# Patient Record
Sex: MALE | Race: WHITE | NOT HISPANIC OR LATINO | Employment: OTHER | URBAN - NONMETROPOLITAN AREA
[De-identification: names, ages, dates, MRNs, and addresses within clinical notes are randomized per-mention and may not be internally consistent; named-entity substitution may affect disease eponyms.]

---

## 2017-12-27 ENCOUNTER — OFFICE VISIT (OUTPATIENT)
Dept: OTOLARYNGOLOGY | Facility: CLINIC | Age: 57
End: 2017-12-27

## 2017-12-27 VITALS
BODY MASS INDEX: 19.99 KG/M2 | DIASTOLIC BLOOD PRESSURE: 88 MMHG | TEMPERATURE: 99.6 F | HEART RATE: 88 BPM | SYSTOLIC BLOOD PRESSURE: 125 MMHG | WEIGHT: 135 LBS | RESPIRATION RATE: 20 BRPM | HEIGHT: 69 IN

## 2017-12-27 DIAGNOSIS — J30.9 CHRONIC ALLERGIC RHINITIS, UNSPECIFIED SEASONALITY, UNSPECIFIED TRIGGER: ICD-10-CM

## 2017-12-27 DIAGNOSIS — R59.1 LYMPHADENOPATHY OF HEAD AND NECK: ICD-10-CM

## 2017-12-27 DIAGNOSIS — C43.31: ICD-10-CM

## 2017-12-27 DIAGNOSIS — C04.9 FLOOR OF MOUTH SQUAMOUS CELL CARCINOMA (HCC): Primary | ICD-10-CM

## 2017-12-27 DIAGNOSIS — F17.200 SMOKING: ICD-10-CM

## 2017-12-27 PROCEDURE — 4004F PT TOBACCO SCREEN RCVD TLK: CPT | Performed by: OTOLARYNGOLOGY

## 2017-12-27 PROCEDURE — 99214 OFFICE O/P EST MOD 30 MIN: CPT | Performed by: OTOLARYNGOLOGY

## 2017-12-27 RX ORDER — AMOXICILLIN AND CLAVULANATE POTASSIUM 875; 125 MG/1; MG/1
1 TABLET, FILM COATED ORAL EVERY 12 HOURS SCHEDULED
Qty: 20 TABLET | Refills: 0 | Status: SHIPPED | OUTPATIENT
Start: 2017-12-27 | End: 2018-01-06

## 2017-12-27 RX ORDER — FLUTICASONE PROPIONATE 50 MCG
2 SPRAY, SUSPENSION (ML) NASAL DAILY
Qty: 16 G | Refills: 11 | Status: SHIPPED | OUTPATIENT
Start: 2017-12-27 | End: 2021-04-20

## 2017-12-27 RX ORDER — MOMETASONE FUROATE 50 UG/1
2 SPRAY, METERED NASAL DAILY
Qty: 1 EACH | Refills: 6 | Status: SHIPPED | OUTPATIENT
Start: 2017-12-27 | End: 2017-12-27 | Stop reason: ALTCHOICE

## 2017-12-27 NOTE — PATIENT INSTRUCTIONS
Steps to Quit Smoking   Smoking tobacco can be harmful to your health and can affect almost every organ in your body. Smoking puts you, and those around you, at risk for developing many serious chronic diseases. Quitting smoking is difficult, but it is one of the best things that you can do for your health. It is never too late to quit.  WHAT ARE THE BENEFITS OF QUITTING SMOKING?  When you quit smoking, you lower your risk of developing serious diseases and conditions, such as:  · Lung cancer or lung disease, such as COPD.  · Heart disease.  · Stroke.  · Heart attack.  · Infertility.  · Osteoporosis and bone fractures.  Additionally, symptoms such as coughing, wheezing, and shortness of breath may get better when you quit. You may also find that you get sick less often because your body is stronger at fighting off colds and infections. If you are pregnant, quitting smoking can help to reduce your chances of having a baby of low birth weight.  HOW DO I GET READY TO QUIT?  When you decide to quit smoking, create a plan to make sure that you are successful. Before you quit:  · Pick a date to quit. Set a date within the next two weeks to give you time to prepare.  · Write down the reasons why you are quitting. Keep this list in places where you will see it often, such as on your bathroom mirror or in your car or wallet.  · Identify the people, places, things, and activities that make you want to smoke (triggers) and avoid them. Make sure to take these actions:    Throw away all cigarettes at home, at work, and in your car.    Throw away smoking accessories, such as ashtrays and lighters.    Clean your car and make sure to empty the ashtray.    Clean your home, including curtains and carpets.  · Tell your family, friends, and coworkers that you are quitting. Support from your loved ones can make quitting easier.  · Talk with your health care provider about your options for quitting smoking.  · Find out what treatment  "options are covered by your health insurance.  WHAT STRATEGIES CAN I USE TO QUIT SMOKING?   Talk with your healthcare provider about different strategies to quit smoking. Some strategies include:  · Quitting smoking altogether instead of gradually lessening how much you smoke over a period of time. Research shows that quitting \"cold turkey\" is more successful than gradually quitting.  · Attending in-person counseling to help you build problem-solving skills. You are more likely to have success in quitting if you attend several counseling sessions. Even short sessions of 10 minutes can be effective.  · Finding resources and support systems that can help you to quit smoking and remain smoke-free after you quit. These resources are most helpful when you use them often. They can include:    Online chats with a counselor.    Telephone quitlines.    Printed self-help materials.    Support groups or group counseling.    Text messaging programs.    Mobile phone applications.  · Taking medicines to help you quit smoking. (If you are pregnant or breastfeeding, talk with your health care provider first.) Some medicines contain nicotine and some do not. Both types of medicines help with cravings, but the medicines that include nicotine help to relieve withdrawal symptoms. Your health care provider may recommend:    Nicotine patches, gum, or lozenges.    Nicotine inhalers or sprays.    Non-nicotine medicine that is taken by mouth.  Talk with your health care provider about combining strategies, such as taking medicines while you are also receiving in-person counseling. Using these two strategies together makes you more likely to succeed in quitting than if you used either strategy on its own.  If you are pregnant or breastfeeding, talk with your health care provider about finding counseling or other support strategies to quit smoking. Do not take medicine to help you quit smoking unless told to do so by your health care " provider.  WHAT THINGS CAN I DO TO MAKE IT EASIER TO QUIT?  Quitting smoking might feel overwhelming at first, but there is a lot that you can do to make it easier. Take these important actions:  · Reach out to your family and friends and ask that they support and encourage you during this time. Call telephone quitlines, reach out to support groups, or work with a counselor for support.  · Ask people who smoke to avoid smoking around you.  · Avoid places that trigger you to smoke, such as bars, parties, or smoke-break areas at work.  · Spend time around people who do not smoke.  · Lessen stress in your life, because stress can be a smoking trigger for some people. To lessen stress, try:    Exercising regularly.    Deep-breathing exercises.    Yoga.    Meditating.    Performing a body scan. This involves closing your eyes, scanning your body from head to toe, and noticing which parts of your body are particularly tense. Purposefully relax the muscles in those areas.  · Download or purchase mobile phone or tablet apps (applications) that can help you stick to your quit plan by providing reminders, tips, and encouragement. There are many free apps, such as QuitGuide from the CDC (Centers for Disease Control and Prevention). You can find other support for quitting smoking (smoking cessation) through smokefree.gov and other websites.  HOW WILL I FEEL WHEN I QUIT SMOKING?  Within the first 24 hours of quitting smoking, you may start to feel some withdrawal symptoms. These symptoms are usually most noticeable 2-3 days after quitting, but they usually do not last beyond 2-3 weeks. Changes or symptoms that you might experience include:  · Mood swings.  · Restlessness, anxiety, or irritation.  · Difficulty concentrating.  · Dizziness.  · Strong cravings for sugary foods in addition to nicotine.  · Mild weight gain.  · Constipation.  · Nausea.  · Coughing or a sore throat.  · Changes in how your medicines work in your  body.  · A depressed mood.  · Difficulty sleeping (insomnia).  After the first 2-3 weeks of quitting, you may start to notice more positive results, such as:  · Improved sense of smell and taste.  · Decreased coughing and sore throat.  · Slower heart rate.  · Lower blood pressure.  · Clearer skin.  · The ability to breathe more easily.  · Fewer sick days.  Quitting smoking is very challenging for most people. Do not get discouraged if you are not successful the first time. Some people need to make many attempts to quit before they achieve long-term success. Do your best to stick to your quit plan, and talk with your health care provider if you have any questions or concerns.     This information is not intended to replace advice given to you by your health care provider. Make sure you discuss any questions you have with your health care provider.     Document Released: 12/12/2002 Document Revised: 05/03/2016 Document Reviewed: 05/03/2016  [employer] Interactive Patient Education ©2017 [employer]

## 2017-12-27 NOTE — PROGRESS NOTES
PRIMARY CARE PROVIDER: Yousif Marrero MD  REFERRING PROVIDER: No ref. provider found    No chief complaint on file.      Subjective   History of Present Illness:  Keron Gerber is a  57 y.o.  male who is here for follow up. He has had complaints of a right neck mass noted with shaving.  Prior to this, he has a few weeks of flu-like symptoms with malaise and nausea.  He is also had multiple dental extractions on November 7 for chronic gingivitis and dental problems.  He has not been treated with any antibiotics.  Denies any weight loss.  Denies any voice change.  Reports intermittent nausea and emesis.  Reports fatigue.  Symptoms are moderate.    He is still smoking.    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    11/7: Dental extractions.    Review of Systems:  Review of Systems   Constitutional: Positive for fatigue and fever. Negative for activity change, appetite change, chills and unexpected weight change.   HENT: Positive for postnasal drip and rhinorrhea. Negative for congestion, dental problem, ear discharge, ear pain, facial swelling, hearing loss, nosebleeds, sinus pressure, sore throat, tinnitus, trouble swallowing and voice change.    Eyes: Negative for pain, discharge, redness and itching.   Respiratory: Negative for apnea, cough, chest tightness, shortness of breath, wheezing and stridor.    Cardiovascular: Negative for chest pain, palpitations and leg swelling.   Gastrointestinal: Positive for nausea and vomiting. Negative for abdominal pain, constipation and diarrhea.   Endocrine: Negative for cold intolerance, heat intolerance and polydipsia.   Genitourinary: Negative for decreased urine volume, difficulty urinating and flank pain.   Musculoskeletal: Negative for arthralgias, back pain, neck pain  and neck stiffness.   Skin: Negative for color change, pallor and rash.   Allergic/Immunologic: Negative for immunocompromised state.   Neurological: Negative for dizziness, facial asymmetry, speech difficulty, weakness, light-headedness and headaches.   Hematological: Negative for adenopathy. Does not bruise/bleed easily.   Psychiatric/Behavioral: Negative for behavioral problems.       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 No pertinent family history.  Social History   Substance Use Topics   • Smoking status: Current Every Day Smoker     Types: Cigarettes   • Smokeless tobacco: Never Used   • Alcohol use Yes      Comment: SOCIAL       Current Outpatient Prescriptions:   •  ALPRAZolam (XANAX) 0.5 MG tablet, , Disp: , Rfl:   •  citalopram (CeleXA) 10 MG tablet, , Disp: , Rfl:   •  metoprolol succinate XL (TOPROL-XL) 25 MG 24 hr tablet, , Disp: , Rfl:   •  tiZANidine (ZANAFLEX) 4 MG tablet, , Disp: , Rfl:   •  amoxicillin-clavulanate (AUGMENTIN) 875-125 MG per tablet, Take 1 tablet by mouth Every 12 (Twelve) Hours for 10 days., Disp: 20 tablet, Rfl: 0  •  fluticasone (FLONASE) 50 MCG/ACT nasal spray, 2 sprays into each nostril Daily., Disp: 16 g, Rfl: 11  Allergies:  Sulfa antibiotics    Objective     Vital Signs:  Temp:  [99.6 °F (37.6 °C)] 99.6 °F (37.6 °C)  Heart Rate:  [88] 88  Resp:  [20] 20  BP: (125)/(88)  125/88    Physical Exam:  Physical Exam   Constitutional: He is oriented to person, place, and time. He appears well-developed. He is cooperative. No distress.   HENT:   Head: Normocephalic and atraumatic. Head is without Delgadillo's sign, without abrasion and without contusion.   Right Ear: Hearing, tympanic membrane, external ear and ear canal normal.   Left Ear: Hearing, tympanic membrane, external ear and ear canal normal.   Nose: Nasal deformity (status post forehead flap with left alar retraction.  No evidence of recurrence) present. No septal deviation.   Mouth/Throat: Uvula is midline, oropharynx is clear and moist and mucous membranes are normal. No oral lesions. Dental caries present. No uvula swelling.   Well-healed floor of mouth and no palpable abnormality.    Mirror laryngoscopy shows an omega-shaped epiglottis.  No tumors seen.   Eyes: Conjunctivae and EOM are normal. Pupils are equal, round, and reactive to light. Right eye exhibits no discharge. Left eye exhibits no discharge. No scleral icterus.   Neck: Normal range of motion. Neck supple. No JVD present. No tracheal deviation present. No thyromegaly present.       Pulmonary/Chest: Effort normal. No stridor.   Musculoskeletal: Normal range of motion. He exhibits no edema or deformity.   Lymphadenopathy:     He has no cervical adenopathy.   Neurological: He is alert and oriented to person, place, and time. He has normal strength. No cranial nerve deficit. Coordination normal.   Skin: Skin is warm and dry. No rash noted. He is not diaphoretic. No erythema. No pallor.   Psychiatric: He has a normal mood and affect. His speech is normal and behavior is normal. Judgment and thought content normal. Cognition and memory are normal.   Nursing note and vitals reviewed.      Assessment   Assessment:  1. Floor of mouth squamous cell carcinoma    2. Smoking    3. Malignant melanoma of skin of nose    4. Chronic allergic rhinitis, unspecified seasonality,  unspecified trigger    5. Lymphadenopathy of head and neck        Plan   Plan:    Mild lymph node findings on examination.  He does have dental exposure following his recent dental extractions.  This may be reactive lymphadenopathy.  He does have a history of melanoma the nose and left for mouth squamous cell carcinoma who continues to smoke.  We will treat with Augmentin for 10 days and follow-up with CT scan of the neck 6 weeks.    .  New Medications Ordered This Visit   Medications   • amoxicillin-clavulanate (AUGMENTIN) 875-125 MG per tablet     Sig: Take 1 tablet by mouth Every 12 (Twelve) Hours for 10 days.     Dispense:  20 tablet     Refill:  0   • fluticasone (FLONASE) 50 MCG/ACT nasal spray     Si sprays into each nostril Daily.     Dispense:  16 g     Refill:  11       Return in about 6 weeks (around 2018).    My findings and recommendations were discussed and questions were answered.     Yousif Lazaro MD  17  12:41 PM

## 2018-02-13 ENCOUNTER — HOSPITAL ENCOUNTER (OUTPATIENT)
Dept: CT IMAGING | Facility: HOSPITAL | Age: 58
Discharge: HOME | End: 2018-02-13
Attending: OTOLARYNGOLOGY | Admitting: OTOLARYNGOLOGY

## 2018-02-13 DIAGNOSIS — R59.1 LYMPHADENOPATHY OF HEAD AND NECK: ICD-10-CM

## 2018-02-13 LAB — CREAT BLDA-MCNC: 0.7 MG/DL (ref 0.6–1.3)

## 2018-02-13 PROCEDURE — 70492 CT SFT TSUE NCK W/O & W/DYE: CPT

## 2018-02-13 PROCEDURE — 0 IOPAMIDOL 61 % SOLUTION: Performed by: OTOLARYNGOLOGY

## 2018-02-13 PROCEDURE — 82565 ASSAY OF CREATININE: CPT

## 2018-02-13 RX ADMIN — IOPAMIDOL 100 ML: 612 INJECTION, SOLUTION INTRAVENOUS at 11:15

## 2018-02-14 ENCOUNTER — OFFICE VISIT (OUTPATIENT)
Dept: OTOLARYNGOLOGY | Facility: CLINIC | Age: 58
End: 2018-02-14

## 2018-02-14 VITALS
SYSTOLIC BLOOD PRESSURE: 113 MMHG | DIASTOLIC BLOOD PRESSURE: 77 MMHG | TEMPERATURE: 97.1 F | WEIGHT: 124 LBS | HEART RATE: 85 BPM | HEIGHT: 69 IN | BODY MASS INDEX: 18.37 KG/M2

## 2018-02-14 DIAGNOSIS — Z72.0 TOBACCO ABUSE DISORDER: ICD-10-CM

## 2018-02-14 DIAGNOSIS — C43.31: ICD-10-CM

## 2018-02-14 DIAGNOSIS — C04.9 FLOOR OF MOUTH SQUAMOUS CELL CARCINOMA (HCC): Primary | ICD-10-CM

## 2018-02-14 DIAGNOSIS — J30.9 CHRONIC ALLERGIC RHINITIS, UNSPECIFIED SEASONALITY, UNSPECIFIED TRIGGER: ICD-10-CM

## 2018-02-14 PROCEDURE — 99213 OFFICE O/P EST LOW 20 MIN: CPT | Performed by: OTOLARYNGOLOGY

## 2018-02-14 NOTE — PROGRESS NOTES
PRIMARY CARE PROVIDER: Yousif Marrero MD  REFERRING PROVIDER: No ref. provider found    Chief Complaint   Patient presents with   • Follow-up     neck mass       Subjective   History of Present Illness:  Keron Gerber is a  57 y.o.  male who is here for follow up. He has had complaints of a right neck mass noted with shaving.  Prior to this, he has a few weeks of flu-like symptoms with malaise and nausea.  He is also had multiple dental extractions on November 7 for chronic gingivitis and dental problems.  He was treated with Augmentin with improvement in symptoms.  He has also been using Flonase. Denies any weight loss.  Denies any voice change. He had a CT scan of the neck on 2/13/18 revealing no lymphadenopathy. He denies persistent lymph node swelling.  The flonase has helped the nasal drainage.    He is still smoking.  He is interested in quitting.    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    11/7: Dental extractions.    Review of Systems:  Review of Systems   Constitutional: Positive for fatigue. Negative for activity change, appetite change, chills, fever and unexpected weight change.   HENT: Positive for postnasal drip and rhinorrhea. Negative for congestion, dental problem, ear discharge, ear pain, facial swelling, hearing loss, nosebleeds, sinus pressure, sore throat, tinnitus, trouble swallowing and voice change.    Eyes: Negative for pain, discharge, redness and itching.   Respiratory: Negative for apnea, cough, chest tightness, shortness of breath, wheezing and stridor.    Cardiovascular: Negative for chest pain, palpitations and leg swelling.   Gastrointestinal: Negative for abdominal pain, constipation, diarrhea, nausea and vomiting.   Endocrine: Negative for cold intolerance, heat intolerance and  polydipsia.   Genitourinary: Negative for decreased urine volume, difficulty urinating and flank pain.   Musculoskeletal: Negative for arthralgias, back pain, neck pain and neck stiffness.   Skin: Negative for color change, pallor and rash.   Allergic/Immunologic: Negative for immunocompromised state.   Neurological: Negative for dizziness, facial asymmetry, speech difficulty, weakness, light-headedness and headaches.   Hematological: Negative for adenopathy. Does not bruise/bleed easily.   Psychiatric/Behavioral: Negative for behavioral problems.       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 No pertinent family history.  Social History   Substance Use Topics   • Smoking status: Current Every Day Smoker     Types: Cigarettes   • Smokeless tobacco: Never Used   • Alcohol use Yes      Comment: SOCIAL       Current Outpatient Prescriptions:   •  ALPRAZolam (XANAX) 0.5 MG tablet, , Disp: , Rfl:   •  citalopram (CeleXA) 10 MG tablet, , Disp: , Rfl:   •  fluticasone (FLONASE) 50 MCG/ACT nasal spray, 2 sprays into each nostril Daily., Disp: 16 g, Rfl: 11  •  metoprolol succinate XL (TOPROL-XL) 25 MG 24 hr tablet, , Disp: , Rfl:   •  tiZANidine (ZANAFLEX) 4 MG tablet, , Disp: , Rfl:   No current facility-administered medications for this visit.   Allergies:  Sulfa antibiotics    Objective     Vital Signs:  Temp:  [97.1 °F (36.2  °C)] 97.1 °F (36.2 °C)  Heart Rate:  [85] 85  BP: (113)/(77) 113/77    Physical Exam:  Physical Exam   Constitutional: He is oriented to person, place, and time. He appears well-developed. He is cooperative. No distress.   HENT:   Head: Normocephalic and atraumatic. Head is without Delgadillo's sign, without abrasion and without contusion.   Right Ear: Hearing, tympanic membrane, external ear and ear canal normal.   Left Ear: Hearing, tympanic membrane, external ear and ear canal normal.   Nose: Nasal deformity (status post forehead flap with left alar retraction.  No evidence of recurrence) present. No septal deviation.   Mouth/Throat: Uvula is midline, oropharynx is clear and moist and mucous membranes are normal. No oral lesions. Abnormal dentition (edentulous.  s/p extractions with extraction sites almost completely mucosalized.  Pitting still present.). No uvula swelling.   Well-healed floor of mouth and no palpable abnormality.  Mirror laryngoscopy shows an omega-shaped epiglottis.  No tumors seen.   Eyes: Conjunctivae and EOM are normal. Pupils are equal, round, and reactive to light. Right eye exhibits no discharge. Left eye exhibits no discharge. No scleral icterus.   Neck: Normal range of motion and phonation normal. Neck supple. No tracheal deviation present.   Pulmonary/Chest: Effort normal. No stridor. No respiratory distress.   Musculoskeletal: Normal range of motion. He exhibits no edema or deformity.   Lymphadenopathy:     He has no cervical adenopathy.   Neurological: He is alert and oriented to person, place, and time. He has normal strength. No cranial nerve deficit. Coordination normal.   Skin: Skin is warm and dry. No rash noted. He is not diaphoretic. No erythema. No pallor.   Psychiatric: He has a normal mood and affect. His speech is normal and behavior is normal. Judgment and thought content normal. Cognition and memory are normal.   Nursing note and vitals reviewed.    Data review:  I  personally reviewed the CT scan images of the neck dated 12/27/2017.  The following is my interpretation: There is no significant lymphadenopathy in the neck.        Assessment   Assessment:  1. Floor of mouth squamous cell carcinoma    2. Malignant melanoma of skin of nose    3. Chronic allergic rhinitis, unspecified seasonality, unspecified trigger    4. Tobacco abuse disorder        Plan   Plan:    Lymphadenopathy resolved. We will continue to monitor closely. RENA today. Follow-up in 6 months for cancer surveillance.     We have also discussed smoking cessation. He is interested. He has been treated with Chantix in the past by his PCP and will discuss restarting Chantix with him.     QUALITY MEASURES    Body Mass Index Screening and Follow-Up Plan  Body mass index is 18.31 kg/(m^2).      Tobacco Use: Screening and Cessation Intervention  Smoking status: Current Every Day Smoker                                                   Packs/day: 0.00      Years: 0.00         Types: Cigarettes  Smokeless status: Never Used                        Smoking cessation information given in after visit summary.      Return in about 6 months (around 8/14/2018), or if symptoms worsen or fail to improve, for Recheck.    My findings and recommendations were discussed and questions were answered.     Yousif Lazaro MD  02/14/18  9:35 AM

## 2018-06-06 RX ORDER — CITALOPRAM 20 MG/1
20 TABLET ORAL DAILY
COMMUNITY

## 2018-06-06 RX ORDER — METOPROLOL SUCCINATE 25 MG/1
25 TABLET, EXTENDED RELEASE ORAL DAILY
COMMUNITY

## 2018-06-06 RX ORDER — GABAPENTIN 100 MG/1
100 CAPSULE ORAL DAILY
COMMUNITY

## 2018-06-06 RX ORDER — IBUPROFEN 800 MG/1
800 TABLET ORAL
COMMUNITY

## 2018-06-06 RX ORDER — DICLOFENAC SODIUM 75 MG/1
75 TABLET, DELAYED RELEASE ORAL 2 TIMES DAILY
COMMUNITY

## 2018-06-06 RX ORDER — FLUTICASONE PROPIONATE 50 MCG
1 SPRAY, SUSPENSION (ML) NASAL DAILY
COMMUNITY

## 2018-06-06 RX ORDER — HYDROCODONE BITARTRATE AND ACETAMINOPHEN 7.5; 325 MG/1; MG/1
1 TABLET ORAL
COMMUNITY

## 2018-06-06 RX ORDER — TRAMADOL HYDROCHLORIDE 50 MG/1
50 TABLET ORAL EVERY 6 HOURS PRN
COMMUNITY

## 2018-06-06 RX ORDER — ALPRAZOLAM 0.5 MG/1
0.5 TABLET ORAL 2 TIMES DAILY
COMMUNITY

## 2018-08-15 ENCOUNTER — OFFICE VISIT (OUTPATIENT)
Dept: OTOLARYNGOLOGY | Facility: CLINIC | Age: 58
End: 2018-08-15

## 2018-08-15 VITALS
HEART RATE: 78 BPM | BODY MASS INDEX: 18.49 KG/M2 | HEIGHT: 68 IN | RESPIRATION RATE: 20 BRPM | SYSTOLIC BLOOD PRESSURE: 113 MMHG | TEMPERATURE: 98 F | WEIGHT: 122 LBS | DIASTOLIC BLOOD PRESSURE: 76 MMHG

## 2018-08-15 DIAGNOSIS — H60.502 ACUTE OTITIS EXTERNA OF LEFT EAR, UNSPECIFIED TYPE: ICD-10-CM

## 2018-08-15 DIAGNOSIS — J30.9 CHRONIC ALLERGIC RHINITIS, UNSPECIFIED SEASONALITY, UNSPECIFIED TRIGGER: ICD-10-CM

## 2018-08-15 DIAGNOSIS — C04.9 FLOOR OF MOUTH SQUAMOUS CELL CARCINOMA (HCC): Primary | ICD-10-CM

## 2018-08-15 DIAGNOSIS — C43.31: ICD-10-CM

## 2018-08-15 DIAGNOSIS — Z72.0 TOBACCO ABUSE DISORDER: ICD-10-CM

## 2018-08-15 DIAGNOSIS — F17.200 SMOKING: ICD-10-CM

## 2018-08-15 PROCEDURE — 99214 OFFICE O/P EST MOD 30 MIN: CPT | Performed by: OTOLARYNGOLOGY

## 2018-08-15 RX ORDER — NEOMYCIN SULFATE, POLYMYXIN B SULFATE AND HYDROCORTISONE 10; 3.5; 1 MG/ML; MG/ML; [USP'U]/ML
3 SUSPENSION/ DROPS AURICULAR (OTIC) 3 TIMES DAILY
Qty: 10 ML | Refills: 0 | Status: SHIPPED | OUTPATIENT
Start: 2018-08-15 | End: 2018-08-22

## 2018-08-15 RX ORDER — GABAPENTIN 100 MG/1
100 CAPSULE ORAL
COMMUNITY
End: 2021-04-20

## 2018-08-15 RX ORDER — TRAMADOL HYDROCHLORIDE 50 MG/1
50 TABLET ORAL
COMMUNITY
End: 2021-04-20

## 2018-08-15 NOTE — PATIENT INSTRUCTIONS
IF YOU SMOKE OR USE TOBACCO PLEASE READ THE FOLLOWING:    Why is smoking bad for me?  Smoking increases the risk of heart disease, lung disease, vascular disease, stroke, and cancer.     If you smoke, STOP!    If you would like more information on quitting smoking, please visit the [employer] website: [email or web address]   This link will provide additional resources including the QUIT line and the Beat the Pack support groups.     For more information:    Quit Now [facility]  1-800-QUIT-NOW  [email or web address]

## 2018-08-15 NOTE — PROGRESS NOTES
PRIMARY CARE PROVIDER: Yousif Marrero MD  REFERRING PROVIDER: No ref. provider found    Chief Complaint   Patient presents with   • Follow-up     neck mass / patient here to have his left ear eval do to drainage       Subjective   History of Present Illness:  Keron Gerber is a  58 y.o.  male who is here for follow up. He has had resolution of his neck swelling.    His main complaint today is left ear pain and drainage.  This has been present for 2 weeks.  He presented to his PCP with complaints of a left-sided cerumen impaction.  Attempts were made at removal.  There was concern for infection and he was placed on an oral antibiotic.  He now complains of moderate left ear pain and draiage. Dull and itching in nature.  Nothing makes this better or worse.  He has associated decreased hearing and ringing.  He denies vertigo.      He is still smoking.  He is not interested in quitting - smokes 15 cigarettes/day.    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      Review of Systems:  Review of Systems   Constitutional: Positive for fatigue. Negative for activity change, appetite change, chills, fever and unexpected weight change.   HENT: Positive for postnasal drip and rhinorrhea. Negative for congestion, dental problem, ear discharge, ear pain, facial swelling, hearing loss, nosebleeds, sinus pressure, sore throat, tinnitus, trouble swallowing and voice change.    Eyes: Negative for pain, discharge, redness and itching.   Respiratory: Negative for apnea, cough, chest tightness, shortness of breath, wheezing and stridor.    Cardiovascular: Negative for chest pain, palpitations and leg swelling.   Gastrointestinal: Negative for abdominal pain, constipation, diarrhea, nausea and vomiting.   Endocrine: Negative for cold  intolerance, heat intolerance and polydipsia.   Genitourinary: Negative for decreased urine volume, difficulty urinating and flank pain.   Musculoskeletal: Negative for arthralgias, back pain, neck pain and neck stiffness.   Skin: Negative for color change, pallor and rash.   Allergic/Immunologic: Negative for immunocompromised state.   Neurological: Negative for dizziness, facial asymmetry, speech difficulty, weakness, light-headedness and headaches.   Hematological: Negative for adenopathy. Does not bruise/bleed easily.   Psychiatric/Behavioral: Negative for behavioral problems.       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 No pertinent family history.  Social History   Substance Use Topics   • Smoking status: Current Every Day Smoker     Types: Cigarettes   • Smokeless tobacco: Never Used   • Alcohol use Yes      Comment: SOCIAL       Current Outpatient Prescriptions:   •  ALPRAZolam (XANAX) 0.5 MG tablet, , Disp: , Rfl:   •  citalopram (CeleXA) 10 MG tablet, , Disp: , Rfl:   •  fluticasone (FLONASE) 50 MCG/ACT nasal spray, 2 sprays into each nostril Daily., Disp: 16 g, Rfl: 11  •  gabapentin (NEURONTIN) 100 MG capsule, Take 100 mg by mouth., Disp: , Rfl:   •  metoprolol succinate XL (TOPROL-XL) 25 MG 24 hr tablet, , Disp: , Rfl:   •  tiZANidine (ZANAFLEX) 4 MG tablet, , Disp: , Rfl:   •  traMADol  (ULTRAM) 50 MG tablet, Take 50 mg by mouth., Disp: , Rfl:   •  neomycin-polymyxin-hydrocortisone (CORTISPORIN) 3.5-66107-5 otic suspension, Administer 3 drops into the left ear 3 (Three) Times a Day for 7 days., Disp: 10 mL, Rfl: 0  Allergies:  Sulfa antibiotics    Objective     Vital Signs:  Temp:  [98 °F (36.7 °C)] 98 °F (36.7 °C)  Heart Rate:  [78] 78  Resp:  [20] 20  BP: (113)/(76) 113/76    Physical Exam:  Physical Exam   Constitutional: He is oriented to person, place, and time. He appears well-developed. He is cooperative. No distress.   HENT:   Head: Normocephalic and atraumatic. Head is without Delgadillo's sign, without abrasion and without contusion.   Right Ear: Hearing, tympanic membrane, external ear and ear canal normal.   Nose: Nasal deformity (status post forehead flap with left alar retraction.  No evidence of recurrence) present. No septal deviation.   Mouth/Throat: Uvula is midline, oropharynx is clear and moist and mucous membranes are normal. No oral lesions. Abnormal dentition: edentulous.  s/p extractions with extraction sites almost completely mucosalized.  Pitting still present. No uvula swelling.   Dentures removed for exam.  Well-healed floor of mouth and no palpable or visible abnormality.  Mirror laryngoscopy shows an omega-shaped epiglottis.  No tumors seen.    Left ear with white, purulent debris in the canal and crusting in the conchal bowl and leading onto the infraauricular skin.  See otoscopy note.   Eyes: Pupils are equal, round, and reactive to light. Conjunctivae and EOM are normal. Right eye exhibits no discharge. Left eye exhibits no discharge. No scleral icterus.   Neck: Normal range of motion and phonation normal. Neck supple. No tracheal deviation present.   Pulmonary/Chest: Effort normal. No stridor. No respiratory distress.   Musculoskeletal: Normal range of motion. He exhibits no edema or deformity.   Lymphadenopathy:     He has no cervical adenopathy.   Neurological: He is  alert and oriented to person, place, and time. He has normal strength. No cranial nerve deficit. Coordination normal.   Skin: Skin is warm and dry. No rash noted. He is not diaphoretic. No erythema. No pallor.   Psychiatric: He has a normal mood and affect. His speech is normal and behavior is normal. Judgment and thought content normal. Cognition and memory are normal.   Nursing note and vitals reviewed.    Assessment   Assessment:  1. 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 Malignant melanoma of skin of nose (CMS/HCC)    3. Chronic allergic rhinitis, unspecified seasonality, unspecified trigger    4. Tobacco abuse disorder    5. Smoking    6. Acute otitis externa of left ear, unspecified type        Plan   Plan:    Ear was debrided.  Start cortisporin otic.  F/U 3 weeks.    Lymphadenopathy resolved. We will continue to monitor closely. RENA today. Follow-up in 6 months for cancer surveillance.       QUALITY MEASURES    Body Mass Index Screening and Follow-Up Plan  Body mass index is 18.55 kg/m².      Tobacco Use: Screening and Cessation Intervention  Smoking status: Current Every Day Smoker                                                   Packs/day: 0.00      Years: 0.00         Types: Cigarettes  Smokeless tobacco: Never Used                        Smoking cessation information given in after visit summary.      Return in about 3 weeks (around 9/5/2018).    My findings and recommendations were discussed and questions were answered.     Yousif Lazaro MD  08/15/18  5:17 PM

## 2018-09-12 ENCOUNTER — OFFICE VISIT (OUTPATIENT)
Dept: OTOLARYNGOLOGY | Facility: CLINIC | Age: 58
End: 2018-09-12

## 2018-09-12 VITALS
SYSTOLIC BLOOD PRESSURE: 140 MMHG | WEIGHT: 122 LBS | RESPIRATION RATE: 20 BRPM | HEART RATE: 80 BPM | BODY MASS INDEX: 18.07 KG/M2 | HEIGHT: 69 IN | DIASTOLIC BLOOD PRESSURE: 78 MMHG | TEMPERATURE: 97.8 F

## 2018-09-12 DIAGNOSIS — J30.9 CHRONIC ALLERGIC RHINITIS, UNSPECIFIED SEASONALITY, UNSPECIFIED TRIGGER: ICD-10-CM

## 2018-09-12 DIAGNOSIS — F17.200 SMOKING: ICD-10-CM

## 2018-09-12 DIAGNOSIS — R59.1 LYMPHADENOPATHY OF HEAD AND NECK: ICD-10-CM

## 2018-09-12 DIAGNOSIS — C43.31: ICD-10-CM

## 2018-09-12 DIAGNOSIS — C04.9 FLOOR OF MOUTH SQUAMOUS CELL CARCINOMA (HCC): Primary | ICD-10-CM

## 2018-09-12 DIAGNOSIS — Z72.0 TOBACCO ABUSE DISORDER: ICD-10-CM

## 2018-09-12 DIAGNOSIS — H60.502 ACUTE OTITIS EXTERNA OF LEFT EAR, UNSPECIFIED TYPE: ICD-10-CM

## 2018-09-12 PROCEDURE — 99213 OFFICE O/P EST LOW 20 MIN: CPT | Performed by: OTOLARYNGOLOGY

## 2018-09-12 NOTE — PROGRESS NOTES
PRIMARY CARE PROVIDER: Yousif Marrero MD  REFERRING PROVIDER: No ref. provider found    Chief Complaint   Patient presents with   • Follow-up     neck mass/ left ear drainage       Subjective   History of Present Illness:  Keron Gerber is a  58 y.o. male who is here for follow up regarding his left ear infection and drainage.  When he was seen on 8/15/2018, he complained of left ear pain and drainage beginning after his PCP removed some impacted cerumen on the left.  He was placed on an oral antibiotic by his PCP.  I further debrided his ear and placed him on cortisporin otic.  He reports complete resolution of the ear pain and hearing loss.  No vertigo.  No tinnitus.    He is still smoking.  He is not interested in quitting - smokes 15 cigarettes/day.     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     Review of Systems:  Review of Systems   Constitutional: Negative for chills and fever.   HENT: Positive for congestion. Negative for ear discharge, ear pain, facial swelling, hearing loss, sore throat, trouble swallowing and voice change.    Respiratory: Negative for shortness of breath.    Musculoskeletal: Positive for gait problem.   Hematological: Negative for adenopathy.       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 No pertinent family history.  Social History   Substance Use Topics   • Smoking status: Current Every Day Smoker     Types: Cigarettes   • Smokeless tobacco: Never Used   • Alcohol use Yes      Comment: SOCIAL     Allergies:  Sulfa antibiotics    Current Outpatient Prescriptions:   •  ALPRAZolam (XANAX) 0.5 MG tablet, , Disp: , Rfl:   •  citalopram (CeleXA) 10 MG tablet, , Disp: , Rfl:   •  fluticasone (FLONASE) 50 MCG/ACT nasal spray, 2 sprays into each nostril Daily., Disp: 16 g, Rfl: 11  •  gabapentin (NEURONTIN) 100 MG capsule, Take 100 mg by mouth., Disp: , Rfl:   •  metoprolol succinate XL (TOPROL-XL) 25 MG 24 hr tablet, , Disp: , Rfl:   •  tiZANidine (ZANAFLEX) 4 MG tablet, , Disp: , Rfl:   •  traMADol (ULTRAM) 50 MG tablet, Take 50 mg by mouth., Disp: , Rfl:       Objective     Vital Signs:  Temp:  [97.8 °F (36.6 °C)] 97.8 °F (36.6 °C)  Heart Rate:  [80] 80  Resp:  [20] 20  BP: (140)/(78) 140/78    Physical Exam:  Physical Exam   Constitutional: He is oriented to person, place, and time. He appears well-developed and well-nourished. He is cooperative. No distress.   HENT:   Head: Normocephalic and atraumatic.   Right Ear: Hearing, tympanic membrane, external ear and ear canal normal.   Left Ear: Hearing, tympanic membrane, external ear and ear canal normal.   Nose: Nose normal.       Mouth/Throat: Uvula is midline, oropharynx is clear and moist and mucous membranes are normal. He has dentures.       Eyes: Pupils are equal, round, and reactive to light. Conjunctivae and EOM are normal. Right eye exhibits no discharge. Left eye exhibits no discharge. No scleral icterus.   Neck: Normal range of motion. Neck supple. No JVD present. No tracheal deviation present. No thyromegaly present.   Pulmonary/Chest: Effort normal. No stridor.    Musculoskeletal: Normal range of motion. He exhibits no edema or deformity.   Lymphadenopathy:     He has no cervical adenopathy.   Neurological: He is alert and oriented to person, place, and time. He has normal strength. No cranial nerve deficit. Coordination normal.   Skin: Skin is warm and dry. No rash noted. He is not diaphoretic. No erythema. No pallor.   Psychiatric: He has a normal mood and affect. His speech is normal and behavior is normal. Judgment and thought content normal. Cognition and memory are normal.   Nursing note and vitals reviewed.      Assessment   Assessment:  1. 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 Malignant melanoma of skin of nose (CMS/HCC)    3. Chronic allergic rhinitis, unspecified seasonality, unspecified trigger    4. Tobacco abuse disorder    5. Smoking    6. Acute otitis externa of left ear, unspecified type    7. Lymphadenopathy of head and neck        Plan   Plan:    His ear infection has resolved.  Call should do develop any decreased hearing, ear fullness, or ear drainage.  Follow-up in 6 months for routine melanoma and floor of mouth cancer surveillance.    Return in about 6 months (around 3/12/2019).    My findings and recommendations were discussed and questions were answered.     Yousif Lazaro MD  09/12/18  9:50 AM

## 2019-08-07 ENCOUNTER — OFFICE VISIT (OUTPATIENT)
Dept: OTOLARYNGOLOGY | Facility: CLINIC | Age: 59
End: 2019-08-07

## 2019-08-07 VITALS
WEIGHT: 126.8 LBS | HEIGHT: 69 IN | OXYGEN SATURATION: 98 % | RESPIRATION RATE: 20 BRPM | DIASTOLIC BLOOD PRESSURE: 82 MMHG | HEART RATE: 80 BPM | TEMPERATURE: 98 F | BODY MASS INDEX: 18.78 KG/M2 | SYSTOLIC BLOOD PRESSURE: 120 MMHG

## 2019-08-07 DIAGNOSIS — Z72.0 TOBACCO ABUSE DISORDER: ICD-10-CM

## 2019-08-07 DIAGNOSIS — C04.9 FLOOR OF MOUTH SQUAMOUS CELL CARCINOMA (HCC): Primary | ICD-10-CM

## 2019-08-07 DIAGNOSIS — C43.31: ICD-10-CM

## 2019-08-07 PROCEDURE — 99213 OFFICE O/P EST LOW 20 MIN: CPT | Performed by: OTOLARYNGOLOGY

## 2019-08-07 NOTE — PATIENT INSTRUCTIONS
IF YOU SMOKE OR USE TOBACCO PLEASE READ THE FOLLOWING:  Why is smoking bad for me?  Smoking increases the risk of heart disease, lung disease, vascular disease, stroke, and cancer. If you smoke, STOP!    If you would like more information on quitting smoking, please visit the [facility] website: [email or web address]   This link will provide additional resources including the QUIT line and the Beat the Pack support groups.     For more information:  Quit Now [facility]  1-800-QUIT-NOW  [email or web address]

## 2019-08-07 NOTE — PROGRESS NOTES
PRIMARY CARE PROVIDER: Yousif Marrero MD  REFERRING PROVIDER: No ref. provider found    Chief Complaint   Patient presents with   • Follow-up       Subjective   History of Present Illness:  Keron Gerber is a  59 y.o. male who presents for nasal melanoma and floor of mouth squamous cell carcinoma surveillance.    With regards to his [id number] malignant melanoma of his nose, he is breathing fine.  He has no nasal obstruction.  He has no nasal bleeding.  He denies any unexpected weight loss-in fact, he is gaining weight.  He denies any headaches, neck pain, lymph node swelling.  His surgery was October 2, 2014.    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 Lawrence with primary excision of August 7, 2014, he is doing well.  He did have all of his teeth extracted and the denture formed.  He denies any recurrent lesion in the mouth.  He denies any mouth pain.  He is continuing to smoke, about 15 cigarettes/day.  He is not interested in smoking cessation.    Review of Systems:  Review of Systems   Constitutional: Negative for chills and fever.   HENT: Positive for dental problem. Negative for congestion, ear pain, mouth sores and nosebleeds.    Musculoskeletal: Negative for neck pain.   Skin: Negative for color change and wound.   Neurological: Negative for facial asymmetry.   Hematological: Negative for adenopathy.       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 No pertinent family history.  Social History     Tobacco Use   • Smoking status: Current Every Day Smoker     Types: Cigarettes   • Smokeless tobacco: Never Used   Substance Use Topics   • Alcohol use: Yes     Comment: SOCIAL   • Drug use: No     Allergies:  Sulfa antibiotics    Current Outpatient Medications:   •  citalopram (CeleXA) 10 MG tablet, , Disp: , Rfl:   •  fluticasone (FLONASE) 50 MCG/ACT nasal spray, 2 sprays into each nostril Daily., Disp: 16 g, Rfl: 11  •  gabapentin (NEURONTIN) 100 MG capsule, Take 100 mg by mouth., Disp: , Rfl:   •  metoprolol succinate XL (TOPROL-XL) 25 MG 24 hr tablet, , Disp: , Rfl:   •  tiZANidine (ZANAFLEX) 4 MG tablet, , Disp: , Rfl:   •  traMADol (ULTRAM) 50 MG tablet, Take 50 mg by mouth., Disp: , Rfl:       Objective     Vital Signs:  Temp:  [98 °F (36.7 °C)] 98 °F (36.7 °C)  Heart Rate:  [80] 80  Resp:  [20] 20  BP: (120)/(82) 120/82    Physical Exam:  Physical Exam   Constitutional: He is oriented to person, place, and time. He appears well-developed and well-nourished. He is cooperative. No distress.   HENT:   Head: Normocephalic and atraumatic.   Right Ear: External ear normal.   Left Ear: External ear normal.   Nose: Nose normal.       Mouth/Throat: Uvula is midline, oropharynx is clear and moist and mucous membranes are normal. He has dentures (Removed for the examination.). No oral lesions.   Eyes: Conjunctivae and EOM are normal. Pupils are equal, round, and reactive to light. Right eye exhibits no discharge. Left eye exhibits no discharge. No scleral icterus.   Neck: Normal range of motion. Neck supple. No JVD present. No tracheal deviation present. No thyromegaly present.   Pulmonary/Chest: Effort normal. No stridor.   Musculoskeletal: Normal range of motion. He exhibits no edema or deformity.   Lymphadenopathy:     He has no cervical adenopathy.   Neurological:  He is alert and oriented to person, place, and time. He has normal strength. No cranial nerve deficit. Coordination normal.   Skin: Skin is warm and dry. No rash noted. He is not diaphoretic. No erythema. No pallor.   Psychiatric: He has a normal mood and affect. His speech is normal and behavior is normal. Judgment and thought content normal. Cognition and memory are normal.   Nursing note and vitals reviewed.      Assessment   Assessment:  1. 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 Malignant melanoma of skin of nose (CMS/HCC)    3. Tobacco abuse disorder        Plan   Plan:    No evidence of new or recurrent cutaneous carcinoma or oral cavity carcinoma.  He is continuing to smoke.  He realizes this is not healthy, but does not want to quit at this time.  We are 5 years out on his cancers, but, with his history and continuing to smoke, I did see him back in 1 year.    QUALITY MEASURES    Body Mass Index Screening and Follow-Up Plan  Body mass index is 18.73 kg/m².      Tobacco Use: Screening and Cessation Intervention  Social History    Tobacco Use      Smoking status: Current Every Day Smoker        Types: Cigarettes      Smokeless tobacco: Never Used          Return in about 1 year (around 8/7/2020).    My findings and recommendations were discussed and questions were answered.     Yousif Lazaro MD  08/07/19  11:14 AM

## 2021-04-16 ENCOUNTER — TELEPHONE (OUTPATIENT)
Dept: NEUROSURGERY | Facility: CLINIC | Age: 61
End: 2021-04-16

## 2021-04-16 NOTE — TELEPHONE ENCOUNTER
LVM with patient regarding appointment reminder 04/20/21. Advised via VM that imaging from outside facility must be brought on CD for review. Also advised to come early if paperwork was not obtained via mail.

## 2021-04-20 ENCOUNTER — OFFICE VISIT (OUTPATIENT)
Dept: NEUROSURGERY | Facility: CLINIC | Age: 61
End: 2021-04-20

## 2021-04-20 ENCOUNTER — HOSPITAL ENCOUNTER (OUTPATIENT)
Dept: GENERAL RADIOLOGY | Facility: HOSPITAL | Age: 61
Discharge: HOME | End: 2021-04-20
Admitting: NURSE PRACTITIONER

## 2021-04-20 VITALS — HEIGHT: 69 IN | WEIGHT: 141 LBS | BODY MASS INDEX: 20.88 KG/M2

## 2021-04-20 DIAGNOSIS — Z87.81 HISTORY OF CERVICAL FRACTURE: ICD-10-CM

## 2021-04-20 DIAGNOSIS — M54.50 LUMBAR BACK PAIN: ICD-10-CM

## 2021-04-20 DIAGNOSIS — M54.2 CERVICALGIA: Primary | ICD-10-CM

## 2021-04-20 DIAGNOSIS — Z87.891 HISTORY OF TOBACCO ABUSE: ICD-10-CM

## 2021-04-20 DIAGNOSIS — M79.601 PAIN IN BOTH UPPER EXTREMITIES: ICD-10-CM

## 2021-04-20 DIAGNOSIS — M79.602 PAIN IN BOTH UPPER EXTREMITIES: ICD-10-CM

## 2021-04-20 PROBLEM — R48.0 DYSLEXIA: Status: ACTIVE | Noted: 2021-04-20

## 2021-04-20 PROCEDURE — 72114 X-RAY EXAM L-S SPINE BENDING: CPT

## 2021-04-20 PROCEDURE — 99204 OFFICE O/P NEW MOD 45 MIN: CPT | Performed by: NURSE PRACTITIONER

## 2021-04-20 NOTE — PATIENT INSTRUCTIONS
Tobacco Use Disorder  Tobacco use disorder (TUD) occurs when a person craves, seeks, and uses tobacco, regardless of the consequences. This disorder can cause problems with mental and physical health. It can affect your ability to have healthy relationships, and it can keep you from meeting your responsibilities at work, home, or school.  Tobacco may be:  · Smoked as a cigarette or cigar.  · Inhaled using e-cigarettes.  · Smoked in a pipe or hookah.  · Chewed as smokeless tobacco.  · Inhaled into the nostrils as snuff.  Tobacco products contain a dangerous chemical called nicotine, which is very addictive. Nicotine triggers hormones that make the body feel stimulated and works on areas of the brain that make you feel good. These effects can make it hard for people to quit nicotine.  Tobacco contains many other unsafe chemicals that can damage almost every organ in the body. Smoking tobacco also puts others in danger due to fire risk and possible health problems caused by breathing in secondhand smoke.  What are the signs or symptoms?  Symptoms of TUD may include:  · Being unable to slow down or stop your tobacco use.  · Spending an abnormal amount of time getting or using tobacco.  · Craving tobacco. Cravings may last for up to 6 months after quitting.  · Tobacco use that:  ? Interferes with your work, school, or home life.  ? Interferes with your personal and social relationships.  ? Makes you give up activities that you once enjoyed or found important.  · Using tobacco even though you know that it is:  ? Dangerous or bad for your health or someone else's health.  ? Causing problems in your life.  · Needing more and more of the substance to get the same effect (developing tolerance).  · Experiencing unpleasant symptoms if you do not use the substance (withdrawal). Withdrawal symptoms may include:  ? Depressed, anxious, or irritable mood.  ? Difficulty concentrating.  ? Increased appetite.  ? Restlessness or trouble  sleeping.  · Using the substance to avoid withdrawal.  How is this diagnosed?  This condition may be diagnosed based on:  · Your current and past tobacco use. Your health care provider may ask questions about how your tobacco use affects your life.  · A physical exam.  You may be diagnosed with TUD if you have at least two symptoms within a 12-month period.  How is this treated?  This condition is treated by stopping tobacco use. Many people are unable to quit on their own and need help. Treatment may include:  · Nicotine replacement therapy (NRT). NRT provides nicotine without the other harmful chemicals in tobacco. NRT gradually lowers the dosage of nicotine in the body and reduces withdrawal symptoms. NRT is available as:  ? Over-the-counter gums, lozenges, and skin patches.  ? Prescription mouth inhalers and nasal sprays.  · Medicine that acts on the brain to reduce cravings and withdrawal symptoms.  · A type of talk therapy that examines your triggers for tobacco use, how to avoid them, and how to cope with cravings (behavioral therapy).  · Hypnosis. This may help with withdrawal symptoms.  · Joining a support group for others coping with TUD.  The best treatment for TUD is usually a combination of medicine, talk therapy, and support groups. Recovery can be a long process. Many people start using tobacco again after stopping (relapse). If you relapse, it does not mean that treatment will not work.  Follow these instructions at home:    Lifestyle  · Do not use any products that contain nicotine or tobacco, such as cigarettes and e-cigarettes.  · Avoid things that trigger tobacco use as much as you can. Triggers include people and situations that usually cause you to use tobacco.  · Avoid drinks that contain caffeine, including coffee. These may worsen some withdrawal symptoms.  · Find ways to manage stress. Wanting to smoke may cause stress, and stress can make you want to smoke. Relaxation techniques such as  deep breathing, meditation, and yoga may help.  · Attend support groups as needed. These groups are an important part of long-term recovery for many people.  General instructions  · Take over-the-counter and prescription medicines only as told by your health care provider.  · Check with your health care provider before taking any new prescription or over-the-counter medicines.  · Decide on a friend, family member, or smoking quit-line (such as 1-800-QUIT-NOW in the [place] that you can call or text when you feel the urge to smoke or when you need help coping with cravings.  · Keep all follow-up visits as told by your health care provider and therapist. This is important.  Contact a health care provider if:  · You are not able to take your medicines as prescribed.  · Your symptoms get worse, even with treatment.  Summary  · Tobacco use disorder (TUD) occurs when a person craves, seeks, and uses tobacco regardless of the consequences.  · This condition may be diagnosed based on your current and past tobacco use and a physical exam.  · Many people are unable to quit on their own and need help. Recovery can be a long process.  · The most effective treatment for TUD is usually a combination of medicine, talk therapy, and support groups.  This information is not intended to replace advice given to you by your health care provider. Make sure you discuss any questions you have with your health care provider.  Document Revised: 12/05/2018 Document Reviewed: 12/05/2018  [employer] Patient Education © 2021 [employer]

## 2021-04-20 NOTE — PROGRESS NOTES
"Primary Care Provider: Yousif Marrero MD  Requesting Provider: Marizol Delaney PA-C    Chief Complaint:   Chief Complaint   Patient presents with   • Back Pain   • Neck Pain     History of Present Illness  Consultation today at the request of Marizol Delaney PA-C    HISTORY/ HPI:  Keron Gerber is a 60 y.o.  male who present today with his mother with a complaint of both neck and lumbar back pain.  No previous spine surgeries.  No recent injury.    Onset of his discomfort began in 2015 after falling from the loft of a pole barn.  At onset, he sustained a questionable fracture to the anterior inferior C3 vertebrae and multiple rib fractures.    In regards to his cervical spine, he complains of constant neck and upper thoracic pain that radiates to the posterior deltoid and triceps bilaterally.  He denies upper extremity weakness, numbness, or tingling.  His neck discomfort often wakes him from sleep and worsens with physical activity.  Alleviating factors include rest and avoidance.    In regards to his lumbar spine, he complains of constant midline lumbar back pain.  He denies lower extremity radicular pain, weakness, numbness, or tingling.  His discomfort is worse upon waking, and with prolonged sitting, standing, walking, and with physical activity which includes \"lifting more than 1 gallon of milk\".  Alleviating factors include use of Tylenol arthritis and \"sitting in my lazy boy watching TV\".  He currently ambulates with a cane due to gait and balance abnormalities and recurrent falls.  His last fall occurred approximately 6 months prior.  He denies fevers, chills, night sweats, unexplained weight loss, saddle anesthesia, or bowel bladder dysfunction.  He currently rates the severity of his symptoms 6/10.  No additional concerns this time.    Oswestry Disability Index = 64%   Score   Pain Intensity Moderate pain-2   Personal Care Look after myslef but very painful-1   Lifting Only very " "light weights-4   Walking Pain prevents > 100 yards-3   Sitting Sit in \"favorite\" chair as long as I like-1   Standing Pain limits standing to < 10 min-4   Sleeping Can only sleep < 4 hrs-3   Sex Life (if applicable) Pain prevents any sex-6   Social Life No social life because of pain-5   Traveling Pain restricts to <30 min-4   (Cristy et al, 1980)    SCORE INTERPRETATION OF THE OSWESTRY LBP DISABILITY QUESTIONNAIRE     60-80% Crippled Back pain impinges on all aspects of these patients' lives both at home and at work. Positive intervention is required.     ROS:  Review of Systems   Constitutional: Negative.    HENT: Positive for postnasal drip.    Eyes: Negative.    Respiratory: Negative.    Cardiovascular: Negative.    Gastrointestinal: Negative.    Endocrine: Negative.    Genitourinary: Negative.    Musculoskeletal: Positive for back pain, neck pain and neck stiffness.   Skin: Negative.    Allergic/Immunologic: Negative.    Neurological: Negative.    Hematological: Negative.    Psychiatric/Behavioral: Negative.    All other systems reviewed and are negative.    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    Social History:  reports that he has been smoking cigarettes. He has never used smokeless tobacco. He reports current alcohol use. He reports that he does not use " "drugs.    Medications:  No current outpatient medications on file.    Allergies:  Sulfa antibiotics    OBJECTIVE:  Objective   Ht 175.3 cm (69\")   Wt 64 kg (141 lb)   BMI 20.82 kg/m²   Physical Exam  Vitals and nursing note reviewed.   Constitutional:       General: He is not in acute distress.     Appearance: Normal appearance. He is well-developed, well-groomed and normal weight. He is not ill-appearing, toxic-appearing or diaphoretic.   HENT:      Head: Normocephalic and atraumatic.      Right Ear: Hearing normal.      Left Ear: Hearing normal.   Eyes:      Extraocular Movements: EOM normal.      Conjunctiva/sclera: Conjunctivae normal.      Pupils: Pupils are equal, round, and reactive to light.   Neck:      Trachea: Trachea normal.   Cardiovascular:      Rate and Rhythm: Normal rate and regular rhythm.   Pulmonary:      Effort: Pulmonary effort is normal. No tachypnea, bradypnea, accessory muscle usage or respiratory distress.   Abdominal:      Palpations: Abdomen is soft.   Musculoskeletal:      Left hand: Deformity present.      Cervical back: Full passive range of motion without pain and neck supple.      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   Neurological:      Mental Status: He is alert and oriented to person, place, and time.      GCS: GCS eye subscore is 4. GCS verbal subscore is 5. GCS motor subscore is 6.      Gait: Gait is intact.      Deep Tendon Reflexes:      Reflex Scores:       Tricep reflexes are 0 on the right side and 0 on the left side.       Bicep reflexes are 1+ on the right side and 1+ on the left side.       Brachioradialis reflexes are 1+ on the right side and 1+ on the left side.       Patellar reflexes are 2+ on the right side and 2+ on the left side.       Achilles reflexes are 0 on the right side and 0 on the left side.  Psychiatric:         Speech: Speech normal.         Behavior: Behavior normal. Behavior " is cooperative.       Neurologic Exam     Mental Status   Oriented to person, place, and time.   Attention: normal. Concentration: normal.   Speech: speech is normal   Level of consciousness: alert    Cranial Nerves     CN II   Visual fields full to confrontation.     CN III, IV, VI   Pupils are equal, round, and reactive to light.  Extraocular motions are normal.     CN V   Facial sensation intact.     CN VII   Facial expression full, symmetric.     CN VIII   CN VIII normal.     CN IX, X   CN IX normal.     CN XI   CN XI normal.     Motor Exam   Right arm tone: normal  Left arm tone: normal  Right leg tone: normal  Left leg tone: normal    Strength   Right deltoid: 5/5  Left deltoid: 5/5  Right biceps: 5/5  Left biceps: 5/5  Right triceps: 5/5  Left triceps: 5/5  Right wrist extension: 5/5  Left wrist extension: 5/5  Right iliopsoas: 5/5  Left iliopsoas: 5/5  Right quadriceps: 5/5  Left quadriceps: 5/5  Right anterior tibial: 5/5  Left anterior tibial: 5/5  Right gastroc: 5/5  Left gastroc: 5/5  Right EHL 5/5  Left EHL 5/5       Sensory Exam   Right arm light touch: normal  Left arm light touch: normal  Right leg light touch: normal  Left leg light touch: normal    Gait, Coordination, and Reflexes     Gait  Gait: normal    Tremor   Resting tremor: absent  Intention tremor: absent  Action tremor: absent    Reflexes   Right brachioradialis: 1+  Left brachioradialis: 1+  Right biceps: 1+  Left biceps: 1+  Right triceps: 0  Left triceps: 0  Right patellar: 2+  Left patellar: 2+  Right achilles: 0  Left achilles: 0  Right plantar: normal  Left plantar: normal  Right Rosas: absent  Left Rosas: absent  Right ankle clonus: absent  Left ankle clonus: absent  Right pendular knee jerk: absent  Left pendular knee jerk: absent    Male  strength (pounds)  AGE Right Hand RH Norms Left Hand LH Norms   20-24  121+20.6  104+21.8   25-29  120+23.0  110+16.2   30-34  121+22.4  110+21.7   35-39  119+24  113+21.7   40-44   117+20.7  112+18.7   45-49  110+23.0  101+22.8   50-54  113+18.1  102+17   55-59 64 101+26.7 64 83+23.4   60-64  90+20.4  77+20.3   65-69  91+20.6  76.8+19.8   70-74  75+21.5  65+18.1   75+  66+21.0  55+17.0   (SANTOSH Banks et al; Hand Dynometer: Effects of trials and sessions.  Perpetual and Motor Skills 61:195-8, 1985)  * = Dominant hand  > = Intervention    Imaging: (independent review and interpretation)  No radiology results for the last 30 days.    ASSESSMENT/ PLAN:  Keron Gerber is a 60 y.o. male with a significant medical history of melanoma, squamous cell carcinoma of the mouth, and tobacco abuse.  He presents with a new problem of both neck and lumbar back pain. FREDDY: 66.  Physical exam findings of congenital malformation of the left hand with only 4 digits, right  strength 2 standard deviations below age-matched controls, left  strength 1 standard deviation below age-matched controls, gross hyporeflexia with absent bilateral Achilles reflexes, no Rosas's or clonus, and a slow waddling gait. No radiology results for the last 30 days.    TREATMENT RECOMMENDATIONS ...  Cervicalgia  Bilateral posterior deltoid and tricep pain  History of cervical fracture  Differential diagnosis include, but are not limited to Cervical Radiculopathy, Cervical Degenerative Disc Disease, Cervical Spinal Stenosis, Cervical Spondylosis without myelopathy and Bilateral Cervical Radiculopathy    For further evaluation of his cervical discomfort with questionable fractures at C3, we will proceed today by obtaining both x-rays of the cervical spine complete with flexion extension, as well as a CT scan of the cervical spine.    Mechanical Low Back Pain   Defined back pain as worsened with sitting and standing and nearly relieved with rest.  This can include referred pain radiating down posterior thighs without descent below the knees.     DDX:  spondylolisthesis with mechanical instability  compression fracture  degenerative  facet arthropathy  coronal or sagittal spinal malalignment  failed back syndrome after surgery  flat back syndrome.   MUCH LESS LIKELY  myofascial pain  drug induced myalgias (statins, Neulasta, or phosphodiesterase type V inhibitors such as tadalafil).  Infectious discitis, vertebral osteomyelitis, spinal epidural abscess,   Neoplastic: spinal tumor (intradural or extradural), multiple myeloma, sacroiliitis  Degenerative spine: lumbar stenosis   Spondyloarthropathies including ankylosis spondylitis ([id number]), Paget's disease.  Fibromyalgia or other rheumatological disease. Malingering, conversion disorder and secondary gain are diagnoses of exclusion.    We will proceed today by obtaining x-rays of the lumbar spine complete with flexion and extension.  As a means of first-line conservative management for Cervical and Lumbar pain, we will send Keron for a dedicated course of physician directed physical therapy; Rx provided.  I additionally recommended chiropractic and/or massage care as tolerated.  Unless contraindicated, Tylenol and ibuprofen or naproxen PRN per package instructions for pain, or may continue current pain medications as previously prescribed by outside clinician.  B/R/AE and use discussed.  Return for reassessment with me after completion of physical therapy.  I advised the patient to call to return sooner for new or worsening complaints of weakness, paresthesias, gait disturbances, or any additional concerns.  Treatment options discussed in detail with Keron and he voiced understanding.  Mr. Gerber agrees with this plan of care.    Tobacco abuse  The patient understands the many dangers of continuing to use tobacco. Despite this, Mr. Gerber states quitting is not an immediate priority at this time and declines to discuss tobacco cessation.  I reminded the patient that if quitting becomes an increased priority to contact us for help with quitting.       Diagnoses and all orders for this  visit:    1. Cervicalgia (Primary)  -     CT Cervical Spine Without Contrast  -     XR Spine Cervical Complete With Flex Ext; Future    2. Pain in both upper extremities  -     CT Cervical Spine Without Contrast    3. History of cervical fracture  -     CT Cervical Spine Without Contrast    4. Lumbar back pain  -     Ambulatory Referral to Physical Therapy Evaluate and treat (2 to 3 times weekly for 6 weeks.)  -     XR Spine Lumbar Complete With Flex & Ext    5. History of tobacco abuse      Return in about 6 weeks (around 6/1/2021) for FOLLOW WITH WITH MAYELA AFTER COMPLETION OF PT.    Thank you for this Consultation and the opportunity to participate in Keron's care.    Sincerely,  Mayela Branch, APRN    Level of Risk: Moderate due to: undiagnosed new problem  MDM: Moderate  (Mod = 11099, High = 75846)

## 2021-05-14 ENCOUNTER — HOSPITAL ENCOUNTER (OUTPATIENT)
Dept: CT IMAGING | Facility: HOSPITAL | Age: 61
Discharge: HOME | End: 2021-05-14
Admitting: NURSE PRACTITIONER

## 2021-05-14 DIAGNOSIS — M54.2 CERVICALGIA: ICD-10-CM

## 2021-05-14 PROCEDURE — 72125 CT NECK SPINE W/O DYE: CPT

## 2021-06-02 ENCOUNTER — TELEPHONE (OUTPATIENT)
Dept: NEUROSURGERY | Facility: CLINIC | Age: 61
End: 2021-06-02

## 2021-06-02 NOTE — TELEPHONE ENCOUNTER
CALLED PT TO CONFIRM APT WITH MAYELA MOURA FOR 06/03/21;  PT STATED THAT HE HAS JUST STARTED PHYSICAL THERAPY, SO PT AGREED TO MOVE APT FURTHER OUT APRX 5 MORE WEEKS UNTIL HE IS ABLE TO COMPLETE MORE THERAPY

## 2021-08-16 ENCOUNTER — TELEPHONE (OUTPATIENT)
Dept: NEUROSURGERY | Facility: CLINIC | Age: 61
End: 2021-08-16

## 2022-07-15 NOTE — PROGRESS NOTES
"Chief Complaint  I have prostate cancer    Subjective          Keron Gerber presents to [facility]   Initially presented to Dr. Luciano in [place] with an elevated PSA of 15.2.  Most recent PSA 16.7.  On transrectal ultrasound his prostate (05/23/2022) measured 32.86 mL.  Patient underwent biopsy which revealed [place] grade 3+3 = 6 in 6 of 16 cores. Patient does not get erections.         Current Outpatient Medications:   •  acetaminophen (TYLENOL) 500 MG tablet, Take 500 mg by mouth Every 6 (Six) Hours As Needed for Mild Pain ., Disp: , Rfl:   •  naproxen (NAPROSYN) 500 MG tablet, TAKE 1 TABLET BY MOUTH TWICE DAILY AS NEEDED. 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   Gastrointestinal: Negative for abdominal pain, anal bleeding or blood in stool.           Objective   PHYSICAL EXAM  Vital Signs:   Temp 98.2 °F (36.8 °C)   Ht 175.3 cm (69\")   Wt 58.2 kg (128 lb 3.2 oz)   BMI 18.93 kg/m²     Constitutional: Well nourished, Well developed; No apparent distress.  His vital signs are reviewed  Psychiatric: Appropriate affect; Alert and oriented  Eyes: Unremarkable  Musculoskeletal: Normal gait and station  GI: Abdomen is soft, nontender  Respiratory: No distress; Unlabored movement; " No accessory musculature needed with symmetric movements  Skin: No pallor or diaphoresis  : Penis and testicles are normal; Prostate 30 mL with small left apical nodule            DATA  Result Review :              Results for orders placed or performed during the hospital encounter of 02/13/18   POC Creatinine    Specimen: Blood   Result Value Ref Range    Creatinine 0.70 0.60 - 1.30 mg/dL                                ASSESSMENT AND PLAN          Problem List Items Addressed This Visit        Hematology and Neoplasia    Prostate cancer (HCC) - Primary    Relevant Orders    Case Request (Completed)      Patient has favorable intermediate risk disease based upon Johanny 3+3 in 5/12 biopsies, PSA 16.7, and cT2a staging.    His option include IMRT, possibly SBRT, and robot-assisted laparoscopic prostatectomy.  Each of these is discussed separately including the risks and benefits.  We especially discussed the effect on continence and erections with any of these therapies.  We also discussed the effect upon the rectum as well as possible stricture/bladder neck contracture disease.  Lastly we discussed possible rectal exam with prostatectomy.    He opted to undergo robot-assisted laparoscopic prostatectomy.  In a  previous visit with Dr. Luciano they went over the pros and cons of treatment versus active surveillance.  That discussion included the types of therapy that are available and today he is here because he has decided to undergo robotic prostatectomy.  After confirming he had no additional questions regarding these issues, We discussed the following:  #1.  The technique of robot-assisted laparoscopic prostatectomy including my preference and approaching prostatectomy in this manner versus open prostatectomy.  I explained that the magnification and 3-dimensional view via the robot gives a high definition view that is superior to the view in pelvic surgery.  We talked about the precision of the small  instruments in addition to tremor reduction.  Lastly we discussed that robot prostatectomy in my experience leads to shorter hospital stay.  Return to normal activities sooner and less blood loss.  I explained my greater than ten years of experience with the procedure to draw these conclusions.  #2.  We went over the complications of the procedure in more detail than at the previous visit regarding infection, hernia, blood loss including the possible need for transfusion, incontinence, impotence, infertility with loss of ejaculation, shortening of the penis and possibly Peyronie's disease, vesicourethral anastomotic leak and its complications including abscess, urinoma, along with bladder neck contracture, urethral stricture, ureteral injury either immediate or delayed, need to convert to open surgery rectal injury including temporary or permanent colostomy possibly, and damage to other intra-abdominal organs.  I explained that there is even a possibility that upon introduction of the trochars and mobilization that there could be a reason to abandon the procedure and recommend external beam radiotherapy and a later time.  #3.  We went over the anticipated postoperative course that included hospital stay, work limitations, and other restrictions that will be recommended following the procedure.  We also discussed management of the catheter postoperatively as well as possible REMBERTO drain.          FOLLOW UP     No follow-ups on file.        (Please note that portions of this note were completed with a voice recognition program.)  Matthew Weaver MD  07/19/22  09:31 CDT

## 2022-07-19 ENCOUNTER — HOSPITAL ENCOUNTER (OUTPATIENT)
Facility: HOSPITAL | Age: 62
Setting detail: SURGERY ADMIT
End: 2022-07-19
Attending: UROLOGY | Admitting: UROLOGY

## 2022-07-19 ENCOUNTER — PATIENT ROUNDING (BHMG ONLY) (OUTPATIENT)
Dept: UROLOGY | Facility: CLINIC | Age: 62
End: 2022-07-19

## 2022-07-19 ENCOUNTER — OFFICE VISIT (OUTPATIENT)
Dept: UROLOGY | Facility: CLINIC | Age: 62
End: 2022-07-19

## 2022-07-19 VITALS — BODY MASS INDEX: 18.99 KG/M2 | HEIGHT: 69 IN | TEMPERATURE: 98.2 F | WEIGHT: 128.2 LBS

## 2022-07-19 DIAGNOSIS — C61 PROSTATE CANCER: Primary | ICD-10-CM

## 2022-07-19 PROCEDURE — 99204 OFFICE O/P NEW MOD 45 MIN: CPT | Performed by: UROLOGY

## 2022-07-19 RX ORDER — NAPROXEN 500 MG/1
500 TABLET ORAL 2 TIMES DAILY WITH MEALS
COMMUNITY
Start: 2022-04-22

## 2022-07-19 RX ORDER — ACETAMINOPHEN 500 MG
500 TABLET ORAL EVERY 6 HOURS PRN
COMMUNITY

## 2022-07-19 NOTE — PROGRESS NOTES
July 19, 2022    Hello, may I speak with Keron Gerber?    My name is Neyda    I am  with [facility]  [address] [place] [address], [address]  Located within [facility] [address] [phone].    Before we get started may I verify your date of birth? 1960    I am calling to officially welcome you to our practice and ask about your recent visit. Is this a good time to talk? Yes    Tell me about your visit with us. What things went well? My first visit to the practice was good and informative.        We're always looking for ways to make our patients' experiences even better. Do you have recommendations on ways we may improve?  No, just be prepared to see me more. :-)    Overall were you satisfied with your first visit to our practice? Yes your new office is very nice and bright. Everyone was great.       I appreciate you taking the time to speak with me today. Is there anything else I can do for you? No      Thank you, and have a great day.

## 2022-08-05 ENCOUNTER — TELEPHONE (OUTPATIENT)
Dept: UROLOGY | Facility: CLINIC | Age: 62
End: 2022-08-05

## 2022-08-05 DIAGNOSIS — C61 PROSTATE CANCER: Primary | ICD-10-CM

## 2022-08-05 NOTE — TELEPHONE ENCOUNTER
Tried to contact patient to remind them of their instructions and time/date of his procedure with dr keita on 08/10 but was unable to leave a message.

## 2022-08-08 ENCOUNTER — LAB (OUTPATIENT)
Dept: LAB | Facility: HOSPITAL | Age: 62
End: 2022-08-08

## 2022-08-08 ENCOUNTER — PRE-ADMISSION TESTING (OUTPATIENT)
Dept: PREADMISSION TESTING | Facility: HOSPITAL | Age: 62
End: 2022-08-08

## 2022-08-08 VITALS
BODY MASS INDEX: 18.48 KG/M2 | DIASTOLIC BLOOD PRESSURE: 86 MMHG | WEIGHT: 124.78 LBS | HEART RATE: 104 BPM | HEIGHT: 69 IN | RESPIRATION RATE: 20 BRPM | SYSTOLIC BLOOD PRESSURE: 130 MMHG | OXYGEN SATURATION: 98 %

## 2022-08-08 DIAGNOSIS — C61 PROSTATE CANCER: ICD-10-CM

## 2022-08-08 LAB
DEPRECATED RDW RBC AUTO: 50.4 FL (ref 37–54)
ERYTHROCYTE [DISTWIDTH] IN BLOOD BY AUTOMATED COUNT: 14.4 % (ref 12.3–15.4)
HCT VFR BLD AUTO: 45.9 % (ref 37.5–51)
HGB BLD-MCNC: 15.4 G/DL (ref 13–17.7)
MCH RBC QN AUTO: 32.2 PG (ref 26.6–33)
MCHC RBC AUTO-ENTMCNC: 33.6 G/DL (ref 31.5–35.7)
MCV RBC AUTO: 96 FL (ref 79–97)
PLATELET # BLD AUTO: 203 10*3/MM3 (ref 140–450)
PMV BLD AUTO: 9.8 FL (ref 6–12)
RBC # BLD AUTO: 4.78 10*6/MM3 (ref 4.14–5.8)
SARS-COV-2 ORF1AB RESP QL NAA+PROBE: DETECTED
WBC NRBC COR # BLD: 5.78 10*3/MM3 (ref 3.4–10.8)

## 2022-08-08 PROCEDURE — 93005 ELECTROCARDIOGRAM TRACING: CPT

## 2022-08-08 PROCEDURE — U0004 COV-19 TEST NON-CDC HGH THRU: HCPCS

## 2022-08-08 PROCEDURE — 93010 ELECTROCARDIOGRAM REPORT: CPT | Performed by: EMERGENCY MEDICINE

## 2022-08-08 PROCEDURE — 36415 COLL VENOUS BLD VENIPUNCTURE: CPT

## 2022-08-08 PROCEDURE — C9803 HOPD COVID-19 SPEC COLLECT: HCPCS

## 2022-08-08 PROCEDURE — 85027 COMPLETE CBC AUTOMATED: CPT

## 2022-08-09 LAB
QT INTERVAL: 366 MS
QTC INTERVAL: 479 MS

## 2022-08-10 ENCOUNTER — ANESTHESIA EVENT (OUTPATIENT)
Dept: PERIOP | Facility: HOSPITAL | Age: 62
End: 2022-08-10

## 2022-08-10 ENCOUNTER — ANESTHESIA (OUTPATIENT)
Dept: PERIOP | Facility: HOSPITAL | Age: 62
End: 2022-08-10

## 2022-08-11 ENCOUNTER — PREP FOR SURGERY (OUTPATIENT)
Dept: OTHER | Facility: HOSPITAL | Age: 62
End: 2022-08-11

## 2022-08-11 DIAGNOSIS — C61 PROSTATE CANCER: Primary | ICD-10-CM

## 2022-08-11 RX ORDER — BUPIVACAINE HCL/0.9 % NACL/PF 0.1 %
2 PLASTIC BAG, INJECTION (ML) EPIDURAL ONCE
Status: CANCELLED | OUTPATIENT
Start: 2022-08-11 | End: 2022-08-11

## 2022-08-24 ENCOUNTER — TELEPHONE (OUTPATIENT)
Dept: UROLOGY | Facility: CLINIC | Age: 62
End: 2022-08-24

## 2022-08-24 NOTE — TELEPHONE ENCOUNTER
Called patients mother ([email or web address])and left her a message to let them know we needed to adjust the arrival time of his procedure on 09/07. He will need to be at patient registration at 0700 not 0500 that day.

## 2022-09-07 ENCOUNTER — ANESTHESIA EVENT (OUTPATIENT)
Dept: PERIOP | Facility: HOSPITAL | Age: 62
End: 2022-09-07

## 2022-09-07 ENCOUNTER — HOSPITAL ENCOUNTER (OUTPATIENT)
Facility: HOSPITAL | Age: 62
Discharge: HOME | End: 2022-09-08
Attending: UROLOGY | Admitting: UROLOGY

## 2022-09-07 ENCOUNTER — ANESTHESIA (OUTPATIENT)
Dept: PERIOP | Facility: HOSPITAL | Age: 62
End: 2022-09-07

## 2022-09-07 DIAGNOSIS — C61 PROSTATE CANCER: ICD-10-CM

## 2022-09-07 PROCEDURE — 25010000002 DROPERIDOL PER 5 MG: Performed by: NURSE ANESTHETIST, CERTIFIED REGISTERED

## 2022-09-07 PROCEDURE — S2900 ROBOTIC SURGICAL SYSTEM: HCPCS | Performed by: UROLOGY

## 2022-09-07 PROCEDURE — 25010000002 ALBUMIN HUMAN 5% PER 50 ML: Performed by: NURSE ANESTHETIST, CERTIFIED REGISTERED

## 2022-09-07 PROCEDURE — 25010000002 MIDAZOLAM PER 1 MG: Performed by: NURSE ANESTHETIST, CERTIFIED REGISTERED

## 2022-09-07 PROCEDURE — 25010000002 FENTANYL CITRATE (PF) 50 MCG/ML SOLUTION: Performed by: ANESTHESIOLOGY

## 2022-09-07 PROCEDURE — 88309 TISSUE EXAM BY PATHOLOGIST: CPT | Performed by: UROLOGY

## 2022-09-07 PROCEDURE — 25010000002 HYDROMORPHONE 1 MG/ML SOLUTION: Performed by: NURSE ANESTHETIST, CERTIFIED REGISTERED

## 2022-09-07 PROCEDURE — P9041 ALBUMIN (HUMAN),5%, 50ML: HCPCS | Performed by: NURSE ANESTHETIST, CERTIFIED REGISTERED

## 2022-09-07 PROCEDURE — 25010000002 ONDANSETRON PER 1 MG: Performed by: NURSE ANESTHETIST, CERTIFIED REGISTERED

## 2022-09-07 PROCEDURE — 25010000002 KETOROLAC TROMETHAMINE PER 15 MG: Performed by: UROLOGY

## 2022-09-07 PROCEDURE — S0260 H&P FOR SURGERY: HCPCS | Performed by: UROLOGY

## 2022-09-07 PROCEDURE — 25010000002 MIDAZOLAM PER 1 MG: Performed by: ANESTHESIOLOGY

## 2022-09-07 PROCEDURE — 25010000002 DEXAMETHASONE PER 1 MG: Performed by: ANESTHESIOLOGY

## 2022-09-07 PROCEDURE — 25010000002 PROPOFOL 10 MG/ML EMULSION: Performed by: NURSE ANESTHETIST, CERTIFIED REGISTERED

## 2022-09-07 PROCEDURE — 55866 LAPS SURG PRST8ECT RPBIC RAD: CPT | Performed by: UROLOGY

## 2022-09-07 PROCEDURE — 25010000002 DEXAMETHASONE PER 1 MG: Performed by: NURSE ANESTHETIST, CERTIFIED REGISTERED

## 2022-09-07 PROCEDURE — 25010000002 CEFAZOLIN PER 500 MG: Performed by: UROLOGY

## 2022-09-07 DEVICE — CLIP LIG HEMOLOK PA LG 6CT PRP: Type: IMPLANTABLE DEVICE | Site: ABDOMEN | Status: FUNCTIONAL

## 2022-09-07 DEVICE — DEV CONTRL TISS STRATAFIX SPIRAL PGA PGL 3/0RB 16X16: Type: IMPLANTABLE DEVICE | Site: ABDOMEN | Status: FUNCTIONAL

## 2022-09-07 RX ORDER — PROPOFOL 10 MG/ML
VIAL (ML) INTRAVENOUS AS NEEDED
Status: DISCONTINUED | OUTPATIENT
Start: 2022-09-07 | End: 2022-09-07 | Stop reason: SURG

## 2022-09-07 RX ORDER — SODIUM CHLORIDE 9 MG/ML
INJECTION, SOLUTION INTRAVENOUS AS NEEDED
Status: DISCONTINUED | OUTPATIENT
Start: 2022-09-07 | End: 2022-09-07 | Stop reason: HOSPADM

## 2022-09-07 RX ORDER — FLUMAZENIL 0.1 MG/ML
0.2 INJECTION INTRAVENOUS AS NEEDED
Status: DISCONTINUED | OUTPATIENT
Start: 2022-09-07 | End: 2022-09-07 | Stop reason: HOSPADM

## 2022-09-07 RX ORDER — MAGNESIUM HYDROXIDE 1200 MG/15ML
LIQUID ORAL AS NEEDED
Status: DISCONTINUED | OUTPATIENT
Start: 2022-09-07 | End: 2022-09-07 | Stop reason: HOSPADM

## 2022-09-07 RX ORDER — DROPERIDOL 2.5 MG/ML
0.62 INJECTION, SOLUTION INTRAMUSCULAR; INTRAVENOUS ONCE AS NEEDED
Status: DISCONTINUED | OUTPATIENT
Start: 2022-09-07 | End: 2022-09-07 | Stop reason: HOSPADM

## 2022-09-07 RX ORDER — DEXAMETHASONE SODIUM PHOSPHATE 4 MG/ML
INJECTION, SOLUTION INTRA-ARTICULAR; INTRALESIONAL; INTRAMUSCULAR; INTRAVENOUS; SOFT TISSUE AS NEEDED
Status: DISCONTINUED | OUTPATIENT
Start: 2022-09-07 | End: 2022-09-07 | Stop reason: SURG

## 2022-09-07 RX ORDER — NALOXONE HCL 0.4 MG/ML
0.04 VIAL (ML) INJECTION AS NEEDED
Status: DISCONTINUED | OUTPATIENT
Start: 2022-09-07 | End: 2022-09-07 | Stop reason: HOSPADM

## 2022-09-07 RX ORDER — BUPIVACAINE HYDROCHLORIDE 7.5 MG/ML
INJECTION, SOLUTION EPIDURAL; RETROBULBAR AS NEEDED
Status: DISCONTINUED | OUTPATIENT
Start: 2022-09-07 | End: 2022-09-07 | Stop reason: SURG

## 2022-09-07 RX ORDER — ONDANSETRON 2 MG/ML
4 INJECTION INTRAMUSCULAR; INTRAVENOUS EVERY 6 HOURS PRN
Status: DISCONTINUED | OUTPATIENT
Start: 2022-09-07 | End: 2022-09-08 | Stop reason: HOSPADM

## 2022-09-07 RX ORDER — FENTANYL CITRATE 50 UG/ML
25 INJECTION, SOLUTION INTRAMUSCULAR; INTRAVENOUS
Status: DISCONTINUED | OUTPATIENT
Start: 2022-09-07 | End: 2022-09-07 | Stop reason: HOSPADM

## 2022-09-07 RX ORDER — NEOSTIGMINE METHYLSULFATE 5 MG/5 ML
SYRINGE (ML) INTRAVENOUS AS NEEDED
Status: DISCONTINUED | OUTPATIENT
Start: 2022-09-07 | End: 2022-09-07 | Stop reason: SURG

## 2022-09-07 RX ORDER — EPHEDRINE SULFATE 50 MG/ML
INJECTION, SOLUTION INTRAVENOUS AS NEEDED
Status: DISCONTINUED | OUTPATIENT
Start: 2022-09-07 | End: 2022-09-07 | Stop reason: SURG

## 2022-09-07 RX ORDER — SODIUM CHLORIDE, SODIUM LACTATE, POTASSIUM CHLORIDE, CALCIUM CHLORIDE 600; 310; 30; 20 MG/100ML; MG/100ML; MG/100ML; MG/100ML
1000 INJECTION, SOLUTION INTRAVENOUS CONTINUOUS
Status: DISCONTINUED | OUTPATIENT
Start: 2022-09-07 | End: 2022-09-07

## 2022-09-07 RX ORDER — NALBUPHINE HCL 10 MG/ML
2.5 AMPUL (ML) INJECTION EVERY 4 HOURS PRN
Status: DISCONTINUED | OUTPATIENT
Start: 2022-09-07 | End: 2022-09-08 | Stop reason: HOSPADM

## 2022-09-07 RX ORDER — BUPIVACAINE HCL/0.9 % NACL/PF 0.1 %
2 PLASTIC BAG, INJECTION (ML) EPIDURAL ONCE
Status: COMPLETED | OUTPATIENT
Start: 2022-09-07 | End: 2022-09-07

## 2022-09-07 RX ORDER — LIDOCAINE HYDROCHLORIDE 10 MG/ML
0.5 INJECTION, SOLUTION EPIDURAL; INFILTRATION; INTRACAUDAL; PERINEURAL ONCE AS NEEDED
Status: DISCONTINUED | OUTPATIENT
Start: 2022-09-07 | End: 2022-09-07 | Stop reason: HOSPADM

## 2022-09-07 RX ORDER — ALBUMIN, HUMAN INJ 5% 5 %
SOLUTION INTRAVENOUS CONTINUOUS PRN
Status: DISCONTINUED | OUTPATIENT
Start: 2022-09-07 | End: 2022-09-07 | Stop reason: SURG

## 2022-09-07 RX ORDER — SODIUM CHLORIDE 0.9 % (FLUSH) 0.9 %
10 SYRINGE (ML) INJECTION AS NEEDED
Status: DISCONTINUED | OUTPATIENT
Start: 2022-09-07 | End: 2022-09-07 | Stop reason: HOSPADM

## 2022-09-07 RX ORDER — DROPERIDOL 2.5 MG/ML
INJECTION, SOLUTION INTRAMUSCULAR; INTRAVENOUS AS NEEDED
Status: DISCONTINUED | OUTPATIENT
Start: 2022-09-07 | End: 2022-09-07 | Stop reason: SURG

## 2022-09-07 RX ORDER — BUPIVACAINE HCL/0.9 % NACL/PF 0.125 %
PLASTIC BAG, INJECTION (ML) EPIDURAL AS NEEDED
Status: DISCONTINUED | OUTPATIENT
Start: 2022-09-07 | End: 2022-09-07 | Stop reason: SURG

## 2022-09-07 RX ORDER — OXYCODONE AND ACETAMINOPHEN 10; 325 MG/1; MG/1
1 TABLET ORAL ONCE AS NEEDED
Status: DISCONTINUED | OUTPATIENT
Start: 2022-09-07 | End: 2022-09-07 | Stop reason: HOSPADM

## 2022-09-07 RX ORDER — CALCIUM CHLORIDE 100 MG/ML
INJECTION INTRAVENOUS; INTRAVENTRICULAR AS NEEDED
Status: DISCONTINUED | OUTPATIENT
Start: 2022-09-07 | End: 2022-09-07 | Stop reason: SURG

## 2022-09-07 RX ORDER — SODIUM CHLORIDE 0.9 % (FLUSH) 0.9 %
3 SYRINGE (ML) INJECTION AS NEEDED
Status: DISCONTINUED | OUTPATIENT
Start: 2022-09-07 | End: 2022-09-07 | Stop reason: HOSPADM

## 2022-09-07 RX ORDER — DEXAMETHASONE SODIUM PHOSPHATE 4 MG/ML
4 INJECTION, SOLUTION INTRA-ARTICULAR; INTRALESIONAL; INTRAMUSCULAR; INTRAVENOUS; SOFT TISSUE ONCE AS NEEDED
Status: COMPLETED | OUTPATIENT
Start: 2022-09-07 | End: 2022-09-07

## 2022-09-07 RX ORDER — SODIUM CHLORIDE 0.9 % (FLUSH) 0.9 %
10 SYRINGE (ML) INJECTION EVERY 12 HOURS SCHEDULED
Status: DISCONTINUED | OUTPATIENT
Start: 2022-09-07 | End: 2022-09-07 | Stop reason: HOSPADM

## 2022-09-07 RX ORDER — ESMOLOL HYDROCHLORIDE 10 MG/ML
INJECTION INTRAVENOUS AS NEEDED
Status: DISCONTINUED | OUTPATIENT
Start: 2022-09-07 | End: 2022-09-07 | Stop reason: SURG

## 2022-09-07 RX ORDER — IBUPROFEN 600 MG/1
600 TABLET ORAL ONCE AS NEEDED
Status: DISCONTINUED | OUTPATIENT
Start: 2022-09-07 | End: 2022-09-07 | Stop reason: HOSPADM

## 2022-09-07 RX ORDER — SODIUM CHLORIDE 9 MG/ML
150 INJECTION, SOLUTION INTRAVENOUS CONTINUOUS
Status: DISCONTINUED | OUTPATIENT
Start: 2022-09-07 | End: 2022-09-08 | Stop reason: HOSPADM

## 2022-09-07 RX ORDER — ATRACURIUM BESYLATE 10 MG/ML
INJECTION, SOLUTION INTRAVENOUS AS NEEDED
Status: DISCONTINUED | OUTPATIENT
Start: 2022-09-07 | End: 2022-09-07 | Stop reason: SURG

## 2022-09-07 RX ORDER — MIDAZOLAM HYDROCHLORIDE 1 MG/ML
1 INJECTION INTRAMUSCULAR; INTRAVENOUS
Status: COMPLETED | OUTPATIENT
Start: 2022-09-07 | End: 2022-09-07

## 2022-09-07 RX ORDER — MIDAZOLAM HYDROCHLORIDE 1 MG/ML
INJECTION INTRAMUSCULAR; INTRAVENOUS AS NEEDED
Status: DISCONTINUED | OUTPATIENT
Start: 2022-09-07 | End: 2022-09-07 | Stop reason: SURG

## 2022-09-07 RX ORDER — LIDOCAINE HYDROCHLORIDE 10 MG/ML
0.5 INJECTION, SOLUTION EPIDURAL; INFILTRATION; INTRACAUDAL; PERINEURAL ONCE AS NEEDED
Status: DISCONTINUED | OUTPATIENT
Start: 2022-09-07 | End: 2022-09-07

## 2022-09-07 RX ORDER — ACETAMINOPHEN 500 MG
1000 TABLET ORAL ONCE
Status: COMPLETED | OUTPATIENT
Start: 2022-09-07 | End: 2022-09-07

## 2022-09-07 RX ORDER — OXYCODONE HYDROCHLORIDE 5 MG/1
5 TABLET ORAL EVERY 4 HOURS PRN
Status: DISCONTINUED | OUTPATIENT
Start: 2022-09-07 | End: 2022-09-08 | Stop reason: HOSPADM

## 2022-09-07 RX ORDER — DEXTROSE MONOHYDRATE 25 G/50ML
12.5 INJECTION, SOLUTION INTRAVENOUS AS NEEDED
Status: DISCONTINUED | OUTPATIENT
Start: 2022-09-07 | End: 2022-09-07 | Stop reason: HOSPADM

## 2022-09-07 RX ORDER — SODIUM CHLORIDE, SODIUM LACTATE, POTASSIUM CHLORIDE, CALCIUM CHLORIDE 600; 310; 30; 20 MG/100ML; MG/100ML; MG/100ML; MG/100ML
9 INJECTION, SOLUTION INTRAVENOUS CONTINUOUS
Status: DISCONTINUED | OUTPATIENT
Start: 2022-09-07 | End: 2022-09-07

## 2022-09-07 RX ORDER — ONDANSETRON 2 MG/ML
INJECTION INTRAMUSCULAR; INTRAVENOUS AS NEEDED
Status: DISCONTINUED | OUTPATIENT
Start: 2022-09-07 | End: 2022-09-07 | Stop reason: SURG

## 2022-09-07 RX ORDER — LABETALOL HYDROCHLORIDE 5 MG/ML
5 INJECTION, SOLUTION INTRAVENOUS
Status: DISCONTINUED | OUTPATIENT
Start: 2022-09-07 | End: 2022-09-07 | Stop reason: HOSPADM

## 2022-09-07 RX ORDER — ACETAMINOPHEN 500 MG
1000 TABLET ORAL EVERY 6 HOURS
Status: DISCONTINUED | OUTPATIENT
Start: 2022-09-07 | End: 2022-09-08 | Stop reason: HOSPADM

## 2022-09-07 RX ORDER — OXYCODONE HYDROCHLORIDE 5 MG/1
10 TABLET ORAL EVERY 4 HOURS PRN
Status: DISCONTINUED | OUTPATIENT
Start: 2022-09-07 | End: 2022-09-08 | Stop reason: HOSPADM

## 2022-09-07 RX ORDER — HYDROMORPHONE HYDROCHLORIDE 1 MG/ML
0.5 INJECTION, SOLUTION INTRAMUSCULAR; INTRAVENOUS; SUBCUTANEOUS
Status: DISCONTINUED | OUTPATIENT
Start: 2022-09-07 | End: 2022-09-07 | Stop reason: HOSPADM

## 2022-09-07 RX ORDER — ONDANSETRON 4 MG/1
4 TABLET, FILM COATED ORAL EVERY 6 HOURS PRN
Status: DISCONTINUED | OUTPATIENT
Start: 2022-09-07 | End: 2022-09-08 | Stop reason: HOSPADM

## 2022-09-07 RX ORDER — ONDANSETRON 2 MG/ML
4 INJECTION INTRAMUSCULAR; INTRAVENOUS
Status: DISCONTINUED | OUTPATIENT
Start: 2022-09-07 | End: 2022-09-07 | Stop reason: HOSPADM

## 2022-09-07 RX ORDER — KETOROLAC TROMETHAMINE 15 MG/ML
15 INJECTION, SOLUTION INTRAMUSCULAR; INTRAVENOUS EVERY 6 HOURS SCHEDULED
Status: DISCONTINUED | OUTPATIENT
Start: 2022-09-07 | End: 2022-09-08 | Stop reason: HOSPADM

## 2022-09-07 RX ORDER — LIDOCAINE HYDROCHLORIDE 20 MG/ML
INJECTION, SOLUTION EPIDURAL; INFILTRATION; INTRACAUDAL; PERINEURAL AS NEEDED
Status: DISCONTINUED | OUTPATIENT
Start: 2022-09-07 | End: 2022-09-07 | Stop reason: SURG

## 2022-09-07 RX ADMIN — Medication 3 MG: at 12:08

## 2022-09-07 RX ADMIN — Medication 2 G: at 09:25

## 2022-09-07 RX ADMIN — ALBUMIN HUMAN: 0.05 INJECTION, SOLUTION INTRAVENOUS at 09:18

## 2022-09-07 RX ADMIN — Medication 200 MCG: at 10:01

## 2022-09-07 RX ADMIN — ONDANSETRON 4 MG: 2 INJECTION INTRAMUSCULAR; INTRAVENOUS at 09:44

## 2022-09-07 RX ADMIN — MIDAZOLAM 1 MG: 1 INJECTION INTRAMUSCULAR; INTRAVENOUS at 08:57

## 2022-09-07 RX ADMIN — CALCIUM CHLORIDE 1 G: 100 INJECTION INTRAVENOUS; INTRAVENTRICULAR at 10:13

## 2022-09-07 RX ADMIN — LIDOCAINE HYDROCHLORIDE 100 MG: 20 INJECTION, SOLUTION EPIDURAL; INFILTRATION; INTRACAUDAL; PERINEURAL at 09:18

## 2022-09-07 RX ADMIN — BUPIVACAINE HYDROCHLORIDE 15 MG: 7.5 INJECTION, SOLUTION EPIDURAL; RETROBULBAR at 09:22

## 2022-09-07 RX ADMIN — SODIUM CHLORIDE, POTASSIUM CHLORIDE, SODIUM LACTATE AND CALCIUM CHLORIDE 1000 ML: 600; 310; 30; 20 INJECTION, SOLUTION INTRAVENOUS at 08:27

## 2022-09-07 RX ADMIN — Medication 100 MCG: at 09:44

## 2022-09-07 RX ADMIN — EPHEDRINE SULFATE 10 MG: 50 INJECTION INTRAVENOUS at 11:56

## 2022-09-07 RX ADMIN — PROPOFOL 70 MG: 10 INJECTION, EMULSION INTRAVENOUS at 09:26

## 2022-09-07 RX ADMIN — SODIUM CHLORIDE 150 ML/HR: 9 INJECTION, SOLUTION INTRAVENOUS at 22:12

## 2022-09-07 RX ADMIN — FENTANYL CITRATE 50 MCG: 50 INJECTION, SOLUTION INTRAMUSCULAR; INTRAVENOUS at 09:28

## 2022-09-07 RX ADMIN — DROPERIDOL 1.25 MG: 2.5 INJECTION, SOLUTION INTRAMUSCULAR; INTRAVENOUS at 11:01

## 2022-09-07 RX ADMIN — MIDAZOLAM 1 MG: 1 INJECTION INTRAMUSCULAR; INTRAVENOUS at 08:47

## 2022-09-07 RX ADMIN — DEXAMETHASONE SODIUM PHOSPHATE 4 MG: 4 INJECTION INTRA-ARTICULAR; INTRALESIONAL; INTRAMUSCULAR; INTRAVENOUS; SOFT TISSUE at 08:47

## 2022-09-07 RX ADMIN — ACETAMINOPHEN 1000 MG: 500 TABLET ORAL at 08:47

## 2022-09-07 RX ADMIN — HYDROMORPHONE HYDROCHLORIDE 100 MCG: 1 INJECTION, SOLUTION INTRAMUSCULAR; INTRAVENOUS; SUBCUTANEOUS at 09:22

## 2022-09-07 RX ADMIN — SODIUM CHLORIDE, POTASSIUM CHLORIDE, SODIUM LACTATE AND CALCIUM CHLORIDE 1000 ML: 600; 310; 30; 20 INJECTION, SOLUTION INTRAVENOUS at 08:26

## 2022-09-07 RX ADMIN — GLYCOPYRROLATE 0.2 MG: 0.2 INJECTION INTRAMUSCULAR; INTRAVENOUS at 09:48

## 2022-09-07 RX ADMIN — DEXAMETHASONE SODIUM PHOSPHATE 4 MG: 4 INJECTION, SOLUTION INTRA-ARTICULAR; INTRALESIONAL; INTRAMUSCULAR; INTRAVENOUS; SOFT TISSUE at 09:44

## 2022-09-07 RX ADMIN — KETOROLAC TROMETHAMINE 15 MG: 15 INJECTION, SOLUTION INTRAMUSCULAR; INTRAVENOUS at 17:08

## 2022-09-07 RX ADMIN — GLYCOPYRROLATE 0.4 MG: 0.2 INJECTION INTRAMUSCULAR; INTRAVENOUS at 12:08

## 2022-09-07 RX ADMIN — Medication 100 MCG: at 12:01

## 2022-09-07 RX ADMIN — MIDAZOLAM 2 MG: 1 INJECTION INTRAMUSCULAR; INTRAVENOUS at 09:16

## 2022-09-07 RX ADMIN — Medication 200 MCG: at 10:17

## 2022-09-07 RX ADMIN — EPHEDRINE SULFATE 10 MG: 50 INJECTION INTRAVENOUS at 11:32

## 2022-09-07 RX ADMIN — ATRACURIUM BESYLATE 10 MG: 10 INJECTION, SOLUTION INTRAVENOUS at 11:56

## 2022-09-07 RX ADMIN — Medication 100 MCG: at 09:39

## 2022-09-07 RX ADMIN — EPHEDRINE SULFATE 10 MG: 50 INJECTION INTRAVENOUS at 11:02

## 2022-09-07 RX ADMIN — FENTANYL CITRATE 50 MCG: 50 INJECTION, SOLUTION INTRAMUSCULAR; INTRAVENOUS at 09:48

## 2022-09-07 RX ADMIN — ACETAMINOPHEN 1000 MG: 500 TABLET ORAL at 20:03

## 2022-09-07 RX ADMIN — ATRACURIUM BESYLATE 50 MG: 10 INJECTION, SOLUTION INTRAVENOUS at 09:26

## 2022-09-07 RX ADMIN — ESMOLOL HYDROCHLORIDE 10 MG: 10 INJECTION, SOLUTION INTRAVENOUS at 09:53

## 2022-09-07 RX ADMIN — EPHEDRINE SULFATE 5 MG: 50 INJECTION INTRAVENOUS at 11:41

## 2022-09-07 RX ADMIN — HYDROMORPHONE HYDROCHLORIDE 900 MCG: 1 INJECTION, SOLUTION INTRAMUSCULAR; INTRAVENOUS; SUBCUTANEOUS at 11:13

## 2022-09-07 NOTE — ANESTHESIA PROCEDURE NOTES
Spinal Block      Patient location during procedure: OB  Indication:at surgeon's request, post-op pain management and procedure for pain  Performed By  CRNA/CAA: Dov Wiggins CRNA  Preanesthetic Checklist  Completed: patient identified, IV checked, site marked, risks and benefits discussed, surgical consent, monitors and equipment checked, pre-op evaluation and timeout performed  Spinal Block Prep:  Patient Position:sitting  Sterile Tech:gloves, sterile barriers, cap and mask  Prep:DuraPrep  Patient Monitoring:blood pressure monitoring, continuous pulse oximetry and EKG  Spinal Block Procedure  Approach:midline  Guidance:landmark technique and palpation technique  Location:L3-L4  Needle Type:Pencan  Needle Gauge:22 G  Placement of Spinal needle event:cerebrospinal fluid aspirated  Paresthesia: no  Fluid Appearance:clear     Post Assessment  Patient Tolerance:patient tolerated the procedure well with no apparent complications  Complications no

## 2022-09-07 NOTE — H&P
Urology H&P    Mr. Gerber is 62 y.o. male    CHIEF COMPLAINT: Here for robot prostatectomy    HPI  Patient presents with probable organ confined prostate cancer.  He opted for robot-assisted laparoscopic prostatectomy.    The following portions of the patient's history were reviewed and updated as appropriate: allergies, current medications, past family history, past medical history, past social history, past surgical history and problem list.    Review of systems  Constitutional: Negative for chills and fever.   Respiratory: Negative for cough and shortness of breath.    Cardiovascular: Negative for chest pain.   Gastrointestinal: Negative for abdominal pain, anal bleeding and blood in stool.   Genitourinary: Negative for dysuria and hematuria.     Medications Prior to Admission   Medication Sig Dispense Refill Last Dose   • acetaminophen (TYLENOL) 500 MG tablet Take 500 mg by mouth Every 6 (Six) Hours As Needed for Mild Pain .      • naproxen (NAPROSYN) 500 MG tablet TAKE 1 TABLET BY MOUTH TWICE DAILY AS NEEDED. USE FOR ARTHRITIS PAIN INSTEAD OF DICLOFENAC.          No current facility-administered medications for this encounter.    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   • Tobacco use disorder        Past Surgical History:   Procedure Laterality Date   • CYST REMOVAL Right 08/07/2014    RIGHT LOWER EYELID- EPIDERMAL INCLUSION CYST   • EXCISION LESION N/A 08/07/2014    MALIGNANT MELANOMA OF LEFT NASAL TIP   • FOREHEAD FLAP  09/11/2014   • PEDICLE FLAP  10/02/2014    PEDICLE DIVISION AND FLAP INSET OF NOSE   • SQUAMOUS CELL CARCINOMA EXCISION  10/29/2013    LEFT FLOOR OF MOUTH   • TONSILLECTOMY         Social History     Socioeconomic History   • Marital status: Single   Tobacco Use   • Smoking status: Current Every Day Smoker     Packs/day:  1.50     Types: Cigarettes   • Smokeless tobacco: Never Used   Vaping Use   • Vaping Use: Never used   Substance and Sexual Activity   • Alcohol use: Yes     Comment: 12 PER DAY   • Drug use: No   • Sexual activity: Defer       No family history on file.    There were no vitals taken for this visit.    Constitutional:  Patient appears well-developed and well-nourished. There are no obvious deformities. No distress.   Pulmonary/Chest: Effort normal.   GI: Soft. The patient exhibits no distension and no mass. There is no tenderness. There is no rebound and no guarding. No hernia.   Neurological: Patient is alert and oriented to person, place, and time.   Skin: Skin is warm and dry. Not diaphoretic.   Psychiatric:  normal mood and affect. Not agitated.     Lab Results   Component Value Date    GLUCOSE 93 10/03/2015    BUN 11 10/03/2015    CREATININE 0.70 02/13/2018    CO2 28 10/03/2015    CALCIUM 9.4 10/03/2015    ALBUMIN 4.4 10/03/2015    AST 36 10/03/2015    ALT 37 10/03/2015     Lab Results   Component Value Date    GLUCOSE 93 10/03/2015    CALCIUM 9.4 10/03/2015     (L) 10/03/2015    K 4.7 10/03/2015    CO2 28 10/03/2015    CL 98 10/03/2015    BUN 11 10/03/2015    CREATININE 0.70 02/13/2018    ANIONGAP 8 10/03/2015     Lab Results   Component Value Date    WBC 5.78 08/08/2022    HGB 15.4 08/08/2022    HCT 45.9 08/08/2022    MCV 96.0 08/08/2022     08/08/2022     No results found for: PSA  No results found for: URINECX  Brief Urine Lab Results     None                                       Imaging Results (Last 7 Days)     ** No results found for the last 168 hours. **          Assessment and Plan  #1.  Adenocarcinoma of the prostate  -Patient has opted for robot-assisted laparoscopic prostatectomy.  Other options for therapy were discussed with the patient in the office setting as documented in previous note.  This includes going over all of the risks and benefits of the procedure.  Patient voiced no  additional questions today.      (Please note that portions of this note were completed with a voice recognition program.)  Matthew Weaver MD  09/07/22  06:58 CDT

## 2022-09-07 NOTE — ADDENDUM NOTE
Addendum  created 09/07/22 1242 by Dov Wiggins CRNA    Intraprocedure Event edited, Intraprocedure Staff edited

## 2022-09-07 NOTE — PLAN OF CARE
Goal Outcome Evaluation:  Plan of Care Reviewed With: patient        Progress: no change  Outcome Evaluation: Pt admitted from PACU post-op prostatectomy per Dr. Weaver. Denied pain on arrival. Pt has 5 lap sites with dermabond, SALOME without drainage. REMBERTO present with small amt. of bloody drainage. Lozano to BSD. SCD's. Safety maintained.

## 2022-09-07 NOTE — OP NOTE
Operative Summary    Keron Gerber  Date of Procedure: 9/7/2022    Pre-op Diagnosis:   Prostate cancer (HCC) [C61]    Post-op Diagnosis:     Post-Op Diagnosis Codes:     * Prostate cancer (HCC) [C61]    Procedure/CPT® Codes:      Procedure(s):  PROSTATECTOMY LAPAROSCOPIC WITH DAVINCI ROBOT    Surgeon(s):  Matthew Weaver MD    Anesthesia: General    Staff:   Circulator: Westley Andrade RN; David Sawyer RN  Scrub Person: Concha Nolan; Geneva Paiz; John Mora; Enedelia Allen    Indications for procedure:  Suspected organ confined adenocarcinoma the prostate    Findings:   No obvious evidence of extracapsular disease  Quite a bit of inflammation made the left base more adherent than the right.  Doubt this is due to locally advanced prostate cancer.  Bilateral nerve sparing accomplished    Procedure details:  The patient is identified in the preoperative holding area.  The informed consent process had been completed In the office documented by my last progress note that included a discussion of the risks of this procedure, alternative management options, and the potential benefits of this procedure.  The patient voiced a good recollection of that discussion.  The patient voiced no additional questions.     The patient is taken to the operating room and given effective general anesthesia. The patient is then placed in the low dorsal lithotomy position with care being placed to appropriately pad the patient to avoid excessive compression with the Michael stirrups, especially laterally to the leg on the peroneal nerve.  The patient is then prepped and draped in the usual sterile fashion. A sterile 18FR Lozano catheter is placed at this point.  Appropriate timeout protocol was observed.    The anterior portion of the abdominal wall was examined closely.  There are no previous incisions noted.  An 11 blade is used to make a small incision just above the umbilicus.  A Veress needle was used and passed  gently into the abdomen.  Definitive pops to the midline fascia and peritoneum were felt.  At this point CO2 insufflation was started.  I started out with low flow and observed the pressures closely.  A slow increase in the abdominal pressure was noted.  Once examination noted the abdomen was distending appropriately, I increased the patient the high flow.  Pressure maximum was set on 15.  Once this was observed the Veress needle was removed.  At this point an 8 mm trocar was placed, mainly for the extra length, with a bladeless trocar.  The obturator was removed and the 30° robotic camera is inserted.  The intra-abdominal contents were examined.  There was no evidence of trauma from the Veress needle.  No significant adhesions are noted.  There was no evidence of inguinal hernia.  The sigmoid colon as well as the distal ileum and cecum showed no evidence of an obvious mass.    The anterior abdominal wall was inspected.  I used the standard trocar placements for robot prostatectomy.  Initially I placed an 8 [place] da Katherine trocar site about 2 fingerbreadths cephalad and medial to the left anterior superior iliac spine.  Arm #3 of the robot would be attached to this trocar Then 1 handbreadth from the camera port, which was near the left midclavicular line, I placed another 8 [place] da Katherine trocar.  Arm #2 of the robot would be attached to this trocar.  These were both placed under direct vision.  There was no evidence of excessive bleeding.    I then turned my attention to the right lower quadrant.  Again under direct vision and 1 handbreadth from the camera port near the right midclavicular line, I placed another 8.5 [place] da Katherine trocar.  Arm #3 of the robot would be attached to this trocar.  Then in the right lower quadrant approximately 2 fingerbreadths medial and cephalad to the anterior superior iliac spine, I placed a 12 mm air seal port.  CO2 insufflation at this point was changed to the air seal system  which allowed constant insufflation throughout the case.  Pressure would be limited to 15 throughout the entire case.  At this point I placed 2 separate #1 Vicryl sutures using the Jose Pinto needle to prevent hernia.    Lastly in the right upper quadrant, approximately 1 handbreadth cephalad to the camera port and midway between the right midclavicular line port and the camera port, I placed a 5 mm trocar for the assistant to use throughout the case.    With all trocars placed in the standard fashion and without evidence of trauma, the patient is placed in the Trendelenburg position.  At this point the da Katherine Xi  is docked in the standard fashion.    The initial step was to take down an anterior strip of the peritoneum lateral to the median umbilical ligaments on both sides and connect these just below the umbilicus.  As I took this plane down I could identify the inferior aspect of the pubic bone which was the initial landmark.  On both sides the posterior peritoneal incision is carried around laterally towards the vas deferens.  This exposed the obturator karlo package.  On both sides the posterior peritoneal incision is carried along the external iliac artery until the common artery iliac is identified.  The ureter is identified but not exposed on both sides.      Attention was turned to removing the prostate gland.  Fat is removed off the anterior surface of the prostate.  The endopelvic fascia is then opened laterally on both sides.  The levator ani fibers are pushed away laterally to define the lateral aspect of the prostate.  The bladder neck is then identified by manipulating the catheter, compressing the lateral aspect of the bladder with these and then noting the prostate gland was not compressible, and looking for the slight angle created by the base of the prostate and the bladder.  At this point I began to cut down on the area anteriorly that I felt defined the base of the prostate from the  bladder neck.  Circular fibers of the detrusor muscle are noted.  A relatively bloodless plane is also identified to make me feel comfortable I was in this area.  This dissection is carried around laterally so that the bladder mucosa and the most proximal aspect of the urethral mucosa is opened well away from the trigone.  The posterior aspect of the urethra and bladder neck were then identified.  The balloon is deflated and the tip of the catheter is pulled into the posterior urethra.    Once I was through the posterior lip of the bladder neck, I could see the fibers of Denonvilliers' fascia as well as fat that surrounds the vas deferens and seminal vesicles.  I initially dissected out the right vas deferens to the level of what I felt would be the tip of the seminal vesicles.  The identical maneuver was used on the contralateral side.  I felt I was away from the nerve bundle enough to comfortably use cautery in this area.  I then dissected out the seminal vesicles doing the right side prior to the left.  There was no obvious evidence of metastatic disease to the seminal vesicles.  I dissected the medial aspect initially and then freed the lateral aspect from what was felt to be the neurovascular bundle.  Care was taken to avoid the use of electrocautery from about the midportion of the seminal vesicle all the way to the tip.  I did use bipolar cautery to cauterize the artery at the very tip of the seminal vesicle holding it medially away from the remainder of the neurovascular bundle.  The lateral most attachments of the bladder to the prostate are then taken down with the vessel sealer.  This created a nice plane as well as good hemostasis.  The anteriormost aspect of the neurovascular bundle was now identified.  At this point the base of the prostate and the seminal vesicles were free.      I then turned my attention to the posterior aspect of the prostate gland.  Caudal to the seminal vesicles and vas deferens  which were now retracted anteriorly I developed this plane between the prerectal fat and  Denonvilliers' fascia.  I did this in the midline initially and then swept this laterally with a combination of both sharp and blunt dissection with the instruments.  I was able to free the prostate down very near the apex.  There was no obvious evidence of extracapsular involvement nor invasion to the prerectal fat.    Now the prostate free at its base and posterior aspect, I turned my attention to doing a nerve sparing prostatectomy.  I used the same technique on the left as the right to do a bilateral nerve sparing approach as follows: The lateral prostatic fascia is divided about midway up on the prostate as defined by the lateral coursing vein that goes all the way to the apex of the prostate.  I identified the plane between the lateral wall the prostate and the neurovascular bundle at about the midlevel of the prostate.  This is dissected both apically and towards the base of the prostate.  At the base of the prostate was felt to be the largest portion of the blood supply to the gland.  Where the blood supply coursed anterior laterally to the prostate, I had dissected this free so that I could insert the vessel sealer and divided this neurovascular tissue.  I used scissors without cautery to dissected the lateral aspect of the prostate all the way down apically.  Any obvious vessel that needed to be controlled at this point was divided after a titanium clip was applied.    With the prostate now free at its base, posteriorly, and laterally, I turned my attention to the apex of the prostate.  The levator fibers were swept away from the apex.  The puboprostatic ligaments are identified and divided using the electrocautery scissors.  The plane between the dorsal vein and the anterior aspect of the urethra is developed.  Care was taken to avoid entering the apical tissue of the prostate posteriorly.  I then placed 1 Vicryl  suture around the dorsal vein to prevent backbleeding.  The dorsal vein at this point is divided.  I used scissors to remove the anterior attachments of the endopelvic fascia and left lateral fibers from the apex of the prostate.  The urethra circumferentially dissected out and divided anteriorly.  The catheters pulled back into the bulbar urethra at this point.  The posterior lip urethra was then divided.  Slight retraction is noted but the mucosa was still visible.  The final attachments from the posterior, apical button of the prostate are removed from the endopelvic fascia sharply.  Then with a barrel rolled type technique, any remaining attachments from apex to the neurovascular bundle, the posterior striated sphincter, and the prerectal fat were divided sharply without cautery.  The prostate was now free and placed in a laparoscopic specimen removal bag and placed in the upper abdomen.  Hemostatic agent Prachi was placed in the prostatic fossa for additional hemostasis.    A vesicourethral anastomosis is carried out with a 2-0 Stratafix suture in a running nature.  This was started at anatomic 6 o'clock for a mucosa to mucosa anastomosis.  Perineal pressure with a sponge stick by the assistant aided in exposure of the urethral stump.  Care was taken not to incorporate the neurovascular bundle.  At the conclusion of the anastomosis, the bladder is irrigated free of clot and the anastomosis expose for leak.  It did appear to be watertight.    At this point CO2 insufflation pressures were dropped to 6 and the pelvis inspected.  There was no active venous or arterial bleeding.  A 15 mm round REMBERTO drain is brought through the 5 mm trocar site and placed in the space of Retzius.  That trocar is removed and the drain is secured at the level of the skin with a 2-0 silk suture.  The robotic instruments are removed and the robot is undocked in standard fashion.  The patient is then taken out of Trendelenburg  position.    Using standard laparoscopic instruments the strings of both specimen removal bags, the karlo package and the prostate are brought out the camera port.  CO2 insufflation is halted and all trocars are removed.  Manipulation of the anterior abdominal wall to remove CO2 gas is then attempted with external pressure.  The camera port incision is extended to allow removal of the specimen bags.  The previously placed Vicryl sutures for the 12 mm air seal port are then tied.  The rectus fascia is approximated using interrupted 0 PDS sutures.  All skin is closed with 4-0 Vicryl in a subcuticular nature.  Sterile dressing is applied.  The catheter is secured.  The bulb was attached with suction to the REMBERTO drain.    Patient is extubated after appropriate respiratory parameters were met per anesthesia.  The patient is transferred to recovery room in good condition.          Estimated Blood Loss: 100 mL    Specimens:                Specimens     ID Source Type Tests Collected By Collected At Frozen?    A Prostate Tissue · TISSUE PATHOLOGY EXAM   Matthew Weaver MD 9/7/22 1014 No    Description: Prostate            Drains:   Closed/Suction Drain 1 RUQ Bulb 15 Fr. (Active)       Urethral Catheter Double-lumen;Silicone 20 Fr. (Active)       Complications: none    Plan: Patient be admitted to [facility] pace and discharged home tomorrow with Lozano catheter.  Can probably take her catheter out toward the end of next week.    Matthew Weaver MD     Date: 9/7/2022  Time: 12:35 CDT    (Please note that portions of this note were completed with a voice recognition program.)

## 2022-09-07 NOTE — ANESTHESIA PREPROCEDURE EVALUATION
Anesthesia Evaluation     Patient summary reviewed   no history of anesthetic complications:  NPO Solid Status: > 8 hours             Airway   Mallampati: I  Dental    (+) upper dentures, lower dentures and edentulous    Pulmonary    (+) a smoker,   (-) COPD, asthma, sleep apnea  Cardiovascular   Exercise tolerance: excellent (>7 METS)    (-) pacemaker, past MI, angina, cardiac stents      Neuro/Psych  (-) seizures, TIA, CVA  GI/Hepatic/Renal/Endo    (-) GERD, liver disease, no renal disease, diabetes    Musculoskeletal     (+) back pain,   Abdominal    Substance History      OB/GYN          Other      history of cancer                    Anesthesia Plan    ASA 2     general and ITN     intravenous induction     Anesthetic plan, risks, benefits, and alternatives have been provided, discussed and informed consent has been obtained with: patient.        CODE STATUS:

## 2022-09-07 NOTE — ANESTHESIA POSTPROCEDURE EVALUATION
"Patient: Keron Gerber    Procedure Summary     Date: 09/07/22 Room / Location: [facility] OR  /  [facility] OR    Anesthesia Start: 0915 Anesthesia Stop: 1231    Procedure: PROSTATECTOMY LAPAROSCOPIC WITH DAVINCI ROBOT (N/A Abdomen) Diagnosis:       Prostate cancer (HCC)      (Prostate cancer (HCC) [C61])    Surgeons: Matthew Weaver MD Provider: Dov Wiggins CRNA    Anesthesia Type: general, ITN ASA Status: 2          Anesthesia Type: general, ITN    Vitals  No vitals data found for the desired time range.          Post Anesthesia Care and Evaluation    Patient location during evaluation: PACU  Patient participation: complete - patient participated  Level of consciousness: awake and alert  Pain management: adequate    Airway patency: patent  Anesthetic complications: No anesthetic complications    Cardiovascular status: acceptable  Respiratory status: acceptable  Hydration status: acceptable    Comments: Blood pressure 110/84, pulse 91, temperature 98.5 °F (36.9 °C), temperature source Temporal, resp. rate 18, height 174 cm (68.5\"), weight 54.5 kg (120 lb 2.4 oz), SpO2 98 %.    Pt discharged from PACU based on andres score >8      "

## 2022-09-07 NOTE — PLAN OF CARE
Goal Outcome Evaluation:  Plan of Care Reviewed With: patient        Progress: no change  Outcome Evaluation: ivf inn progress. stout to bsd with gold colored urine. c/o minimal pain. tolerated cl liq. abd with lap wds x5 closed with surgical glue, ismael x1 with sanguinouds drainage. vss. no acute distress noted. cont to monitor.

## 2022-09-08 VITALS
HEART RATE: 66 BPM | TEMPERATURE: 98.2 F | SYSTOLIC BLOOD PRESSURE: 95 MMHG | HEIGHT: 69 IN | RESPIRATION RATE: 16 BRPM | WEIGHT: 120.15 LBS | OXYGEN SATURATION: 99 % | DIASTOLIC BLOOD PRESSURE: 53 MMHG | BODY MASS INDEX: 17.8 KG/M2

## 2022-09-08 LAB
ANION GAP SERPL CALCULATED.3IONS-SCNC: 6 MMOL/L (ref 5–15)
BASOPHILS # BLD AUTO: 0.01 10*3/MM3 (ref 0–0.2)
BASOPHILS NFR BLD AUTO: 0.1 % (ref 0–1.5)
BUN SERPL-MCNC: 4 MG/DL (ref 8–23)
BUN/CREAT SERPL: 7.8 (ref 7–25)
CALCIUM SPEC-SCNC: 8.4 MG/DL (ref 8.6–10.5)
CHLORIDE SERPL-SCNC: 98 MMOL/L (ref 98–107)
CO2 SERPL-SCNC: 30 MMOL/L (ref 22–29)
CREAT FLD-MCNC: 0.59 MG/DL
CREAT SERPL-MCNC: 0.51 MG/DL (ref 0.76–1.27)
DEPRECATED RDW RBC AUTO: 50.7 FL (ref 37–54)
EGFRCR SERPLBLD CKD-EPI 2021: 116.1 ML/MIN/1.73
EOSINOPHIL # BLD AUTO: 0 10*3/MM3 (ref 0–0.4)
EOSINOPHIL NFR BLD AUTO: 0 % (ref 0.3–6.2)
ERYTHROCYTE [DISTWIDTH] IN BLOOD BY AUTOMATED COUNT: 14.5 % (ref 12.3–15.4)
GLUCOSE SERPL-MCNC: 110 MG/DL (ref 65–99)
HCT VFR BLD AUTO: 35.7 % (ref 37.5–51)
HGB BLD-MCNC: 12.2 G/DL (ref 13–17.7)
LYMPHOCYTES # BLD AUTO: 1.47 10*3/MM3 (ref 0.7–3.1)
LYMPHOCYTES NFR BLD AUTO: 11.2 % (ref 19.6–45.3)
MCH RBC QN AUTO: 33 PG (ref 26.6–33)
MCHC RBC AUTO-ENTMCNC: 34.2 G/DL (ref 31.5–35.7)
MCV RBC AUTO: 96.5 FL (ref 79–97)
MONOCYTES # BLD AUTO: 0.66 10*3/MM3 (ref 0.1–0.9)
MONOCYTES NFR BLD AUTO: 5 % (ref 5–12)
NEUTROPHILS NFR BLD AUTO: 10.95 10*3/MM3 (ref 1.7–7)
NEUTROPHILS NFR BLD AUTO: 83.2 % (ref 42.7–76)
PLATELET # BLD AUTO: 134 10*3/MM3 (ref 140–450)
PMV BLD AUTO: 10.4 FL (ref 6–12)
POTASSIUM SERPL-SCNC: 3.6 MMOL/L (ref 3.5–5.2)
RBC # BLD AUTO: 3.7 10*6/MM3 (ref 4.14–5.8)
SODIUM SERPL-SCNC: 134 MMOL/L (ref 136–145)
WBC NRBC COR # BLD: 13.15 10*3/MM3 (ref 3.4–10.8)

## 2022-09-08 PROCEDURE — 80048 BASIC METABOLIC PNL TOTAL CA: CPT | Performed by: UROLOGY

## 2022-09-08 PROCEDURE — 85025 COMPLETE CBC W/AUTO DIFF WBC: CPT | Performed by: UROLOGY

## 2022-09-08 PROCEDURE — 25010000002 KETOROLAC TROMETHAMINE PER 15 MG: Performed by: UROLOGY

## 2022-09-08 PROCEDURE — 82570 ASSAY OF URINE CREATININE: CPT | Performed by: UROLOGY

## 2022-09-08 RX ORDER — HYDROCODONE BITARTRATE AND ACETAMINOPHEN 5; 325 MG/1; MG/1
1 TABLET ORAL EVERY 6 HOURS PRN
Qty: 12 TABLET | Refills: 0 | Status: SHIPPED | OUTPATIENT
Start: 2022-09-08 | End: 2022-09-11

## 2022-09-08 RX ORDER — OXYCODONE HYDROCHLORIDE 5 MG/1
5 TABLET ORAL EVERY 6 HOURS PRN
Status: DISCONTINUED | OUTPATIENT
Start: 2022-09-08 | End: 2022-09-08 | Stop reason: HOSPADM

## 2022-09-08 RX ORDER — CEPHALEXIN 500 MG/1
500 CAPSULE ORAL ONCE
Qty: 1 CAPSULE | Refills: 0 | Status: SHIPPED | OUTPATIENT
Start: 2022-09-08 | End: 2022-09-09

## 2022-09-08 RX ADMIN — KETOROLAC TROMETHAMINE 15 MG: 15 INJECTION, SOLUTION INTRAMUSCULAR; INTRAVENOUS at 05:12

## 2022-09-08 RX ADMIN — KETOROLAC TROMETHAMINE 15 MG: 15 INJECTION, SOLUTION INTRAMUSCULAR; INTRAVENOUS at 11:37

## 2022-09-08 RX ADMIN — KETOROLAC TROMETHAMINE 15 MG: 15 INJECTION, SOLUTION INTRAMUSCULAR; INTRAVENOUS at 00:04

## 2022-09-08 RX ADMIN — ACETAMINOPHEN 1000 MG: 500 TABLET ORAL at 10:11

## 2022-09-08 RX ADMIN — SODIUM CHLORIDE 150 ML/HR: 9 INJECTION, SOLUTION INTRAVENOUS at 04:37

## 2022-09-08 RX ADMIN — ACETAMINOPHEN 1000 MG: 500 TABLET ORAL at 14:27

## 2022-09-08 RX ADMIN — ACETAMINOPHEN 1000 MG: 500 TABLET ORAL at 03:17

## 2022-09-08 NOTE — PLAN OF CARE
Pt stable for d/c; tolerating diet; ambulating; good UOP; d/c instructions provided verbal and written.

## 2022-09-08 NOTE — DISCHARGE SUMMARY
Date of Discharge:  9/8/2022    Discharge Diagnosis:   #1.  Prostate cancer    Presenting Problem/History of Present Illness  Prostate cancer (HCC) [C61]       Hospital Course  Patient is a 62 y.o. male presented with prostate cancer.  Postoperatively admitted to the [facility] after robot-assisted laparoscopic prostatectomy.  On postop day 1 the REMBERTO drain was removed.  Pain control has been good due to the Intrathecal narcotics.  He was discharged home on oral Norco.  He was tolerating regular diet.  Patient was instructed on catheter care by the nursing staff    Procedures Performed  Procedure(s):  PROSTATECTOMY LAPAROSCOPIC WITH DAVINCI ROBOT       Consults:   Consults     No orders found for last 30 day(s).          Condition on Discharge: Good    Vital Signs  Temp:  [96.2 °F (35.7 °C)-98.2 °F (36.8 °C)] 98.2 °F (36.8 °C)  Heart Rate:  [] 66  Resp:  [14-18] 16  BP: ()/(48-75) 95/53      Discharge Disposition  Home or Self Care    Discharge Medications     Discharge Medications      New Medications      Instructions Start Date   cephalexin 500 MG capsule  Commonly known as: KEFLEX   500 mg, Oral, Once, Take this morning of catheter removal      HYDROcodone-acetaminophen 5-325 MG per tablet  Commonly known as: Norco   1 tablet, Oral, Every 6 Hours PRN         Continue These Medications      Instructions Start Date   acetaminophen 500 MG tablet  Commonly known as: TYLENOL   500 mg, Oral, Every 6 Hours PRN      naproxen 500 MG tablet  Commonly known as: NAPROSYN   500 mg, Oral, 2 Times Daily With Meals             Discharge Diet:   Diet Instructions     Diet: Regular      Discharge Diet: Regular          Activity at Discharge:   Activity Instructions     Bathing Restrictions      Type of Restriction: Bathing    Bathing Restrictions: Other    Explain Bathing Restrictions: May shower tomorrow. Do not take tub bath or get into water above level of catheter. After catheter removed there are no restrictions     Driving Restrictions      And NO longer taking antibiotics    Type of Restriction: Driving    Driving Restrictions: No Driving (Time Limited)    Length: 2 Weeks    Lifting Restrictions      Type of Restriction: Lifting    Lifting Restrictions: Lifting Restriction (Indicate Limit)    Weight Limit (Pounds): 10    Length of Lifting Restriction: 1 month    Work Restrictions      Type of Restriction: Work    May Return to Work: Other    Return to Work Instructions: to be discussed at f/u appointment          Follow-up Appointments  No future appointments.  Additional Instructions for the Follow-ups that You Need to Schedule     Call MD With Problems / Concerns   As directed      If catheter stops draining; If urine becomes bloody enough to form clots in tubing; If wound drainage requires dressing changes more than twice daily; If you have fever >101F; If you have nausea and vomiting more than once in a day, especially if abdomen is distended;    Order Comments: If catheter stops draining; If urine becomes bloody enough to form clots in tubing; If wound drainage requires dressing changes more than twice daily; If you have fever >101F; If you have nausea and vomiting more than once in a day, especially if abdomen is distended;          Discharge Follow-up with Specified Provider: Dr. Weaver Thursday 9/15/2022   As directed      To: Dr. Weaver Thursday 9/15/2022               Test Results Pending at Discharge  Pending Labs     Order Current Status    Tissue Pathology Exam In process           Matthew Weaver MD  09/08/22  12:51 CDT    Time: Discharge 15 min

## 2022-09-08 NOTE — PLAN OF CARE
Problem: Adult Inpatient Plan of Care  Goal: Plan of Care Review  Outcome: Ongoing, Progressing  Flowsheets (Taken 9/8/2022 0329)  Progress: no change  Outcome Evaluation: Patient RA. Lap x5 with REMBERTO x1 covered with a mepilex, blooding drainage. Lozano cath, Urine pink, muñoz color. Clear, no signs of clots. REMBERTO creat in am, see results. IV CDI, IVF infusing per order. Ambulation and up in chair in the AM. Scheduled tylenol and Toradol managing pain. No c/o of pain or n/v. Clear liquid diet, Reg diet in AM. VSS, safety maintained.

## 2022-09-12 LAB
CYTO UR: NORMAL
LAB AP CASE REPORT: NORMAL
Lab: NORMAL
PATH REPORT.FINAL DX SPEC: NORMAL
PATH REPORT.GROSS SPEC: NORMAL

## 2022-09-15 ENCOUNTER — OFFICE VISIT (OUTPATIENT)
Dept: UROLOGY | Facility: CLINIC | Age: 62
End: 2022-09-15

## 2022-09-15 VITALS — TEMPERATURE: 98.5 F | WEIGHT: 125.6 LBS | BODY MASS INDEX: 18.6 KG/M2 | HEIGHT: 69 IN

## 2022-09-15 DIAGNOSIS — C61 PROSTATE CANCER: Primary | ICD-10-CM

## 2022-09-15 PROCEDURE — 99024 POSTOP FOLLOW-UP VISIT: CPT | Performed by: UROLOGY

## 2022-09-15 NOTE — PROGRESS NOTES
UROLOGY  POD # 8    Procedure: Op Note by Matthew Weaver MD (09/07/2022 09:48)-robot-assisted laparoscopic prostatectomy    Symptoms: Patient has been doing well.  He is eating reasonably well.  Anxious to get the catheter removed      Current Outpatient Medications:   •  acetaminophen (TYLENOL) 500 MG tablet, Take 500 mg by mouth Every 6 (Six) Hours As Needed for Mild Pain ., Disp: , Rfl:   •  naproxen (NAPROSYN) 500 MG tablet, Take 500 mg by mouth 2 (Two) Times a Day With Meals., Disp: , Rfl:     There were no vitals taken for this visit.      Wound:     Data: Tissue Pathology Exam (09/07/2022 10:14)         Plan: Repeat PSA in 3 months.  Kegel exercises discussed with patient.    Matthew Weaver MD  9/15/2022  07:33 CDT

## 2022-12-08 DIAGNOSIS — C61 PROSTATE CANCER: Primary | ICD-10-CM

## 2022-12-13 NOTE — PROGRESS NOTES
"Chief Complaint  Prostate cancer    Subjective          Keron Gerber presents to [facility] UROLOGY [place]   Patient follow-up of adenocarcinoma prostate.  He underwent robot-assisted laparoscopic prostatectomy 09/07/2022.  His path revealed Johanny grade 4+4 = 8, pT2 with negative margins.  No evidence of SLICK or SV involvement.  His continence is very good. Occasional stress episode but not bothersome.         Current Outpatient Medications:   •  acetaminophen (TYLENOL) 500 MG tablet, Take 500 mg by mouth Every 6 (Six) Hours As Needed for Mild Pain ., Disp: , Rfl:   •  naproxen (NAPROSYN) 500 MG tablet, Take 500 mg by mouth 2 (Two) Times a Day With Meals., Disp: , Rfl:   •  sildenafil (VIAGRA) 100 MG tablet, Take 1 tablet by mouth As Needed for Erectile Dysfunction. 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   • Tobacco use disorder      Past Surgical History:   Procedure Laterality Date   • CYST REMOVAL Right 08/07/2014    RIGHT LOWER EYELID- EPIDERMAL INCLUSION CYST   • EXCISION LESION N/A 08/07/2014    MALIGNANT MELANOMA OF LEFT NASAL TIP   • FOREHEAD FLAP  09/11/2014   • PEDICLE FLAP  10/02/2014    PEDICLE DIVISION AND FLAP INSET OF NOSE   • PROSTATECTOMY N/A 9/7/2022    Procedure: PROSTATECTOMY LAPAROSCOPIC WITH [employer] ROBOT;  Surgeon: Matthew Weaver MD;  Location: [facility];  Service: Robotics - [employer];  Laterality: N/A;   • SQUAMOUS CELL CARCINOMA EXCISION  10/29/2013    LEFT FLOOR OF MOUTH   • TONSILLECTOMY             Review of Systems      Objective   PHYSICAL EXAM  Vital Signs:   Temp 96.9 °F (36.1 °C)   Ht 175.3 cm (69\")   Wt 60 kg (132 lb 3.2 oz)   BMI 19.52 kg/m²     Physical Exam      DATA  Result Review :              Results for orders placed or " performed in visit on 12/08/22   PSA Diagnostic    Specimen: Blood   Result Value Ref Range    PSA <0.1 0.0 - 4.0 ng/mL               Lab Results   Component Value Date    PSA <0.1 12/08/2022              ASSESSMENT AND PLAN          Problem List Items Addressed This Visit        Hematology and Neoplasia    Prostate cancer (HCC) - Primary    Relevant Orders    PSA DIAGNOSTIC   Other Visit Diagnoses     Erectile dysfunction due to diseases classified elsewhere        Relevant Medications    sildenafil (VIAGRA) 100 MG tablet      PSA results reviewed with patient  Will recheck at next visit          FOLLOW UP     No follow-ups on file.        (Please note that portions of this note were completed with a voice recognition program.)  Matthew Weaver MD  12/15/22  11:14 CST

## 2022-12-15 ENCOUNTER — OFFICE VISIT (OUTPATIENT)
Dept: UROLOGY | Facility: CLINIC | Age: 62
End: 2022-12-15

## 2022-12-15 VITALS — WEIGHT: 132.2 LBS | TEMPERATURE: 96.9 F | BODY MASS INDEX: 19.58 KG/M2 | HEIGHT: 69 IN

## 2022-12-15 DIAGNOSIS — C61 PROSTATE CANCER: Primary | ICD-10-CM

## 2022-12-15 DIAGNOSIS — N52.1 ERECTILE DYSFUNCTION DUE TO DISEASES CLASSIFIED ELSEWHERE: ICD-10-CM

## 2022-12-15 PROCEDURE — 99213 OFFICE O/P EST LOW 20 MIN: CPT | Performed by: UROLOGY

## 2022-12-15 RX ORDER — SILDENAFIL 100 MG/1
100 TABLET, FILM COATED ORAL AS NEEDED
Qty: 12 TABLET | Refills: 11 | Status: SHIPPED | OUTPATIENT
Start: 2022-12-15

## 2023-04-17 ENCOUNTER — TELEPHONE (OUTPATIENT)
Dept: UROLOGY | Facility: CLINIC | Age: 63
End: 2023-04-17
Payer: MEDICAID

## 2023-04-17 DIAGNOSIS — C61 PROSTATE CANCER: Primary | ICD-10-CM

## 2023-04-17 NOTE — TELEPHONE ENCOUNTER
Caller: Portia Gerber    Relationship to patient: MOTHER    Patient is needing: PTS MOTHER CALLED TO SCHEDULE FOLLOW UP WITH DR RODRIGUEZ. PER RECALL PT TO HAVE PSA LABS COMPLETED BEFORE APPT. NO ORDERS CURRENTLY IN CHART FOR PSA LABS TO BE COMPLETED AND WILL NEED TO HAVE AN ORDER ENTERED INTO TO CHART

## 2023-05-12 NOTE — PROGRESS NOTES
Chief Complaint  Prostate Cancer    Subjective          Keron Gerber presents to [facility] [place]   Patient is 8 months status post robot-assisted laparoscopic prostatectomy for pT2 Salem grade 4+4 = 8 adenocarcinoma prostate.  He had negative margins, no extracapsular extension or seminal vesicle involvement. Voiding with good stream. He is having some stress incontinence. Patient denies any possible systemic symptoms of prostate cancer such as weight loss, lower extremity edema, or skeletal pain that could be worrisome for systemic disease.      Current Outpatient Medications:     acetaminophen (TYLENOL) 500 MG tablet, Take 500 mg by mouth Every 6 (Six) Hours As Needed for Mild Pain ., Disp: , Rfl:     naproxen (NAPROSYN) 500 MG tablet, Take 500 mg by mouth 2 (Two) Times a Day With Meals. (Patient not taking: Reported on 5/23/2023), Disp: , Rfl:     sildenafil (VIAGRA) 100 MG tablet, Take 1 tablet by mouth As Needed for Erectile Dysfunction. 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    Tobacco use disorder      Past Surgical History:   Procedure Laterality Date    CYST REMOVAL Right 08/07/2014    RIGHT LOWER EYELID- EPIDERMAL INCLUSION CYST    EXCISION LESION N/A 08/07/2014    MALIGNANT MELANOMA OF LEFT NASAL TIP    FOREHEAD FLAP  09/11/2014    PEDICLE FLAP  10/02/2014    PEDICLE DIVISION AND FLAP INSET OF NOSE    PROSTATECTOMY N/A 9/7/2022    Procedure: PROSTATECTOMY LAPAROSCOPIC WITH [employer] ROBOT;  Surgeon: Matthew Weaver MD;  Location: [facility];  Service: Robotics - [employer];  Laterality: N/A;    SQUAMOUS CELL CARCINOMA EXCISION  10/29/2013    LEFT FLOOR OF MOUTH    TONSILLECTOMY             Review of Systems      Objective  "  PHYSICAL EXAM  Vital Signs:   Temp 97.4 °F (36.3 °C)   Ht 175.3 cm (69\")   Wt 58.3 kg (128 lb 9.6 oz)   BMI 18.99 kg/m²     Physical Exam      DATA  Result Review :              Results for orders placed or performed in visit on 05/23/23   POC Urinalysis Dipstick, Multipro    Specimen: Urine   Result Value Ref Range    Color Yellow Yellow, Straw, Dark Yellow, Ellen    Clarity, UA Clear Clear    Glucose, UA Negative Negative mg/dL    Bilirubin Small (1+) (A) Negative    Ketones, UA Trace (A) Negative    Specific Gravity  1.025 1.005 - 1.030    Blood, UA Negative Negative    pH, Urine 5.5 5.0 - 8.0    Protein, POC Trace (A) Negative mg/dL    Urobilinogen, UA 0.2 E.U./dL Normal, 0.2 E.U./dL    Nitrite, UA Negative Negative    Leukocytes Negative Negative     Lab Results   Component Value Date    PSA <0.1 05/16/2023    PSA <0.1 12/08/2022        ASSESSMENT AND PLAN          Problem List Items Addressed This Visit          Hematology and Neoplasia    Prostate cancer - Primary    Relevant Orders    POC Urinalysis Dipstick, Multipro (Completed)     Other Visit Diagnoses       Male stress incontinence        Erectile dysfunction due to diseases classified elsewhere              PSA is <0.2 which is indicative of RENA status.    His incontinence is improving. Still with heavy activity. Wears 1 pad/day.    He and I discussed the possible etiologies of his impotence.  We discussed diagnostic evaluation for erectile dysfunction is very expensive and usually does not change the management.  He understands that goal directed therapy is probably the best approach since no matter what the etiology, a PDE 5 inhibitor is the least invasive way to manage erectile dysfunction.  While there are risks to PDE 5 inhibitors,  it was relayed to him that they probably provide the most \"natural\" erection. Other options discussed including the  therapeutic windows of Cialis vs. The other PDE 5 inhitors, penile injections, urethral " suppositories of PGE1, vacuum erection devices and penile prosthetics.  However, he decided this point he does not wish to be started on medication or trying any other options.                FOLLOW UP     No follow-ups on file.        (Please note that portions of this note were completed with a voice recognition program.)  Matthew Weaver MD  05/23/23  09:31 CDT

## 2023-05-23 ENCOUNTER — OFFICE VISIT (OUTPATIENT)
Dept: UROLOGY | Facility: CLINIC | Age: 63
End: 2023-05-23
Payer: MEDICAID

## 2023-05-23 VITALS — BODY MASS INDEX: 19.05 KG/M2 | HEIGHT: 69 IN | WEIGHT: 128.6 LBS | TEMPERATURE: 97.4 F

## 2023-05-23 DIAGNOSIS — N52.1 ERECTILE DYSFUNCTION DUE TO DISEASES CLASSIFIED ELSEWHERE: ICD-10-CM

## 2023-05-23 DIAGNOSIS — N39.3 MALE STRESS INCONTINENCE: ICD-10-CM

## 2023-05-23 DIAGNOSIS — C61 PROSTATE CANCER: Primary | ICD-10-CM

## 2023-05-23 LAB
BILIRUB BLD-MCNC: ABNORMAL MG/DL
CLARITY, POC: CLEAR
COLOR UR: YELLOW
GLUCOSE UR STRIP-MCNC: NEGATIVE MG/DL
KETONES UR QL: ABNORMAL
LEUKOCYTE EST, POC: NEGATIVE
NITRITE UR-MCNC: NEGATIVE MG/ML
PH UR: 5.5 [PH] (ref 5–8)
PROT UR STRIP-MCNC: ABNORMAL MG/DL
RBC # UR STRIP: NEGATIVE /UL
SP GR UR: 1.02 (ref 1–1.03)
UROBILINOGEN UR QL: ABNORMAL

## 2023-05-23 PROCEDURE — 99213 OFFICE O/P EST LOW 20 MIN: CPT | Performed by: UROLOGY

## 2023-05-23 PROCEDURE — 1159F MED LIST DOCD IN RCRD: CPT | Performed by: UROLOGY

## 2023-05-23 PROCEDURE — 1160F RVW MEDS BY RX/DR IN RCRD: CPT | Performed by: UROLOGY

## 2023-06-06 ENCOUNTER — TELEPHONE (OUTPATIENT)
Dept: OTOLARYNGOLOGY | Facility: CLINIC | Age: 63
End: 2023-06-06
Payer: MEDICAID

## 2023-06-06 NOTE — TELEPHONE ENCOUNTER
Received referral for patient, called to schedule appointment for 7/5 at 9:00am. Patient VU. Will mail appointment reminder

## 2023-07-06 ENCOUNTER — TELEPHONE (OUTPATIENT)
Dept: OTOLARYNGOLOGY | Facility: CLINIC | Age: 63
End: 2023-07-06

## 2023-07-06 NOTE — TELEPHONE ENCOUNTER
The [facility] received a fax that requires your attention. The document has been indexed to the patient’s chart for your review.      Reason for sending: NEEDS PROVIDER REVIEW    Documents Description: PRIOR AUTHORIZATION REQUEST    Name of Sender: [employer]    Date Indexed: 07/05/2023    Notes (if needed):

## 2023-07-07 ENCOUNTER — TELEPHONE (OUTPATIENT)
Dept: OTOLARYNGOLOGY | Facility: CLINIC | Age: 63
End: 2023-07-07

## 2023-07-10 ENCOUNTER — TELEPHONE (OUTPATIENT)
Dept: OTOLARYNGOLOGY | Facility: CLINIC | Age: 63
End: 2023-07-10

## 2023-07-10 NOTE — TELEPHONE ENCOUNTER
The [facility] received a fax that requires your attention. The document has been indexed to the patient’s chart for your review.      Reason for sending: ADDITIONAL INFORMATION NEEDED FOR PRIOR AUTH    Documents Description: EXT MED [id number]    Name of Sender: [employer]    Date Indexed: 07.07.23    Notes (if needed):

## 2023-07-11 ENCOUNTER — TELEPHONE (OUTPATIENT)
Dept: OTOLARYNGOLOGY | Facility: CLINIC | Age: 63
End: 2023-07-11

## 2023-07-11 NOTE — TELEPHONE ENCOUNTER
Provider: DR. LIZETH BROWN JR.    RECEIVED ADVERSE BENEFIT DETERMINATION PAPERWORK FROM Induction Manager. INDEXED INTO CHART AS EXT MED RECS 7/11/23.    Subjective:  HPI                    Objective:  System    Physical Exam    General     ROS    Assessment/Plan:    PROGRESS  REPORT    Progress reporting period is from 12/11/18 to 2/15/19.       SUBJECTIVE  Subjective changes noted by patient:  Tailbone pain is better overall, some days no pain at all with sitting. Unsure of the reason why it is painful some days more than other.  HEP is going well.        Current pain level is 0/10  .     Previous pain level was  1/10 Initial Pain level: 2/10.   Changes in function:  Yes (See Goal flowsheet attached for changes in current functional level)  Adverse reaction to treatment or activity: None    OBJECTIVE  Changes noted in objective findings:  Yes,Less PT at coccyx and surrounding tissue. Minimal PT and good coccyx flexion/ extension mobility with internal assessment.  Improved neutral sitting posture  Fair TrA activation in supine with verbal cues- instructed in core exercise         ASSESSMENT/PLAN  Updated problem list and treatment plan: Diagnosis 1:  Coccyx pain  Pain -  hot/cold therapy, US, manual therapy, self management, education and home program  Impaired muscle performance - neuro re-education and home program  Decreased function - therapeutic activities and home program  Impaired posture - neuro re-education and home program  STG/LTGs have been met or progress has been made towards goals:  Yes (See Goal flow sheet completed today.)  Assessment of Progress: The patient's condition is improving.  Patient is meeting short term goals and is progressing towards long term goals.  Self Management Plans:  Patient has been instructed in a home treatment program.  Patient  has been instructed in self management of symptoms.  I have re-evaluated this patient and find that the nature, scope, duration and intensity of the therapy is appropriate for the medical condition of the patient.  Sagrario continues to require the following intervention to meet STG and LTG's:   PT    Recommendations:  This patient would benefit from continued therapy.     Frequency:  2 X a month, once daily  Duration:  for 2 months        Please refer to the daily flowsheet for treatment today, total treatment time and time spent performing 1:1 timed codes.

## 2023-09-18 ENCOUNTER — TELEPHONE (OUTPATIENT)
Dept: UROLOGY | Facility: CLINIC | Age: 63
End: 2023-09-18
Payer: MEDICAID

## 2023-09-18 NOTE — PROGRESS NOTES
Chief Complaint  Prostate Cancer    Subjective          Keron Gerber presents to [facility] UROLOGY   He is status post prostatectomy for adenocarcinoma prostate.  He had high risk disease.  Patient denies any possible systemic symptoms of prostate cancer such as weight loss, lower extremity edema, or skeletal pain that could be worrisome for systemic disease.  He has no issues with urine leakage.    09/2022: Robot assisted laparoscopic prostatectomy  Prostate, prostatectomy:  - Histologic Type: Acinar adenocarcinoma.  - Histologic Grade: Grade group 4 (Johanny score 4 + 4 = 8).  - Intraductal Carcinoma: Not identified.  - Cribriform Glands: Present.  - Treatment Effect: No known presurgical therapy.  - Tumor Quantitation: 41-50%.  - Extraprostatic Extension: Not identified.  - Urinary Bladder Neck Invasion: Not identified.  - Seminal Vesicle Invasion: Not identified.  - Lymphovascular Invasion: Not identified.  - Margins: All margins are Negative for invasive carcinoma.  - Pathologic Stage: pT2 pNx pMx  Electronically signed by Isabel Reeves MD on 9/12/2022 at 1223    He has not been able to get an erection.  This occurred since the surgery.  He says his libido is good.  He does not get any rigidity and only minimal tumescence.      Current Outpatient Medications:     acetaminophen (TYLENOL) 500 MG tablet, Take 1 tablet by mouth Every 6 (Six) Hours As Needed for Mild Pain., Disp: , Rfl:     fluticasone (FLONASE) 50 MCG/ACT nasal spray, 2 sprays into the nostril(s) as directed by provider 2 (Two) Times a Day., Disp: 48 g, Rfl: 3    sildenafil (VIAGRA) 100 MG tablet, Take 1 tablet by mouth As Needed for Erectile Dysfunction. About 1 hour before activity, Disp: 30 tablet, Rfl: 11    naproxen (NAPROSYN) 500 MG tablet, Take 1 tablet by mouth 2 (Two) Times a Day With Meals. 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    Tobacco use disorder      Past Surgical History:   Procedure Laterality Date    CYST REMOVAL Right 08/07/2014    RIGHT LOWER EYELID- EPIDERMAL INCLUSION CYST    EXCISION LESION N/A 08/07/2014    MALIGNANT MELANOMA OF LEFT NASAL TIP    FOREHEAD FLAP  09/11/2014    PEDICLE FLAP  10/02/2014    PEDICLE DIVISION AND FLAP INSET OF NOSE    PROSTATECTOMY N/A 9/7/2022    Procedure: PROSTATECTOMY LAPAROSCOPIC WITH [employer] ROBOT;  Surgeon: Matthew Weaver MD;  Location: [facility];  Service: Robotics - [employer];  Laterality: N/A;    SQUAMOUS CELL CARCINOMA EXCISION  10/29/2013    LEFT FLOOR OF MOUTH    TONSILLECTOMY             Review of Systems      Objective   PHYSICAL EXAM  Vital Signs:   Temp 96.1 °F (35.6 °C)   Ht 175.3 cm (69\")   Wt 59.9 kg (132 lb)   BMI 19.49 kg/m²     Physical Exam      DATA  Result Review :              Results for orders placed or performed in visit on 09/26/23   POC Urinalysis Dipstick, Multipro    Specimen: Urine   Result Value Ref Range    Color Yellow Yellow, Straw, Dark Yellow, Ellen    Clarity, UA Clear Clear    Glucose, UA Negative Negative mg/dL    Bilirubin Small (1+) (A) Negative    Ketones, UA Negative Negative    Specific Gravity  1.030 1.005 - 1.030    Blood, UA Negative Negative    pH, Urine 5.5 5.0 - 8.0    Protein, POC 30 mg/dL (A) Negative mg/dL    Urobilinogen, UA Normal Normal, 0.2 E.U./dL    Nitrite, UA Negative Negative    Leukocytes Negative Negative       Lab Results   Component Value Date    PSA <0.1 09/19/2023    PSA <0.1 05/16/2023    PSA <0.1 12/08/2022                      ASSESSMENT AND PLAN          Problem List Items Addressed This Visit          Hematology and Neoplasia    Prostate cancer - Primary    Relevant Orders    POC Urinalysis Dipstick, Multipro (Completed)     Other Visit Diagnoses       Erectile dysfunction due to " diseases classified elsewhere        Relevant Medications    sildenafil (VIAGRA) 100 MG tablet        PSA is <0.2 which is indicative of RENA status.    He is concerned about his erections because he has a new girlfriend.  We talked about expectations after prostatectomy.  I would like to start him on a daily sildenafil.          FOLLOW UP     Return in about 4 months (around 1/26/2024) for PSA before visit.        (Please note that portions of this note were completed with a voice recognition program.)  Matthew Weaver MD  09/26/23  09:04 CDT

## 2023-09-18 NOTE — TELEPHONE ENCOUNTER
Called Patient to remind them to get PSA prior to appointment.  Unable to leave message with Patient.  If patient calls back it is ok for the HUB to tell the pt the message.

## 2023-09-26 ENCOUNTER — OFFICE VISIT (OUTPATIENT)
Dept: UROLOGY | Facility: CLINIC | Age: 63
End: 2023-09-26
Payer: MEDICAID

## 2023-09-26 VITALS — TEMPERATURE: 96.1 F | HEIGHT: 69 IN | BODY MASS INDEX: 19.55 KG/M2 | WEIGHT: 132 LBS

## 2023-09-26 DIAGNOSIS — N52.1 ERECTILE DYSFUNCTION DUE TO DISEASES CLASSIFIED ELSEWHERE: ICD-10-CM

## 2023-09-26 DIAGNOSIS — C61 PROSTATE CANCER: Primary | ICD-10-CM

## 2023-09-26 LAB
BILIRUB BLD-MCNC: ABNORMAL MG/DL
CLARITY, POC: CLEAR
COLOR UR: YELLOW
GLUCOSE UR STRIP-MCNC: NEGATIVE MG/DL
KETONES UR QL: NEGATIVE
LEUKOCYTE EST, POC: NEGATIVE
NITRITE UR-MCNC: NEGATIVE MG/ML
PH UR: 5.5 [PH] (ref 5–8)
PROT UR STRIP-MCNC: ABNORMAL MG/DL
RBC # UR STRIP: NEGATIVE /UL
SP GR UR: 1.03 (ref 1–1.03)
UROBILINOGEN UR QL: NORMAL

## 2023-09-26 RX ORDER — SILDENAFIL 100 MG/1
100 TABLET, FILM COATED ORAL AS NEEDED
Qty: 30 TABLET | Refills: 11 | Status: SHIPPED | OUTPATIENT
Start: 2023-09-26

## 2024-01-12 NOTE — PROGRESS NOTES
Chief Complaint  Prostate Cancer    Subjective          Keron Gerber presents to [facility] UROLOGY to follow-up prostate cancer.  Patient denies any possible systemic symptoms of prostate cancer such as weight loss, lower extremity edema, or skeletal pain that could be worrisome for systemic disease.      09/2022: Robot assisted laparoscopic prostatectomy  Prostate, prostatectomy:  - Histologic Type: Acinar adenocarcinoma.  - Histologic Grade: Grade group 4 (Johanny score 4 + 4 = 8).  - Intraductal Carcinoma: Not identified.  - Cribriform Glands: Present.  - Treatment Effect: No known presurgical therapy.  - Tumor Quantitation: 41-50%.  - Extraprostatic Extension: Not identified.  - Urinary Bladder Neck Invasion: Not identified.  - Seminal Vesicle Invasion: Not identified.  - Lymphovascular Invasion: Not identified.  - Margins: All margins are Negative for invasive carcinoma.  - Pathologic Stage: pT2 pNx pMx  Electronically signed by Isabel Reeves MD on 9/12/2022 at 1223    He continues to be bothered by erectile dysfunction.  Sildenafil does not help.  He is not getting spontaneous early a.m. erections either.      Current Outpatient Medications:     acetaminophen (TYLENOL) 500 MG tablet, Take 1 tablet by mouth Every 6 (Six) Hours As Needed for Mild Pain., Disp: , Rfl:     tadalafil (CIALIS) 20 MG tablet, Take 1 tablet by mouth As Needed for Erectile Dysfunction. 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    Tobacco use disorder      Past Surgical History:   Procedure Laterality Date    CYST REMOVAL Right 08/07/2014    RIGHT LOWER EYELID- EPIDERMAL INCLUSION CYST    EXCISION LESION N/A 08/07/2014    MALIGNANT MELANOMA OF LEFT NASAL TIP    FOREHEAD FLAP  09/11/2014    PEDICLE FLAP   "10/02/2014    PEDICLE DIVISION AND FLAP INSET OF NOSE    PROSTATECTOMY N/A 9/7/2022    Procedure: PROSTATECTOMY LAPAROSCOPIC WITH DAVINCI ROBOT;  Surgeon: Matthew Weaver MD;  Location: [facility];  Service: Robotics - DaVinci;  Laterality: N/A;    SQUAMOUS CELL CARCINOMA EXCISION  10/29/2013    LEFT FLOOR OF MOUTH    TONSILLECTOMY             Review of Systems      Objective   PHYSICAL EXAM  Vital Signs:   Temp 96.2 °F (35.7 °C)   Ht 175.3 cm (69\")   Wt 61.1 kg (134 lb 12.8 oz)   BMI 19.91 kg/m²     Physical Exam      DATA  Result Review :              Results for orders placed or performed in visit on 01/30/24   POC Urinalysis Dipstick, Multipro    Specimen: Urine   Result Value Ref Range    Color Yellow Yellow, Straw, Dark Yellow, Ellen    Clarity, UA Clear Clear    Glucose, UA Negative Negative mg/dL    Bilirubin Negative Negative    Ketones, UA Negative Negative    Specific Gravity  1.005 1.005 - 1.030    Blood, UA Trace (A) Negative    pH, Urine 6.0 5.0 - 8.0    Protein, POC Negative Negative mg/dL    Urobilinogen, UA 0.2 E.U./dL Normal, 0.2 E.U./dL    Nitrite, UA Negative Negative    Leukocytes Negative Negative       Lab Results   Component Value Date    PSA <0.1 01/23/2024    PSA <0.1 09/19/2023    PSA <0.1 05/16/2023    PSA <0.1 12/08/2022     ASSESSMENT AND PLAN          Problem List Items Addressed This Visit          Hematology and Neoplasia    Prostate cancer - Primary    Relevant Orders    POC Urinalysis Dipstick, Multipro (Completed)     Other Visit Diagnoses       Erectile dysfunction due to diseases classified elsewhere        Erectile dysfunction after radical prostatectomy        Relevant Medications    tadalafil (CIALIS) 20 MG tablet        Each of these chronic Urologic conditions, which I have followed >1 year,  were evaluated and managed today as follows:     PSA is <0.2 which is indicative of RENA status.  Change to tadalafil.  Recommend 20 mg dose.  Sildenafil not working.  Discussed " potential side effects.  Also discussed therapeutic window.        FOLLOW UP     Return in about 4 months (around 5/30/2024) for PSA before visit.        (Please note that portions of this note were completed with a voice recognition program.)  Matthew Weaver MD  01/30/24  09:38 CST

## 2024-01-30 ENCOUNTER — OFFICE VISIT (OUTPATIENT)
Dept: UROLOGY | Facility: CLINIC | Age: 64
End: 2024-01-30
Payer: MEDICAID

## 2024-01-30 VITALS — BODY MASS INDEX: 19.96 KG/M2 | HEIGHT: 69 IN | TEMPERATURE: 96.2 F | WEIGHT: 134.8 LBS

## 2024-01-30 DIAGNOSIS — C61 PROSTATE CANCER: Primary | ICD-10-CM

## 2024-01-30 DIAGNOSIS — N52.31 ERECTILE DYSFUNCTION AFTER RADICAL PROSTATECTOMY: ICD-10-CM

## 2024-01-30 DIAGNOSIS — N52.1 ERECTILE DYSFUNCTION DUE TO DISEASES CLASSIFIED ELSEWHERE: ICD-10-CM

## 2024-01-30 LAB
BILIRUB BLD-MCNC: NEGATIVE MG/DL
CLARITY, POC: CLEAR
COLOR UR: YELLOW
GLUCOSE UR STRIP-MCNC: NEGATIVE MG/DL
KETONES UR QL: NEGATIVE
LEUKOCYTE EST, POC: NEGATIVE
NITRITE UR-MCNC: NEGATIVE MG/ML
PH UR: 6 [PH] (ref 5–8)
PROT UR STRIP-MCNC: NEGATIVE MG/DL
RBC # UR STRIP: ABNORMAL /UL
SP GR UR: 1 (ref 1–1.03)
UROBILINOGEN UR QL: ABNORMAL

## 2024-01-30 RX ORDER — TADALAFIL 20 MG/1
20 TABLET ORAL AS NEEDED
Qty: 12 TABLET | Refills: 11 | Status: SHIPPED | OUTPATIENT
Start: 2024-01-30

## 2024-02-19 ENCOUNTER — TELEPHONE (OUTPATIENT)
Dept: OTOLARYNGOLOGY | Facility: CLINIC | Age: 64
End: 2024-02-19
Payer: MEDICAID

## 2024-02-19 NOTE — TELEPHONE ENCOUNTER
Unable to leave  or contact patient's Mom, her number has been disconnected. Trying to schedule referral appt with Dr Middleton

## 2024-03-11 ENCOUNTER — TELEPHONE (OUTPATIENT)
Dept: OTOLARYNGOLOGY | Facility: CLINIC | Age: 64
End: 2024-03-11

## 2024-03-11 NOTE — TELEPHONE ENCOUNTER
Provider: DR. BROWN    Caller: Portia Chandra    Relationship to Patient: Mother     Phone Number: [phone]    Reason for Call: CANCEL APPOINTMENT DUE TO CHANGE IN HEALTH    When was the patient last seen: 07/05/23    Notes: MS. CHANDRA CALLED TO CANCEL KATHY'S APPOINTMENT (03/12/24) DUE TO A CHANGE IN HIS HEALTH.     HE IS CURRENTLY ADMITTED IN [facility] DUE TO CONCERNS WITH HIS THROAT. PATIENT IS SCHEDULED TO SEE A SURGEON IN [place] THIS WEDNESDAY.     MS. CHANDRA WILL CALL BACK TO RESCHEDULE ONCE MORE IS KNOWN.

## 2024-03-27 NOTE — PROGRESS NOTES
[facility] HEMATOLOGY & ONCOLOGY  [address]  [place] [address] [phone]    Patient Name: Keron Gerber  Encounter Date: 04/02/2024  YOB: 1960  Patient Number: [id number]    Initial Note    REASON FOR CONSULTATION: Keron Gerber is a pleasant 63 y.o.  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 He is seen with mother Portia and brother in law.  History is obtained from patient. History is considered to be accurate.      HISTORY OF PRESENT ILLNESS: He had several months of right ear pain radiating to the right neck and 20 pound weight loss over 6 months.     He noted right neck mass and dysphagia.    Admitted for dehydration.     CT neck on 2/28/2024.  3.5 x 4 cm lobular enhancing mass in the upper larynx arising from the posterior wall at the level of the base of the epiglottis.  Suspicious for malignancy.  Abnormal mucosal enhancement also noted involving the uvula and tongue base.    Patient admitted 3/13/2024 for pharyngeal mass.    CT chest 3/13/2024.  Well-defined 1.7 cm fluid density lesion in the anterior mediastinum could represent a benign etiology such as a thymic cyst.  Recommend follow-up.  Mild diffuse thickening of bilateral adrenal glands could represent adenomatous hyperplasia.  Recommend correlation with prior imaging if available.  No evidence of pulmonary metastasis.    Tracheostomy, direct laryngoscopy, rigid cervical esophagoscopy and biopsy of the posterior pharyngeal mass and biopsy of the left true vocal fold on 3/14/2024. Flexible laryngoscopy revealed a large exophytic lesion coming from the posterior pharyngeal wall protruding off the supraglottis and glottis with inability to visualize the glottis.    PEG placement 3/18/2024.    Pathology  "report on 3/18/2024.  Vocal cord, left, biopsy: Squamous mucosa with focal dysplasia at least moderate.  Posterior pharyngeal wall biopsy: Invasive poorly differentiated squamous cell carcinoma.  Posterior pharyngeal wall biopsy: Invasive poorly differentiated squamous cell carcinoma with focal metaplastic features.  Posterior pharyngeal wall biopsy: Invasive poorly differentiated squamous cell carcinoma with focal metaplastic features.    CBC 3/19/2024 revealed a WBC of 10.8, hemoglobin 13.7, hematocrit 40.5 and platelet 329.  CMP revealed creatinine of 0.73.    Patient seen by Dr. Lit Rand on 3/22/2024.  Findings: Nasopharynx and oropharynx patent without masses/lesions; BOT without masses/lesions; exophytic lesion likely coming from posterior pharyngeal wall protruding onto supraglottis and glottis; unable to visualize glottis d/t obstructive view; supraglottis without obvious lesion otherwise.  Multidisciplinary recommendation: Discussed chemoradiation versus surgical resection.  Patient would benefit from chemoradiation at this time.    Father had melanoma.       LABS    No results for input(s): \"HGB\", \"HCT\", \"MCV\", \"WBC\", \"RDW\", \"MPV\", \"PLT\", \"AUTOIGPER\", \"NEUTROABS\", \"LYMPHSABS\", \"MONOSABS\", \"EOSABS\", \"BASOSABS\", \"AUTOIGNUM\", \"NRBC\", \"NEUTRELPCTM\", \"MONOPCT\", \"BASOPCT\", \"ATYLMPCT\", \"ANISOCYTOSIS\", \"GIANTPLTS\" in the last 92530 hours.    Invalid input(s): \"LYMPHOCPCT\", \"ABCMORPH\"    No results for input(s): \"GLUCOSE\", \"NA\", \"K\", \"CO2\", \"CL\", \"ANIONGAP\", \"CREATININE\", \"BUN\", \"BCR\", \"CALCIUM\", \"EGFRIFNONA\", \"ALKPHOS\", \"PROTEINTOT\", \"ALT\", \"AST\", \"BILITOT\", \"ALBUMIN\", \"GLOB\" in the last 16361 hours.    Invalid input(s): \"LABIL\"    No results for input(s): \"MSPIKE\", \"KAPPALAMB\", \"IGLFLC\", \"URICACID\", \"FREEKAPPAL\", \"CEA\", \"LDH\", \"REFLABREPO\" in the last 85848 hours.    No results for input(s): \"IRON\", \"TIBC\", \"LABIRON\", \"FERRITIN\", \"O3DFAVO\", \"TSH\", \"FOLATE\" in the last 28718 hours.    Invalid input(s): " "\"VITB12\"      PAST MEDICAL HISTORY:  ALLERGIES:  Allergies   Allergen Reactions    Sulfa Antibiotics Other (See Comments)     Headaches     CURRENT MEDICATIONS:  Outpatient Encounter Medications as of 4/2/2024   Medication Sig Dispense Refill    acetaminophen (TYLENOL) 500 MG tablet Take 1 tablet by mouth Every 6 (Six) Hours As Needed for Mild Pain.      tadalafil (CIALIS) 20 MG tablet Take 1 tablet by mouth As Needed for Erectile Dysfunction. 1-12 hours before activity 12 tablet 11    ondansetron (Zofran) 8 MG tablet Take 1 tablet by mouth Every 8 (Eight) Hours As Needed for Nausea or Vomiting. 60 tablet 2    prochlorperazine (COMPAZINE) 10 MG tablet Take 1 tablet by mouth Every 4 (Four) Hours As Needed for Nausea or Vomiting. 60 tablet 2     No facility-administered encounter medications on file as of 4/2/2024.     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    Current every day smoker F17.200     SURGERIES:  Past Surgical History:   Procedure Laterality Date    CYST REMOVAL Right 08/07/2014    RIGHT LOWER EYELID- EPIDERMAL INCLUSION CYST    EXCISION LESION N/A 08/07/2014    MALIGNANT MELANOMA OF LEFT NASAL TIP    FOREHEAD FLAP  09/11/2014    PEDICLE FLAP  10/02/2014    PEDICLE DIVISION AND FLAP INSET OF NOSE    PROSTATECTOMY N/A 9/7/2022    Procedure: PROSTATECTOMY LAPAROSCOPIC WITH NitroSellINCI ROBOT;  Surgeon: Matthew Weaver MD;  Location: [facility];  Service: Robotics - DaVinci;  Laterality: N/A;    SQUAMOUS CELL CARCINOMA EXCISION  10/29/2013    LEFT FLOOR OF MOUTH    TONSILLECTOMY       HEALTH MAINTENANCE ITEMS:  Health Maintenance Due   Topic Date Due    COLORECTAL CANCER SCREENING  Never done    Pneumococcal Vaccine 0-64 (1 of 2 - PCV) Never done    TDAP/TD VACCINES (1 - Tdap) Never done    ZOSTER VACCINE (1 of 2) Never done    HEPATITIS C SCREENING  Never done    ANNUAL " "PHYSICAL  Never done    BMI FOLLOWUP  04/20/2022    RSV Vaccine - Adults (1 - 1-dose 60+ series) Never done    COVID-19 Vaccine (5 - 2023-24 season) 09/01/2023       <no information>  Last Completed Colonoscopy       This patient has no relevant Health Maintenance data.          Immunization History   Administered Date(s) Administered    COVID-19 (MODERNA) 1st,2nd,3rd Dose Monovalent 02/24/2021, 03/23/2021    COVID-19 (MODERNA) BIVALENT 12+YRS 11/15/2022    COVID-19 (MODERNA) Monovalent Original Booster 12/16/2021     Last Completed Mammogram       This patient has no relevant Health Maintenance data.              FAMILY HISTORY:  No family history on file.  SOCIAL HISTORY:  Social History     Socioeconomic History    Marital status: Single   Tobacco Use    Smoking status: Every Day     Current packs/day: 1.50     Types: Cigarettes    Smokeless tobacco: Never   Vaping Use    Vaping status: Never Used   Substance and Sexual Activity    Alcohol use: Not Currently     Comment: 12 PER DAY    Drug use: No    Sexual activity: Defer       REVIEW OF SYSTEMS:  Review of Systems   Constitutional:  Positive for unexpected weight loss. Negative for fatigue and fever.        \"I feel great.\"   HENT:  Negative for congestion.    Eyes:  Negative for blurred vision and redness.   Respiratory:  Negative for shortness of breath and wheezing.    Cardiovascular:  Negative for chest pain and leg swelling.   Gastrointestinal:  Negative for blood in stool, constipation, diarrhea, nausea and vomiting.   Endocrine: Negative for polydipsia and polyphagia.   Genitourinary:  Negative for dysuria and hematuria.   Musculoskeletal:  Negative for gait problem and joint swelling.   Skin:  Negative for pallor.   Allergic/Immunologic: Negative for food allergies.   Neurological:  Negative for speech difficulty and weakness.       /66   Pulse 81   Temp 98.2 °F (36.8 °C)   Resp 18   Ht 175.3 cm (69\")   Wt 56.7 kg (125 lb)   SpO2 99%   BMI " 18.46 kg/m²  Body surface area is 1.69 meters squared.  Pain Score    04/02/24 1331   PainSc: 0-No pain       Physical Exam:  Physical Exam  Vitals reviewed.   Constitutional:       General: He is not in acute distress.  HENT:      Head: Normocephalic and atraumatic.      Comments: +Trach.  Eyes:      General: No scleral icterus.  Cardiovascular:      Rate and Rhythm: Normal rate.   Pulmonary:      Effort: No respiratory distress.      Breath sounds: No wheezing.   Abdominal:      General: Bowel sounds are normal.      Palpations: Abdomen is soft.      Tenderness: There is no abdominal tenderness.      Comments: +PEG.   Musculoskeletal:         General: No swelling.      Cervical back: Neck supple.   Skin:     General: Skin is warm.      Coloration: Skin is not pale.   Neurological:      Mental Status: He is alert and oriented to person, place, and time.   Psychiatric:         Mood and Affect: Mood normal.         Behavior: Behavior normal.         Thought Content: Thought content normal.         Judgment: Judgment normal.         Keron Gerber reports a pain score of 0.            ASSESSMENT:  1.  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  Tumor size.3.5 x 4 cm.  AJCC stage:III (cT3, cN0, cM0)  Tumor complications: Dysphagia.  Treatment status: Post tracheostomy and G-tube placement.  For concurrent cisplatin weekly with radiation.   2.  Performance status of 0.  3.  Prostate cancer 2022, history of.   Treatment status: Post prostatectomy 9/7/2022 by Dr. Weaver.  Followed by Dr. Luciano.  4.  Tobacco abuse.  He is a current smoker.  5.  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 Lawrence in [place].   6.  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         PLAN:   regarding the reason for the referral.  2.   regarding NCCN guidelines for T3 N0 hypopharyngeal squamous cell carcinoma using weekly cisplatin, lower morbidity  without sacrificing efficacy, with radiation.  3.   regarding potential adverse effects of cisplatin specially infusion reactions, anaphylaxis, nausea, vomiting, nephrotoxicity, ototoxicity, neuropathy, cytopenias, nausea or vomiting.  4.  Blood for CBC with differential, CMP and magnesium.  5.  Treatment education.  6.  Schedule chemo:  Cisplatin 40 mg/m2 weekly with radiation.  7.  Premed:  Aloxi 0.25 mg IV  Decadron 12 mg IV  Emend 150 mg IV  8.  Pre-hydrate cisplatin with 1L NS over 2 hours with 1 gram magnesium and 20 mEq of KCl.  Lasix 20 mg IVP after pre-hydration and after cisplatin.   9.  Post Hydration with NS 1L over 2 hours with 20 mEq of KCl and 1 g magnesium.  10.  Weekly CBC with differential, CMP and magnesium.  11. eRx Zofran 8 mg per PEG. every 8 hours as needed for nausea and vomiting #60, 2 refills.  12. eRx Compazine 10 mg per PEG every 4 hours as needed for nausea and vomiting #60, 2 refills.  13.  Continue care per primary care physician and other specialist.  14.  Plan of care discussed with patient and family.  Understanding expressed.  Patient agreeable to proceed.  15.  To see Dr. Good 4/2/2024.   16.  Return to office in 3 weeks.  17.  Further recommendations pending.  18.  Importance of Smoking Cessation discussed with patient and informed patient additional information will be on today's [facility].   [place] [facility]'s Flyer - Plan to Be Tobacco Free handout provided to patient       Thank you for the referral.        I have reviewed the assessment and plan and verified the accuracy of it. No changes to assessment and plan since the information was documented. Tee Randhawa MD 04/02/24         I spent 73 total minutes, face-to-face, caring for Keron vargas. Greater than 50% of this time involved counseling and/or coordination of care as documented within this note.           Hector Good MD  (Lit Rand MD [facility].)  MD Yousif Cheney MD

## 2024-04-01 ENCOUNTER — HOSPITAL ENCOUNTER (OUTPATIENT)
Dept: RADIATION ONCOLOGY | Facility: HOSPITAL | Age: 64
Setting detail: RADIATION/ONCOLOGY SERIES
End: 2024-04-01
Payer: MEDICAID

## 2024-04-01 PROBLEM — F17.200 CURRENT EVERY DAY SMOKER: Status: ACTIVE | Noted: 2024-04-01

## 2024-04-01 NOTE — PROGRESS NOTES
[facility] Good MD      ZENY Barakat  ____________________________________________________________  [facility]  Department of Radiation Oncology  [address] [place] [id number]  Office:  [phone]  Fax: [phone]    DATE: 04/02/2024  PATIENT: Keron Gerber  1960                         MEDICAL RECORD #:  [id number]                                                       REASON FOR VISIT:    Chief Complaint   Patient presents with    Squamous Cell Carcinoma     Keron Gerber is a very pleasant male that has been referred to our office for radiotherapy considerations for head/neck carcinoma. Reports fatigue, unexpected weight change, sore throat, trouble swallowing, and voice change. Denies activity change, cough, SOB, chest pain, nasuea/vomiting, diarrhea, light-headedness, weakness, and headaches. He follows .     History of Present Illness:    02/28/2024 - CT Neck:  35 x 40 mm lobular enhancing mass in the upper larynx arising from the posterior wall at the level of the base of the epiglottis. This is suspicious for malignancy.   There is abnormal mucosal enhancement also noted involving the uvula and tongue base.     03/02/2024 - CT Soft tissue neck with contrast:  Findings suggest laryngeal mass as noted, suggest ENT evaluation, asymmetric appearance of the vocal cords and larynx  Minimal adenopathy  Postop changes left submandibular region    03/13/2024 - CT Chest with contrast:  Well-defined 1.7 cm fluid density lesion in the anterior mediastinum could represent a benign etiology such as a thymic cyst. Given lack of prior imaging and concern for malignancy, recommend close attention on follow-up imaging and further workup as clinically necessary.  Mild diffuse thickening of bilateral adrenal glands could represent adenomatous hyperplasia. Recommend correlation to prior imaging if available and attention on follow-up  imaging.  No evidence of pulmonary metastases.    03/14/2024 - Awake tracheostomy/Direct laryngoscopy with suspension with   telescope/right cervical esophagoscopy/biopsy:  Findings:  Successful awake tracheostomy in successful awake tracheostomy with placement of a 6 cuffed Shiley tracheostomy tube.  Large exophytic 5-6 cm posterior oropharyngeal mass extending to the junction of the lateral posterior pharyngeal wall junction bilaterally, superiorly comes up to the level of the soft palate. Inferiorly extends into bilateral piriform sinuses, but does not involve the apex, however there are reactive changes of the left piriform apex. There is no involvement of the supraglottic structures.  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  No other mucosal masses, lesions, or ulcers visualized.   No mucosal masses, lesions, or ulcers visualized in the esophageal inlet or cervical esophagus.  Pathology:  VOCAL CORD, LEFT, BIOPSY:   Squamous mucosa with focal dysplasia, at least moderate.   POSTERIOR PHARYNGEAL WALL, BIOPSY:   INVASIVE POORLY DIFFERENTIATED SQUAMOUS CELL CARCINOMA.   POSTERIOR PHARYNGEAL WALL, BIOPSY:   INVASIVE POORLY DIFFERENTIATED SQUAMOUS CELL CARCINOMA WITH FOCAL METAPLASTIC FEATURES   POSTERIOR PHARYNGEAL WALL, BIOPSY:   INVASIVE POORLY DIFFERENTIATED SQUAMOUS CELL CARCINOMA WITH FOCAL METAPLASTIC FEATURES     03/18/2024 - Gastrostomy tube placement    03/22/2024 - Appointment with Dr. Rand - ENT at [facility]:  Laryngoscopy:  Findings: nasopharynx and oropharynx patent without masses/lesions; BOT without masses/lesions; exophytic lesion likely coming from posterior pharyngeal wall protruding onto supraglottis and glottis; unable to visualize glottis d/t obstructive view; supraglottis without obvious lesion otherwise  PLAN:   CT chest without mets. Discussed imaging in depth with team. Patient is currently T3N0,  stage III oropharyngeal SCC.   DL had biopsies positive for invasive SCC.   Discussed chemoradiation vs surgical resection with patient. Patient will benefit from chemoradiation at this time.  Patient would like to have treatment closer to home in [facility]. Will send out referrals.  Patient may need further evaluation of his carotid stenosis as well.     03/22/2024 - Appointment with Dr. 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    04/02/2024 - Consult with Dr. 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  Tumor size.3.5 x 4 cm.  AJCC stage:III (cT3, cN0, cM0)  Tumor complications: Dysphagia.  Treatment status: Post tracheostomy and G-tube placement.  For concurrent cisplatin weekly with radiation.   Performance status of 0.  Prostate cancer 2022, history of.   Treatment status: Post prostatectomy 9/7/2022 by Dr. Weaver.  Followed by Dr. Luciano  Tobacco abuse.  He is a current smoker.  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 Lawrence in [place].   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   PLAN:   regarding the reason for the referral.   regarding NCCN guidelines for T3 N0 hypopharyngeal squamous cell carcinoma using weekly cisplatin, lower morbidity without sacrificing efficacy, with radiation.   regarding potential adverse effects of cisplatin specially infusion reactions, anaphylaxis, nausea, vomiting, nephrotoxicity, ototoxicity, neuropathy, cytopenias, nausea or vomiting.   Blood for CBC with differential, CMP and magnesium.  Treatment education.  Schedule chemo:  Cisplatin 40 mg/m2 weekly with radiation.  Premed:  Aloxi 0.25 mg IV   Decadron 12 mg IV  Emend 150 mg IV  Pre-hydrate cisplatin with 1L NS over 2 hours with 1 gram  magnesium and 20 mEq of KCl.  Lasix 20 mg IVP after pre-hydration and after cisplatin.   Post Hydration with NS 1L over 2 hours with 20 mEq of KCl and 1 g magnesium.  Weekly CBC with differential, CMP and magnesium.  eRx Zofran 8 mg per PEG. every 8 hours as needed for nausea and vomiting #60, 2 refills  eRx Compazine 10 mg per PEG every 4 hours as needed for nausea and vomiting #60, 2 refills.  Continue care per primary care physician and other specialist.  Plan of care discussed with patient and family.  Understanding expressed.  Patient agreeable to proceed.  To see Dr. Good 4/2/2024.   Return to office in 3 weeks.  Further recommendations pending.          History of Prostate cancer:  01/23/2020 - PSA: 13.96    02/23/2021 - PSA: 4.4    04/23/2022 - PSA: 10.1    05/12/2022 - PSA: 16.70    05/23/2022 - Prostate Biopsy:  Left mid medial 1:  Prostatic adenocarcinoma, Johanny 3+3=6  Left mid lateral 2:  Prostatic adenocarcinoma Longview 3+3=6  Left mid medial 2:  Prostatic adenocarcinoma Johanny 3+3=6  Left apex lateral:  Prostatic adenocarcinoma, Johanny 3+3=6  Left apex medial:  Prostatic adenocarcinoma, Johanny 3+3=6  Right base medial:  Prostatic adenocarcinoma, Johanny 3+3=6  Perineural invasion is identified in this case    09/07/2022 - Prostate, prostatectomy:               - Histologic Type:  Acinar adenocarcinoma.               - Histologic Grade:  Grade group 4 (Longview score 4 + 4 = 8).               - Intraductal Carcinoma:  Not identified.               - Cribriform Glands:  Present.               - Treatment Effect:  No known presurgical therapy.               - Tumor Quantitation: 41-50%.               - Extraprostatic Extension:  Not identified.               - Urinary Bladder Neck Invasion: Not identified.               - Seminal Vesicle Invasion: Not identified.               - Lymphovascular Invasion: Not identified.               - Margins: All margins are Negative for invasive carcinoma.                - Pathologic Stage:  pT2 pNx pMx    12/08/2022 - PSA: <0.1    05/16/2023 - PSA: <0.1    09/19/2023 - PSA: <0.1    01/23/2024 - PSA: <0.1        History of malignant melanoma of nose:    08/07/2014 - Left nose lesion excision:  CYST, RIGHT EYELID:   EPIDERMAL INCLUSION CYST.   EXCISION, SKIN LESION, LEFT NOSE:   MALIGNANT MELANOMA.   ANTHONY LEVEL III.   BRESLOW MAXIMUM THICKNESS EQUALS 0.4 MM.   NO ULCERATION IDENTIFIED.   NO MICROSATELLITES IDENTIFIED.   NO MITOTIC FIGURES IDENTIFIED IN TUMOR.   NO ANGIOLYMPHATIC INVASION IDENTIFIED.   AJCC STAGE: pT1a pNX     09/11/2014 - SKIN, NOSE, RE-EXCISION FOR MELANOMA:      NEGATIVE FOR RESIDUAL MELANOMA OR MELANOMA IN SITU.   BIOPSY SITE IDENTIFIED.  SEVERE SOLAR ELASTOSIS.   SEBORRHEIC KERATOSIS.   MARGINS OF RESECTION ARE NEGATIVE.         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    Tobacco use disorder       PAST SURGICAL HISTORY  Past Surgical History:   Procedure Laterality Date    CYST REMOVAL Right 08/07/2014    RIGHT LOWER EYELID- EPIDERMAL INCLUSION CYST    EXCISION LESION N/A 08/07/2014    MALIGNANT MELANOMA OF LEFT NASAL TIP    FOREHEAD FLAP  09/11/2014    PEDICLE FLAP  10/02/2014    PEDICLE DIVISION AND FLAP INSET OF NOSE    PROSTATECTOMY N/A 9/7/2022    Procedure: PROSTATECTOMY LAPAROSCOPIC WITH [employer] ROBOT;  Surgeon: Matthew Weaver MD;  Location: [facility];  Service: Robotics - DaVinci;  Laterality: N/A;    SQUAMOUS CELL CARCINOMA EXCISION  10/29/2013    LEFT FLOOR OF MOUTH    TONSILLECTOMY        FAMILY HISTORY  family history is not on file.    SOCIAL HISTORY  Social History     Tobacco Use    Smoking status: Every Day     Current packs/day: 1.50     Types: Cigarettes    Smokeless tobacco: Never   Vaping Use    Vaping status:  "Never Used   Substance Use Topics    Alcohol use: Not Currently     Comment: 12 PER DAY    Drug use: No     ALLERGIES  Sulfa antibiotics     MEDICATIONS    Current Outpatient Medications:     acetaminophen (TYLENOL) 500 MG tablet, Take 1 tablet by mouth Every 6 (Six) Hours As Needed for Mild Pain., Disp: , Rfl:     ondansetron (Zofran) 8 MG tablet, Take 1 tablet by mouth Every 8 (Eight) Hours As Needed for Nausea or Vomiting., Disp: 60 tablet, Rfl: 2    prochlorperazine (COMPAZINE) 10 MG tablet, Take 1 tablet by mouth Every 4 (Four) Hours As Needed for Nausea or Vomiting., Disp: 60 tablet, Rfl: 2    tadalafil (CIALIS) 20 MG tablet, Take 1 tablet by mouth As Needed for Erectile Dysfunction. 1-12 hours before activity, Disp: 12 tablet, Rfl: 11    The following portions of the patient's history were reviewed and updated as appropriate: allergies, current medications, past family history, past medical history, past social history, past surgical history and problem list.    REVIEW OF SYSTEMS  Review of Systems   Constitutional:  Positive for fatigue and unexpected weight change. Negative for activity change.   HENT:  Positive for sore throat, trouble swallowing and voice change (tracheostomy).    Respiratory: Negative.  Negative for cough and shortness of breath.    Cardiovascular: Negative.  Negative for chest pain.   Gastrointestinal: Negative.    Genitourinary: Negative.    Musculoskeletal: Negative.    Skin: Negative.    Neurological: Negative.  Negative for dizziness.   Hematological: Negative.  Negative for adenopathy.   Psychiatric/Behavioral: Negative.     All other systems reviewed and are negative.     I have reviewed and confirmed the accuracy of the ROS as documented by the MA/ROSETTA/RN Hector Good III, MD    PHYSICAL EXAM  VITAL SIGNS:   Vitals:    04/02/24 1427   BP: 123/73   Weight: 56.7 kg (125 lb)   Height: 175.3 cm (69\")   PainSc: 0-No pain       Physical Exam  Vitals reviewed.   Constitutional:       " Appearance: Normal appearance.   HENT:      Head: Normocephalic.   Neck:      Comments: Tracheostomy in place  Cardiovascular:      Rate and Rhythm: Normal rate and regular rhythm.      Pulses: Normal pulses.      Heart sounds: Normal heart sounds.   Pulmonary:      Effort: Pulmonary effort is normal. No respiratory distress.      Breath sounds: Normal breath sounds.   Abdominal:      General: Bowel sounds are normal.      Comments: Feeding tube in place   Musculoskeletal:         General: Normal range of motion.      Cervical back: Normal range of motion and neck supple.   Lymphadenopathy:      Cervical: No cervical adenopathy.   Skin:     General: Skin is warm.      Capillary Refill: Capillary refill takes less than 2 seconds.   Neurological:      General: No focal deficit present.      Mental Status: He is alert and oriented to person, place, and time.   Psychiatric:         Mood and Affect: Mood normal.         Behavior: Behavior normal.         Performance Status: ECOG (0) Fully active, able to carry on all predisease performance without restriction    Clinical Quality Measures  -Pain Documented by Standardized Tool, FPS  Keron Gerber reports a pain score of 0.  Given his pain assessment as noted, treatment options were discussed and the following options were decided upon as a follow-up plan to address the patient's pain:  No pain, no plan given .  Pain Medications               acetaminophen (TYLENOL) 500 MG tablet Take 1 tablet by mouth Every 6 (Six) Hours As Needed for Mild Pain.    prochlorperazine (COMPAZINE) 10 MG tablet Take 1 tablet by mouth Every 4 (Four) Hours As Needed for Nausea or Vomiting.          -Advanced Care Planning Advance Care Planning   ACP discussion was held with the patient during this visit. Patient has an advance directive in EMR which is still valid.      -Body Mass Index Screening and Follow-Up Plan BMI is below normal parameters (malnutrition). Recommendations: treating the  underlying disease process    -Tobacco Use: Screening and Cessation Intervention Social History    Tobacco Use      Smoking status: Every Day        Packs/day: 1.50        Types: Cigarettes      Smokeless tobacco: Never   Smoking cessation information given in after visit summary.    ASSESSMENT AND PLAN  1. Malignant neoplasm hypopharynx    2. Prostate cancer    3. Malignant melanoma of nose    4. Current every day smoker      Orders Placed This Encounter   Procedures    NM PET/CT Skull Base to Mid Thigh     Standing Status:   Future     Standing Expiration Date:   4/2/2025     Order Specific Question:   What radiopharmaceutical is preferred for this exam?     Answer:   FDG  (offered at all sites)     Order Specific Question:   Release to patient     Answer:   Routine Release [[id number]]    Ambulatory Referral to Speech Therapy for Head/Neck Cancer Services     Referral Priority:   Routine     Referral Type:   Speech Pathology     Referral Reason:   Specialty Services Required     Requested Specialty:   Speech Pathology     Number of Visits Requested:   1    Ambulatory Referral to OP ONC Nutrition Services     Referral Priority:   Routine     Referral Type:   Clinical Pathway     Referral Reason:   Specialty Services Required     Number of Visits Requested:   1     RECOMMENDATIONS: Keron Gerber was diagnosed with 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    We have discussed the indications and rationale of radiation therapy according to the NCCN Guidelines for head and neck carcinoma. I have extensively reviewed the risks, benefits and alternatives of therapy and progression of disease in spite of therapy with either local or systemic failure.  Potential complications of head neck irradiation have been thoroughly reviewed.  This includes but is not limited to mucositis, loss of taste, pain, skin erythema, salivary dysfunction (xerostomia), dysphagia  with secondary weight loss and other potential complications or side effects.  Long-term loss of taste, induration and fibrosis of the neck are also potential long term side effects of radiation.      I have seen, examined and reviewed his medication list, appropriate labs and imaging studies as well as other physician notes. We discussed the goals/plans of care and answered all questions. The option of definitive surgery has also been reviewed as well as surveillance.    We discussed treatment options per the national guidelines could include surgical resection, a definitive course of radiation therapy, with or without concurrent chemotherapy. Prior to treatment, we would recommend dental clearance, nutrition, speech and swallowing evaluations. Long-term salivary gland dysfunction is likely and will exacerbate future dental care problems.  Will order referrals to oncology dietician and speech pathologist for care and management during radiation course.    In consideration of diagnostic data and evaluation of the patient,  I recommend a definitive course of radiation therapy, anticipate a dose of 7000 cGy over 35 fractions. Will coordinate care with Dr. Randhawa for delivery of chemotherapy per their direction.     The patient and his family verbalize understanding of this discussion, voice no further questions and wishes to proceed with recommendations. Will simulate treatment fields today to begin treatment planning. Final course pending. Will order PET scan to complete disease staging.     Keron FLORES Zabrina  reports that he has been smoking cigarettes. He has never used smokeless tobacco. I have educated him on the risk of diseases from using tobacco products such as cancer, COPD, and heart disease. I advised him to quit and he is not willing to quit. I spent >10 minutes counseling the patient.    Thank you for allowing me to assist in this patients care.    Patient Instructions   1) plan on 35 daily treatments  (Monday-Friday).  2) PET scan ordered, they will call you    Time Spent: I spent 68 minutes caring for Keron on this date of service. This time includes time spent by me in the following activities: preparing for the visit, reviewing tests, obtaining and/or reviewing a separately obtained history, performing a medically appropriate examination and/or evaluation, counseling and educating the patient/family/caregiver, ordering medications, tests, or procedures, referring and communicating with other health care professionals, documenting information in the medical record, independently interpreting results and communicating that information with the patient/family/caregiver, and care coordination.   Hector Good III, MD   04/02/2024

## 2024-04-02 ENCOUNTER — CONSULT (OUTPATIENT)
Dept: RADIATION ONCOLOGY | Facility: HOSPITAL | Age: 64
End: 2024-04-02
Payer: MEDICAID

## 2024-04-02 ENCOUNTER — CONSULT (OUTPATIENT)
Dept: ONCOLOGY | Facility: CLINIC | Age: 64
End: 2024-04-02
Payer: MEDICAID

## 2024-04-02 VITALS
DIASTOLIC BLOOD PRESSURE: 66 MMHG | HEIGHT: 69 IN | HEART RATE: 81 BPM | WEIGHT: 125 LBS | TEMPERATURE: 98.2 F | RESPIRATION RATE: 18 BRPM | SYSTOLIC BLOOD PRESSURE: 122 MMHG | OXYGEN SATURATION: 99 % | BODY MASS INDEX: 18.51 KG/M2

## 2024-04-02 VITALS
BODY MASS INDEX: 18.51 KG/M2 | DIASTOLIC BLOOD PRESSURE: 73 MMHG | WEIGHT: 125 LBS | SYSTOLIC BLOOD PRESSURE: 123 MMHG | HEIGHT: 69 IN

## 2024-04-02 DIAGNOSIS — C61 PROSTATE CANCER: ICD-10-CM

## 2024-04-02 DIAGNOSIS — C43.31 MALIGNANT MELANOMA OF NOSE: ICD-10-CM

## 2024-04-02 DIAGNOSIS — C13.9 MALIGNANT NEOPLASM HYPOPHARYNX: Primary | ICD-10-CM

## 2024-04-02 DIAGNOSIS — F17.200 CURRENT EVERY DAY SMOKER: ICD-10-CM

## 2024-04-02 PROCEDURE — 77470 SPECIAL RADIATION TREATMENT: CPT | Performed by: RADIOLOGY

## 2024-04-02 PROCEDURE — 77334 RADIATION TREATMENT AID(S): CPT | Performed by: RADIOLOGY

## 2024-04-02 PROCEDURE — G0463 HOSPITAL OUTPT CLINIC VISIT: HCPCS | Performed by: RADIOLOGY

## 2024-04-02 RX ORDER — ONDANSETRON HYDROCHLORIDE 8 MG/1
8 TABLET, FILM COATED ORAL EVERY 8 HOURS PRN
Qty: 60 TABLET | Refills: 2 | Status: SHIPPED | OUTPATIENT
Start: 2024-04-02

## 2024-04-02 RX ORDER — PROCHLORPERAZINE MALEATE 10 MG
10 TABLET ORAL EVERY 4 HOURS PRN
Qty: 60 TABLET | Refills: 2 | Status: SHIPPED | OUTPATIENT
Start: 2024-04-02

## 2024-04-02 NOTE — LETTER
April 2, 2024       No Recipients    Patient: Keron Gerber   YOB: 1960   Date of Visit: 4/2/2024     Dear Yousif Marrero MD:       Thank you for referring Keron Gerber to me for evaluation. Below are the relevant portions of my assessment and plan of care.    If you have questions, please do not hesitate to call me. I look forward to following Keron along with you.         Sincerely,        Tee Randhawa MD        CC:   No Recipients    Tee Randhawa MD  04/02/24 1412  Sign when Signing Visit  [facility]AT[facility] & [facility]  [address]  [place] [id number] [phone]    Patient Name: Keron Gerber  Encounter Date: 04/02/2024  YOB: 1960  Patient Number: [id number]    Initial Note    REASON FOR CONSULTATION: Keron Gerber is a pleasant 63 y.o.  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 He is seen with mother Portia and brother in law.  History is obtained from patient. History is considered to be accurate.      HISTORY OF PRESENT ILLNESS: He had several months of right ear pain radiating to the right neck and 20 pound weight loss over 6 months.     He noted right neck mass and dysphagia.    Admitted for dehydration.     CT neck on 2/28/2024.  3.5 x 4 cm lobular enhancing mass in the upper larynx arising from the posterior wall at the level of the base of the epiglottis.  Suspicious for malignancy.  Abnormal mucosal enhancement also noted involving the uvula and tongue base.    Patient admitted 3/13/2024 for pharyngeal mass.    CT chest 3/13/2024.  Well-defined 1.7 cm fluid density lesion in the anterior mediastinum could represent a benign etiology such as a thymic cyst.  Recommend follow-up.  Mild diffuse thickening of bilateral adrenal  "glands could represent adenomatous hyperplasia.  Recommend correlation with prior imaging if available.  No evidence of pulmonary metastasis.    Tracheostomy, direct laryngoscopy, rigid cervical esophagoscopy and biopsy of the posterior pharyngeal mass and biopsy of the left true vocal fold on 3/14/2024. Flexible laryngoscopy revealed a large exophytic lesion coming from the posterior pharyngeal wall protruding off the supraglottis and glottis with inability to visualize the glottis.    PEG placement 3/18/2024.    Pathology report on 3/18/2024.  Vocal cord, left, biopsy: Squamous mucosa with focal dysplasia at least moderate.  Posterior pharyngeal wall biopsy: Invasive poorly differentiated squamous cell carcinoma.  Posterior pharyngeal wall biopsy: Invasive poorly differentiated squamous cell carcinoma with focal metaplastic features.  Posterior pharyngeal wall biopsy: Invasive poorly differentiated squamous cell carcinoma with focal metaplastic features.    CBC 3/19/2024 revealed a WBC of 10.8, hemoglobin 13.7, hematocrit 40.5 and platelet 329.  CMP revealed creatinine of 0.73.    Patient seen by Dr. Lit Rand on 3/22/2024.  Findings: Nasopharynx and oropharynx patent without masses/lesions; BOT without masses/lesions; exophytic lesion likely coming from posterior pharyngeal wall protruding onto supraglottis and glottis; unable to visualize glottis d/t obstructive view; supraglottis without obvious lesion otherwise.  Multidisciplinary recommendation: Discussed chemoradiation versus surgical resection.  Patient would benefit from chemoradiation at this time.    Father had melanoma.       LABS    No results for input(s): \"HGB\", \"HCT\", \"MCV\", \"WBC\", \"RDW\", \"MPV\", \"PLT\", \"AUTOIGPER\", \"NEUTROABS\", \"LYMPHSABS\", \"MONOSABS\", \"EOSABS\", \"BASOSABS\", \"AUTOIGNUM\", \"NRBC\", \"NEUTRELPCTM\", \"MONOPCT\", \"BASOPCT\", \"ATYLMPCT\", \"ANISOCYTOSIS\", \"GIANTPLTS\" in the last 18159 hours.    Invalid input(s): \"LYMPHOCPCT\", \"ABCMORPH\"    No " "results for input(s): \"GLUCOSE\", \"NA\", \"K\", \"CO2\", \"CL\", \"ANIONGAP\", \"CREATININE\", \"BUN\", \"BCR\", \"CALCIUM\", \"EGFRIFNONA\", \"ALKPHOS\", \"PROTEINTOT\", \"ALT\", \"AST\", \"BILITOT\", \"ALBUMIN\", \"GLOB\" in the last 67755 hours.    Invalid input(s): \"LABIL\"    No results for input(s): \"MSPIKE\", \"KAPPALAMB\", \"IGLFLC\", \"URICACID\", \"FREEKAPPAL\", \"CEA\", \"LDH\", \"REFLABREPO\" in the last 61997 hours.    No results for input(s): \"IRON\", \"TIBC\", \"LABIRON\", \"FERRITIN\", \"S4EVTPB\", \"TSH\", \"FOLATE\" in the last 74442 hours.    Invalid input(s): \"VITB12\"      PAST MEDICAL HISTORY:  ALLERGIES:  Allergies   Allergen Reactions   • Sulfa Antibiotics Other (See Comments)     Headaches     CURRENT MEDICATIONS:  Outpatient Encounter Medications as of 4/2/2024   Medication Sig Dispense Refill   • acetaminophen (TYLENOL) 500 MG tablet Take 1 tablet by mouth Every 6 (Six) Hours As Needed for Mild Pain.     • tadalafil (CIALIS) 20 MG tablet Take 1 tablet by mouth As Needed for Erectile Dysfunction. 1-12 hours before activity 12 tablet 11   • ondansetron (Zofran) 8 MG tablet Take 1 tablet by mouth Every 8 (Eight) Hours As Needed for Nausea or Vomiting. 60 tablet 2   • prochlorperazine (COMPAZINE) 10 MG tablet Take 1 tablet by mouth Every 4 (Four) Hours As Needed for Nausea or Vomiting. 60 tablet 2     No facility-administered encounter medications on file as of 4/2/2024.     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   • Current every day smoker F17.200     SURGERIES:  Past Surgical History:   Procedure Laterality Date   • CYST REMOVAL Right 08/07/2014    RIGHT LOWER EYELID- EPIDERMAL INCLUSION CYST   • EXCISION LESION N/A 08/07/2014    MALIGNANT MELANOMA OF LEFT NASAL TIP   • FOREHEAD FLAP  09/11/2014   • PEDICLE FLAP  10/02/2014    PEDICLE DIVISION AND FLAP INSET OF NOSE   • PROSTATECTOMY N/A 9/7/2022    Procedure: " "PROSTATECTOMY LAPAROSCOPIC WITH DAVINCI ROBOT;  Surgeon: Matthew Weaver MD;  Location: [facility] OR;  Service: Robotics - DaVinci;  Laterality: N/A;   • SQUAMOUS CELL CARCINOMA EXCISION  10/29/2013    LEFT FLOOR OF MOUTH   • TONSILLECTOMY       HEALTH MAINTENANCE ITEMS:  Health Maintenance Due   Topic Date Due   • COLORECTAL CANCER SCREENING  Never done   • Pneumococcal Vaccine 0-64 (1 of 2 - PCV) Never done   • TDAP/TD VACCINES (1 - Tdap) Never done   • ZOSTER VACCINE (1 of 2) Never done   • HEPATITIS C SCREENING  Never done   • ANNUAL PHYSICAL  Never done   • BMI FOLLOWUP  04/20/2022   • RSV Vaccine - Adults (1 - 1-dose 60+ series) Never done   • COVID-19 Vaccine (5 - 2023-24 season) 09/01/2023       <no information>  Last Completed Colonoscopy       This patient has no relevant Health Maintenance data.          Immunization History   Administered Date(s) Administered   • COVID-19 (MODERNA) 1st,2nd,3rd Dose Monovalent 02/24/2021, 03/23/2021   • COVID-19 (MODERNA) BIVALENT 12+YRS 11/15/2022   • COVID-19 (MODERNA) Monovalent Original Booster 12/16/2021     Last Completed Mammogram       This patient has no relevant Health Maintenance data.              FAMILY HISTORY:  No family history on file.  SOCIAL HISTORY:  Social History     Socioeconomic History   • Marital status: Single   Tobacco Use   • Smoking status: Every Day     Current packs/day: 1.50     Types: Cigarettes   • Smokeless tobacco: Never   Vaping Use   • Vaping status: Never Used   Substance and Sexual Activity   • Alcohol use: Not Currently     Comment: 12 PER DAY   • Drug use: No   • Sexual activity: Defer       REVIEW OF SYSTEMS:  Review of Systems   Constitutional:  Positive for unexpected weight loss. Negative for fatigue and fever.        \"I feel great.\"   HENT:  Negative for congestion.    Eyes:  Negative for blurred vision and redness.   Respiratory:  Negative for shortness of breath and wheezing.    Cardiovascular:  Negative for chest pain and " "leg swelling.   Gastrointestinal:  Negative for blood in stool, constipation, diarrhea, nausea and vomiting.   Endocrine: Negative for polydipsia and polyphagia.   Genitourinary:  Negative for dysuria and hematuria.   Musculoskeletal:  Negative for gait problem and joint swelling.   Skin:  Negative for pallor.   Allergic/Immunologic: Negative for food allergies.   Neurological:  Negative for speech difficulty and weakness.       /66   Pulse 81   Temp 98.2 °F (36.8 °C)   Resp 18   Ht 175.3 cm (69\")   Wt 56.7 kg (125 lb)   SpO2 99%   BMI 18.46 kg/m²  Body surface area is 1.69 meters squared.  Pain Score    04/02/24 1331   PainSc: 0-No pain       Physical Exam:  Physical Exam  Vitals reviewed.   Constitutional:       General: He is not in acute distress.  HENT:      Head: Normocephalic and atraumatic.      Comments: +Trach.  Eyes:      General: No scleral icterus.  Cardiovascular:      Rate and Rhythm: Normal rate.   Pulmonary:      Effort: No respiratory distress.      Breath sounds: No wheezing.   Abdominal:      General: Bowel sounds are normal.      Palpations: Abdomen is soft.      Tenderness: There is no abdominal tenderness.      Comments: +PEG.   Musculoskeletal:         General: No swelling.      Cervical back: Neck supple.   Skin:     General: Skin is warm.      Coloration: Skin is not pale.   Neurological:      Mental Status: He is alert and oriented to person, place, and time.   Psychiatric:         Mood and Affect: Mood normal.         Behavior: Behavior normal.         Thought Content: Thought content normal.         Judgment: Judgment normal.         Keron FLORES Zabrina reports a pain score of 0.            ASSESSMENT:  1.  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  Tumor size.3.5 x 4 cm.  AJCC stage:III (cT3, cN0, cM0)  Tumor complications: Dysphagia.  Treatment status: Post tracheostomy and G-tube placement.  For concurrent cisplatin weekly with radiation.   2.  Performance " status of 0.  3.  Prostate cancer 2022, history of.   Treatment status: Post prostatectomy 9/7/2022 by Dr. Weaver.  Followed by Dr. Luciano.  4.  Tobacco abuse.  He is a current smoker.  5.  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 Lawrence in [place].   6.  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         PLAN:   regarding the reason for the referral.  2.   regarding NCCN guidelines for T3 N0 hypopharyngeal squamous cell carcinoma using weekly cisplatin, lower morbidity without sacrificing efficacy, with radiation.  3.   regarding potential adverse effects of cisplatin specially infusion reactions, anaphylaxis, nausea, vomiting, nephrotoxicity, ototoxicity, neuropathy, cytopenias, nausea or vomiting.  4.  Blood for CBC with differential, CMP and magnesium.  5.  Treatment education.  6.  Schedule chemo:  Cisplatin 40 mg/m2 weekly with radiation.  7.  Premed:  Aloxi 0.25 mg IV  Decadron 12 mg IV  Emend 150 mg IV  8.  Pre-hydrate cisplatin with 1L NS over 2 hours with 1 gram magnesium and 20 mEq of KCl.  Lasix 20 mg IVP after pre-hydration and after cisplatin.   9.  Post Hydration with NS 1L over 2 hours with 20 mEq of KCl and 1 g magnesium.  10.  Weekly CBC with differential, CMP and magnesium.  11. eRx Zofran 8 mg per PEG. every 8 hours as needed for nausea and vomiting #60, 2 refills.  12. eRx Compazine 10 mg per PEG every 4 hours as needed for nausea and vomiting #60, 2 refills.  13.  Continue care per primary care physician and other specialist.  14.  Plan of care discussed with patient and family.  Understanding expressed.  Patient agreeable to proceed.  15.  To see Dr. Good 4/2/2024.   16.  Return to office in 3 weeks.  17.  Further recommendations pending.  18.  Importance of Smoking Cessation discussed with patient and informed patient additional information will be on today's [facility]'s Flyer - Plan to  Be Tobacco Free handout provided to patient       Thank you for the referral.        I have reviewed the assessment and plan and verified the accuracy of it. No changes to assessment and plan since the information was documented. Tee Randhawa MD 04/02/24         I spent 73 total minutes, face-to-face, caring for Keron today. Greater than 50% of this time involved counseling and/or coordination of care as documented within this note.           Hector Good MD  (iLt Rand MD [facility].)  MD Yousif Cheney MD

## 2024-04-09 ENCOUNTER — CLINICAL SUPPORT (OUTPATIENT)
Dept: ONCOLOGY | Facility: CLINIC | Age: 64
End: 2024-04-09
Payer: MEDICAID

## 2024-04-09 ENCOUNTER — DOCUMENTATION (OUTPATIENT)
Dept: RADIATION ONCOLOGY | Facility: HOSPITAL | Age: 64
End: 2024-04-09
Payer: MEDICAID

## 2024-04-09 DIAGNOSIS — C13.9 MALIGNANT NEOPLASM HYPOPHARYNX: Primary | ICD-10-CM

## 2024-04-09 NOTE — PROGRESS NOTES
Subjective     PATIENT NAME:  Keron Gerber  YOB: 1960  PATIENTS AGE:  63 y.o.  PATIENTS SEX:  male  DATE OF SERVICE:  04/09/2024  PROVIDER:  [facility] NURSE      ____________________PATIENT EDUCATION____________________    PATIENT EDUCATION:  Today I met with the patient to discuss the chemotherapy regimen recommended for treatment of his disease malignant neoplasm hypopharynx.  The patient was given explanation of treatment premed side effects including office policy that prohibits patients to drive if sedating medications are administered, MD explanation given regarding benefits, side effects, toxicities and goals of treatment.  The patient received a Chemotherapy/Biotherapy Plan Summary including diagnosis and specific treatment plan.    SIDE EFFECTS:  Common side effects were discussed with the patient, his mother, and a friend.  Discussion included hair loss/discoloration, anemia/fatigue, infection/chills/fever, appetite, bleeding risk/precautions, constipation, diarrhea, mouth sores, taste alteration, loss of appetite,nausea/vomiting, peripheral neuropathy, skin/nail changes, rash, muscle aches/weakness, photosensitivity, weight gain/loss, hearing loss, dizziness, sterility, high blood pressure, heart damage, liver damage, lung damage, kidney damage, DVT/PE risk, fluid retention, pleural/pericardial effusion, somnolence, electrolyte/LFT imbalance, vein exercises and/or the possible need for vascular access/port placement.  The patient was advice that although uncommon, leakage of an infused medication from the vein or venous access device (port) may lead to skin breakdown and/or other tissue damage.  The patient was advised that he/she may have pain, bleeding, and/or bruising from the insertion of a needle in their vein or venous access device (port).  The patient was further advised that, in spite of proper technique, infection with redness and irritation may rarely occur at the site  where the needle was inserted.  The patient was advised that if complications occur, additional medical treatment is available.  Treatment weekly with concurrent radiation.    Discussion also included side effects specific to drugs in the treatment plan, specifically Cisplatin.    Reproductive risks were discussed, including appropriate use of birth control and protection during sexual relations.    A total of 60 minutes were spent with the patient, with 100% of time spent in education and counseling.

## 2024-04-09 NOTE — PROGRESS NOTES
[facility] HEMATOLOGY & ONCOLOGY  [address]  [facility] [address] [phone]    Patient Name: Keron Gerber  Encounter Date: 04/23/2024  YOB: 1960  Patient Number: [id number]      REASON FOR FOLLOW-UP: Keron Gerber is a pleasant 63 y.o.  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 He is seen with mother Portia.  History is obtained from patient.  History is considered reliable.          DIAGNOSTIC ABNORMALITIES:   He had several months of right ear pain radiating to the right neck and 20 pound weight loss over 6 months.   He noted right neck mass and dysphagia.  CT neck on 2/28/2024.  3.5 x 4 cm lobular enhancing mass in the upper larynx arising from the posterior wall at the level of the base of the epiglottis.  Suspicious for malignancy.  Abnormal mucosal enhancement also noted involving the uvula and tongue base.  CT chest 3/13/2024.  Well-defined 1.7 cm fluid density lesion in the anterior mediastinum could represent a benign etiology such as a thymic cyst.  Recommend follow-up.  Mild diffuse thickening of bilateral adrenal glands could represent adenomatous hyperplasia.  Recommend correlation with prior imaging if available.  No evidence of pulmonary metastasis.  Tracheostomy, direct laryngoscopy, rigid cervical esophagoscopy and biopsy of the posterior pharyngeal mass and biopsy of the left true vocal fold on 3/14/2024. Flexible laryngoscopy revealed a large exophytic lesion coming from the posterior pharyngeal wall protruding off the supraglottis and glottis with inability to visualize the glottis.  PEG placement 3/18/2024.  Pathology report on 3/18/2024.  Vocal cord, left, biopsy: Squamous mucosa with focal dysplasia at least moderate.  Posterior pharyngeal wall biopsy: Invasive poorly differentiated squamous cell carcinoma.  Posterior pharyngeal  wall biopsy: Invasive poorly differentiated squamous cell carcinoma with focal metaplastic features.  Posterior pharyngeal wall biopsy: Invasive poorly differentiated squamous cell carcinoma with focal metaplastic features.  CBC 3/19/2024 revealed a WBC of 10.8, hemoglobin 13.7, hematocrit 40.5 and platelet 329.  CMP revealed creatinine of 0.73.  Patient seen by Dr. Lit Rand on 3/22/2024.  Findings: Nasopharynx and oropharynx patent without masses/lesions; BOT without masses/lesions; exophytic lesion likely coming from posterior pharyngeal wall protruding onto supraglottis and glottis; unable to visualize glottis d/t obstructive view; supraglottis without obvious lesion otherwise.  Multidisciplinary recommendation: Discussed chemoradiation versus surgical resection.  Patient would benefit from chemoradiation at this time.          PREVIOUS INTERVENTIONS:  Weekly cisplatin with radiation pending.      Oncology/Hematology History   Prostate cancer    Initial Diagnosis    Prostate cancer     1/23/2020 Procedure    PSA 13.96     2/23/2021 Procedure    PSA 4.4     4/23/2022 Procedure    PSA 10.1     5/12/2022 Procedure    PSA 16.70     5/23/2022 Biopsy    Prostate Biopsy:  Left mid medial 1 - prostatic adenocarcinoma, Startex 3+3=6  Left mid lateral 2 - prostatic adenocarcinoma Startex 3+3=6  Left mid medial 2 - prostatic adenocarcinoma Johanny 3+3=6  Left apex lateral - prostatic adenocarcinoma, Johanny 3+3=6  Left apex medial - prostatic adenocarcinoma, Startex 3+3=6  Right base medial - prostatic adenocarcinoma, Startex 3+3=6    Perineural invasion is identified in this case     9/7/2022 Surgery    Final Diagnosis   Prostate, prostatectomy:               - Histologic Type:  Acinar adenocarcinoma.               - Histologic Grade:  Grade group 4 (Startex score 4 + 4 = 8).               - Intraductal Carcinoma:  Not identified.               - Cribriform Glands:  Present.               - Treatment Effect:  No known  presurgical therapy.               - Tumor Quantitation: 41-50%.               - Extraprostatic Extension:  Not identified.               - Urinary Bladder Neck Invasion: Not identified.               - Seminal Vesicle Invasion: Not identified.               - Lymphovascular Invasion: Not identified.               - Margins: All margins are Negative for invasive carcinoma.               - Pathologic Stage:  pT2 pNx pMx        12/8/2022 Procedure    PSA <0.1     5/16/2023 Procedure    PSA <0.1     9/19/2023 Procedure    PSA <0.1     1/23/2024 Procedure    PSA <0.1     Malignant neoplasm hypopharynx    Initial Diagnosis    Malignant neoplasm hypopharynx     2/28/2024 Imaging    CT Neck:  35 x 40 mm lobular enhancing mass in the upper larynx arising from the posterior   wall at the level of the base of the epiglottis. This is suspicious for malignancy.   There is abnormal mucosal enhancement also noted involving the uvula and   tongue base.      3/2/2024 Imaging    CT Neck:  Findings suggest laryngeal mass as noted, suggest ENT evaluation, asymmetric appearance of the vocal cords and larynx  Minimal adenopathy  Postop changes left submandibular region     3/13/2024 Imaging    CT Chest:  Well-defined 1.7 cm fluid density lesion in the anterior mediastinum could   represent a benign etiology such as a thymic cyst. Given lack of prior imaging   and concern for malignancy, recommend close attention on follow-up imaging   and further workup as clinically necessary.  Mild diffuse thickening of bilateral adrenal glands could represent   adenomatous hyperplasia. Recommend correlation to prior imaging if   available and attention on follow-up imaging.  No evidence of pulmonary metastases.     3/14/2024 Biopsy    Awake tracheostomy/Direct laryngoscopy with suspension with   telescope/right cervical esophagoscopy/biopsy    Findings:  Successful awake tracheostomy in successful awake tracheostomy with   placement of a 6 cuffed  Teeteeley tracheostomy tube.  Large exophytic 5-6 cm posterior oropharyngeal mass extending to the   junction of the lateral posterior pharyngeal wall junction bilaterally, superiorly   comes up to the level of the soft palate. Inferiorly extends into bilateral piriform   sinuses, but does not involve the apex, however there are reactive changes   of the left piriform apex. There is no involvement of the supraglottic structures.  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  No other mucosal masses, lesions, or ulcers visualized.   No mucosal masses, lesions, or ulcers visualized in the esophageal inlet or   cervical esophagus.    DIAGNOSIS:     A. VOCAL CORD, LEFT, BIOPSY:   - Squamous mucosa with focal dysplasia, at least moderate.     B.  POSTERIOR PHARYNGEAL WALL, BIOPSY:   - INVASIVE POORLY DIFFERENTIATED SQUAMOUS CELL CARCINOMA.     C.  POSTERIOR PHARYNGEAL WALL, BIOPSY:   - INVASIVE POORLY DIFFERENTIATED SQUAMOUS CELL CARCINOMA WITH FOCAL METAPLASTIC FEATURES     D.  POSTERIOR PHARYNGEAL WALL, BIOPSY:   - INVASIVE POORLY DIFFERENTIATED SQUAMOUS CELL CARCINOMA WITH FOCAL METAPLASTIC FEATURES      3/18/2024 Procedure    Gastrostomy tube placement     3/22/2024 Procedure    Appointment with Dr. Rand - [facility]:  Laryngoscopy:  Findings: nasopharynx and oropharynx patent without masses/lesions; BOT   without masses/lesions; exophytic lesion likely coming from posterior   pharyngeal wall protruding onto supraglottis and glottis; unable to visualize   glottis d/t obstructive view; supraglottis without obvious lesion otherwise    PLAN:   CT chest without mets. Discussed imaging in depth with team. Patient is   currently T3N0, stage III oropharyngeal SCC.   DL had biopsies positive for invasive SCC.   Discussed chemoradiation vs surgical resection with patient. Patient will   benefit from chemoradiation at this  time.  Patient would like to have treatment closer to home in [facility]. Will send   out referrals.  Patient may need further evaluation of his carotid stenosis as well.     Appointment with Dr. 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       4/2/2024 Cancer Staged    Staging form: Pharynx - Hypopharynx, AJCC 8th Edition  - Clinical stage from 4/2/2024: Stage III (cT3, cN0, cM0) - Signed by Tee Randhawa MD on 4/2/2024 4/16/2024 -  Chemotherapy    OP HEAD & NECK CISplatin (Weekly) + XRT     Malignant melanoma of nose   8/7/2014 Surgery    FINAL DIAGNOSIS                       A. CYST, RIGHT EYELID:                 EPIDERMAL INCLUSION CYST.            B. EXCISION, SKIN LESION, LEFT NOSE:                 1.   MALIGNANT MELANOMA.                 2.   ARTIE LEVEL III.                 3.   BRESLOW MAXIMUM THICKNESS EQUALS 0.4 MM.                 4.   NO ULCERATION IDENTIFIED.                 5.   NO MICROSATELLITES IDENTIFIED.                 6.   NO MITOTIC FIGURES IDENTIFIED IN TUMOR.                 7.   NO ANGIOLYMPHATIC INVASION IDENTIFIED.                 8.   AJCC STAGE: pT1a pNX           Dictated by: CHANTE GILL MD        9/11/2014 Surgery    FINAL DIAGNOSIS                       SKIN, NOSE, RE-EXCISION FOR MELANOMA:                 NEGATIVE FOR RESIDUAL MELANOMA OR MELANOMA IN SITU.                 BIOPSY SITE IDENTIFIED.                 SEVERE SOLAR ELASTOSIS.                 SEBORRHEIC KERATOSIS.                 MARGINS OF RESECTION ARE NEGATIVE.              PAST MEDICAL HISTORY:  ALLERGIES:  Allergies   Allergen Reactions    Sulfa Antibiotics Other (See Comments)     Headaches     CURRENT MEDICATIONS:  Outpatient Encounter Medications as of 4/23/2024   Medication Sig Dispense Refill    acetaminophen (TYLENOL) 500 MG  tablet Take 1 tablet by mouth Every 6 (Six) Hours As Needed for Mild Pain.      ondansetron (Zofran) 8 MG tablet Take 1 tablet by mouth Every 8 (Eight) Hours As Needed for Nausea or Vomiting. 60 tablet 2    prochlorperazine (COMPAZINE) 10 MG tablet Take 1 tablet by mouth Every 4 (Four) Hours As Needed for Nausea or Vomiting. 60 tablet 2    tadalafil (CIALIS) 20 MG tablet Take 1 tablet by mouth As Needed for Erectile Dysfunction. 1-12 hours before activity 12 tablet 11     No facility-administered encounter medications on file as of 4/23/2024.     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    Current every day smoker F17.200     SURGERIES:  Past Surgical History:   Procedure Laterality Date    CYST REMOVAL Right 08/07/2014    RIGHT LOWER EYELID- EPIDERMAL INCLUSION CYST    EXCISION LESION N/A 08/07/2014    MALIGNANT MELANOMA OF LEFT NASAL TIP    FOREHEAD FLAP  09/11/2014    PEDICLE FLAP  10/02/2014    PEDICLE DIVISION AND FLAP INSET OF NOSE    PROSTATECTOMY N/A 9/7/2022    Procedure: PROSTATECTOMY LAPAROSCOPIC WITH [employer] ROBOT;  Surgeon: Matthew Weaver MD;  Location: [facility] OR;  Service: Robotics - [employer];  Laterality: N/A;    SQUAMOUS CELL CARCINOMA EXCISION  10/29/2013    LEFT FLOOR OF MOUTH    TONSILLECTOMY       HEALTH MAINTENANCE ITEMS:  Health Maintenance Due   Topic Date Due    COLORECTAL CANCER SCREENING  Never done    Pneumococcal Vaccine 0-64 (1 of 2 - PCV) Never done    TDAP/TD VACCINES (1 - Tdap) Never done    ZOSTER VACCINE (1 of 2) Never done    HEPATITIS C SCREENING  Never done    ANNUAL PHYSICAL  Never done    RSV Vaccine - Adults (1 - 1-dose 60+ series) Never done    COVID-19 Vaccine (5 - 2023-24 season) 09/01/2023       <no information>  Last Completed Colonoscopy       This patient has no relevant Health Maintenance data.          Immunization History  "  Administered Date(s) Administered    COVID-19 (MODERNA) 1st,2nd,3rd Dose Monovalent 02/24/2021, 03/23/2021    COVID-19 (MODERNA) BIVALENT 12+YRS 11/15/2022    COVID-19 (MODERNA) Monovalent Original Booster 12/16/2021     Last Completed Mammogram       This patient has no relevant Health Maintenance data.              FAMILY HISTORY:  No family history on file.  SOCIAL HISTORY:  Social History     Socioeconomic History    Marital status: Single   Tobacco Use    Smoking status: Every Day     Current packs/day: 1.50     Types: Cigarettes    Smokeless tobacco: Never   Vaping Use    Vaping status: Never Used   Substance and Sexual Activity    Alcohol use: Not Currently     Comment: 12 PER DAY    Drug use: No    Sexual activity: Defer       REVIEW OF SYSTEMS:    Review of Systems   Constitutional:  Negative for chills, fatigue and fever.        \"I feel good.\"   HENT:  Positive for trouble swallowing. Negative for mouth sores.    Eyes:  Negative for discharge and redness.   Respiratory:  Negative for shortness of breath and wheezing.    Gastrointestinal:  Negative for abdominal distention, nausea and vomiting.   Endocrine: Negative for polydipsia and polyphagia.   Genitourinary:  Negative for dysuria and flank pain.   Musculoskeletal:  Negative for gait problem and myalgias.   Skin:  Negative for pallor.   Allergic/Immunologic: Negative for food allergies.   Neurological:  Negative for dizziness, facial asymmetry and weakness.   Hematological:  Negative for adenopathy. Does not bruise/bleed easily.   Psychiatric/Behavioral:  Negative for agitation, confusion and hallucinations.        VITAL SIGNS: /60   Pulse 78   Temp 98.3 °F (36.8 °C)   Resp 18   Ht 175.3 cm (69\")   Wt 57.1 kg (125 lb 12.8 oz)   SpO2 98%   BMI 18.58 kg/m²   Pain Score    04/23/24 1400   PainSc: 0-No pain       PHYSICAL EXAMINATION:     Physical Exam  Vitals reviewed.   Constitutional:       General: He is not in acute distress.  HENT:      " "Head: Normocephalic and atraumatic.      Comments: + trach.   Eyes:      General: No scleral icterus.  Cardiovascular:      Rate and Rhythm: Normal rate.   Pulmonary:      Effort: No respiratory distress.      Breath sounds: No wheezing.   Abdominal:      General: Bowel sounds are normal.      Palpations: Abdomen is soft.      Comments: +PEG.   Musculoskeletal:         General: No swelling.      Cervical back: Neck supple.   Skin:     General: Skin is warm.      Coloration: Skin is not pale.   Neurological:      Mental Status: He is alert and oriented to person, place, and time.   Psychiatric:         Mood and Affect: Mood normal.         Behavior: Behavior normal.         Thought Content: Thought content normal.         Judgment: Judgment normal.         LABS    No results for input(s): \"HGB\", \"HCT\", \"MCV\", \"WBC\", \"RDW\", \"MPV\", \"PLT\", \"AUTOIGPER\", \"NEUTROABS\", \"LYMPHSABS\", \"MONOSABS\", \"EOSABS\", \"BASOSABS\", \"AUTOIGNUM\", \"NRBC\", \"NEUTRELPCTM\", \"MONOPCT\", \"BASOPCT\", \"ATYLMPCT\", \"ANISOCYTOSIS\", \"GIANTPLTS\" in the last 24547 hours.    Invalid input(s): \"LYMPHOCPCT\", \"ABCMORPH\"    No results for input(s): \"GLUCOSE\", \"NA\", \"K\", \"CO2\", \"CL\", \"ANIONGAP\", \"CREATININE\", \"BUN\", \"BCR\", \"CALCIUM\", \"EGFRIFNONA\", \"ALKPHOS\", \"PROTEINTOT\", \"ALT\", \"AST\", \"BILITOT\", \"ALBUMIN\", \"GLOB\" in the last 85687 hours.    Invalid input(s): \"LABIL\"    No results for input(s): \"MSPIKE\", \"KAPPALAMB\", \"IGLFLC\", \"URICACID\", \"FREEKAPPAL\", \"CEA\", \"LDH\", \"REFLABREPO\" in the last 19574 hours.    No results for input(s): \"IRON\", \"TIBC\", \"LABIRON\", \"FERRITIN\", \"F9HKGRQ\", \"TSH\", \"FOLATE\" in the last 46307 hours.    Invalid input(s): \"VITB12\"    Keron Gerber reports a pain score of 0.            ASSESSMENT:  1.  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  Tumor size.3.5 x 4 cm.  AJCC stage:III (cT3, cN0, cM0)  Tumor complications: Dysphagia.  Treatment status: Post tracheostomy and G-tube placement.  Cisplatin weekly with radiation on " hold.   2.  Performance status of 0.  3.  Hypermetabolic mass at the distal sigmoid colon maximum SUV 7.4. Await colonoscopy.    4.  Prostate cancer 2022, pT2, NX,history of.   Treatment status: Post prostatectomy 9/7/2022 by Dr. Weaver.  Followed by Dr. Luciano.  5.  Tobacco abuse.  He is a current smoker.  6.  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 Lawrence in [place].   7.  Melanoma,  pT1a, nose, in 2014 post resection and reconstruction by ADRIANA Lazaro.            PLAN:   Re: Note from Dr. Good on 4/2/2024. Plan for 35 fractions. PET 4/16/2024. Hypermetabolic mass of the posterior wall of the hypopharynx consistent with biopsy-proven squamous cell carcinoma, known primary neoplasm. No hypermetabolic cervical lymphadenopathy is seen.  Hypermetabolic mass of the distal sigmoid colon also worrisome for second primary neoplasm, adenocarcinoma of the colon. Colonoscopy is recommended for further evaluation. Plan for colonoscopy. To see Luz Vanegas 4/26/2024.   2.   Re: Heme status. None.   3.   Re: CMP.  None.  4.   Re: Magnesium. None.   5.   Re: Potential adverse effects of cisplatin like infusion reactions, anaphylaxis, nausea, vomiting, cytopenias, nephrotoxicity, ototoxicity or neuropathy.  6.  Weekly CBC with differential, CMP and magnesium.  7.  Schedule chemo:  Cisplatin 40 mg/m2 weekly with radiation.  8.  Premed:  Aloxi 0.25 mg IV  Decadron 12 mg IV  Emend 150 mg IV  9.  Pre-hydrate cisplatin with 1L NS over 2 hours with 1 gram magnesium and 20 mEq of KCl.  Lasix 20 mg IVP after pre-hydration and after cisplatin.   10.  Post Hydration with NS 1L over 2 hours with 20 mEq of KCl and 1 g magnesium.  11. eRx ondansetron 8 mg per PEG. every 8 hours as needed for nausea and vomiting #60, 2 refills if needed.  12. eRx Compazine 10 mg per PEG every 4 hours as needed for nausea and vomiting #60, 2 refills if needed.  13.  Continue care per primary  care physician and other specialist.  14.  Plan of care discussed with patient and mother.  Understanding expressed.  He is agreeable to proceed.   15.  Return to office in 3 weeks.        I have reviewed the assessment and plan and verified the accuracy of it. No changes to assessment and plan since the information was documented. Tee Randhawa MD 04/23/24         I spent 41 total minutes, face-to-face, caring for Keron vargas. Greater than 50% of this time involved counseling and/or coordination of care as documented within this note.               (Hector Good MD)  (Lit Rand MD [facility]  MD Yousif Cheney MD  (ZENY Benedict)

## 2024-04-09 NOTE — PROGRESS NOTES
TEJA met with Mr. Gerber who is here for chemo education for malignant neoplasm hypopharynx. TEJA introduced self and explained role and source of support. He is 63 years old and lives alone. He has a strong support system which includes his family. Currently, Mr. Gerber is independent with his ADLs and driving. He wanted to know if he was unable to drive what his options would be. He has Medicaid and if he is unable to drive and family is unable to take him, if appropriate, the doctor could write a note to assist with transportation through [employer]. He is on a fixed income and states it will be a financial burden for him to drive daily. He does qualify for assistance through the [employer] and TEJA informed him about [email or web address]. Mr. Gerber states he is ready to start treatment and does not feel anxious or nervous. He does not take any medication for anxiety/depression, and he does not see a counselor. He is active and states to help relax he will work on jigsaw puzzles. TJEA will follow op with him once he starts treatment.   
Spontaneous, unlabored and symmetrical

## 2024-04-16 ENCOUNTER — HOSPITAL ENCOUNTER (OUTPATIENT)
Dept: CT IMAGING | Facility: HOSPITAL | Age: 64
Discharge: HOME | End: 2024-04-16
Payer: MEDICAID

## 2024-04-16 DIAGNOSIS — C13.9 MALIGNANT NEOPLASM HYPOPHARYNX: ICD-10-CM

## 2024-04-16 PROCEDURE — 78815 PET IMAGE W/CT SKULL-THIGH: CPT

## 2024-04-16 PROCEDURE — 0 FLUDEOXYGLUCOSE F18 SOLUTION

## 2024-04-16 PROCEDURE — A9552 F18 FDG: HCPCS

## 2024-04-16 RX ADMIN — FLUDEOXYGLUCOSE F 18 1 DOSE: 200 INJECTION, SOLUTION INTRAVENOUS at 12:20

## 2024-04-17 ENCOUNTER — DOCUMENTATION (OUTPATIENT)
Dept: RADIATION ONCOLOGY | Facility: HOSPITAL | Age: 64
End: 2024-04-17
Payer: MEDICAID

## 2024-04-17 DIAGNOSIS — C13.9 MALIGNANT NEOPLASM HYPOPHARYNX: ICD-10-CM

## 2024-04-17 DIAGNOSIS — K63.89 COLONIC MASS: Primary | ICD-10-CM

## 2024-04-17 NOTE — PROGRESS NOTES
I have attempted to the patient twice about his PET scan results. He does need a colonoscopy. I will make that referral and in the interim will attempt to reach him again tomorrow.

## 2024-04-23 ENCOUNTER — OFFICE VISIT (OUTPATIENT)
Dept: ONCOLOGY | Facility: CLINIC | Age: 64
End: 2024-04-23
Payer: MEDICAID

## 2024-04-23 VITALS
TEMPERATURE: 98.3 F | HEIGHT: 69 IN | DIASTOLIC BLOOD PRESSURE: 60 MMHG | SYSTOLIC BLOOD PRESSURE: 122 MMHG | BODY MASS INDEX: 18.63 KG/M2 | WEIGHT: 125.8 LBS | HEART RATE: 78 BPM | OXYGEN SATURATION: 98 % | RESPIRATION RATE: 18 BRPM

## 2024-04-23 DIAGNOSIS — C13.9 MALIGNANT NEOPLASM HYPOPHARYNX: Primary | ICD-10-CM

## 2024-04-23 PROCEDURE — 77338 DESIGN MLC DEVICE FOR IMRT: CPT | Performed by: RADIOLOGY

## 2024-04-23 PROCEDURE — 99215 OFFICE O/P EST HI 40 MIN: CPT | Performed by: INTERNAL MEDICINE

## 2024-04-23 PROCEDURE — 77300 RADIATION THERAPY DOSE PLAN: CPT | Performed by: RADIOLOGY

## 2024-04-23 PROCEDURE — 1159F MED LIST DOCD IN RCRD: CPT | Performed by: INTERNAL MEDICINE

## 2024-04-23 PROCEDURE — 77301 RADIOTHERAPY DOSE PLAN IMRT: CPT | Performed by: RADIOLOGY

## 2024-04-23 PROCEDURE — 1126F AMNT PAIN NOTED NONE PRSNT: CPT | Performed by: INTERNAL MEDICINE

## 2024-04-25 NOTE — H&P (VIEW-ONLY)
[facility] GASTROENTEROLOGY - OFFICE NOTE    4/26/2024    Keron Gerber   1960    Primary Physician: Yousif Marrero MD    Referring Physician: Dr. Randhawa     Chief Complaint   Patient presents with    Abnormal Imaging         HISTORY OF PRESENT ILLNESS:     Keron Gerber is a 63 y.o. male presents  with abnormal pet scan noting mass of the distal sigmoid. Has had some loose stools since starting on tube feedings. Has had some weight loss.  No rectal bleeding.        He has squamous cell carcinoma hypopharynx. Has had tracheostomy and g tube placement. Radiation and chemo on hold. He is followed by Dr. Randhawa and Dr. Good.   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         NM PET/CT Skull Base to Mid Thigh (04/16/2024 13:53)       He has g tube placed 3/2024 while in [place].       No history of colonoscopy.   Father had colon polyps.   No family history of colon cancer.     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    Tobacco use disorder        Past Surgical History:   Procedure Laterality Date    CYST REMOVAL Right 08/07/2014    RIGHT LOWER EYELID- EPIDERMAL INCLUSION CYST    EXCISION LESION N/A 08/07/2014    MALIGNANT MELANOMA OF LEFT NASAL TIP    FOREHEAD FLAP  09/11/2014    PEDICLE FLAP  10/02/2014    PEDICLE DIVISION AND FLAP INSET OF NOSE    PROSTATECTOMY N/A 9/7/2022    Procedure: PROSTATECTOMY LAPAROSCOPIC WITH InfotopI ROBOT;  Surgeon: Mattehw Weaver MD;  Location: [facility] OR;  Service: Robotics - eyefactiveinci;  Laterality: N/A;    SQUAMOUS CELL CARCINOMA EXCISION  10/29/2013    LEFT FLOOR OF MOUTH    TONSILLECTOMY         No outpatient medications have been marked as taking for the 4/26/24 encounter (Office Visit) with Luz Vanegas APRN.       Allergies   Allergen  "Reactions    Sulfa Antibiotics Other (See Comments)     Headaches       Social History     Socioeconomic History    Marital status: Single   Tobacco Use    Smoking status: Every Day     Current packs/day: 1.50     Types: Cigarettes    Smokeless tobacco: Never   Vaping Use    Vaping status: Never Used   Substance and Sexual Activity    Alcohol use: Not Currently     Comment: 12 PER DAY    Drug use: No    Sexual activity: Defer       Family History   Problem Relation Age of Onset    Colon polyps Father     Colon cancer Neg Hx        Review of Systems   Constitutional:  Negative for chills and fever.   Respiratory:  Negative for shortness of breath.    Cardiovascular:  Negative for chest pain.   Gastrointestinal:  Negative for abdominal distention, abdominal pain, anal bleeding, blood in stool, constipation, diarrhea, nausea and vomiting.        Vitals:    04/26/24 0928   BP: 108/60   Pulse: 81   Temp: 97.3 °F (36.3 °C)   SpO2: 100%   Weight: 56.2 kg (124 lb)   Height: 175.3 cm (69\")      Body mass index is 18.31 kg/m².    Physical Exam  Vitals reviewed.   Constitutional:       General: He is not in acute distress.  Neck:      Comments: Tracheostomy in place   Cardiovascular:      Rate and Rhythm: Normal rate and regular rhythm.      Heart sounds: Normal heart sounds.   Pulmonary:      Effort: Pulmonary effort is normal.      Breath sounds: Normal breath sounds.   Abdominal:      General: Bowel sounds are normal. There is no distension.      Palpations: Abdomen is soft.      Tenderness: There is no abdominal tenderness.      Comments: G tube in place.    Skin:     General: Skin is warm and dry.   Neurological:      Mental Status: He is alert.         Results for orders placed or performed in visit on 01/30/24   POC Urinalysis Dipstick, Multipro    Specimen: Urine   Result Value Ref Range    Color Yellow Yellow, Straw, Dark Yellow, Ellen    Clarity, UA Clear Clear    Glucose, UA Negative Negative mg/dL    Bilirubin " Negative Negative    Ketones, UA Negative Negative    Specific Gravity  1.005 1.005 - 1.030    Blood, UA Trace (A) Negative    pH, Urine 6.0 5.0 - 8.0    Protein, POC Negative Negative mg/dL    Urobilinogen, UA 0.2 E.U./dL Normal, 0.2 E.U./dL    Nitrite, UA Negative Negative    Leukocytes Negative Negative           ASSESSMENT AND PLAN    Assessment & Plan     Diagnoses and all orders for this visit:    1. Abnormal PET scan of colon (Primary)  Comments:  distal sigmoid mass  Orders:  -     Case Request; Standing  -     Case Request    2. Family hx colonic polyps    Other orders  -     Implement Anesthesia Orders Day of Procedure; Standing  -     Obtain Informed Consent; Standing  -     polyethylene glycol (Golytely) 236 g solution; Take 4,000 mL by mouth 1 (One) Time for 1 dose. Take as directed per instruction sheet.  Dispense: 4000 mL; Refill: 0      Differential diagnoses discussed.   He will have colonoscopy next week. He is not supposed to be taking anything by mouth due to aspiration. He reports that he does drink some water by mouth. Will put prep through his g tube. I also recommended getting gatorade through his g tube throughout the prep day so that he does not get dehydrated. He will not get tube feeding the day before the prep due to only being on clear liquids.  He verbalizes understanding.       COLONOSCOPY WITH ANESTHESIA (N/A)  All risks, benefits, alternatives, and indications of colonoscopy procedure have been discussed with the patient. Risks to include perforation of the colon requiring possible surgery or colostomy, risk of bleeding from biopsies or removal of colon tissue, possibility of missing a colon polyp or cancer, or adverse drug reaction.  Benefits to include the diagnosis and management of disease of the colon and rectum. Alternatives to include barium enema, radiographic evaluation, lab testing or no intervention. Pt verbalizes understanding and agrees.            No follow-ups on  file.            There are no Patient Instructions on file for this visit.      Luz Vanegas, APRN

## 2024-04-25 NOTE — PROGRESS NOTES
[facility] GASTROENTEROLOGY - OFFICE NOTE    4/26/2024    Keron Gerber   1960    Primary Physician: Yousif Marrero MD    Referring Physician: Dr. Randhawa     Chief Complaint   Patient presents with    Abnormal Imaging         HISTORY OF PRESENT ILLNESS:     Keron Gerber is a 63 y.o. male presents  with abnormal pet scan noting mass of the distal sigmoid. Has had some loose stools since starting on tube feedings. Has had some weight loss.  No rectal bleeding.        He has squamous cell carcinoma hypopharynx. Has had tracheostomy and g tube placement. Radiation and chemo on hold. He is followed by Dr. Randhawa and Dr. Good.   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         NM PET/CT Skull Base to Mid Thigh (04/16/2024 13:53)       He has g tube placed 3/2024 while in [place].       No history of colonoscopy.   Father had colon polyps.   No family history of colon cancer.     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    Tobacco use disorder        Past Surgical History:   Procedure Laterality Date    CYST REMOVAL Right 08/07/2014    RIGHT LOWER EYELID- EPIDERMAL INCLUSION CYST    EXCISION LESION N/A 08/07/2014    MALIGNANT MELANOMA OF LEFT NASAL TIP    FOREHEAD FLAP  09/11/2014    PEDICLE FLAP  10/02/2014    PEDICLE DIVISION AND FLAP INSET OF NOSE    PROSTATECTOMY N/A 9/7/2022    Procedure: PROSTATECTOMY LAPAROSCOPIC WITH DSI MET-TECHI ROBOT;  Surgeon: Matthew Weaver MD;  Location: [facility] OR;  Service: Robotics - [id number];  Laterality: N/A;    SQUAMOUS CELL CARCINOMA EXCISION  10/29/2013    LEFT FLOOR OF MOUTH    TONSILLECTOMY         No outpatient medications have been marked as taking for the 4/26/24 encounter (Office Visit) with Luz Vanegas APRN.       Allergies   Allergen  "Reactions    Sulfa Antibiotics Other (See Comments)     Headaches       Social History     Socioeconomic History    Marital status: Single   Tobacco Use    Smoking status: Every Day     Current packs/day: 1.50     Types: Cigarettes    Smokeless tobacco: Never   Vaping Use    Vaping status: Never Used   Substance and Sexual Activity    Alcohol use: Not Currently     Comment: 12 PER DAY    Drug use: No    Sexual activity: Defer       Family History   Problem Relation Age of Onset    Colon polyps Father     Colon cancer Neg Hx        Review of Systems   Constitutional:  Negative for chills and fever.   Respiratory:  Negative for shortness of breath.    Cardiovascular:  Negative for chest pain.   Gastrointestinal:  Negative for abdominal distention, abdominal pain, anal bleeding, blood in stool, constipation, diarrhea, nausea and vomiting.        Vitals:    04/26/24 0928   BP: 108/60   Pulse: 81   Temp: 97.3 °F (36.3 °C)   SpO2: 100%   Weight: 56.2 kg (124 lb)   Height: 175.3 cm (69\")      Body mass index is 18.31 kg/m².    Physical Exam  Vitals reviewed.   Constitutional:       General: He is not in acute distress.  Neck:      Comments: Tracheostomy in place   Cardiovascular:      Rate and Rhythm: Normal rate and regular rhythm.      Heart sounds: Normal heart sounds.   Pulmonary:      Effort: Pulmonary effort is normal.      Breath sounds: Normal breath sounds.   Abdominal:      General: Bowel sounds are normal. There is no distension.      Palpations: Abdomen is soft.      Tenderness: There is no abdominal tenderness.      Comments: G tube in place.    Skin:     General: Skin is warm and dry.   Neurological:      Mental Status: He is alert.         Results for orders placed or performed in visit on 01/30/24   POC Urinalysis Dipstick, Multipro    Specimen: Urine   Result Value Ref Range    Color Yellow Yellow, Straw, Dark Yellow, Ellen    Clarity, UA Clear Clear    Glucose, UA Negative Negative mg/dL    Bilirubin " Negative Negative    Ketones, UA Negative Negative    Specific Gravity  1.005 1.005 - 1.030    Blood, UA Trace (A) Negative    pH, Urine 6.0 5.0 - 8.0    Protein, POC Negative Negative mg/dL    Urobilinogen, UA 0.2 E.U./dL Normal, 0.2 E.U./dL    Nitrite, UA Negative Negative    Leukocytes Negative Negative           ASSESSMENT AND PLAN    Assessment & Plan     Diagnoses and all orders for this visit:    1. Abnormal PET scan of colon (Primary)  Comments:  distal sigmoid mass  Orders:  -     Case Request; Standing  -     Case Request    2. Family hx colonic polyps    Other orders  -     Implement Anesthesia Orders Day of Procedure; Standing  -     Obtain Informed Consent; Standing  -     polyethylene glycol (Golytely) 236 g solution; Take 4,000 mL by mouth 1 (One) Time for 1 dose. Take as directed per instruction sheet.  Dispense: 4000 mL; Refill: 0      Differential diagnoses discussed.   He will have colonoscopy next week. He is not supposed to be taking anything by mouth due to aspiration. He reports that he does drink some water by mouth. Will put prep through his g tube. I also recommended getting gatorade through his g tube throughout the prep day so that he does not get dehydrated. He will not get tube feeding the day before the prep due to only being on clear liquids.  He verbalizes understanding.       COLONOSCOPY WITH ANESTHESIA (N/A)  All risks, benefits, alternatives, and indications of colonoscopy procedure have been discussed with the patient. Risks to include perforation of the colon requiring possible surgery or colostomy, risk of bleeding from biopsies or removal of colon tissue, possibility of missing a colon polyp or cancer, or adverse drug reaction.  Benefits to include the diagnosis and management of disease of the colon and rectum. Alternatives to include barium enema, radiographic evaluation, lab testing or no intervention. Pt verbalizes understanding and agrees.            No follow-ups on  file.            There are no Patient Instructions on file for this visit.      Luz Vanegas, APRN

## 2024-04-26 ENCOUNTER — OFFICE VISIT (OUTPATIENT)
Dept: GASTROENTEROLOGY | Facility: CLINIC | Age: 64
End: 2024-04-26
Payer: MEDICAID

## 2024-04-26 VITALS
WEIGHT: 124 LBS | HEIGHT: 69 IN | SYSTOLIC BLOOD PRESSURE: 108 MMHG | OXYGEN SATURATION: 100 % | TEMPERATURE: 97.3 F | HEART RATE: 81 BPM | BODY MASS INDEX: 18.37 KG/M2 | DIASTOLIC BLOOD PRESSURE: 60 MMHG

## 2024-04-26 DIAGNOSIS — Z83.719 FAMILY HX COLONIC POLYPS: ICD-10-CM

## 2024-04-26 DIAGNOSIS — R94.8 ABNORMAL PET SCAN OF COLON: Primary | ICD-10-CM

## 2024-04-26 RX ORDER — POLYETHYLENE GLYCOL 3350, SODIUM SULFATE ANHYDROUS, SODIUM BICARBONATE, SODIUM CHLORIDE, POTASSIUM CHLORIDE 236; 22.74; 6.74; 5.86; 2.97 G/4L; G/4L; G/4L; G/4L; G/4L
4 POWDER, FOR SOLUTION ORAL ONCE
Qty: 4000 ML | Refills: 0 | Status: SHIPPED | OUTPATIENT
Start: 2024-04-26 | End: 2024-04-26

## 2024-04-30 ENCOUNTER — TELEPHONE (OUTPATIENT)
Dept: ONCOLOGY | Facility: CLINIC | Age: 64
End: 2024-04-30
Payer: MEDICAID

## 2024-04-30 DIAGNOSIS — C13.9 MALIGNANT NEOPLASM HYPOPHARYNX: Primary | ICD-10-CM

## 2024-04-30 RX ORDER — FUROSEMIDE 10 MG/ML
20 INJECTION INTRAMUSCULAR; INTRAVENOUS ONCE
Start: 2024-05-09 | End: 2024-05-02

## 2024-04-30 RX ORDER — DIPHENHYDRAMINE HYDROCHLORIDE 50 MG/ML
50 INJECTION INTRAMUSCULAR; INTRAVENOUS AS NEEDED
OUTPATIENT
Start: 2024-05-09

## 2024-04-30 RX ORDER — FAMOTIDINE 10 MG/ML
20 INJECTION, SOLUTION INTRAVENOUS AS NEEDED
OUTPATIENT
Start: 2024-05-09

## 2024-04-30 RX ORDER — SODIUM CHLORIDE 9 MG/ML
20 INJECTION, SOLUTION INTRAVENOUS ONCE
OUTPATIENT
Start: 2024-05-09

## 2024-04-30 RX ORDER — PALONOSETRON 0.05 MG/ML
0.25 INJECTION, SOLUTION INTRAVENOUS ONCE
OUTPATIENT
Start: 2024-05-09

## 2024-04-30 NOTE — TELEPHONE ENCOUNTER
Caller: BALJINDER SIERRA    Relationship: DAUGHTER    Best call back number: [phone]    What is the best time to reach you: ANYTIME    Who are you requesting to speak with (clinical staff, provider,  specific staff member): CLINICAL    What was the call regarding: REQUESTING A CALL BACK TO GO OVER INSTRUCTIONS FOR CHEMO AND TO SEE IF THERE IS A MEDICATION THAT IS SUPPOSED TO BE CALLED IN PRIOR TO STARTING TREATMENT.

## 2024-04-30 NOTE — TELEPHONE ENCOUNTER
Returned call to Kirsty.  Keron was with Kirsty when I returned call.  Wanting to know how many treatments, if he needed to bring his nausea medicine with him, and if someone can come with him.  Discussed with them that he will be getting 6 to 7 chemotherapy treatments concurrent with radiation,  he does not need to bring the nausea medicine with him, and yes someone can stay with him while he gets treatment.

## 2024-05-01 ENCOUNTER — ANESTHESIA EVENT (OUTPATIENT)
Dept: GASTROENTEROLOGY | Facility: HOSPITAL | Age: 64
End: 2024-05-01
Payer: MEDICAID

## 2024-05-01 ENCOUNTER — TELEPHONE (OUTPATIENT)
Dept: GASTROENTEROLOGY | Facility: CLINIC | Age: 64
End: 2024-05-01
Payer: MEDICAID

## 2024-05-01 ENCOUNTER — HOSPITAL ENCOUNTER (OUTPATIENT)
Facility: HOSPITAL | Age: 64
Setting detail: HOSPITAL OUTPATIENT SURGERY
Discharge: HOME | End: 2024-05-01
Attending: INTERNAL MEDICINE | Admitting: INTERNAL MEDICINE
Payer: MEDICAID

## 2024-05-01 ENCOUNTER — TELEPHONE (OUTPATIENT)
Dept: ONCOLOGY | Facility: CLINIC | Age: 64
End: 2024-05-01
Payer: MEDICAID

## 2024-05-01 ENCOUNTER — ANESTHESIA (OUTPATIENT)
Dept: GASTROENTEROLOGY | Facility: HOSPITAL | Age: 64
End: 2024-05-01
Payer: MEDICAID

## 2024-05-01 VITALS
HEIGHT: 67 IN | DIASTOLIC BLOOD PRESSURE: 59 MMHG | OXYGEN SATURATION: 100 % | HEART RATE: 66 BPM | BODY MASS INDEX: 19.15 KG/M2 | RESPIRATION RATE: 22 BRPM | TEMPERATURE: 97.5 F | SYSTOLIC BLOOD PRESSURE: 88 MMHG | WEIGHT: 122 LBS

## 2024-05-01 DIAGNOSIS — R94.8 ABNORMAL PET SCAN OF COLON: ICD-10-CM

## 2024-05-01 PROCEDURE — 45381 COLONOSCOPY SUBMUCOUS NJX: CPT | Performed by: INTERNAL MEDICINE

## 2024-05-01 PROCEDURE — 45385 COLONOSCOPY W/LESION REMOVAL: CPT | Performed by: INTERNAL MEDICINE

## 2024-05-01 PROCEDURE — 88305 TISSUE EXAM BY PATHOLOGIST: CPT | Performed by: INTERNAL MEDICINE

## 2024-05-01 PROCEDURE — 25010000002 PROPOFOL 10 MG/ML EMULSION: Performed by: NURSE ANESTHETIST, CERTIFIED REGISTERED

## 2024-05-01 PROCEDURE — 25810000003 SODIUM CHLORIDE 0.9 % SOLUTION: Performed by: ANESTHESIOLOGY

## 2024-05-01 PROCEDURE — 45380 COLONOSCOPY AND BIOPSY: CPT | Performed by: INTERNAL MEDICINE

## 2024-05-01 PROCEDURE — 45379 COLONOSCOPY W/FB REMOVAL: CPT | Performed by: INTERNAL MEDICINE

## 2024-05-01 RX ORDER — PROPOFOL 10 MG/ML
VIAL (ML) INTRAVENOUS AS NEEDED
Status: DISCONTINUED | OUTPATIENT
Start: 2024-05-01 | End: 2024-05-01 | Stop reason: SURG

## 2024-05-01 RX ORDER — LIDOCAINE HYDROCHLORIDE 20 MG/ML
INJECTION, SOLUTION EPIDURAL; INFILTRATION; INTRACAUDAL; PERINEURAL AS NEEDED
Status: DISCONTINUED | OUTPATIENT
Start: 2024-05-01 | End: 2024-05-01 | Stop reason: SURG

## 2024-05-01 RX ORDER — SODIUM CHLORIDE 0.9 % (FLUSH) 0.9 %
10 SYRINGE (ML) INJECTION AS NEEDED
Status: DISCONTINUED | OUTPATIENT
Start: 2024-05-01 | End: 2024-05-01 | Stop reason: HOSPADM

## 2024-05-01 RX ORDER — LIDOCAINE HYDROCHLORIDE 10 MG/ML
0.5 INJECTION, SOLUTION EPIDURAL; INFILTRATION; INTRACAUDAL; PERINEURAL ONCE AS NEEDED
Status: DISCONTINUED | OUTPATIENT
Start: 2024-05-01 | End: 2024-05-01 | Stop reason: HOSPADM

## 2024-05-01 RX ORDER — SODIUM CHLORIDE 9 MG/ML
500 INJECTION, SOLUTION INTRAVENOUS CONTINUOUS PRN
Status: DISCONTINUED | OUTPATIENT
Start: 2024-05-01 | End: 2024-05-01 | Stop reason: HOSPADM

## 2024-05-01 RX ORDER — ONDANSETRON 2 MG/ML
4 INJECTION INTRAMUSCULAR; INTRAVENOUS ONCE AS NEEDED
Status: DISCONTINUED | OUTPATIENT
Start: 2024-05-01 | End: 2024-05-01 | Stop reason: HOSPADM

## 2024-05-01 RX ADMIN — PROPOFOL INJECTABLE EMULSION 80 MG: 10 INJECTION, EMULSION INTRAVENOUS at 12:27

## 2024-05-01 RX ADMIN — LIDOCAINE HYDROCHLORIDE 100 MG: 20 INJECTION, SOLUTION EPIDURAL; INFILTRATION; INTRACAUDAL; PERINEURAL at 12:01

## 2024-05-01 RX ADMIN — PROPOFOL INJECTABLE EMULSION 80 MG: 10 INJECTION, EMULSION INTRAVENOUS at 12:01

## 2024-05-01 RX ADMIN — SODIUM CHLORIDE 500 ML: 9 INJECTION, SOLUTION INTRAVENOUS at 10:34

## 2024-05-01 RX ADMIN — SODIUM CHLORIDE: 9 INJECTION, SOLUTION INTRAVENOUS at 12:32

## 2024-05-01 RX ADMIN — PROPOFOL INJECTABLE EMULSION 80 MG: 10 INJECTION, EMULSION INTRAVENOUS at 12:08

## 2024-05-01 RX ADMIN — PROPOFOL INJECTABLE EMULSION 80 MG: 10 INJECTION, EMULSION INTRAVENOUS at 12:19

## 2024-05-01 RX ADMIN — PROPOFOL INJECTABLE EMULSION 80 MG: 10 INJECTION, EMULSION INTRAVENOUS at 12:14

## 2024-05-01 NOTE — TELEPHONE ENCOUNTER
Received call from patient's care givers. They question if tomorrows planned start of therapy  Chemotherapy and Radiation therapy is to proceed? They report patient had a colonoscopy w biopsy performed today Dr Ruiz 5/1/24.   Dr Randhawa reports he had spoke with Dr Good regarding mutual patient and at this time they do not want to start any txt until biopsy confirmation of mass is back to proceed.   Notified (horace) she cancelled 1st chemo txt 5/2/24 weekly Cisplatin, but will leave next weeks planned txt 5/9/24 on jaida for now until notified to proceed or cancel.   Message sent to Rad/Onc regarding information so they can discuss with Dr Good.   Patient family is aware of cancellation of jaida chemo txt 5/2/24 and will wait for phone call regarding biopsy results.

## 2024-05-01 NOTE — ANESTHESIA PREPROCEDURE EVALUATION
Anesthesia Evaluation     NPO Solid Status: > 8 hours  NPO Liquid Status: > 2 hours           Airway   Mallampati: II  TM distance: >3 FB  No difficulty expected  Dental      Pulmonary    (+) a smoker Current,  (-) COPD  Cardiovascular   Exercise tolerance: good (4-7 METS)    (-) hypertension, CAD      Neuro/Psych  GI/Hepatic/Renal/Endo    (-) liver disease, no renal disease, diabetes    ROS Comment: Large mass obstructing vocal cords  Trach in place, planning to start radiation and chemo tomorrow  G tube in place    Musculoskeletal     Abdominal    Substance History      OB/GYN          Other            Phys Exam Other: Uncuffed trach shiley 7.5 in place              Anesthesia Plan    ASA 3     MAC     intravenous induction     Anesthetic plan, risks, benefits, and alternatives have been provided, discussed and informed consent has been obtained with: patient.    CODE STATUS:

## 2024-05-01 NOTE — ANESTHESIA POSTPROCEDURE EVALUATION
"Patient: Keron Gerber    Procedure Summary       Date: 05/01/24 Room / Location: [facility] ENDOSCOPY 4 / [facility] ENDOSCOPY    Anesthesia Start: 1157 Anesthesia Stop: 1236    Procedure: COLONOSCOPY WITH ANESTHESIA Diagnosis:       Abnormal PET scan of colon      (Abnormal PET scan of colon [R94.8])    Surgeons: Gustabo Ruiz MD Provider: Sarkis Galindo CRNA    Anesthesia Type: MAC ASA Status: 3            Anesthesia Type: MAC    Vitals  Vitals Value Taken Time   BP 88/59 05/01/24 1251   Temp     Pulse 70 05/01/24 1255   Resp 22 05/01/24 1250   SpO2 98 % 05/01/24 1255   Vitals shown include unfiled device data.        Post Anesthesia Care and Evaluation    Patient location during evaluation: PHASE II  Patient participation: complete - patient participated  Level of consciousness: awake and alert  Pain management: adequate    Airway patency: patent  Anesthetic complications: No anesthetic complications    Cardiovascular status: acceptable  Respiratory status: acceptable  Hydration status: acceptable    Comments: Blood pressure (!) 88/59, pulse 66, temperature 97.5 °F (36.4 °C), temperature source Temporal, resp. rate 22, height 168.9 cm (66.5\"), weight 55.3 kg (122 lb), SpO2 100%.    Pt discharged from PACU based on andres score >8    "

## 2024-05-01 NOTE — TELEPHONE ENCOUNTER
Can you please make him an appointment with Dr. Atkins, general surgery, regarding colon mass at the rectosigmoid junction.  You can forward him a copy of today's colonoscopy report.  As well as last office visit.  Please let the patient know when his appointment is and let me know as well.

## 2024-05-02 ENCOUNTER — APPOINTMENT (OUTPATIENT)
Dept: RADIATION ONCOLOGY | Facility: HOSPITAL | Age: 64
End: 2024-05-02
Payer: MEDICAID

## 2024-05-02 ENCOUNTER — TELEPHONE (OUTPATIENT)
Age: 64
End: 2024-05-02
Payer: MEDICAID

## 2024-05-02 ENCOUNTER — HOSPITAL ENCOUNTER (OUTPATIENT)
Dept: RADIATION ONCOLOGY | Facility: HOSPITAL | Age: 64
Setting detail: RADIATION/ONCOLOGY SERIES
End: 2024-05-02
Payer: MEDICAID

## 2024-05-02 DIAGNOSIS — K63.89 COLONIC MASS: Primary | ICD-10-CM

## 2024-05-02 LAB
CYTO UR: NORMAL
LAB AP CASE REPORT: NORMAL
LAB AP DIAGNOSIS COMMENT: NORMAL
Lab: NORMAL
PATH REPORT.FINAL DX SPEC: NORMAL
PATH REPORT.GROSS SPEC: NORMAL

## 2024-05-02 NOTE — TELEPHONE ENCOUNTER
Called and spoke with patient to let him know we were cancelling his radiation appointment this morning since we were awaiting path from his recent colonoscopy.   I instructed patient that we tentatively have him scheduled to start Monday, May 6th at 12:30. Patient verbalized understanding.  Dr. Randhawa's office aware as well.

## 2024-05-03 ENCOUNTER — TELEPHONE (OUTPATIENT)
Dept: ONCOLOGY | Facility: CLINIC | Age: 64
End: 2024-05-03
Payer: MEDICAID

## 2024-05-03 NOTE — TELEPHONE ENCOUNTER
Received call from patient POA Ed Malathi, he calls to report he had received a call from Dr Ruiz/GI last nite 5/2/24 regarding results of Keron's Colonoscopy Bx report. Mr. Servin reports that Dr Ruiz discussed biopsy findings and was told that patient does have a rectal mass and removal is recommended but not immediate, this could be performed maybe even a month down the road, but that he recommends start of TXT ASAP.  POA questions when patient needs to start his planned TXT Rad/chemo concurrent?  POA was informed of all this information per Dr Randhawa instructions last week when he/patient/and sister were in the office lobby, They were informed that Dr Randhawa had discussed Keron's case with Dr Good and both were in agreement that patient TXT jaida for Monday 5/6/24 should be held until colonoscopy biopsy results confirm dx.  Per Dr Randhawa instructions, Monday 5/6/24 chemotherapy TXT was Cx and moved to Thursday 5/9/24 tentatively. Message was sent to Rad/Onc regarding cancellation of patient jaida chemo on Monday 5/6/24  POA reports that patient/Keron was called per Infusion  and informed he was to be at the Center on Monday 5/6/24 @ 7:30 for start of TXT. Reviewed patient apt's with POA and he was informed Radiation TXT is jaida 5/6/24 @ 12:30 pm but patient chemotherapy was previously suze to next Thursday 5/9/24.    Call placed to Rad/Onc spoke with Marcelle Buchanan, relayed all information regarding mutual patient. Nurse was informed Dr Randhawa is out of the office until next week 5/9/24 and she reports Dr Good is in training today, but she will send message to Dr Good regarding this issue and he maybe able to review at lunch and respond.     Patient POA to be contacted with updated information

## 2024-05-06 ENCOUNTER — TELEPHONE (OUTPATIENT)
Dept: ONCOLOGY | Facility: CLINIC | Age: 64
End: 2024-05-06
Payer: MEDICAID

## 2024-05-06 ENCOUNTER — HOSPITAL ENCOUNTER (OUTPATIENT)
Dept: RADIATION ONCOLOGY | Facility: HOSPITAL | Age: 64
Setting detail: RADIATION/ONCOLOGY SERIES
Discharge: HOME | End: 2024-05-06
Payer: MEDICAID

## 2024-05-06 LAB
RAD ONC ARIA COURSE ID: NORMAL
RAD ONC ARIA COURSE LAST TREATMENT DATE: NORMAL
RAD ONC ARIA COURSE START DATE: NORMAL
RAD ONC ARIA COURSE TREATMENT ELAPSED DAYS: 0
RAD ONC ARIA FIRST TREATMENT DATE: NORMAL
RAD ONC ARIA PLAN FRACTIONS TREATED TO DATE: 1
RAD ONC ARIA PLAN ID: NORMAL
RAD ONC ARIA PLAN PRESCRIBED DOSE PER FRACTION: 2 GY
RAD ONC ARIA PLAN PRIMARY REFERENCE POINT: NORMAL
RAD ONC ARIA PLAN TOTAL FRACTIONS PRESCRIBED: 23
RAD ONC ARIA PLAN TOTAL PRESCRIBED DOSE: 4600 CGY
RAD ONC ARIA REFERENCE POINT DOSAGE GIVEN TO DATE: 1.22 GY
RAD ONC ARIA REFERENCE POINT DOSAGE GIVEN TO DATE: 2 GY
RAD ONC ARIA REFERENCE POINT ID: NORMAL
RAD ONC ARIA REFERENCE POINT ID: NORMAL
RAD ONC ARIA REFERENCE POINT SESSION DOSAGE GIVEN: 1.22 GY
RAD ONC ARIA REFERENCE POINT SESSION DOSAGE GIVEN: 2 GY

## 2024-05-06 PROCEDURE — 77386: CPT | Performed by: RADIOLOGY

## 2024-05-07 ENCOUNTER — OFFICE VISIT (OUTPATIENT)
Dept: SURGERY | Facility: CLINIC | Age: 64
End: 2024-05-07
Payer: MEDICAID

## 2024-05-07 ENCOUNTER — APPOINTMENT (OUTPATIENT)
Dept: SPEECH THERAPY | Facility: HOSPITAL | Age: 64
End: 2024-05-07
Payer: MEDICAID

## 2024-05-07 VITALS
HEIGHT: 66 IN | WEIGHT: 122 LBS | BODY MASS INDEX: 19.61 KG/M2 | DIASTOLIC BLOOD PRESSURE: 71 MMHG | SYSTOLIC BLOOD PRESSURE: 105 MMHG

## 2024-05-07 DIAGNOSIS — Z93.0 TRACHEOSTOMY DEPENDENT: ICD-10-CM

## 2024-05-07 DIAGNOSIS — C20 RECTAL CANCER: Primary | ICD-10-CM

## 2024-05-07 DIAGNOSIS — C13.9 MALIGNANT NEOPLASM HYPOPHARYNX: ICD-10-CM

## 2024-05-07 DIAGNOSIS — Z93.1 S/P PERCUTANEOUS ENDOSCOPIC GASTROSTOMY (PEG) TUBE PLACEMENT: ICD-10-CM

## 2024-05-07 LAB
RAD ONC ARIA COURSE ID: NORMAL
RAD ONC ARIA COURSE LAST TREATMENT DATE: NORMAL
RAD ONC ARIA COURSE START DATE: NORMAL
RAD ONC ARIA COURSE TREATMENT ELAPSED DAYS: 1
RAD ONC ARIA FIRST TREATMENT DATE: NORMAL
RAD ONC ARIA PLAN FRACTIONS TREATED TO DATE: 2
RAD ONC ARIA PLAN ID: NORMAL
RAD ONC ARIA PLAN PRESCRIBED DOSE PER FRACTION: 2 GY
RAD ONC ARIA PLAN PRIMARY REFERENCE POINT: NORMAL
RAD ONC ARIA PLAN TOTAL FRACTIONS PRESCRIBED: 23
RAD ONC ARIA PLAN TOTAL PRESCRIBED DOSE: 4600 CGY
RAD ONC ARIA REFERENCE POINT DOSAGE GIVEN TO DATE: 2.44 GY
RAD ONC ARIA REFERENCE POINT DOSAGE GIVEN TO DATE: 4 GY
RAD ONC ARIA REFERENCE POINT ID: NORMAL
RAD ONC ARIA REFERENCE POINT ID: NORMAL
RAD ONC ARIA REFERENCE POINT SESSION DOSAGE GIVEN: 1.22 GY
RAD ONC ARIA REFERENCE POINT SESSION DOSAGE GIVEN: 2 GY

## 2024-05-07 PROCEDURE — 1159F MED LIST DOCD IN RCRD: CPT | Performed by: SURGERY

## 2024-05-07 PROCEDURE — 99204 OFFICE O/P NEW MOD 45 MIN: CPT | Performed by: SURGERY

## 2024-05-07 PROCEDURE — 77386: CPT | Performed by: RADIOLOGY

## 2024-05-07 PROCEDURE — 1160F RVW MEDS BY RX/DR IN RCRD: CPT | Performed by: SURGERY

## 2024-05-07 RX ORDER — VARENICLINE TARTRATE 0.5 (11)-1
KIT ORAL
COMMUNITY
Start: 2024-04-09

## 2024-05-08 ENCOUNTER — HOSPITAL ENCOUNTER (OUTPATIENT)
Dept: RADIATION ONCOLOGY | Facility: HOSPITAL | Age: 64
Setting detail: RADIATION/ONCOLOGY SERIES
Discharge: HOME | End: 2024-05-08
Payer: MEDICAID

## 2024-05-08 ENCOUNTER — TRANSCRIBE ORDERS (OUTPATIENT)
Dept: ADMINISTRATIVE | Age: 64
End: 2024-05-08

## 2024-05-08 DIAGNOSIS — C20 MALIGNANT NEOPLASM OF RECTUM (HCC): Primary | ICD-10-CM

## 2024-05-08 LAB
RAD ONC ARIA COURSE ID: NORMAL
RAD ONC ARIA COURSE LAST TREATMENT DATE: NORMAL
RAD ONC ARIA COURSE START DATE: NORMAL
RAD ONC ARIA COURSE TREATMENT ELAPSED DAYS: 2
RAD ONC ARIA FIRST TREATMENT DATE: NORMAL
RAD ONC ARIA PLAN FRACTIONS TREATED TO DATE: 3
RAD ONC ARIA PLAN ID: NORMAL
RAD ONC ARIA PLAN PRESCRIBED DOSE PER FRACTION: 2 GY
RAD ONC ARIA PLAN PRIMARY REFERENCE POINT: NORMAL
RAD ONC ARIA PLAN TOTAL FRACTIONS PRESCRIBED: 23
RAD ONC ARIA PLAN TOTAL PRESCRIBED DOSE: 4600 CGY
RAD ONC ARIA REFERENCE POINT DOSAGE GIVEN TO DATE: 3.66 GY
RAD ONC ARIA REFERENCE POINT DOSAGE GIVEN TO DATE: 6 GY
RAD ONC ARIA REFERENCE POINT ID: NORMAL
RAD ONC ARIA REFERENCE POINT ID: NORMAL
RAD ONC ARIA REFERENCE POINT SESSION DOSAGE GIVEN: 1.22 GY
RAD ONC ARIA REFERENCE POINT SESSION DOSAGE GIVEN: 2 GY

## 2024-05-08 PROCEDURE — 77336 RADIATION PHYSICS CONSULT: CPT

## 2024-05-08 PROCEDURE — 77386: CPT

## 2024-05-09 ENCOUNTER — INFUSION (OUTPATIENT)
Dept: ONCOLOGY | Facility: HOSPITAL | Age: 64
End: 2024-05-09
Payer: MEDICAID

## 2024-05-09 ENCOUNTER — HOSPITAL ENCOUNTER (OUTPATIENT)
Dept: RADIATION ONCOLOGY | Facility: HOSPITAL | Age: 64
Setting detail: RADIATION/ONCOLOGY SERIES
Discharge: HOME | End: 2024-05-09
Payer: MEDICAID

## 2024-05-09 ENCOUNTER — LAB (OUTPATIENT)
Dept: LAB | Facility: HOSPITAL | Age: 64
End: 2024-05-09
Payer: MEDICAID

## 2024-05-09 ENCOUNTER — DOCUMENTATION (OUTPATIENT)
Dept: RADIATION ONCOLOGY | Facility: HOSPITAL | Age: 64
End: 2024-05-09
Payer: MEDICAID

## 2024-05-09 VITALS
OXYGEN SATURATION: 100 % | SYSTOLIC BLOOD PRESSURE: 102 MMHG | HEIGHT: 66 IN | TEMPERATURE: 97.4 F | DIASTOLIC BLOOD PRESSURE: 61 MMHG | WEIGHT: 122.4 LBS | RESPIRATION RATE: 17 BRPM | HEART RATE: 69 BPM | BODY MASS INDEX: 19.67 KG/M2

## 2024-05-09 DIAGNOSIS — C13.9 MALIGNANT NEOPLASM HYPOPHARYNX: Primary | ICD-10-CM

## 2024-05-09 DIAGNOSIS — C13.9 MALIGNANT NEOPLASM HYPOPHARYNX: ICD-10-CM

## 2024-05-09 LAB
ALBUMIN SERPL-MCNC: 4.1 G/DL (ref 3.5–5.2)
ALBUMIN/GLOB SERPL: 1.4 G/DL
ALP SERPL-CCNC: 94 U/L (ref 39–117)
ALT SERPL W P-5'-P-CCNC: 10 U/L (ref 1–41)
ANION GAP SERPL CALCULATED.3IONS-SCNC: 10 MMOL/L (ref 5–15)
AST SERPL-CCNC: 13 U/L (ref 1–40)
BASOPHILS # BLD AUTO: 0.06 10*3/MM3 (ref 0–0.2)
BASOPHILS NFR BLD AUTO: 0.7 % (ref 0–1.5)
BILIRUB SERPL-MCNC: 0.3 MG/DL (ref 0–1.2)
BUN SERPL-MCNC: 15 MG/DL (ref 8–23)
BUN/CREAT SERPL: 26.8 (ref 7–25)
CALCIUM SPEC-SCNC: 9.4 MG/DL (ref 8.6–10.5)
CHLORIDE SERPL-SCNC: 100 MMOL/L (ref 98–107)
CO2 SERPL-SCNC: 29 MMOL/L (ref 22–29)
CREAT SERPL-MCNC: 0.56 MG/DL (ref 0.76–1.27)
DEPRECATED RDW RBC AUTO: 47.6 FL (ref 37–54)
EGFRCR SERPLBLD CKD-EPI 2021: 112.1 ML/MIN/1.73
EOSINOPHIL # BLD AUTO: 0.19 10*3/MM3 (ref 0–0.4)
EOSINOPHIL NFR BLD AUTO: 2.2 % (ref 0.3–6.2)
ERYTHROCYTE [DISTWIDTH] IN BLOOD BY AUTOMATED COUNT: 13.2 % (ref 12.3–15.4)
GLOBULIN UR ELPH-MCNC: 2.9 GM/DL
GLUCOSE SERPL-MCNC: 111 MG/DL (ref 65–99)
HCT VFR BLD AUTO: 40.7 % (ref 37.5–51)
HGB BLD-MCNC: 13.5 G/DL (ref 13–17.7)
IMM GRANULOCYTES # BLD AUTO: 0.04 10*3/MM3 (ref 0–0.05)
IMM GRANULOCYTES NFR BLD AUTO: 0.5 % (ref 0–0.5)
LYMPHOCYTES # BLD AUTO: 2.78 10*3/MM3 (ref 0.7–3.1)
LYMPHOCYTES NFR BLD AUTO: 32.9 % (ref 19.6–45.3)
MAGNESIUM SERPL-MCNC: 2.1 MG/DL (ref 1.6–2.4)
MCH RBC QN AUTO: 32.3 PG (ref 26.6–33)
MCHC RBC AUTO-ENTMCNC: 33.2 G/DL (ref 31.5–35.7)
MCV RBC AUTO: 97.4 FL (ref 79–97)
MONOCYTES # BLD AUTO: 0.66 10*3/MM3 (ref 0.1–0.9)
MONOCYTES NFR BLD AUTO: 7.8 % (ref 5–12)
NEUTROPHILS NFR BLD AUTO: 4.73 10*3/MM3 (ref 1.7–7)
NEUTROPHILS NFR BLD AUTO: 55.9 % (ref 42.7–76)
NRBC BLD AUTO-RTO: 0 /100 WBC (ref 0–0.2)
PLATELET # BLD AUTO: 267 10*3/MM3 (ref 140–450)
PMV BLD AUTO: 10.6 FL (ref 6–12)
POTASSIUM SERPL-SCNC: 3.8 MMOL/L (ref 3.5–5.2)
PROT SERPL-MCNC: 7 G/DL (ref 6–8.5)
RAD ONC ARIA COURSE ID: NORMAL
RAD ONC ARIA COURSE LAST TREATMENT DATE: NORMAL
RAD ONC ARIA COURSE START DATE: NORMAL
RAD ONC ARIA COURSE TREATMENT ELAPSED DAYS: 3
RAD ONC ARIA FIRST TREATMENT DATE: NORMAL
RAD ONC ARIA PLAN FRACTIONS TREATED TO DATE: 4
RAD ONC ARIA PLAN ID: NORMAL
RAD ONC ARIA PLAN PRESCRIBED DOSE PER FRACTION: 2 GY
RAD ONC ARIA PLAN PRIMARY REFERENCE POINT: NORMAL
RAD ONC ARIA PLAN TOTAL FRACTIONS PRESCRIBED: 23
RAD ONC ARIA PLAN TOTAL PRESCRIBED DOSE: 4600 CGY
RAD ONC ARIA REFERENCE POINT DOSAGE GIVEN TO DATE: 4.88 GY
RAD ONC ARIA REFERENCE POINT DOSAGE GIVEN TO DATE: 8 GY
RAD ONC ARIA REFERENCE POINT ID: NORMAL
RAD ONC ARIA REFERENCE POINT ID: NORMAL
RAD ONC ARIA REFERENCE POINT SESSION DOSAGE GIVEN: 1.22 GY
RAD ONC ARIA REFERENCE POINT SESSION DOSAGE GIVEN: 2 GY
RBC # BLD AUTO: 4.18 10*6/MM3 (ref 4.14–5.8)
SODIUM SERPL-SCNC: 139 MMOL/L (ref 136–145)
WBC NRBC COR # BLD AUTO: 8.46 10*3/MM3 (ref 3.4–10.8)

## 2024-05-09 PROCEDURE — 96366 THER/PROPH/DIAG IV INF ADDON: CPT

## 2024-05-09 PROCEDURE — 25810000003 SODIUM CHLORIDE 0.9 % SOLUTION: Performed by: INTERNAL MEDICINE

## 2024-05-09 PROCEDURE — 77386: CPT

## 2024-05-09 PROCEDURE — 25010000002 POTASSIUM CHLORIDE PER 2 MEQ OF POTASSIUM: Performed by: INTERNAL MEDICINE

## 2024-05-09 PROCEDURE — 25810000003 SODIUM CHLORIDE 0.9 % SOLUTION 250 ML FLEX CONT: Performed by: INTERNAL MEDICINE

## 2024-05-09 PROCEDURE — 36415 COLL VENOUS BLD VENIPUNCTURE: CPT

## 2024-05-09 PROCEDURE — 83735 ASSAY OF MAGNESIUM: CPT

## 2024-05-09 PROCEDURE — 25810000003 SODIUM CHLORIDE 0.9 % SOLUTION 1,000 ML FLEX CONT: Performed by: INTERNAL MEDICINE

## 2024-05-09 PROCEDURE — 85025 COMPLETE CBC W/AUTO DIFF WBC: CPT

## 2024-05-09 PROCEDURE — 96375 TX/PRO/DX INJ NEW DRUG ADDON: CPT

## 2024-05-09 PROCEDURE — 25010000002 PALONOSETRON PER 25 MCG: Performed by: INTERNAL MEDICINE

## 2024-05-09 PROCEDURE — 25010000002 MAGNESIUM SULFATE PER 500 MG OF MAGNESIUM: Performed by: INTERNAL MEDICINE

## 2024-05-09 PROCEDURE — 96367 TX/PROPH/DG ADDL SEQ IV INF: CPT

## 2024-05-09 PROCEDURE — 25010000002 FOSAPREPITANT PER 1 MG: Performed by: INTERNAL MEDICINE

## 2024-05-09 PROCEDURE — 25010000002 FUROSEMIDE PER 20 MG: Performed by: INTERNAL MEDICINE

## 2024-05-09 PROCEDURE — 25010000002 DEXAMETHASONE SODIUM PHOSPHATE 100 MG/10ML SOLUTION: Performed by: INTERNAL MEDICINE

## 2024-05-09 PROCEDURE — 96413 CHEMO IV INFUSION 1 HR: CPT

## 2024-05-09 PROCEDURE — 25010000002 CISPLATIN PER 50 MG: Performed by: INTERNAL MEDICINE

## 2024-05-09 PROCEDURE — 96368 THER/DIAG CONCURRENT INF: CPT

## 2024-05-09 PROCEDURE — 80053 COMPREHEN METABOLIC PANEL: CPT

## 2024-05-09 RX ORDER — DIPHENHYDRAMINE HYDROCHLORIDE 50 MG/ML
50 INJECTION INTRAMUSCULAR; INTRAVENOUS AS NEEDED
Status: DISCONTINUED | OUTPATIENT
Start: 2024-05-09 | End: 2024-05-09 | Stop reason: HOSPADM

## 2024-05-09 RX ORDER — PALONOSETRON 0.05 MG/ML
0.25 INJECTION, SOLUTION INTRAVENOUS ONCE
Status: COMPLETED | OUTPATIENT
Start: 2024-05-09 | End: 2024-05-09

## 2024-05-09 RX ORDER — FUROSEMIDE 10 MG/ML
20 INJECTION INTRAMUSCULAR; INTRAVENOUS ONCE
Qty: 2 ML | Refills: 0 | Status: DISCONTINUED | OUTPATIENT
Start: 2024-05-09 | End: 2024-05-09 | Stop reason: HOSPADM

## 2024-05-09 RX ORDER — FAMOTIDINE 10 MG/ML
20 INJECTION, SOLUTION INTRAVENOUS AS NEEDED
Status: DISCONTINUED | OUTPATIENT
Start: 2024-05-09 | End: 2024-05-09 | Stop reason: HOSPADM

## 2024-05-09 RX ORDER — FUROSEMIDE 10 MG/ML
20 INJECTION INTRAMUSCULAR; INTRAVENOUS ONCE
Status: COMPLETED | OUTPATIENT
Start: 2024-05-09 | End: 2024-05-09

## 2024-05-09 RX ORDER — SODIUM CHLORIDE 9 MG/ML
20 INJECTION, SOLUTION INTRAVENOUS ONCE
Status: COMPLETED | OUTPATIENT
Start: 2024-05-09 | End: 2024-05-09

## 2024-05-09 RX ADMIN — MAGNESIUM SULFATE HEPTAHYDRATE: 500 INJECTION, SOLUTION INTRAMUSCULAR; INTRAVENOUS at 09:12

## 2024-05-09 RX ADMIN — FUROSEMIDE 20 MG: 10 INJECTION, SOLUTION INTRAMUSCULAR; INTRAVENOUS at 12:24

## 2024-05-09 RX ADMIN — FOSAPREPITANT 150 MG: 150 INJECTION, POWDER, LYOPHILIZED, FOR SOLUTION INTRAVENOUS at 11:50

## 2024-05-09 RX ADMIN — MAGNESIUM SULFATE HEPTAHYDRATE: 500 INJECTION, SOLUTION INTRAMUSCULAR; INTRAVENOUS at 13:49

## 2024-05-09 RX ADMIN — PALONOSETRON HYDROCHLORIDE 0.25 MG: 0.25 INJECTION, SOLUTION INTRAVENOUS at 11:27

## 2024-05-09 RX ADMIN — DEXAMETHASONE SODIUM PHOSPHATE 12 MG: 10 INJECTION, SOLUTION INTRAMUSCULAR; INTRAVENOUS at 11:29

## 2024-05-09 RX ADMIN — CISPLATIN 68 MG: 50 INJECTION, SOLUTION INTRAVENOUS at 12:27

## 2024-05-09 RX ADMIN — SODIUM CHLORIDE 20 ML/HR: 9 INJECTION, SOLUTION INTRAVENOUS at 08:45

## 2024-05-10 ENCOUNTER — HOSPITAL ENCOUNTER (OUTPATIENT)
Dept: MRI IMAGING | Age: 64
Discharge: HOME | End: 2024-05-10
Payer: MEDICAID

## 2024-05-10 ENCOUNTER — HOSPITAL ENCOUNTER (OUTPATIENT)
Dept: RADIATION ONCOLOGY | Facility: HOSPITAL | Age: 64
Setting detail: RADIATION/ONCOLOGY SERIES
Discharge: HOME | End: 2024-05-10
Payer: MEDICAID

## 2024-05-10 DIAGNOSIS — C20 MALIGNANT NEOPLASM OF RECTUM (HCC): ICD-10-CM

## 2024-05-10 LAB
RAD ONC ARIA COURSE ID: NORMAL
RAD ONC ARIA COURSE LAST TREATMENT DATE: NORMAL
RAD ONC ARIA COURSE START DATE: NORMAL
RAD ONC ARIA COURSE TREATMENT ELAPSED DAYS: 4
RAD ONC ARIA FIRST TREATMENT DATE: NORMAL
RAD ONC ARIA PLAN FRACTIONS TREATED TO DATE: 5
RAD ONC ARIA PLAN ID: NORMAL
RAD ONC ARIA PLAN PRESCRIBED DOSE PER FRACTION: 2 GY
RAD ONC ARIA PLAN PRIMARY REFERENCE POINT: NORMAL
RAD ONC ARIA PLAN TOTAL FRACTIONS PRESCRIBED: 23
RAD ONC ARIA PLAN TOTAL PRESCRIBED DOSE: 4600 CGY
RAD ONC ARIA REFERENCE POINT DOSAGE GIVEN TO DATE: 10 GY
RAD ONC ARIA REFERENCE POINT DOSAGE GIVEN TO DATE: 6.09 GY
RAD ONC ARIA REFERENCE POINT ID: NORMAL
RAD ONC ARIA REFERENCE POINT ID: NORMAL
RAD ONC ARIA REFERENCE POINT SESSION DOSAGE GIVEN: 1.22 GY
RAD ONC ARIA REFERENCE POINT SESSION DOSAGE GIVEN: 2 GY

## 2024-05-10 PROCEDURE — 6360000004 HC RX CONTRAST MEDICATION: Performed by: OPHTHALMOLOGY

## 2024-05-10 PROCEDURE — 72197 MRI PELVIS W/O & W/DYE: CPT

## 2024-05-10 PROCEDURE — A9577 INJ MULTIHANCE: HCPCS | Performed by: OPHTHALMOLOGY

## 2024-05-10 RX ADMIN — GADOBENATE DIMEGLUMINE 12 ML: 529 INJECTION, SOLUTION INTRAVENOUS at 12:02

## 2024-05-13 ENCOUNTER — HOSPITAL ENCOUNTER (OUTPATIENT)
Dept: RADIATION ONCOLOGY | Facility: HOSPITAL | Age: 64
Setting detail: RADIATION/ONCOLOGY SERIES
Discharge: HOME | End: 2024-05-13
Payer: MEDICAID

## 2024-05-13 LAB
RAD ONC ARIA COURSE ID: NORMAL
RAD ONC ARIA COURSE LAST TREATMENT DATE: NORMAL
RAD ONC ARIA COURSE START DATE: NORMAL
RAD ONC ARIA COURSE TREATMENT ELAPSED DAYS: 7
RAD ONC ARIA FIRST TREATMENT DATE: NORMAL
RAD ONC ARIA PLAN FRACTIONS TREATED TO DATE: 6
RAD ONC ARIA PLAN ID: NORMAL
RAD ONC ARIA PLAN PRESCRIBED DOSE PER FRACTION: 2 GY
RAD ONC ARIA PLAN PRIMARY REFERENCE POINT: NORMAL
RAD ONC ARIA PLAN TOTAL FRACTIONS PRESCRIBED: 23
RAD ONC ARIA PLAN TOTAL PRESCRIBED DOSE: 4600 CGY
RAD ONC ARIA REFERENCE POINT DOSAGE GIVEN TO DATE: 12 GY
RAD ONC ARIA REFERENCE POINT DOSAGE GIVEN TO DATE: 7.31 GY
RAD ONC ARIA REFERENCE POINT ID: NORMAL
RAD ONC ARIA REFERENCE POINT ID: NORMAL
RAD ONC ARIA REFERENCE POINT SESSION DOSAGE GIVEN: 1.22 GY
RAD ONC ARIA REFERENCE POINT SESSION DOSAGE GIVEN: 2 GY

## 2024-05-13 PROCEDURE — 77386: CPT | Performed by: RADIOLOGY

## 2024-05-14 ENCOUNTER — TREATMENT (OUTPATIENT)
Dept: SPEECH THERAPY | Facility: HOSPITAL | Age: 64
End: 2024-05-14
Payer: MEDICAID

## 2024-05-14 ENCOUNTER — OFFICE VISIT (OUTPATIENT)
Dept: ONCOLOGY | Facility: CLINIC | Age: 64
End: 2024-05-14
Payer: MEDICAID

## 2024-05-14 ENCOUNTER — HOSPITAL ENCOUNTER (OUTPATIENT)
Dept: RADIATION ONCOLOGY | Facility: HOSPITAL | Age: 64
Setting detail: RADIATION/ONCOLOGY SERIES
Discharge: HOME | End: 2024-05-14
Payer: MEDICAID

## 2024-05-14 ENCOUNTER — OFFICE VISIT (OUTPATIENT)
Dept: SURGERY | Facility: CLINIC | Age: 64
End: 2024-05-14
Payer: MEDICAID

## 2024-05-14 ENCOUNTER — APPOINTMENT (OUTPATIENT)
Dept: SPEECH THERAPY | Facility: HOSPITAL | Age: 64
End: 2024-05-14
Payer: MEDICAID

## 2024-05-14 VITALS
DIASTOLIC BLOOD PRESSURE: 67 MMHG | HEIGHT: 66 IN | SYSTOLIC BLOOD PRESSURE: 101 MMHG | BODY MASS INDEX: 19.44 KG/M2 | WEIGHT: 121 LBS

## 2024-05-14 VITALS
WEIGHT: 122.2 LBS | SYSTOLIC BLOOD PRESSURE: 126 MMHG | RESPIRATION RATE: 18 BRPM | HEIGHT: 67 IN | TEMPERATURE: 98.5 F | DIASTOLIC BLOOD PRESSURE: 62 MMHG | HEART RATE: 80 BPM | BODY MASS INDEX: 19.18 KG/M2 | OXYGEN SATURATION: 98 %

## 2024-05-14 DIAGNOSIS — Z92.3 HISTORY OF RADIATION THERAPY: ICD-10-CM

## 2024-05-14 DIAGNOSIS — C13.9 MALIGNANT NEOPLASM HYPOPHARYNX: ICD-10-CM

## 2024-05-14 DIAGNOSIS — Z93.0 TRACHEOSTOMY DEPENDENCE: ICD-10-CM

## 2024-05-14 DIAGNOSIS — C13.9 MALIGNANT NEOPLASM HYPOPHARYNX: Primary | ICD-10-CM

## 2024-05-14 DIAGNOSIS — R13.12 OROPHARYNGEAL DYSPHAGIA: Primary | ICD-10-CM

## 2024-05-14 DIAGNOSIS — C20 RECTAL CANCER: Primary | ICD-10-CM

## 2024-05-14 LAB
RAD ONC ARIA COURSE ID: NORMAL
RAD ONC ARIA COURSE LAST TREATMENT DATE: NORMAL
RAD ONC ARIA COURSE START DATE: NORMAL
RAD ONC ARIA COURSE TREATMENT ELAPSED DAYS: 8
RAD ONC ARIA FIRST TREATMENT DATE: NORMAL
RAD ONC ARIA PLAN FRACTIONS TREATED TO DATE: 7
RAD ONC ARIA PLAN ID: NORMAL
RAD ONC ARIA PLAN PRESCRIBED DOSE PER FRACTION: 2 GY
RAD ONC ARIA PLAN PRIMARY REFERENCE POINT: NORMAL
RAD ONC ARIA PLAN TOTAL FRACTIONS PRESCRIBED: 23
RAD ONC ARIA PLAN TOTAL PRESCRIBED DOSE: 4600 CGY
RAD ONC ARIA REFERENCE POINT DOSAGE GIVEN TO DATE: 14 GY
RAD ONC ARIA REFERENCE POINT DOSAGE GIVEN TO DATE: 8.53 GY
RAD ONC ARIA REFERENCE POINT ID: NORMAL
RAD ONC ARIA REFERENCE POINT ID: NORMAL
RAD ONC ARIA REFERENCE POINT SESSION DOSAGE GIVEN: 1.22 GY
RAD ONC ARIA REFERENCE POINT SESSION DOSAGE GIVEN: 2 GY

## 2024-05-14 PROCEDURE — 99214 OFFICE O/P EST MOD 30 MIN: CPT | Performed by: INTERNAL MEDICINE

## 2024-05-14 PROCEDURE — 77386: CPT

## 2024-05-14 PROCEDURE — 1159F MED LIST DOCD IN RCRD: CPT | Performed by: INTERNAL MEDICINE

## 2024-05-14 PROCEDURE — 1126F AMNT PAIN NOTED NONE PRSNT: CPT | Performed by: INTERNAL MEDICINE

## 2024-05-14 PROCEDURE — 92610 EVALUATE SWALLOWING FUNCTION: CPT | Performed by: SPEECH-LANGUAGE PATHOLOGIST

## 2024-05-14 PROCEDURE — 1160F RVW MEDS BY RX/DR IN RCRD: CPT | Performed by: INTERNAL MEDICINE

## 2024-05-14 RX ORDER — FAMOTIDINE 10 MG/ML
20 INJECTION, SOLUTION INTRAVENOUS AS NEEDED
Status: CANCELLED | OUTPATIENT
Start: 2024-05-16

## 2024-05-14 RX ORDER — DIPHENHYDRAMINE HYDROCHLORIDE 50 MG/ML
50 INJECTION INTRAMUSCULAR; INTRAVENOUS AS NEEDED
Status: CANCELLED | OUTPATIENT
Start: 2024-05-16

## 2024-05-14 RX ORDER — FUROSEMIDE 10 MG/ML
20 INJECTION INTRAMUSCULAR; INTRAVENOUS ONCE
Status: CANCELLED
Start: 2024-05-16 | End: 2024-05-16

## 2024-05-14 RX ORDER — SODIUM CHLORIDE 9 MG/ML
20 INJECTION, SOLUTION INTRAVENOUS ONCE
Status: CANCELLED | OUTPATIENT
Start: 2024-05-16

## 2024-05-14 RX ORDER — PALONOSETRON 0.05 MG/ML
0.25 INJECTION, SOLUTION INTRAVENOUS ONCE
Status: CANCELLED | OUTPATIENT
Start: 2024-05-16

## 2024-05-14 NOTE — THERAPY EVALUATION
Outpatient Speech Language Pathology   Adult Swallow Initial Evaluation       Patient Name: Keron Gerber  : 1960  MRN: [id number]  Today's Date: 2024         Visit Date: 2024       SPEECH-LANGUAGE PATHOLOGY EVALUATION - SWALLOW  Subjective: The patient was seen on this date for a Clinical Swallow evaluation.  Patient was alert and cooperative. Mother and uncle present.   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   Objective: Oral motor examination results: WFL.  Textures given during assessment of swallow function included thin liquid.  Assessment: Difficulties were noted with thin liquid.  Observations: Patient reports he has been NPO since hospitalization for PEG and trach. He reports a swallow study was completed and he was aspirating. He did report he has been doing water by mouth. Otherwise nutrition/hydration/medication is all via gtube. With sips of thin liquids the patient had throat clearing and coughing. Intermittent wet vocal quality but clears with strong cough. Patient had questions regarding when he could progress to soup-like items. I explained need for repeat VFSS to determine safety and likely change throughout XRT. May be more beneficial to wait on completion of XRT to reassess return to PO diet as swallow may change throughout treatment.   SLP Findings:  Patient presents with severe oropharyngeal dysphagia.  Recommendations: Diet Textures: NPO  with long term alternate means of nutrition. Medications should be taken  by alternate means. May have sips of water between after oral care, under staff or family supervision and with the recommended strategies for safe swallowing.   Recommended Strategies: upright for PO, small bites and sips, and volitional cough with all intake . Oral care 3x a day.  Other Recommended Evaluations: VFSS    Dysphagia therapy is recommended.      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 ENDOSCOPY;  Service: Gastroenterology;  Laterality: N/A;  preop; abnormal pet scan   postop rectal mass; polyps;   PCP Dr Santos    CYST REMOVAL Right 08/07/2014    RIGHT LOWER EYELID- EPIDERMAL INCLUSION CYST    EXCISION LESION N/A 08/07/2014    MALIGNANT MELANOMA OF LEFT NASAL TIP    FOREHEAD FLAP  09/11/2014    PEDICLE FLAP  10/02/2014    PEDICLE DIVISION AND FLAP INSET OF NOSE    PROSTATECTOMY N/A 09/07/2022    Procedure: PROSTATECTOMY LAPAROSCOPIC WITH [employer] ROBOT;  Surgeon: Matthew Weaver MD;  Location: [facility] OR;  Service: Robotics - DaVinci;  Laterality: N/A;    SQUAMOUS CELL CARCINOMA EXCISION  10/29/2013    LEFT FLOOR OF MOUTH    TONSILLECTOMY           Visit Dx:     ICD-10-CM ICD-9-CM   1. Oropharyngeal dysphagia  R13.12 787.22            OP SLP Assessment/Plan - 05/14/24 1500          SLP Assessment    Functional Problems Swallowing  -MG    Impact on Function: Swallowing Risk of aspiration;Impact on social aspects of eating  -MG    Clinical Impression: Swallowing Severe:;oropharyngeal phase dysphagia  -MG    Clinical Impression Comments Per report prior VFSS with recommendation for NPO status and water only. Current trach and PEG. Overt s/s of aspriation present with thin liquids.  -MG    Please refer to paper survey for additional self-reported information Yes  -MG    Please refer to items  scanned into chart for additional diagnostic informaiton and handouts as provided by clinician Yes  -MG    SLP Diagnosis Oropharyngeal dysphagia  -MG    Prognosis Good (comment)  -MG    Patient/caregiver participated in establishment of treatment plan and goals Yes  -MG    Patient would benefit from skilled therapy intervention Yes  -MG       SLP Plan    Frequency 1 time per week  -MG    Duration 6 months  -MG    Planned CPT's? SLP SWALLOW THERAPY: 57080;SLP CLINICAL SWALLOW EVAL: 49329  -MG    Expected Duration of Therapy Session (SLP Eval) 45  -MG    Plan Comments Follow 1 time per week throughout XRT. 6 week follow up post XRT with VFSS at that time if medically appropriate. Patient to begin swallowing excercise program 3 times per day. Water by mouth only; otherwise, NPO and nutrition/hydration/medication via Gtube.  -MG              User Key  (r) = Recorded By, (t) = Taken By, (c) = Cosigned By      Initials Name Provider Type    MG Alma Granger MS CCC-SLP Speech and Language Pathologist                     SLP Adult Swallow Evaluation       Row Name 05/14/24 8078       Rehab Evaluation    Document Type evaluation  -MG    Subjective Information no complaints  -MG    Patient Observations alert;cooperative;agree to therapy  -MG    Patient/Family/Caregiver Comments/Observations Mother and uncle present.  -MG    Patient Effort good  -MG    Symptoms Noted During/After Treatment none  -MG       General Information    Patient Profile Reviewed yes  -MG    Pertinent History Of Current Problem Stage IV T4N0M0 P16- SCC hypopharynx  due to probable prevertebral fasica involvement.  -MG    Current Method of Nutrition NPO;gastrostomy feedings;water between meals  -MG    Precautions/Limitations, Vision WFL with corrective lenses;for purposes of eval  -MG    Precautions/Limitations, Hearing WFL;for purposes of eval  -MG    Prior Level of Function-Communication WFL  -MG    Prior Level of Function-Swallowing no diet  consistency restrictions  -MG    Plans/Goals Discussed with patient and family;agreed upon  -MG    Barriers to Rehab medically complex  -MG    Patient's Goals for Discharge return to PO diet  -MG    Family Goals for Discharge family did not state  -MG       Pain    Additional Documentation Pain Scale: FACES Pre/Post-Treatment (Group)  -MG       Pain Scale: FACES Pre/Post-Treatment    Pain: FACES Scale, Pretreatment 0-->no hurt  -MG    Posttreatment Pain Rating 0-->no hurt  -MG       Oral Motor Structure and Function    Secretion Management WNL/WFL  -MG    Mucosal Quality moist, healthy  -MG    Volitional Swallow WFL  -MG    Volitional Cough WFL  -MG       Oral Musculature and Cranial Nerve Assessment    Oral Motor General Assessment WFL  -MG       General Eating/Swallowing Observations    Respiratory Support Currently in Use tracheostomy  -MG    Eating/Swallowing Skills self-fed  -MG    Positioning During Eating upright in chair  -MG    Utensils Used cup  -MG    Consistencies Trialed thin liquids  -MG       Clinical Swallow Eval    Oral Prep Phase WFL  -MG    Oral Transit WFL  -MG    Oral Residue WFL  -MG    Pharyngeal Phase suspected pharyngeal impairment  -MG    Esophageal Phase unremarkable  -MG    Clinical Swallow Evaluation Summary See note  -MG       Pharyngeal Phase Concerns    Pharyngeal Phase Concerns cough;wet vocal quality  -MG    Wet Vocal Quality thin  -MG    Cough thin  -MG       SLP Evaluation Clinical Impression    SLP Swallowing Diagnosis functional oral phase;severe;suspected pharyngeal dysphagia  -MG    Functional Impact risk of aspiration/pneumonia  -MG    Rehab Potential/Prognosis, Swallowing adequate, monitor progress closely  -MG    Swallow Criteria for Skilled Therapeutic Interventions Met demonstrates skilled criteria  -MG       Recommendations    Therapy Frequency (Swallow) 1 day per week  -MG    Predicted Duration Therapy Intervention (Days) until discharge  -MG    SLP Diet Recommendation  NPO;water between meals after oral care, with supervision;long term alternate methods of nutrition/hydration  -MG    Recommended Diagnostics VFSS (MBS)  -MG    Recommended Precautions and Strategies upright posture during/after eating;small bites of food and sips of liquid;volitional throat clear;general aspiration precautions  -MG    Oral Care Recommendations Oral Care before breakfast, after meals and PRN;Toothbrush  -MG    SLP Rec. for Method of Medication Administration meds via alternate route  -MG    Monitor for Signs of Aspiration yes;notify SLP if any concerns  -MG    Anticipated Discharge Disposition (SLP) home with OP services  -MG    Demonstrates Need for Referral to Another Service clinical nutrition services/dietitian  -MG              User Key  (r) = Recorded By, (t) = Taken By, (c) = Cosigned By      Initials Name Provider Type    MG Alma Granger MS CCC-SLP Speech and Language Pathologist                                   OP SLP Education       Row Name 05/14/24 1504       Education    Barriers to Learning No barriers identified  -MG    Education Provided Described results of evaluation;Patient expressed understanding of evaluation;Family/caregivers expressed understanding of evaluation;Patient participated in establishing goals and treatment plan;Family/caregivers participated in establishing goals and treatment plan;Patient requires further education on strategies, risks;Family/caregivers require further education on strategies, risks  -MG    Assessed Learning needs;Learning motivation;Learning preferences;Learning readiness  -MG    Learning Motivation Strong  -MG    Learning Method Explanation;Written materials  -MG    Teaching Response Verbalized understanding  -MG    Education Comments Plan of care packet given. Extensive education regarding effects of XRT on the swallow and recommendations.  -MG              User Key  (r) = Recorded By, (t) = Taken By, (c) = Cosigned By      Initials Name  Effective Dates    MG Alma Granger MS [facility]-SLP 07/11/23 -                    SLP OP Goals       Row Name 05/14/24 1515 05/14/24 0697       Goal Type Needed    Goal Type Needed -- Other Adult Goals  -MG       Subjective Comments    Subjective Comments -- Patient was seen today for swallow evaluation.  -MG       Subjective Pain    Able to rate subjective pain? -- yes  -MG    Pre-Treatment Pain Level -- 0  -MG    Post-Treatment Pain Level -- 0  -MG       Other Goals    Other Adult Goal- 1 -- Patient will increase swallowing frequency during XRT in order to maintain current swallow function once XRT is completed.  -MG    Status: Other Adult Goal- 1 -- New  -MG    Comments: Other Adult Goal- 1 -- See note  -MG    Other Adult Goal- 2 -- Patient will tolerate water throughout and following treatment without complications such as aspiration pneumonia or overt s/s of aspiration.  -MG    Status: Other Adult Goal- 2 -- New  -MG    Comments: Other Adult Goal- 2 -- See note.  -MG    Other Adult Goal- 3 -- Patient will complete oral care daily in order to promote healthy oral mucosa throughout XRT.  -MG    Status: Other Adult Goal- 3 -- New  -MG    Comments: Other Adult Goal- 3 -- See note  -MG    Other Adult Goal- 4 -- Patient will maintain adequate hydration by drinking needed amount of water by mouth or via PEG tube.  -MG    Status: Other Adult Goal- 4 -- New  -MG    Comments: Other Adult Goal- 4 -- See note  -MG    Other Adult Goal- 5 -- Will work towards PO for pleasure as tolerated.  -MG    Status: Other Adult Goal- 5 -- New  -MG    Comments: Other Adult Goal- 5 -- See note  -MG    Other Adult Goal- 6 -- Patient will complete swallowing exercises three times a day in order to maintain current swallow function and range of motion of oral and pharyngeal structures.  -MG    Status: Other Adult Goal- 6 -- New  -MG    Comments: Other Adult Goal- 6 -- See note  -MG       SLP Time Calculation    SLP Goal Re-Cert Due Date  08/14/24  -MG 08/14/24  -MG              User Key  (r) = Recorded By, (t) = Taken By, (c) = Cosigned By      Initials Name Provider Type    Alma Rebolledo MS CCC-SLP Speech and Language Pathologist                             Time Calculation:   SLP Start Time: 1315  SLP Stop Time: 1515  SLP Time Calculation (min): 120 min  Untimed Charges  SLP Eval/Re-eval : ST Eval Oral Pharyng Swallow - 40359  [id number]-TV Eval Oral Pharyng Swallow Minutes: 120  Total Minutes  Untimed Charges Total Minutes: 120   Total Minutes: 120                 Alma Granger MS CCC-SLP  5/14/2024

## 2024-05-14 NOTE — PROGRESS NOTES
[facility] HEMATOLOGY & ONCOLOGY  [address]  [place] [address] [phone]    Patient Name: Keron Gerber  Encounter Date: 05/14/2024  YOB: 1960  Patient Number: [id number]      REASON FOR FOLLOW-UP: Keron Gerber is a pleasant 63 y.o.  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  He is seen week 2 of cisplatin with radiation.  The patient is seen with his mother Portia and brother in law.  History is obtained from patient.  Patient is a reliable historian           DIAGNOSTIC ABNORMALITIES:   He had several months of right ear pain radiating to the right neck and 20 pound weight loss over 6 months.   He noted right neck mass and dysphagia.  CT neck on 2/28/2024.  3.5 x 4 cm lobular enhancing mass in the upper larynx arising from the posterior wall at the level of the base of the epiglottis.  Suspicious for malignancy.  Abnormal mucosal enhancement also noted involving the uvula and tongue base.  CT chest 3/13/2024.  Well-defined 1.7 cm fluid density lesion in the anterior mediastinum could represent a benign etiology such as a thymic cyst.  Recommend follow-up.  Mild diffuse thickening of bilateral adrenal glands could represent adenomatous hyperplasia.  Recommend correlation with prior imaging if available.  No evidence of pulmonary metastasis.  Tracheostomy, direct laryngoscopy, rigid cervical esophagoscopy and biopsy of the posterior pharyngeal mass and biopsy of the left true vocal fold on 3/14/2024. Flexible laryngoscopy revealed a large exophytic lesion coming from the posterior pharyngeal wall protruding off the supraglottis and glottis with inability to visualize the glottis.  PEG placement 3/18/2024.  Pathology report on 3/18/2024.  Vocal cord, left, biopsy: Squamous mucosa with focal dysplasia at least moderate.  Posterior pharyngeal wall biopsy:  Invasive poorly differentiated squamous cell carcinoma.  Posterior pharyngeal wall biopsy: Invasive poorly differentiated squamous cell carcinoma with focal metaplastic features.  Posterior pharyngeal wall biopsy: Invasive poorly differentiated squamous cell carcinoma with focal metaplastic features.  CBC 3/19/2024 revealed a WBC of 10.8, hemoglobin 13.7, hematocrit 40.5 and platelet 329.  CMP revealed creatinine of 0.73.  Patient seen by Dr. Lit Rand on 3/22/2024.  Findings: Nasopharynx and oropharynx patent without masses/lesions; BOT without masses/lesions; exophytic lesion likely coming from posterior pharyngeal wall protruding onto supraglottis and glottis; unable to visualize glottis d/t obstructive view; supraglottis without obvious lesion otherwise.  Multidisciplinary recommendation: Discussed chemoradiation versus surgical resection.  Patient would benefit from chemoradiation at this time.  Note from Dr. Good on 4/2/2024. Plan for 35 fractions. PET 4/16/2024. Hypermetabolic mass of the posterior wall of the hypopharynx consistent with biopsy-proven squamous cell carcinoma, known primary neoplasm. No hypermetabolic cervical lymphadenopathy is seen.  Hypermetabolic mass of the distal sigmoid colon also worrisome for second primary neoplasm, adenocarcinoma of the colon. Colonoscopy is recommended for further evaluation. Plan for colonoscopy.           PREVIOUS INTERVENTIONS:  Weekly cisplatin 5/9/2024 through present with radiation 5/6/2024 through present.         Oncology/Hematology History   Prostate cancer    Initial Diagnosis    Prostate cancer     1/23/2020 Procedure    PSA 13.96     2/23/2021 Procedure    PSA 4.4     4/23/2022 Procedure    PSA 10.1     5/12/2022 Procedure    PSA 16.70     5/23/2022 Biopsy    Prostate Biopsy:  Left mid medial 1 - prostatic adenocarcinoma, Johanny 3+3=6  Left mid lateral 2 - prostatic adenocarcinoma Johanny 3+3=6  Left mid medial 2 - prostatic adenocarcinoma Johanny  3+3=6  Left apex lateral - prostatic adenocarcinoma, Johanny 3+3=6  Left apex medial - prostatic adenocarcinoma, Ryan 3+3=6  Right base medial - prostatic adenocarcinoma, Ryan 3+3=6    Perineural invasion is identified in this case     9/7/2022 Surgery    Final Diagnosis   Prostate, prostatectomy:               - Histologic Type:  Acinar adenocarcinoma.               - Histologic Grade:  Grade group 4 (Ryan score 4 + 4 = 8).               - Intraductal Carcinoma:  Not identified.               - Cribriform Glands:  Present.               - Treatment Effect:  No known presurgical therapy.               - Tumor Quantitation: 41-50%.               - Extraprostatic Extension:  Not identified.               - Urinary Bladder Neck Invasion: Not identified.               - Seminal Vesicle Invasion: Not identified.               - Lymphovascular Invasion: Not identified.               - Margins: All margins are Negative for invasive carcinoma.               - Pathologic Stage:  pT2 pNx pMx        12/8/2022 Procedure    PSA <0.1     5/16/2023 Procedure    PSA <0.1     9/19/2023 Procedure    PSA <0.1     1/23/2024 Procedure    PSA <0.1     Malignant neoplasm hypopharynx    Initial Diagnosis    Malignant neoplasm hypopharynx     2/28/2024 Imaging    CT Neck:  35 x 40 mm lobular enhancing mass in the upper larynx arising from the posterior   wall at the level of the base of the epiglottis. This is suspicious for malignancy.   There is abnormal mucosal enhancement also noted involving the uvula and   tongue base.      3/2/2024 Imaging    CT Neck:  Findings suggest laryngeal mass as noted, suggest ENT evaluation, asymmetric appearance of the vocal cords and larynx  Minimal adenopathy  Postop changes left submandibular region     3/13/2024 Imaging    CT Chest:  Well-defined 1.7 cm fluid density lesion in the anterior mediastinum could   represent a benign etiology such as a thymic cyst. Given lack of prior imaging   and  concern for malignancy, recommend close attention on follow-up imaging   and further workup as clinically necessary.  Mild diffuse thickening of bilateral adrenal glands could represent   adenomatous hyperplasia. Recommend correlation to prior imaging if   available and attention on follow-up imaging.  No evidence of pulmonary metastases.     3/14/2024 Biopsy    Awake tracheostomy/Direct laryngoscopy with suspension with   telescope/right cervical esophagoscopy/biopsy    Findings:  Successful awake tracheostomy in successful awake tracheostomy with   placement of a 6 cuffed Shiley tracheostomy tube.  Large exophytic 5-6 cm posterior oropharyngeal mass extending to the   junction of the lateral posterior pharyngeal wall junction bilaterally, superiorly   comes up to the level of the soft palate. Inferiorly extends into bilateral piriform   sinuses, but does not involve the apex, however there are reactive changes   of the left piriform apex. There is no involvement of the supraglottic structures.  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  No other mucosal masses, lesions, or ulcers visualized.   No mucosal masses, lesions, or ulcers visualized in the esophageal inlet or   cervical esophagus.    DIAGNOSIS:     A. VOCAL CORD, LEFT, BIOPSY:   - Squamous mucosa with focal dysplasia, at least moderate.     B.  POSTERIOR PHARYNGEAL WALL, BIOPSY:   - INVASIVE POORLY DIFFERENTIATED SQUAMOUS CELL CARCINOMA.     C.  POSTERIOR PHARYNGEAL WALL, BIOPSY:   - INVASIVE POORLY DIFFERENTIATED SQUAMOUS CELL CARCINOMA WITH FOCAL METAPLASTIC FEATURES     D.  POSTERIOR PHARYNGEAL WALL, BIOPSY:   - INVASIVE POORLY DIFFERENTIATED SQUAMOUS CELL CARCINOMA WITH FOCAL METAPLASTIC FEATURES      3/18/2024 Procedure    Gastrostomy tube placement     3/22/2024 Procedure    Appointment with Dr. Rand - [facility]:  Laryngoscopy:  Findings: nasopharynx  and oropharynx patent without masses/lesions; BOT   without masses/lesions; exophytic lesion likely coming from posterior   pharyngeal wall protruding onto supraglottis and glottis; unable to visualize   glottis d/t obstructive view; supraglottis without obvious lesion otherwise    PLAN:   CT chest without mets. Discussed imaging in depth with team. Patient is   currently T3N0, stage III oropharyngeal SCC.   DL had biopsies positive for invasive SCC.   Discussed chemoradiation vs surgical resection with patient. Patient will   benefit from chemoradiation at this time.  Patient would like to have treatment closer to home in [facility]. Will send   out referrals.  Patient may need further evaluation of his carotid stenosis as well.     Appointment with Dr. 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       4/2/2024 Cancer Staged    Staging form: Pharynx - Hypopharynx, AJCC 8th Edition  - Clinical stage from 4/2/2024: Stage III (cT3, cN0, cM0) - Signed by Tee Randhawa MD on 4/2/2024 5/9/2024 -  Chemotherapy    OP HEAD & NECK CISplatin (Weekly) + XRT     Malignant melanoma of nose   8/7/2014 Surgery    FINAL DIAGNOSIS                       A. CYST, RIGHT EYELID:                 EPIDERMAL INCLUSION CYST.            B. EXCISION, SKIN LESION, LEFT NOSE:                 1.   MALIGNANT MELANOMA.                 2.   ARTIE LEVEL III.                 3.   BRESLOW MAXIMUM THICKNESS EQUALS 0.4 MM.                 4.   NO ULCERATION IDENTIFIED.                 5.   NO MICROSATELLITES IDENTIFIED.                 6.   NO MITOTIC FIGURES IDENTIFIED IN TUMOR.                 7.   NO ANGIOLYMPHATIC INVASION IDENTIFIED.                 8.   AJCC STAGE: pT1a pNX           Dictated by: CHANTE GILL MD        9/11/2014 Surgery    FINAL DIAGNOSIS                        SKIN, NOSE, RE-EXCISION FOR MELANOMA:                 NEGATIVE FOR RESIDUAL MELANOMA OR MELANOMA IN SITU.                 BIOPSY SITE IDENTIFIED.                 SEVERE SOLAR ELASTOSIS.                 SEBORRHEIC KERATOSIS.                 MARGINS OF RESECTION ARE NEGATIVE.              PAST MEDICAL HISTORY:  ALLERGIES:  Allergies   Allergen Reactions    Sulfa Antibiotics Other (See Comments)     Headaches     CURRENT MEDICATIONS:  Outpatient Encounter Medications as of 5/14/2024   Medication Sig Dispense Refill    acetaminophen (TYLENOL) 500 MG tablet Take 1 tablet by mouth Every 6 (Six) Hours As Needed for Mild Pain.      ondansetron (Zofran) 8 MG tablet Take 1 tablet by mouth Every 8 (Eight) Hours As Needed for Nausea or Vomiting. 60 tablet 2    prochlorperazine (COMPAZINE) 10 MG tablet Take 1 tablet by mouth Every 4 (Four) Hours As Needed for Nausea or Vomiting. 60 tablet 2    tadalafil (CIALIS) 20 MG tablet Take 1 tablet by mouth As Needed for Erectile Dysfunction. 1-12 hours before activity 12 tablet 11    Varenicline Tartrate, Starter, 0.5 MG X 11 & 1 MG X 42 tablet therapy pack        No facility-administered encounter medications on file as of 5/14/2024.     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    Current every day smoker F17.200    Abnormal PET scan of colon R94.8     SURGERIES:  Past Surgical History:   Procedure Laterality Date    COLONOSCOPY N/A 5/1/2024    Procedure: COLONOSCOPY WITH ANESTHESIA;  Surgeon: Gustabo Ruiz MD;  Location[facility] ENDOSCOPY;  Service: Gastroenterology;  Laterality: N/A;  preop; abnormal pet scan   postop rectal mass; polyps;   PCP Dr Santos    CYST REMOVAL Right 08/07/2014    RIGHT LOWER EYELID- EPIDERMAL INCLUSION CYST    EXCISION LESION N/A 08/07/2014    MALIGNANT MELANOMA OF LEFT NASAL TIP    FOREHEAD FLAP   09/11/2014    PEDICLE FLAP  10/02/2014    PEDICLE DIVISION AND FLAP INSET OF NOSE    PROSTATECTOMY N/A 09/07/2022    Procedure: PROSTATECTOMY LAPAROSCOPIC WITH [employer] ROBOT;  Surgeon: Matthew Weaver MD;  Location: [facility] OR;  Service: Robotics - [employer];  Laterality: N/A;    SQUAMOUS CELL CARCINOMA EXCISION  10/29/2013    LEFT FLOOR OF MOUTH    TONSILLECTOMY       HEALTH MAINTENANCE ITEMS:  Health Maintenance Due   Topic Date Due    Pneumococcal Vaccine 0-64 (1 of 2 - PCV) Never done    TDAP/TD VACCINES (1 - Tdap) Never done    ZOSTER VACCINE (1 of 2) Never done    HEPATITIS C SCREENING  Never done    ANNUAL PHYSICAL  Never done    RSV Vaccine - Adults (1 - 1-dose 60+ series) Never done    COVID-19 Vaccine (4 - 2023-24 season) 09/01/2023       <no information>  Last Completed Colonoscopy            COLONOSCOPY (Every 10 Years) Next due on 5/1/2034 05/01/2024  Colonoscopy    05/01/2024  Surgical Procedure: COLONOSCOPY                  Immunization History   Administered Date(s) Administered    COVID-19 (MODERNA) 1st,2nd,3rd Dose Monovalent 02/24/2021, 03/23/2021    COVID-19 (MODERNA) BIVALENT 12+YRS 11/15/2022    COVID-19 (MODERNA) Monovalent Original Booster 12/16/2021     Last Completed Mammogram       This patient has no relevant Health Maintenance data.              FAMILY HISTORY:  Family History   Problem Relation Age of Onset    No Known Problems Mother     Heart disease Father     Colon polyps Father     Colon cancer Neg Hx      SOCIAL HISTORY:  Social History     Socioeconomic History    Marital status: Single   Tobacco Use    Smoking status: Every Day     Current packs/day: 1.00     Types: Cigarettes    Smokeless tobacco: Never   Vaping Use    Vaping status: Never Used   Substance and Sexual Activity    Alcohol use: Not Currently     Comment: 12 PER DAY    Drug use: No    Sexual activity: Defer       REVIEW OF SYSTEMS:    Review of Systems   Constitutional:  Positive for fatigue. Negative for  "chills.        \"Just tired.\"   HENT:  Positive for trouble swallowing. Negative for congestion.    Eyes:  Negative for discharge and redness.   Respiratory:  Negative for shortness of breath and wheezing.    Cardiovascular:  Negative for chest pain and leg swelling.   Gastrointestinal:  Negative for constipation, diarrhea, nausea and vomiting.   Endocrine: Negative for cold intolerance and heat intolerance.   Genitourinary:  Negative for difficulty urinating, dysuria and hematuria.   Musculoskeletal:  Negative for gait problem and myalgias.   Skin:  Negative for pallor.   Allergic/Immunologic: Negative for food allergies.   Neurological:  Negative for dizziness, speech difficulty and weakness.   Hematological:  Negative for adenopathy. Does not bruise/bleed easily.   Psychiatric/Behavioral:  Negative for agitation, confusion and hallucinations.        VITAL SIGNS: /62   Pulse 80   Temp 98.5 °F (36.9 °C)   Resp 18   Ht 168.9 cm (66.5\")   Wt 55.4 kg (122 lb 3.2 oz)   SpO2 98%   BMI 19.43 kg/m²  Lost 3 pounds.   Pain Score    05/14/24 1239   PainSc: 0-No pain       PHYSICAL EXAMINATION:     Physical Exam  Vitals reviewed.   Constitutional:       General: He is not in acute distress.  HENT:      Head: Normocephalic and atraumatic.   Eyes:      General: No scleral icterus.  Neck:      Comments: +trach.   Cardiovascular:      Rate and Rhythm: Normal rate.   Pulmonary:      Effort: No respiratory distress.      Breath sounds: No wheezing.   Abdominal:      General: Bowel sounds are normal.      Palpations: Abdomen is soft.      Comments: +PEG.    Musculoskeletal:         General: No swelling.      Cervical back: Neck supple.   Skin:     Coloration: Skin is not pale.   Neurological:      Mental Status: He is alert and oriented to person, place, and time.   Psychiatric:         Mood and Affect: Mood normal.         Behavior: Behavior normal.         Thought Content: Thought content normal.         Judgment: " "Judgment normal.         LABS    Lab Results - Last 18 Months   Lab Units 05/09/24  0729   HEMOGLOBIN g/dL 13.5   HEMATOCRIT % 40.7   MCV fL 97.4*   WBC 10*3/mm3 8.46   RDW % 13.2   MPV fL 10.6   PLATELETS 10*3/mm3 267   IMM GRAN % % 0.5   NEUTROS ABS 10*3/mm3 4.73   LYMPHS ABS 10*3/mm3 2.78   MONOS ABS 10*3/mm3 0.66   EOS ABS 10*3/mm3 0.19   BASOS ABS 10*3/mm3 0.06   IMMATURE GRANS (ABS) 10*3/mm3 0.04   NRBC /100 WBC 0.0       Lab Results - Last 18 Months   Lab Units 05/09/24  0729   GLUCOSE mg/dL 111*   SODIUM mmol/L 139   POTASSIUM mmol/L 3.8   CO2 mmol/L 29.0   CHLORIDE mmol/L 100   ANION GAP mmol/L 10.0   CREATININE mg/dL 0.56*   BUN mg/dL 15   BUN / CREAT RATIO  26.8*   CALCIUM mg/dL 9.4   ALK PHOS U/L 94   TOTAL PROTEIN g/dL 7.0   ALT (SGPT) U/L 10   AST (SGOT) U/L 13   BILIRUBIN mg/dL 0.3   ALBUMIN g/dL 4.1   GLOBULIN gm/dL 2.9       No results for input(s): \"MSPIKE\", \"KAPPALAMB\", \"IGLFLC\", \"URICACID\", \"FREEKAPPAL\", \"CEA\", \"LDH\", \"REFLABREPO\" in the last 31009 hours.    No results for input(s): \"IRON\", \"TIBC\", \"LABIRON\", \"FERRITIN\", \"V1SDHYI\", \"TSH\", \"FOLATE\" in the last 79075 hours.    Invalid input(s): \"VITB12\"    Keron Gerber reports a pain score of 0.            ASSESSMENT:  1.  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  Tumor size.3.5 x 4 cm.  AJCC stage:III (cT3, cN0, cM0)  Tumor complications: Dysphagia.  Treatment status: Post tracheostomy and G-tube placement.  Cisplatin weekly 5/9/2024 through present .   2.  Performance status of 0**.  3.  Hypermetabolic mass at the distal sigmoid colon maximum SUV 7.4. Await colonoscopy.    4.  Prostate cancer 2022, pT2, NX,history of.   Treatment status: Post prostatectomy 9/7/2022 by Dr. Weaver.  Followed by Dr. Luciano.  5.  Tobacco abuse.  He is a current smoker.  6.  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 Lawrence in [place].   7.  Melanoma,  pT1a, nose, in 2014 post resection and reconstruction by ADRIANA" "Iveth.    8.  Nicotine abuse. \"Smokes 1 pack per day. \"I like it.\"        PLAN:   Re: Colonoscopy report 5/1/2024. . Foreign body in the cecum. Removal was successful.  One 4 mm polyp in the distal transverse colon, removed with a cold snare. Resected and retrieved.  One 5 mm polyp in the proximal sigmoid colon, removed with a cold snare. Complete resection. Polyp tissue not retrieved.  One 6 mm polyp at 27 cm proximal to the anus, removed with a cold snare. Resected and retrieved.  One 10 mm polyp at 17 cm proximal to the anus, removed with a hot snare. Resected and retrieved.  Likely malignant partially obstructing tumor from 10 to 14 cm proximal to the anus. Biopsied. This appears to be a large polypoid lesion but given the fact that it was noted on pet imaging this is concerning for malignant cells being present.  Internal hemorrhoids.  The examination was otherwise normal on direct and retroflexion views.  An area at 9 cm proximal to the anus was tattooed.  Pathology report. Tubulovillous adenoma.High-grade dysplasia is present   2.   Re: Heme status.WBC 8.4, hemoglobin 13.5 and platelet 267.   3.   Re: CMP.  .1 ml/min..  4.   Re: Magnesium at 2.1.    5.   Re: Potential adverse effects of cisplatin like hypersensitivity reaction, anaphylaxis, neuropathy, nephrotoxicity, autotoxicity, nausea, vomiting or cytopenias.  6.  Weekly CBC with differential, CMP and magnesium with chemo.  7.  Schedule chemo:  Cisplatin 40 mg/m2 weekly (Thursdays) with radiation.  8.  Premed:  Aloxi 0.25 mg IV  Decadron 12 mg IV  Fosaprepitant 150 mg IV  9.  Pre-hydrate cisplatin with 1L NS over 2 hours with 1 gram magnesium and 20 mEq of KCl.  Lasix 20 mg IVP after pre-hydration and after cisplatin.   10.  Post Hydration with NS 1L over 2 hours with 20 mEq of KCl and 1 g magnesium.  11. eRx ondansetron 8 mg per PEG. every 8 hours as needed for nausea and vomiting #60, 2 refills if needed.  12. " eRx prochlorperazine 10 mg per PEG every 4 hours as needed for nausea and vomiting #60, 2 refills if needed.  13.  Continue care per primary care physician and the other specialist.  14.  Plan of care discussed with patient, brother in law and his mother.  Understanding expressed.  He is agreeable to proceed.   15.  Return to office in 3 weeks.        I have reviewed the assessment and plan and verified the accuracy of it. No changes to assessment and plan since the information was documented. Tee Randhawa MD 05/14/24          I spent 32 total minutes, face-to-face, caring for Keron vargas. Greater than 50% of this time involved counseling and/or coordination of care as documented within this note.               (Hector Good MD)  (Lit Rand MD [facility]  (Yousif Atkins MD)  Hiram Luciano MD  (Gustabo Ruiz MD)  Yousif Marrero MD  (ZENY Benedict)

## 2024-05-15 ENCOUNTER — HOSPITAL ENCOUNTER (OUTPATIENT)
Dept: RADIATION ONCOLOGY | Facility: HOSPITAL | Age: 64
Setting detail: RADIATION/ONCOLOGY SERIES
Discharge: HOME | End: 2024-05-15
Payer: MEDICAID

## 2024-05-15 DIAGNOSIS — C13.9 MALIGNANT NEOPLASM HYPOPHARYNX: Primary | ICD-10-CM

## 2024-05-15 LAB
RAD ONC ARIA COURSE ID: NORMAL
RAD ONC ARIA COURSE LAST TREATMENT DATE: NORMAL
RAD ONC ARIA COURSE START DATE: NORMAL
RAD ONC ARIA COURSE TREATMENT ELAPSED DAYS: 9
RAD ONC ARIA FIRST TREATMENT DATE: NORMAL
RAD ONC ARIA PLAN FRACTIONS TREATED TO DATE: 8
RAD ONC ARIA PLAN ID: NORMAL
RAD ONC ARIA PLAN PRESCRIBED DOSE PER FRACTION: 2 GY
RAD ONC ARIA PLAN PRIMARY REFERENCE POINT: NORMAL
RAD ONC ARIA PLAN TOTAL FRACTIONS PRESCRIBED: 23
RAD ONC ARIA PLAN TOTAL PRESCRIBED DOSE: 4600 CGY
RAD ONC ARIA REFERENCE POINT DOSAGE GIVEN TO DATE: 16 GY
RAD ONC ARIA REFERENCE POINT DOSAGE GIVEN TO DATE: 9.75 GY
RAD ONC ARIA REFERENCE POINT ID: NORMAL
RAD ONC ARIA REFERENCE POINT ID: NORMAL
RAD ONC ARIA REFERENCE POINT SESSION DOSAGE GIVEN: 1.22 GY
RAD ONC ARIA REFERENCE POINT SESSION DOSAGE GIVEN: 2 GY

## 2024-05-15 PROCEDURE — 77386: CPT | Performed by: RADIOLOGY

## 2024-05-15 RX ORDER — FAMOTIDINE 10 MG/ML
20 INJECTION, SOLUTION INTRAVENOUS AS NEEDED
Status: CANCELLED | OUTPATIENT
Start: 2024-05-23

## 2024-05-15 RX ORDER — SODIUM CHLORIDE 9 MG/ML
20 INJECTION, SOLUTION INTRAVENOUS ONCE
Status: CANCELLED | OUTPATIENT
Start: 2024-05-23

## 2024-05-15 RX ORDER — FUROSEMIDE 10 MG/ML
20 INJECTION INTRAMUSCULAR; INTRAVENOUS ONCE
Status: CANCELLED
Start: 2024-05-23 | End: 2024-05-23

## 2024-05-15 RX ORDER — DIPHENHYDRAMINE HYDROCHLORIDE 50 MG/ML
50 INJECTION INTRAMUSCULAR; INTRAVENOUS AS NEEDED
Status: CANCELLED | OUTPATIENT
Start: 2024-05-23

## 2024-05-15 RX ORDER — PALONOSETRON 0.05 MG/ML
0.25 INJECTION, SOLUTION INTRAVENOUS ONCE
Status: CANCELLED | OUTPATIENT
Start: 2024-05-23

## 2024-05-16 ENCOUNTER — HOSPITAL ENCOUNTER (OUTPATIENT)
Dept: RADIATION ONCOLOGY | Facility: HOSPITAL | Age: 64
Setting detail: RADIATION/ONCOLOGY SERIES
Discharge: HOME | End: 2024-05-16
Payer: MEDICAID

## 2024-05-16 ENCOUNTER — LAB (OUTPATIENT)
Dept: LAB | Facility: HOSPITAL | Age: 64
End: 2024-05-16
Payer: MEDICAID

## 2024-05-16 ENCOUNTER — INFUSION (OUTPATIENT)
Dept: ONCOLOGY | Facility: HOSPITAL | Age: 64
End: 2024-05-16
Payer: MEDICAID

## 2024-05-16 VITALS
HEIGHT: 69 IN | OXYGEN SATURATION: 100 % | WEIGHT: 121.4 LBS | DIASTOLIC BLOOD PRESSURE: 59 MMHG | RESPIRATION RATE: 20 BRPM | HEART RATE: 64 BPM | BODY MASS INDEX: 17.98 KG/M2 | TEMPERATURE: 97.7 F | SYSTOLIC BLOOD PRESSURE: 111 MMHG

## 2024-05-16 DIAGNOSIS — C13.9 MALIGNANT NEOPLASM HYPOPHARYNX: Primary | ICD-10-CM

## 2024-05-16 DIAGNOSIS — C13.9 MALIGNANT NEOPLASM HYPOPHARYNX: ICD-10-CM

## 2024-05-16 LAB
ALBUMIN SERPL-MCNC: 4.1 G/DL (ref 3.5–5.2)
ALBUMIN/GLOB SERPL: 1.5 G/DL
ALP SERPL-CCNC: 96 U/L (ref 39–117)
ALT SERPL W P-5'-P-CCNC: 11 U/L (ref 1–41)
ANION GAP SERPL CALCULATED.3IONS-SCNC: 7 MMOL/L (ref 5–15)
ANION GAP SERPL CALCULATED.3IONS-SCNC: 7 MMOL/L (ref 5–15)
AST SERPL-CCNC: 12 U/L (ref 1–40)
BASOPHILS # BLD AUTO: 0.06 10*3/MM3 (ref 0–0.2)
BASOPHILS NFR BLD AUTO: 0.7 % (ref 0–1.5)
BILIRUB SERPL-MCNC: 0.3 MG/DL (ref 0–1.2)
BUN SERPL-MCNC: 16 MG/DL (ref 8–23)
BUN SERPL-MCNC: 16 MG/DL (ref 8–23)
BUN/CREAT SERPL: 32 (ref 7–25)
BUN/CREAT SERPL: 32 (ref 7–25)
CALCIUM SPEC-SCNC: 9.2 MG/DL (ref 8.6–10.5)
CALCIUM SPEC-SCNC: 9.2 MG/DL (ref 8.6–10.5)
CHLORIDE SERPL-SCNC: 98 MMOL/L (ref 98–107)
CHLORIDE SERPL-SCNC: 98 MMOL/L (ref 98–107)
CO2 SERPL-SCNC: 33 MMOL/L (ref 22–29)
CO2 SERPL-SCNC: 33 MMOL/L (ref 22–29)
CREAT SERPL-MCNC: 0.5 MG/DL (ref 0.76–1.27)
CREAT SERPL-MCNC: 0.5 MG/DL (ref 0.76–1.27)
DEPRECATED RDW RBC AUTO: 46.5 FL (ref 37–54)
EGFRCR SERPLBLD CKD-EPI 2021: 116 ML/MIN/1.73
EGFRCR SERPLBLD CKD-EPI 2021: 116 ML/MIN/1.73
EOSINOPHIL # BLD AUTO: 0.14 10*3/MM3 (ref 0–0.4)
EOSINOPHIL NFR BLD AUTO: 1.6 % (ref 0.3–6.2)
ERYTHROCYTE [DISTWIDTH] IN BLOOD BY AUTOMATED COUNT: 13.2 % (ref 12.3–15.4)
GLOBULIN UR ELPH-MCNC: 2.8 GM/DL
GLUCOSE SERPL-MCNC: 107 MG/DL (ref 65–99)
GLUCOSE SERPL-MCNC: 107 MG/DL (ref 65–99)
HCT VFR BLD AUTO: 39.8 % (ref 37.5–51)
HGB BLD-MCNC: 13.2 G/DL (ref 13–17.7)
IMM GRANULOCYTES # BLD AUTO: 0.05 10*3/MM3 (ref 0–0.05)
IMM GRANULOCYTES NFR BLD AUTO: 0.6 % (ref 0–0.5)
LYMPHOCYTES # BLD AUTO: 2.21 10*3/MM3 (ref 0.7–3.1)
LYMPHOCYTES NFR BLD AUTO: 26 % (ref 19.6–45.3)
MAGNESIUM SERPL-MCNC: 2.1 MG/DL (ref 1.6–2.4)
MCH RBC QN AUTO: 31.8 PG (ref 26.6–33)
MCHC RBC AUTO-ENTMCNC: 33.2 G/DL (ref 31.5–35.7)
MCV RBC AUTO: 95.9 FL (ref 79–97)
MONOCYTES # BLD AUTO: 0.8 10*3/MM3 (ref 0.1–0.9)
MONOCYTES NFR BLD AUTO: 9.4 % (ref 5–12)
NEUTROPHILS NFR BLD AUTO: 5.25 10*3/MM3 (ref 1.7–7)
NEUTROPHILS NFR BLD AUTO: 61.7 % (ref 42.7–76)
NRBC BLD AUTO-RTO: 0 /100 WBC (ref 0–0.2)
PLATELET # BLD AUTO: 245 10*3/MM3 (ref 140–450)
PMV BLD AUTO: 10.3 FL (ref 6–12)
POTASSIUM SERPL-SCNC: 3.5 MMOL/L (ref 3.5–5.2)
POTASSIUM SERPL-SCNC: 3.5 MMOL/L (ref 3.5–5.2)
PROT SERPL-MCNC: 6.9 G/DL (ref 6–8.5)
RAD ONC ARIA COURSE ID: NORMAL
RAD ONC ARIA COURSE LAST TREATMENT DATE: NORMAL
RAD ONC ARIA COURSE START DATE: NORMAL
RAD ONC ARIA COURSE TREATMENT ELAPSED DAYS: 10
RAD ONC ARIA FIRST TREATMENT DATE: NORMAL
RAD ONC ARIA PLAN FRACTIONS TREATED TO DATE: 9
RAD ONC ARIA PLAN ID: NORMAL
RAD ONC ARIA PLAN PRESCRIBED DOSE PER FRACTION: 2 GY
RAD ONC ARIA PLAN PRIMARY REFERENCE POINT: NORMAL
RAD ONC ARIA PLAN TOTAL FRACTIONS PRESCRIBED: 23
RAD ONC ARIA PLAN TOTAL PRESCRIBED DOSE: 4600 CGY
RAD ONC ARIA REFERENCE POINT DOSAGE GIVEN TO DATE: 10.97 GY
RAD ONC ARIA REFERENCE POINT DOSAGE GIVEN TO DATE: 18 GY
RAD ONC ARIA REFERENCE POINT ID: NORMAL
RAD ONC ARIA REFERENCE POINT ID: NORMAL
RAD ONC ARIA REFERENCE POINT SESSION DOSAGE GIVEN: 1.22 GY
RAD ONC ARIA REFERENCE POINT SESSION DOSAGE GIVEN: 2 GY
RBC # BLD AUTO: 4.15 10*6/MM3 (ref 4.14–5.8)
SODIUM SERPL-SCNC: 138 MMOL/L (ref 136–145)
SODIUM SERPL-SCNC: 138 MMOL/L (ref 136–145)
WBC NRBC COR # BLD AUTO: 8.51 10*3/MM3 (ref 3.4–10.8)

## 2024-05-16 PROCEDURE — 85025 COMPLETE CBC W/AUTO DIFF WBC: CPT

## 2024-05-16 PROCEDURE — 77386: CPT | Performed by: RADIOLOGY

## 2024-05-16 PROCEDURE — 96366 THER/PROPH/DIAG IV INF ADDON: CPT

## 2024-05-16 PROCEDURE — 25010000002 FUROSEMIDE PER 20 MG: Performed by: INTERNAL MEDICINE

## 2024-05-16 PROCEDURE — 25010000002 CISPLATIN PER 50 MG: Performed by: INTERNAL MEDICINE

## 2024-05-16 PROCEDURE — 96367 TX/PROPH/DG ADDL SEQ IV INF: CPT

## 2024-05-16 PROCEDURE — 25810000003 SODIUM CHLORIDE 0.9 % SOLUTION 1,000 ML FLEX CONT: Performed by: INTERNAL MEDICINE

## 2024-05-16 PROCEDURE — 96413 CHEMO IV INFUSION 1 HR: CPT

## 2024-05-16 PROCEDURE — 25010000002 PALONOSETRON PER 25 MCG: Performed by: INTERNAL MEDICINE

## 2024-05-16 PROCEDURE — 25810000003 SODIUM CHLORIDE 0.9 % SOLUTION: Performed by: INTERNAL MEDICINE

## 2024-05-16 PROCEDURE — 25810000003 SODIUM CHLORIDE 0.9 % SOLUTION 250 ML FLEX CONT: Performed by: INTERNAL MEDICINE

## 2024-05-16 PROCEDURE — 25010000002 MAGNESIUM SULFATE PER 500 MG OF MAGNESIUM: Performed by: INTERNAL MEDICINE

## 2024-05-16 PROCEDURE — 96368 THER/DIAG CONCURRENT INF: CPT

## 2024-05-16 PROCEDURE — 77336 RADIATION PHYSICS CONSULT: CPT

## 2024-05-16 PROCEDURE — 36415 COLL VENOUS BLD VENIPUNCTURE: CPT

## 2024-05-16 PROCEDURE — 80053 COMPREHEN METABOLIC PANEL: CPT

## 2024-05-16 PROCEDURE — 25010000002 DEXAMETHASONE SODIUM PHOSPHATE 100 MG/10ML SOLUTION: Performed by: INTERNAL MEDICINE

## 2024-05-16 PROCEDURE — 83735 ASSAY OF MAGNESIUM: CPT

## 2024-05-16 PROCEDURE — 25010000002 FOSAPREPITANT PER 1 MG: Performed by: INTERNAL MEDICINE

## 2024-05-16 PROCEDURE — 25010000002 POTASSIUM CHLORIDE PER 2 MEQ OF POTASSIUM: Performed by: INTERNAL MEDICINE

## 2024-05-16 PROCEDURE — 96375 TX/PRO/DX INJ NEW DRUG ADDON: CPT

## 2024-05-16 RX ORDER — SODIUM CHLORIDE 9 MG/ML
20 INJECTION, SOLUTION INTRAVENOUS ONCE
Status: COMPLETED | OUTPATIENT
Start: 2024-05-16 | End: 2024-05-16

## 2024-05-16 RX ORDER — PALONOSETRON 0.05 MG/ML
0.25 INJECTION, SOLUTION INTRAVENOUS ONCE
Status: COMPLETED | OUTPATIENT
Start: 2024-05-16 | End: 2024-05-16

## 2024-05-16 RX ORDER — FUROSEMIDE 10 MG/ML
20 INJECTION INTRAMUSCULAR; INTRAVENOUS ONCE
Status: DISCONTINUED | OUTPATIENT
Start: 2024-05-16 | End: 2024-05-16 | Stop reason: HOSPADM

## 2024-05-16 RX ORDER — FUROSEMIDE 10 MG/ML
20 INJECTION INTRAMUSCULAR; INTRAVENOUS ONCE
Status: COMPLETED | OUTPATIENT
Start: 2024-05-16 | End: 2024-05-16

## 2024-05-16 RX ORDER — FAMOTIDINE 10 MG/ML
20 INJECTION, SOLUTION INTRAVENOUS AS NEEDED
Status: DISCONTINUED | OUTPATIENT
Start: 2024-05-16 | End: 2024-05-16 | Stop reason: HOSPADM

## 2024-05-16 RX ORDER — DIPHENHYDRAMINE HYDROCHLORIDE 50 MG/ML
50 INJECTION INTRAMUSCULAR; INTRAVENOUS AS NEEDED
Status: DISCONTINUED | OUTPATIENT
Start: 2024-05-16 | End: 2024-05-16 | Stop reason: HOSPADM

## 2024-05-16 RX ADMIN — SODIUM CHLORIDE 20 ML/HR: 9 INJECTION, SOLUTION INTRAVENOUS at 09:03

## 2024-05-16 RX ADMIN — MAGNESIUM SULFATE HEPTAHYDRATE: 500 INJECTION, SOLUTION INTRAMUSCULAR; INTRAVENOUS at 13:01

## 2024-05-16 RX ADMIN — CISPLATIN 68 MG: 50 INJECTION, SOLUTION INTRAVENOUS at 12:56

## 2024-05-16 RX ADMIN — FOSAPREPITANT 150 MG: 150 INJECTION, POWDER, LYOPHILIZED, FOR SOLUTION INTRAVENOUS at 12:21

## 2024-05-16 RX ADMIN — FUROSEMIDE 20 MG: 10 INJECTION, SOLUTION INTRAMUSCULAR; INTRAVENOUS at 11:50

## 2024-05-16 RX ADMIN — DEXAMETHASONE SODIUM PHOSPHATE 12 MG: 10 INJECTION, SOLUTION INTRAMUSCULAR; INTRAVENOUS at 11:55

## 2024-05-16 RX ADMIN — MAGNESIUM SULFATE HEPTAHYDRATE: 500 INJECTION, SOLUTION INTRAMUSCULAR; INTRAVENOUS at 09:17

## 2024-05-16 RX ADMIN — PALONOSETRON HYDROCHLORIDE 0.25 MG: 0.25 INJECTION, SOLUTION INTRAVENOUS at 11:54

## 2024-05-17 ENCOUNTER — HOSPITAL ENCOUNTER (OUTPATIENT)
Dept: RADIATION ONCOLOGY | Facility: HOSPITAL | Age: 64
Setting detail: RADIATION/ONCOLOGY SERIES
Discharge: HOME | End: 2024-05-17
Payer: MEDICAID

## 2024-05-17 LAB
RAD ONC ARIA COURSE ID: NORMAL
RAD ONC ARIA COURSE LAST TREATMENT DATE: NORMAL
RAD ONC ARIA COURSE START DATE: NORMAL
RAD ONC ARIA COURSE TREATMENT ELAPSED DAYS: 11
RAD ONC ARIA FIRST TREATMENT DATE: NORMAL
RAD ONC ARIA PLAN FRACTIONS TREATED TO DATE: 10
RAD ONC ARIA PLAN ID: NORMAL
RAD ONC ARIA PLAN PRESCRIBED DOSE PER FRACTION: 2 GY
RAD ONC ARIA PLAN PRIMARY REFERENCE POINT: NORMAL
RAD ONC ARIA PLAN TOTAL FRACTIONS PRESCRIBED: 23
RAD ONC ARIA PLAN TOTAL PRESCRIBED DOSE: 4600 CGY
RAD ONC ARIA REFERENCE POINT DOSAGE GIVEN TO DATE: 12.19 GY
RAD ONC ARIA REFERENCE POINT DOSAGE GIVEN TO DATE: 20 GY
RAD ONC ARIA REFERENCE POINT ID: NORMAL
RAD ONC ARIA REFERENCE POINT ID: NORMAL
RAD ONC ARIA REFERENCE POINT SESSION DOSAGE GIVEN: 1.22 GY
RAD ONC ARIA REFERENCE POINT SESSION DOSAGE GIVEN: 2 GY

## 2024-05-17 PROCEDURE — 77386: CPT

## 2024-05-20 ENCOUNTER — TELEPHONE (OUTPATIENT)
Dept: SURGERY | Facility: CLINIC | Age: 64
End: 2024-05-20
Payer: MEDICAID

## 2024-05-20 ENCOUNTER — HOSPITAL ENCOUNTER (OUTPATIENT)
Dept: RADIATION ONCOLOGY | Facility: HOSPITAL | Age: 64
Setting detail: RADIATION/ONCOLOGY SERIES
Discharge: HOME | End: 2024-05-20
Payer: MEDICAID

## 2024-05-20 ENCOUNTER — DOCUMENTATION (OUTPATIENT)
Dept: RADIATION ONCOLOGY | Facility: HOSPITAL | Age: 64
End: 2024-05-20
Payer: MEDICAID

## 2024-05-20 LAB
RAD ONC ARIA COURSE ID: NORMAL
RAD ONC ARIA COURSE LAST TREATMENT DATE: NORMAL
RAD ONC ARIA COURSE START DATE: NORMAL
RAD ONC ARIA COURSE TREATMENT ELAPSED DAYS: 14
RAD ONC ARIA FIRST TREATMENT DATE: NORMAL
RAD ONC ARIA PLAN FRACTIONS TREATED TO DATE: 11
RAD ONC ARIA PLAN ID: NORMAL
RAD ONC ARIA PLAN PRESCRIBED DOSE PER FRACTION: 2 GY
RAD ONC ARIA PLAN PRIMARY REFERENCE POINT: NORMAL
RAD ONC ARIA PLAN TOTAL FRACTIONS PRESCRIBED: 23
RAD ONC ARIA PLAN TOTAL PRESCRIBED DOSE: 4600 CGY
RAD ONC ARIA REFERENCE POINT DOSAGE GIVEN TO DATE: 13.41 GY
RAD ONC ARIA REFERENCE POINT DOSAGE GIVEN TO DATE: 22 GY
RAD ONC ARIA REFERENCE POINT ID: NORMAL
RAD ONC ARIA REFERENCE POINT ID: NORMAL
RAD ONC ARIA REFERENCE POINT SESSION DOSAGE GIVEN: 1.22 GY
RAD ONC ARIA REFERENCE POINT SESSION DOSAGE GIVEN: 2 GY

## 2024-05-20 PROCEDURE — 77386: CPT | Performed by: RADIOLOGY

## 2024-05-20 NOTE — PROGRESS NOTES
TEJA did meet with Mr. Gerber and he explained driving to treatment had been a financial burden for him. He does meet criteria for assistance through the [employer].

## 2024-05-20 NOTE — TELEPHONE ENCOUNTER
LM for pt to call me directly to set up a MyChart video visit with Dr Cruz.    HUB: pls transfer to office for scheduling

## 2024-05-21 ENCOUNTER — TREATMENT (OUTPATIENT)
Dept: SPEECH THERAPY | Facility: HOSPITAL | Age: 64
End: 2024-05-21
Payer: MEDICAID

## 2024-05-21 ENCOUNTER — HOSPITAL ENCOUNTER (OUTPATIENT)
Dept: RADIATION ONCOLOGY | Facility: HOSPITAL | Age: 64
Setting detail: RADIATION/ONCOLOGY SERIES
Discharge: HOME | End: 2024-05-21
Payer: MEDICAID

## 2024-05-21 ENCOUNTER — APPOINTMENT (OUTPATIENT)
Dept: SPEECH THERAPY | Facility: HOSPITAL | Age: 64
End: 2024-05-21
Payer: MEDICAID

## 2024-05-21 DIAGNOSIS — R13.12 OROPHARYNGEAL DYSPHAGIA: Primary | ICD-10-CM

## 2024-05-21 LAB
RAD ONC ARIA COURSE ID: NORMAL
RAD ONC ARIA COURSE LAST TREATMENT DATE: NORMAL
RAD ONC ARIA COURSE START DATE: NORMAL
RAD ONC ARIA COURSE TREATMENT ELAPSED DAYS: 15
RAD ONC ARIA FIRST TREATMENT DATE: NORMAL
RAD ONC ARIA PLAN FRACTIONS TREATED TO DATE: 12
RAD ONC ARIA PLAN ID: NORMAL
RAD ONC ARIA PLAN PRESCRIBED DOSE PER FRACTION: 2 GY
RAD ONC ARIA PLAN PRIMARY REFERENCE POINT: NORMAL
RAD ONC ARIA PLAN TOTAL FRACTIONS PRESCRIBED: 23
RAD ONC ARIA PLAN TOTAL PRESCRIBED DOSE: 4600 CGY
RAD ONC ARIA REFERENCE POINT DOSAGE GIVEN TO DATE: 14.63 GY
RAD ONC ARIA REFERENCE POINT DOSAGE GIVEN TO DATE: 24 GY
RAD ONC ARIA REFERENCE POINT ID: NORMAL
RAD ONC ARIA REFERENCE POINT ID: NORMAL
RAD ONC ARIA REFERENCE POINT SESSION DOSAGE GIVEN: 1.22 GY
RAD ONC ARIA REFERENCE POINT SESSION DOSAGE GIVEN: 2 GY

## 2024-05-21 PROCEDURE — 77386: CPT | Performed by: RADIOLOGY

## 2024-05-21 PROCEDURE — 92526 ORAL FUNCTION THERAPY: CPT | Performed by: SPEECH-LANGUAGE PATHOLOGIST

## 2024-05-21 NOTE — THERAPY TREATMENT NOTE
Outpatient Speech Language Pathology   Adult Swallow Treatment Note       Patient Name: Keron Gerber  : 1960  MRN: [id number]  Today's Date: 2024         Visit Date: 2024     Mr. Gerber was seen today for swallowing therapy. He was alert and well-appearing. Voice is clear with passy everett valve in place. He completed sips of water from bottle he had brought in. Delayed throat clearing observed; however, vocal quality was clear. He request increased intake options. He completed a full cup of apple sauce with noted delayed coughing but vocal quality was clear. We discussed allowing 2 cups of puree a day to slowly start PO again with close monitoring. Provided list of options for intake and when to stop if increased overt s/s occur. Discussed this with PEGGY Ibanez for close monitoring of toleration daily.     Continue to follow 1 time per week. Patient to continue home excercise program 3 times per day. Discussed addition of PO intake to include 2 cups a puree a day to monitor toleration of increased intake. Continue with sips of thin water as needed as well. Continue main source of nutrition by gtube. Continue oral care and focus on completion of this prior to PO to decrease risk of aspiration pneumonia.   Alma Granger, MS CCC-SLP 2024 11:05 CDT      Patient Active Problem List   Diagnosis    Dyslexia    Prostate cancer    Malignant neoplasm hypopharynx    Malignant melanoma of nose    Current every day smoker    Abnormal PET scan of colon        Visit Dx:    ICD-10-CM ICD-9-CM   1. Oropharyngeal dysphagia  R13.12 787.22        OP SLP Assessment/Plan - 24 1058          SLP Plan    Plan Comments Continue to follow 1 time per week. Patient to continue home excercise program 3 times per day. Discussed addition of PO intake to include 2 cups a puree a day to monitor toleration of increased intake. Continue with sips of thin water as needed as well. Continue main source of nutrition  by [employer]. Continue oral care and focus on completion of this prior to PO to decrease risk of aspiration pneumonia.  -MG              User Key  (r) = Recorded By, (t) = Taken By, (c) = Cosigned By      Initials Name Provider Type    Alma Rebolledo MS CCC-SLP Speech and Language Pathologist                                       SLP OP Goals       Row Name 05/21/24 1040          Goal Type Needed    Goal Type Needed Other Adult Goals  -MG        Subjective Comments    Subjective Comments Patient was seen today for swallowing therapy.  -MG        Subjective Pain    Able to rate subjective pain? yes  -MG     Pre-Treatment Pain Level 0  -MG     Post-Treatment Pain Level 0  -MG        Other Goals    Other Adult Goal- 1 Patient will increase swallowing frequency during XRT in order to maintain current swallow function once XRT is completed.  -MG     Status: Other Adult Goal- 1 Progressing as expected  -MG     Comments: Other Adult Goal- 1 See note  -MG     Other Adult Goal- 2 Patient will tolerate water throughout and following treatment without complications such as aspiration pneumonia or overt s/s of aspiration.  -MG     Status: Other Adult Goal- 2 Progressing as expected  -MG     Comments: Other Adult Goal- 2 See note.  -MG     Other Adult Goal- 3 Patient will complete oral care daily in order to promote healthy oral mucosa throughout XRT.  -MG     Status: Other Adult Goal- 3 Progressing as expected  -MG     Comments: Other Adult Goal- 3 See note  -MG     Other Adult Goal- 4 Patient will maintain adequate hydration by drinking needed amount of water by mouth or via PEG tube.  -MG     Status: Other Adult Goal- 4 Progressing as expected  -MG     Comments: Other Adult Goal- 4 See note  -MG     Other Adult Goal- 5 Will work towards PO for pleasure as tolerated.  -MG     Status: Other Adult Goal- 5 Progressing as expected  -MG     Comments: Other Adult Goal- 5 See note  -MG     Other Adult Goal- 6 Patient will  complete swallowing exercises three times a day in order to maintain current swallow function and range of motion of oral and pharyngeal structures.  -MG     Status: Other Adult Goal- 6 Progressing as expected  -MG     Comments: Other Adult Goal- 6 See note  -MG        SLP Time Calculation    SLP Goal Re-Cert Due Date 08/14/24  -MG               User Key  (r) = Recorded By, (t) = Taken By, (c) = Cosigned By      Initials Name Provider Type    MG Alma Granger MS CCC-SLP Speech and Language Pathologist                   OP SLP Education       Row Name 05/21/24 1100       Education    Barriers to Learning No barriers identified  -MG    Education Provided Patient requires further education on strategies, risks  -MG    Assessed Learning readiness;Learning preferences;Learning motivation;Learning needs  -MG    Learning Motivation Strong  -MG    Learning Method Explanation  -MG    Teaching Response Verbalized understanding  -MG    Education Comments See note.  -MG              User Key  (r) = Recorded By, (t) = Taken By, (c) = Cosigned By      Initials Name Effective Dates    MG Alma Granger MS CCC-SLP 07/11/23 -                         Time Calculation:   SLP Start Time: 1040  SLP Stop Time: 1105  SLP Time Calculation (min): 25 min  Untimed Charges  03245-KG Treatment Swallow Minutes: 25  Total Minutes  Untimed Charges Total Minutes: 25   Total Minutes: 25                 Alma Granger MS CCC-SLP  5/21/2024

## 2024-05-22 ENCOUNTER — HOSPITAL ENCOUNTER (OUTPATIENT)
Dept: RADIATION ONCOLOGY | Facility: HOSPITAL | Age: 64
Setting detail: RADIATION/ONCOLOGY SERIES
Discharge: HOME | End: 2024-05-22
Payer: MEDICAID

## 2024-05-22 ENCOUNTER — APPOINTMENT (OUTPATIENT)
Dept: SPEECH THERAPY | Facility: HOSPITAL | Age: 64
End: 2024-05-22
Payer: MEDICAID

## 2024-05-22 LAB
RAD ONC ARIA COURSE ID: NORMAL
RAD ONC ARIA COURSE LAST TREATMENT DATE: NORMAL
RAD ONC ARIA COURSE START DATE: NORMAL
RAD ONC ARIA COURSE TREATMENT ELAPSED DAYS: 16
RAD ONC ARIA FIRST TREATMENT DATE: NORMAL
RAD ONC ARIA PLAN FRACTIONS TREATED TO DATE: 13
RAD ONC ARIA PLAN ID: NORMAL
RAD ONC ARIA PLAN PRESCRIBED DOSE PER FRACTION: 2 GY
RAD ONC ARIA PLAN PRIMARY REFERENCE POINT: NORMAL
RAD ONC ARIA PLAN TOTAL FRACTIONS PRESCRIBED: 23
RAD ONC ARIA PLAN TOTAL PRESCRIBED DOSE: 4600 CGY
RAD ONC ARIA REFERENCE POINT DOSAGE GIVEN TO DATE: 15.85 GY
RAD ONC ARIA REFERENCE POINT DOSAGE GIVEN TO DATE: 26 GY
RAD ONC ARIA REFERENCE POINT ID: NORMAL
RAD ONC ARIA REFERENCE POINT ID: NORMAL
RAD ONC ARIA REFERENCE POINT SESSION DOSAGE GIVEN: 1.22 GY
RAD ONC ARIA REFERENCE POINT SESSION DOSAGE GIVEN: 2 GY

## 2024-05-22 PROCEDURE — 77386: CPT

## 2024-05-23 ENCOUNTER — LAB (OUTPATIENT)
Dept: LAB | Facility: HOSPITAL | Age: 64
End: 2024-05-23
Payer: MEDICAID

## 2024-05-23 ENCOUNTER — HOSPITAL ENCOUNTER (OUTPATIENT)
Dept: RADIATION ONCOLOGY | Facility: HOSPITAL | Age: 64
Setting detail: RADIATION/ONCOLOGY SERIES
Discharge: HOME | End: 2024-05-23
Payer: MEDICAID

## 2024-05-23 ENCOUNTER — INFUSION (OUTPATIENT)
Dept: ONCOLOGY | Facility: HOSPITAL | Age: 64
End: 2024-05-23
Payer: MEDICAID

## 2024-05-23 ENCOUNTER — APPOINTMENT (OUTPATIENT)
Dept: RADIATION ONCOLOGY | Facility: HOSPITAL | Age: 64
End: 2024-05-23
Payer: MEDICAID

## 2024-05-23 VITALS
SYSTOLIC BLOOD PRESSURE: 100 MMHG | BODY MASS INDEX: 17.83 KG/M2 | OXYGEN SATURATION: 99 % | TEMPERATURE: 98.2 F | HEART RATE: 70 BPM | DIASTOLIC BLOOD PRESSURE: 55 MMHG | WEIGHT: 120.4 LBS | RESPIRATION RATE: 20 BRPM | HEIGHT: 69 IN

## 2024-05-23 DIAGNOSIS — C13.9 MALIGNANT NEOPLASM HYPOPHARYNX: Primary | ICD-10-CM

## 2024-05-23 DIAGNOSIS — C13.9 MALIGNANT NEOPLASM HYPOPHARYNX: ICD-10-CM

## 2024-05-23 LAB
ALBUMIN SERPL-MCNC: 4.1 G/DL (ref 3.5–5.2)
ALBUMIN/GLOB SERPL: 1.5 G/DL
ALP SERPL-CCNC: 98 U/L (ref 39–117)
ALT SERPL W P-5'-P-CCNC: 11 U/L (ref 1–41)
ANION GAP SERPL CALCULATED.3IONS-SCNC: 8 MMOL/L (ref 5–15)
AST SERPL-CCNC: 13 U/L (ref 1–40)
BASOPHILS # BLD AUTO: 0.06 10*3/MM3 (ref 0–0.2)
BASOPHILS NFR BLD AUTO: 0.8 % (ref 0–1.5)
BILIRUB SERPL-MCNC: 0.2 MG/DL (ref 0–1.2)
BUN SERPL-MCNC: 14 MG/DL (ref 8–23)
BUN/CREAT SERPL: 30.4 (ref 7–25)
CALCIUM SPEC-SCNC: 9.4 MG/DL (ref 8.6–10.5)
CHLORIDE SERPL-SCNC: 99 MMOL/L (ref 98–107)
CO2 SERPL-SCNC: 32 MMOL/L (ref 22–29)
CREAT SERPL-MCNC: 0.46 MG/DL (ref 0.76–1.27)
DEPRECATED RDW RBC AUTO: 47 FL (ref 37–54)
EGFRCR SERPLBLD CKD-EPI 2021: 119 ML/MIN/1.73
EOSINOPHIL # BLD AUTO: 0.12 10*3/MM3 (ref 0–0.4)
EOSINOPHIL NFR BLD AUTO: 1.6 % (ref 0.3–6.2)
ERYTHROCYTE [DISTWIDTH] IN BLOOD BY AUTOMATED COUNT: 13.6 % (ref 12.3–15.4)
GLOBULIN UR ELPH-MCNC: 2.8 GM/DL
GLUCOSE SERPL-MCNC: 113 MG/DL (ref 65–99)
HCT VFR BLD AUTO: 37.1 % (ref 37.5–51)
HGB BLD-MCNC: 12.4 G/DL (ref 13–17.7)
IMM GRANULOCYTES # BLD AUTO: 0.02 10*3/MM3 (ref 0–0.05)
IMM GRANULOCYTES NFR BLD AUTO: 0.3 % (ref 0–0.5)
LYMPHOCYTES # BLD AUTO: 1.86 10*3/MM3 (ref 0.7–3.1)
LYMPHOCYTES NFR BLD AUTO: 24.3 % (ref 19.6–45.3)
MAGNESIUM SERPL-MCNC: 2.1 MG/DL (ref 1.6–2.4)
MCH RBC QN AUTO: 32.3 PG (ref 26.6–33)
MCHC RBC AUTO-ENTMCNC: 33.4 G/DL (ref 31.5–35.7)
MCV RBC AUTO: 96.6 FL (ref 79–97)
MONOCYTES # BLD AUTO: 0.62 10*3/MM3 (ref 0.1–0.9)
MONOCYTES NFR BLD AUTO: 8.1 % (ref 5–12)
NEUTROPHILS NFR BLD AUTO: 4.98 10*3/MM3 (ref 1.7–7)
NEUTROPHILS NFR BLD AUTO: 64.9 % (ref 42.7–76)
NRBC BLD AUTO-RTO: 0 /100 WBC (ref 0–0.2)
PLATELET # BLD AUTO: 212 10*3/MM3 (ref 140–450)
PMV BLD AUTO: 9 FL (ref 6–12)
POTASSIUM SERPL-SCNC: 3.9 MMOL/L (ref 3.5–5.2)
PROT SERPL-MCNC: 6.9 G/DL (ref 6–8.5)
RAD ONC ARIA COURSE ID: NORMAL
RAD ONC ARIA COURSE LAST TREATMENT DATE: NORMAL
RAD ONC ARIA COURSE START DATE: NORMAL
RAD ONC ARIA COURSE TREATMENT ELAPSED DAYS: 17
RAD ONC ARIA FIRST TREATMENT DATE: NORMAL
RAD ONC ARIA PLAN FRACTIONS TREATED TO DATE: 14
RAD ONC ARIA PLAN ID: NORMAL
RAD ONC ARIA PLAN PRESCRIBED DOSE PER FRACTION: 2 GY
RAD ONC ARIA PLAN PRIMARY REFERENCE POINT: NORMAL
RAD ONC ARIA PLAN TOTAL FRACTIONS PRESCRIBED: 23
RAD ONC ARIA PLAN TOTAL PRESCRIBED DOSE: 4600 CGY
RAD ONC ARIA REFERENCE POINT DOSAGE GIVEN TO DATE: 17.06 GY
RAD ONC ARIA REFERENCE POINT DOSAGE GIVEN TO DATE: 28 GY
RAD ONC ARIA REFERENCE POINT ID: NORMAL
RAD ONC ARIA REFERENCE POINT ID: NORMAL
RAD ONC ARIA REFERENCE POINT SESSION DOSAGE GIVEN: 1.22 GY
RAD ONC ARIA REFERENCE POINT SESSION DOSAGE GIVEN: 2 GY
RBC # BLD AUTO: 3.84 10*6/MM3 (ref 4.14–5.8)
SODIUM SERPL-SCNC: 139 MMOL/L (ref 136–145)
WBC NRBC COR # BLD AUTO: 7.66 10*3/MM3 (ref 3.4–10.8)

## 2024-05-23 PROCEDURE — 96367 TX/PROPH/DG ADDL SEQ IV INF: CPT

## 2024-05-23 PROCEDURE — 96361 HYDRATE IV INFUSION ADD-ON: CPT

## 2024-05-23 PROCEDURE — 36415 COLL VENOUS BLD VENIPUNCTURE: CPT

## 2024-05-23 PROCEDURE — 25010000002 POTASSIUM CHLORIDE PER 2 MEQ OF POTASSIUM: Performed by: INTERNAL MEDICINE

## 2024-05-23 PROCEDURE — 25010000002 MAGNESIUM SULFATE PER 500 MG OF MAGNESIUM: Performed by: INTERNAL MEDICINE

## 2024-05-23 PROCEDURE — 25010000002 FUROSEMIDE PER 20 MG: Performed by: INTERNAL MEDICINE

## 2024-05-23 PROCEDURE — 77336 RADIATION PHYSICS CONSULT: CPT

## 2024-05-23 PROCEDURE — 77386: CPT

## 2024-05-23 PROCEDURE — 85025 COMPLETE CBC W/AUTO DIFF WBC: CPT

## 2024-05-23 PROCEDURE — 96375 TX/PRO/DX INJ NEW DRUG ADDON: CPT

## 2024-05-23 PROCEDURE — 25810000003 SODIUM CHLORIDE 0.9 % SOLUTION 250 ML FLEX CONT: Performed by: INTERNAL MEDICINE

## 2024-05-23 PROCEDURE — 25810000003 SODIUM CHLORIDE 0.9 % SOLUTION: Performed by: INTERNAL MEDICINE

## 2024-05-23 PROCEDURE — 25010000002 DEXAMETHASONE SODIUM PHOSPHATE 100 MG/10ML SOLUTION: Performed by: INTERNAL MEDICINE

## 2024-05-23 PROCEDURE — 25010000002 FOSAPREPITANT PER 1 MG: Performed by: INTERNAL MEDICINE

## 2024-05-23 PROCEDURE — 25810000003 SODIUM CHLORIDE 0.9 % SOLUTION 1,000 ML FLEX CONT: Performed by: INTERNAL MEDICINE

## 2024-05-23 PROCEDURE — 96376 TX/PRO/DX INJ SAME DRUG ADON: CPT

## 2024-05-23 PROCEDURE — 25010000002 PALONOSETRON PER 25 MCG: Performed by: INTERNAL MEDICINE

## 2024-05-23 PROCEDURE — 80053 COMPREHEN METABOLIC PANEL: CPT

## 2024-05-23 PROCEDURE — 96413 CHEMO IV INFUSION 1 HR: CPT

## 2024-05-23 PROCEDURE — 25010000002 CISPLATIN PER 50 MG: Performed by: INTERNAL MEDICINE

## 2024-05-23 PROCEDURE — 83735 ASSAY OF MAGNESIUM: CPT

## 2024-05-23 RX ORDER — SODIUM CHLORIDE 9 MG/ML
20 INJECTION, SOLUTION INTRAVENOUS ONCE
Status: COMPLETED | OUTPATIENT
Start: 2024-05-23 | End: 2024-05-23

## 2024-05-23 RX ORDER — FAMOTIDINE 10 MG/ML
20 INJECTION, SOLUTION INTRAVENOUS AS NEEDED
Status: DISCONTINUED | OUTPATIENT
Start: 2024-05-23 | End: 2024-05-23 | Stop reason: HOSPADM

## 2024-05-23 RX ORDER — FUROSEMIDE 10 MG/ML
20 INJECTION INTRAMUSCULAR; INTRAVENOUS ONCE
Status: COMPLETED | OUTPATIENT
Start: 2024-05-23 | End: 2024-05-23

## 2024-05-23 RX ORDER — PALONOSETRON 0.05 MG/ML
0.25 INJECTION, SOLUTION INTRAVENOUS ONCE
Status: COMPLETED | OUTPATIENT
Start: 2024-05-23 | End: 2024-05-23

## 2024-05-23 RX ORDER — DIPHENHYDRAMINE HYDROCHLORIDE 50 MG/ML
50 INJECTION INTRAMUSCULAR; INTRAVENOUS AS NEEDED
Status: DISCONTINUED | OUTPATIENT
Start: 2024-05-23 | End: 2024-05-23 | Stop reason: HOSPADM

## 2024-05-23 RX ADMIN — FOSAPREPITANT 150 MG: 150 INJECTION, POWDER, LYOPHILIZED, FOR SOLUTION INTRAVENOUS at 09:05

## 2024-05-23 RX ADMIN — PALONOSETRON HYDROCHLORIDE 0.25 MG: 0.25 INJECTION, SOLUTION INTRAVENOUS at 08:44

## 2024-05-23 RX ADMIN — SODIUM CHLORIDE 20 ML/HR: 9 INJECTION, SOLUTION INTRAVENOUS at 08:15

## 2024-05-23 RX ADMIN — SODIUM CHLORIDE 68 MG: 9 INJECTION, SOLUTION INTRAVENOUS at 12:00

## 2024-05-23 RX ADMIN — FUROSEMIDE 20 MG: 10 INJECTION, SOLUTION INTRAMUSCULAR; INTRAVENOUS at 11:57

## 2024-05-23 RX ADMIN — FUROSEMIDE 20 MG: 10 INJECTION, SOLUTION INTRAMUSCULAR; INTRAVENOUS at 13:14

## 2024-05-23 RX ADMIN — DEXAMETHASONE SODIUM PHOSPHATE 12 MG: 10 INJECTION, SOLUTION INTRAMUSCULAR; INTRAVENOUS at 08:46

## 2024-05-23 RX ADMIN — MAGNESIUM SULFATE HEPTAHYDRATE: 500 INJECTION, SOLUTION INTRAMUSCULAR; INTRAVENOUS at 09:36

## 2024-05-23 RX ADMIN — MAGNESIUM SULFATE HEPTAHYDRATE: 500 INJECTION, SOLUTION INTRAMUSCULAR; INTRAVENOUS at 13:16

## 2024-05-23 NOTE — PROGRESS NOTES
OFFICE FOLLOW UP VISIT    Referring Provider: No ref. provider found  Primary Care Provider: Yousif Marrero MD    Chief Complaint   Patient presents with    Follow-up     Patient is here for a follow up to discuss referral about rectal cancer       Subjective .       HPI    The patient returns today after undergoing a pelvic MRI.  No changes in his health since his last visit and no new symptoms.    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    Tobacco use disorder    ,   Past Surgical History:   Procedure Laterality Date    COLONOSCOPY N/A 5/1/2024    Procedure: COLONOSCOPY WITH ANESTHESIA;  Surgeon: Gustabo Ruzi MD;  Location: [facility] ENDOSCOPY;  Service: Gastroenterology;  Laterality: N/A;  preop; abnormal pet scan   postop rectal mass; polyps;   PCP Dr Santos    CYST REMOVAL Right 08/07/2014    RIGHT LOWER EYELID- EPIDERMAL INCLUSION CYST    EXCISION LESION N/A 08/07/2014    MALIGNANT MELANOMA OF LEFT NASAL TIP    FOREHEAD FLAP  09/11/2014    PEDICLE FLAP  10/02/2014    PEDICLE DIVISION AND FLAP INSET OF NOSE    PROSTATECTOMY N/A 09/07/2022    Procedure: PROSTATECTOMY LAPAROSCOPIC WITH DAVINCI ROBOT;  Surgeon: Matthew Weaver MD;  Location: [facility] OR;  Service: Robotics - DaVinci;  Laterality: N/A;    SQUAMOUS CELL CARCINOMA EXCISION  10/29/2013    LEFT FLOOR OF MOUTH    TONSILLECTOMY     ,   Family History   Problem Relation Age of Onset    No Known Problems Mother     Heart disease Father     Colon polyps Father     Colon cancer Neg Hx    ,   Social History     Tobacco Use    Smoking status: Every Day     Current packs/day: 1.00     Types: Cigarettes    Smokeless tobacco: Never   Vaping Use    Vaping status: Never Used   Substance Use Topics    Alcohol use: Not Currently     Comment: 12 PER DAY    Drug use: No       Current Outpatient  "Medications:     acetaminophen (TYLENOL) 500 MG tablet, Take 1 tablet by mouth Every 6 (Six) Hours As Needed for Mild Pain. (Patient not taking: Reported on 5/14/2024), Disp: , Rfl:     ondansetron (Zofran) 8 MG tablet, Take 1 tablet by mouth Every 8 (Eight) Hours As Needed for Nausea or Vomiting. (Patient not taking: Reported on 5/14/2024), Disp: 60 tablet, Rfl: 2    prochlorperazine (COMPAZINE) 10 MG tablet, Take 1 tablet by mouth Every 4 (Four) Hours As Needed for Nausea or Vomiting. (Patient not taking: Reported on 5/14/2024), Disp: 60 tablet, Rfl: 2    tadalafil (CIALIS) 20 MG tablet, Take 1 tablet by mouth As Needed for Erectile Dysfunction. 1-12 hours before activity (Patient not taking: Reported on 5/14/2024), Disp: 12 tablet, Rfl: 11    Varenicline Tartrate, Starter, 0.5 MG X 11 & 1 MG X 42 tablet therapy pack, , Disp: , Rfl:   No current facility-administered medications for this visit.    Facility-Administered Medications Ordered in Other Visits:     diphenhydrAMINE (BENADRYL) injection 50 mg, 50 mg, Intravenous, PRN, Tee Randhawa MD    famotidine (PEPCID) injection 20 mg, 20 mg, Intravenous, PRN, Tee Randhawa MD    Hydrocortisone Sod Suc (PF) (Solu-CORTEF) injection 100 mg, 100 mg, Intravenous, PRN, Tee Randhawa MD    Allergies:  Sulfa antibiotics      The following portions of the patient's history were reviewed and updated as appropriate: allergies, current medications, past family history, past medical history, past social history, past surgical history, and problem list.      Review of Systems  A comprehensive 14 point review of systems was performed and is negative unless otherwise noted      Objective   /67 (BP Location: Left arm, Patient Position: Sitting, Cuff Size: Adult)   Ht 168.9 cm (66.5\")   Wt 54.9 kg (121 lb)   BMI 19.24 kg/m²     BMI followup discussion/instruction with patient: Patient is underweight      Physical Exam    Results:  Labs:  I personally reviewed all " pertinent labs. Labs within normal limits  Imaging: I personally reviewed all pertinent imaging studies.   Impression      Study is limited by motion artifact.  High rectal polypoid mass extending to the distal sigmoid approximately measuring 5.1 cm in length; T1/T2.  Small bowel segment abut the sigmoid colon without evidence of definite invasion.  No regional  lymphadenopathy.          ______________________________________  Electronically signed by: MITCHEL CAVAZOS M.D.  Date:     05/12/2024  Time:    21:25  Narrative    EXAM:  MRI PELVIS WITH AND WITHOUT CONTRAST    HISTORY:  Malignant neoplasm of the rectum    COMPARISON:  None.    TECHNIQUE: Multiplanar multi sequence MRI of the pelvis before and after the administration of IV contrast.  Study is limited by motion artifact.      FINDINGS:    High rectal polypoid mass extending to the distal sigmoid approximately measuring 5.1 cm in length and 3.3 cm in thickness demonstrating enhancement and restricted diffusion (sagittal series 7, image 17).  This is T1/T2.  Mass is at and above the  peritoneal reflection.  The peritoneum is not involved.  Small bowel segments abut the sigmoid colon without evidence of definite invasion.    Normal urinary bladder.  Prostate is not visualized and likely surgically absent.    No lymphadenopathy.  Visualized vessels are patent.  No free fluid.    No suspicious enhancing osseous lesions.  Exam End: 05/10/24 13:01 Last Resulted: 05/12/24 22:41   Received From: [facility]  Result Received: 05/13/24 15:28       Assessment & Plan   Diagnoses and all orders for this visit:    1. Rectal cancer (Primary)  -     Ambulatory Referral to Colorectal Surgery    2. History of radiation therapy    3. Malignant neoplasm hypopharynx    4. Tracheostomy dependence      MRI confirmed that this is a high rectal mass extending to above the peritoneal reflection.  We discussed this case at tumor board, given his complex  situation currently undergoing chemotherapy and radiation therapy for throat cancer.  I will refer him to colorectal surgery for further evaluation.    Discussed his case at tumor board.  Recommendations to follow.

## 2024-05-24 ENCOUNTER — APPOINTMENT (OUTPATIENT)
Dept: RADIATION ONCOLOGY | Facility: HOSPITAL | Age: 64
End: 2024-05-24
Payer: MEDICAID

## 2024-05-24 ENCOUNTER — HOSPITAL ENCOUNTER (OUTPATIENT)
Dept: RADIATION ONCOLOGY | Facility: HOSPITAL | Age: 64
Setting detail: RADIATION/ONCOLOGY SERIES
Discharge: HOME | End: 2024-05-24
Payer: MEDICAID

## 2024-05-24 ENCOUNTER — TELEMEDICINE (OUTPATIENT)
Dept: SURGERY | Facility: CLINIC | Age: 64
End: 2024-05-24
Payer: MEDICAID

## 2024-05-24 DIAGNOSIS — D49.0 RECTAL TUMOR: Primary | ICD-10-CM

## 2024-05-24 LAB
RAD ONC ARIA COURSE ID: NORMAL
RAD ONC ARIA COURSE LAST TREATMENT DATE: NORMAL
RAD ONC ARIA COURSE START DATE: NORMAL
RAD ONC ARIA COURSE TREATMENT ELAPSED DAYS: 18
RAD ONC ARIA FIRST TREATMENT DATE: NORMAL
RAD ONC ARIA PLAN FRACTIONS TREATED TO DATE: 15
RAD ONC ARIA PLAN ID: NORMAL
RAD ONC ARIA PLAN PRESCRIBED DOSE PER FRACTION: 2 GY
RAD ONC ARIA PLAN PRIMARY REFERENCE POINT: NORMAL
RAD ONC ARIA PLAN TOTAL FRACTIONS PRESCRIBED: 23
RAD ONC ARIA PLAN TOTAL PRESCRIBED DOSE: 4600 CGY
RAD ONC ARIA REFERENCE POINT DOSAGE GIVEN TO DATE: 18.28 GY
RAD ONC ARIA REFERENCE POINT DOSAGE GIVEN TO DATE: 30 GY
RAD ONC ARIA REFERENCE POINT ID: NORMAL
RAD ONC ARIA REFERENCE POINT ID: NORMAL
RAD ONC ARIA REFERENCE POINT SESSION DOSAGE GIVEN: 1.22 GY
RAD ONC ARIA REFERENCE POINT SESSION DOSAGE GIVEN: 2 GY

## 2024-05-24 PROCEDURE — 77386: CPT

## 2024-05-24 NOTE — PROGRESS NOTES
The use of a video visit has been reviewed with the patient and verbal informed consent has been obtained.    Colorectal & General Surgery  Consultation    Patient: Keron Gerber  YOB: 1960  MRN: [id number]      Assessment  Keron Gerber is a 63 y.o. 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 Yousif Atkins for evaluation of large advanced mid to upper rectal polyp.    I visited with Keron, his sister, and his mother today via a video visit.    This is a challenging situation as he is currently undergoing chemoradiation for hypopharyngeal squamous cell carcinoma, which is scheduled to be completed sometime in mid June.  From the standpoint of his mid to upper rectal neoplasm, he does not have proven malignancy, but the appearance of the tumor from endoscopic photographs as well as from MRI are certainly concerning for this mass containing a malignancy.  He has no concerning mesorectal lymphadenopathy on his MRI, which demonstrates tumor that is in the mid or upper rectum at the level of the peritoneal reflection and moving up into his distal sigmoid colon.  It is quite large but does not appear to have significant spread beyond the rectal wall itself.  Given that we have a large upper rectal tumor with pathologic evidence of advanced polyp with high-grade dysplasia and no other concerning findings consistent with metastatic disease, I have recommended that he undergo laparoscopic low anterior resection with possible diverting loop ileostomy creation and possible end colostomy creation.  This will depend on his nutritional status and functional status after undergoing chemoradiation for his hypopharyngeal cancer.  I discussed this approach with him and his family today and answered all their questions.  They are interested in proceeding pending the outcome of his current cancer  treatment.    I have ordered a CT scan of his abdomen pelvis which we will use for preoperative planning and staging.    I will follow-up with him in a few weeks virtually again to see how things are going and to keep our dialogue open.    I will also reach out to Dr. Randhawa, his medical oncologist, to ensure that we are all on the same page.    Plan  Plan for surgical treatment of advanced polyp with high-grade dysplasia concerning for underlying malignancy after completion of chemoradiation for hypopharyngeal squamous cell carcinoma.  CT scan of the abdomen pelvis  Follow-up with me virtually in 3 weeks    Referring Provider: Yousif Atkins MD    Reason for Consultation: Rectal mass    History of Present Illness   Keron Gerber is a 63 y.o. male with active hypopharyngeal squamous cell carcinoma currently undergoing chemoradiation with cisplatin as well as history of prostate cancer status post prostatectomy and melanoma status post wide local excision who presents with a newly diagnosed endoscopically unresectable large advanced upper rectal polyp with high-grade dysplasia.  He is asymptomatic.  He has liquid bowel movements secondary to his tube feeds which are administered via his tube feeds.  He is actively losing weight but is also on chemotherapy at this time.  Otherwise, he feels well.  He has no personal or family history of colorectal cancer but does have history of colonic polyps in his father.    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 ENDOSCOPY;  Service: Gastroenterology;   Laterality: N/A;  preop; abnormal pet scan   postop rectal mass; polyps;   PCP Dr Santos    CYST REMOVAL Right 08/07/2014    RIGHT LOWER EYELID- EPIDERMAL INCLUSION CYST    EXCISION LESION N/A 08/07/2014    MALIGNANT MELANOMA OF LEFT NASAL TIP    FOREHEAD FLAP  09/11/2014    PEDICLE FLAP  10/02/2014    PEDICLE DIVISION AND FLAP INSET OF NOSE    PROSTATECTOMY N/A 09/07/2022    Procedure: PROSTATECTOMY LAPAROSCOPIC WITH [employer] ROBOT;  Surgeon: Matthew Weaver MD;  Location: [facility] OR;  Service: Robotics - DaVinci;  Laterality: N/A;    SQUAMOUS CELL CARCINOMA EXCISION  10/29/2013    LEFT FLOOR OF MOUTH    TONSILLECTOMY         Social History  Social History     Socioeconomic History    Marital status: Single   Tobacco Use    Smoking status: Every Day     Current packs/day: 1.00     Types: Cigarettes    Smokeless tobacco: Never   Vaping Use    Vaping status: Never Used   Substance and Sexual Activity    Alcohol use: Not Currently     Comment: 12 PER DAY    Drug use: No    Sexual activity: Defer       Family History  Family History   Problem Relation Age of Onset    No Known Problems Mother     Heart disease Father     Colon polyps Father     Colon cancer Neg Hx        Colorectal cancer family history: Colon polyps in his father    Review of Systems  Negative except as documented in the HPI.     Allergies  Allergies   Allergen Reactions    Sulfa Antibiotics Other (See Comments)     Headaches       Medications    Current Outpatient Medications:     acetaminophen (TYLENOL) 500 MG tablet, Take 1 tablet by mouth Every 6 (Six) Hours As Needed for Mild Pain. (Patient not taking: Reported on 5/14/2024), Disp: , Rfl:     ondansetron (Zofran) 8 MG tablet, Take 1 tablet by mouth Every 8 (Eight) Hours As Needed for Nausea or Vomiting. (Patient not taking: Reported on 5/14/2024), Disp: 60 tablet, Rfl: 2    prochlorperazine (COMPAZINE) 10 MG tablet, Take 1 tablet by mouth Every 4 (Four) Hours As Needed for Nausea or Vomiting.  (Patient not taking: Reported on 5/14/2024), Disp: 60 tablet, Rfl: 2    tadalafil (CIALIS) 20 MG tablet, Take 1 tablet by mouth As Needed for Erectile Dysfunction. 1-12 hours before activity (Patient not taking: Reported on 5/14/2024), Disp: 12 tablet, Rfl: 11    Varenicline Tartrate, Starter, 0.5 MG X 11 & 1 MG X 42 tablet therapy pack, , Disp: , Rfl:   No current facility-administered medications for this visit.    Vital Signs  There were no vitals filed for this visit.     Physical Exam  Constitutional: Resting comfortably, no acute distress  Neck: Supple, tracheostomy in place  Respiratory: Appears to be breathing comfortably  Cardiovascular: Appears well-perfused  Abdominal: Unable to assess  Skin: Appears dry and well-perfused  Psychiatric: Alert and oriented ×3, normal affect      Laboratory Results  I have personally reviewed CBC with WBC 7, hemoglobin 12, platelet 212.  CMP with creatinine 0.46, bicarb 32, albumin 4.1.    Radiology  I have personally reviewed MRI of his pelvis which demonstrates rectal tumor in the mid to upper rectum starting at the peritoneal reflection and working his way more proximally to the sigmoid colon.  No evidence of mesorectal lymphadenopathy.    Endoscopy  I have personally reviewed colonoscopy report which demonstrates a large rectal mass that is nonobstructing as well as an advanced polyp 17 cm from the anal verge.  I reviewed the pathology report associated with this which demonstrates tubulovillous adenoma with high-grade dysplasia.    I spent 67 minutes caring for Keron on this date of service. This time includes time spent by me in the following activities:preparing for the visit, reviewing tests, obtaining and/or reviewing a separately obtained history, performing a medically appropriate examination and/or evaluation , counseling and educating the patient/family/caregiver, ordering medications, tests, or procedures, referring and communicating with other health care  professionals , documenting information in the medical record, independently interpreting results and communicating that information with the patient/family/caregiver, and care coordination     This visit was conducted via telehealth today utilizing a video conferencing service.  Mr. Gerber and his family were at their home within the [facility] at the time of the visit.    Trey Cruz MD  Colorectal & General Surgery  [facility]    [address], [address]  [place], [place], [address]  P: [phone]  F: [phone]

## 2024-05-27 ENCOUNTER — APPOINTMENT (OUTPATIENT)
Dept: RADIATION ONCOLOGY | Facility: HOSPITAL | Age: 64
End: 2024-05-27
Payer: MEDICAID

## 2024-05-28 ENCOUNTER — HOSPITAL ENCOUNTER (OUTPATIENT)
Dept: RADIATION ONCOLOGY | Facility: HOSPITAL | Age: 64
Setting detail: RADIATION/ONCOLOGY SERIES
Discharge: HOME | End: 2024-05-28
Payer: MEDICAID

## 2024-05-28 ENCOUNTER — APPOINTMENT (OUTPATIENT)
Dept: SPEECH THERAPY | Facility: HOSPITAL | Age: 64
End: 2024-05-28
Payer: MEDICAID

## 2024-05-28 DIAGNOSIS — C13.9 MALIGNANT NEOPLASM HYPOPHARYNX: Primary | ICD-10-CM

## 2024-05-28 LAB
RAD ONC ARIA COURSE ID: NORMAL
RAD ONC ARIA COURSE LAST TREATMENT DATE: NORMAL
RAD ONC ARIA COURSE START DATE: NORMAL
RAD ONC ARIA COURSE TREATMENT ELAPSED DAYS: 22
RAD ONC ARIA FIRST TREATMENT DATE: NORMAL
RAD ONC ARIA PLAN FRACTIONS TREATED TO DATE: 1
RAD ONC ARIA PLAN ID: NORMAL
RAD ONC ARIA PLAN PRESCRIBED DOSE PER FRACTION: 2 GY
RAD ONC ARIA PLAN PRIMARY REFERENCE POINT: NORMAL
RAD ONC ARIA PLAN TOTAL FRACTIONS PRESCRIBED: 8
RAD ONC ARIA PLAN TOTAL PRESCRIBED DOSE: 1600 CGY
RAD ONC ARIA REFERENCE POINT DOSAGE GIVEN TO DATE: 19.49 GY
RAD ONC ARIA REFERENCE POINT DOSAGE GIVEN TO DATE: 32 GY
RAD ONC ARIA REFERENCE POINT ID: NORMAL
RAD ONC ARIA REFERENCE POINT ID: NORMAL
RAD ONC ARIA REFERENCE POINT SESSION DOSAGE GIVEN: 1.21 GY
RAD ONC ARIA REFERENCE POINT SESSION DOSAGE GIVEN: 2 GY

## 2024-05-28 PROCEDURE — 77386: CPT

## 2024-05-28 RX ORDER — DIPHENHYDRAMINE HYDROCHLORIDE 50 MG/ML
50 INJECTION INTRAMUSCULAR; INTRAVENOUS AS NEEDED
Status: CANCELLED | OUTPATIENT
Start: 2024-05-30

## 2024-05-28 RX ORDER — PALONOSETRON 0.05 MG/ML
0.25 INJECTION, SOLUTION INTRAVENOUS ONCE
OUTPATIENT
Start: 2024-06-06

## 2024-05-28 RX ORDER — FAMOTIDINE 10 MG/ML
20 INJECTION, SOLUTION INTRAVENOUS AS NEEDED
OUTPATIENT
Start: 2024-06-06

## 2024-05-28 RX ORDER — FUROSEMIDE 10 MG/ML
20 INJECTION INTRAMUSCULAR; INTRAVENOUS ONCE
Start: 2024-06-06 | End: 2024-06-06

## 2024-05-28 RX ORDER — DIPHENHYDRAMINE HYDROCHLORIDE 50 MG/ML
50 INJECTION INTRAMUSCULAR; INTRAVENOUS AS NEEDED
OUTPATIENT
Start: 2024-06-06

## 2024-05-28 RX ORDER — SODIUM CHLORIDE 9 MG/ML
20 INJECTION, SOLUTION INTRAVENOUS ONCE
OUTPATIENT
Start: 2024-06-06

## 2024-05-28 RX ORDER — FUROSEMIDE 10 MG/ML
20 INJECTION INTRAMUSCULAR; INTRAVENOUS ONCE
Status: CANCELLED
Start: 2024-05-30 | End: 2024-05-30

## 2024-05-28 RX ORDER — SODIUM CHLORIDE 9 MG/ML
20 INJECTION, SOLUTION INTRAVENOUS ONCE
Status: CANCELLED | OUTPATIENT
Start: 2024-05-30

## 2024-05-28 RX ORDER — PALONOSETRON 0.05 MG/ML
0.25 INJECTION, SOLUTION INTRAVENOUS ONCE
Status: CANCELLED | OUTPATIENT
Start: 2024-05-30

## 2024-05-28 RX ORDER — FAMOTIDINE 10 MG/ML
20 INJECTION, SOLUTION INTRAVENOUS AS NEEDED
Status: CANCELLED | OUTPATIENT
Start: 2024-05-30

## 2024-05-29 ENCOUNTER — TREATMENT (OUTPATIENT)
Dept: SPEECH THERAPY | Facility: HOSPITAL | Age: 64
End: 2024-05-29
Payer: MEDICAID

## 2024-05-29 ENCOUNTER — HOSPITAL ENCOUNTER (OUTPATIENT)
Dept: RADIATION ONCOLOGY | Facility: HOSPITAL | Age: 64
Setting detail: RADIATION/ONCOLOGY SERIES
Discharge: HOME | End: 2024-05-29
Payer: MEDICAID

## 2024-05-29 ENCOUNTER — APPOINTMENT (OUTPATIENT)
Dept: RADIATION ONCOLOGY | Facility: HOSPITAL | Age: 64
End: 2024-05-29
Payer: MEDICAID

## 2024-05-29 DIAGNOSIS — R13.12 OROPHARYNGEAL DYSPHAGIA: Primary | ICD-10-CM

## 2024-05-29 LAB
RAD ONC ARIA COURSE ID: NORMAL
RAD ONC ARIA COURSE LAST TREATMENT DATE: NORMAL
RAD ONC ARIA COURSE START DATE: NORMAL
RAD ONC ARIA COURSE TREATMENT ELAPSED DAYS: 23
RAD ONC ARIA FIRST TREATMENT DATE: NORMAL
RAD ONC ARIA PLAN FRACTIONS TREATED TO DATE: 2
RAD ONC ARIA PLAN ID: NORMAL
RAD ONC ARIA PLAN PRESCRIBED DOSE PER FRACTION: 2 GY
RAD ONC ARIA PLAN PRIMARY REFERENCE POINT: NORMAL
RAD ONC ARIA PLAN TOTAL FRACTIONS PRESCRIBED: 8
RAD ONC ARIA PLAN TOTAL PRESCRIBED DOSE: 1600 CGY
RAD ONC ARIA REFERENCE POINT DOSAGE GIVEN TO DATE: 20.7 GY
RAD ONC ARIA REFERENCE POINT DOSAGE GIVEN TO DATE: 34 GY
RAD ONC ARIA REFERENCE POINT ID: NORMAL
RAD ONC ARIA REFERENCE POINT ID: NORMAL
RAD ONC ARIA REFERENCE POINT SESSION DOSAGE GIVEN: 1.21 GY
RAD ONC ARIA REFERENCE POINT SESSION DOSAGE GIVEN: 2 GY

## 2024-05-29 PROCEDURE — 77300 RADIATION THERAPY DOSE PLAN: CPT

## 2024-05-29 PROCEDURE — 92526 ORAL FUNCTION THERAPY: CPT | Performed by: SPEECH-LANGUAGE PATHOLOGIST

## 2024-05-29 PROCEDURE — 77386: CPT | Performed by: RADIOLOGY

## 2024-05-29 NOTE — THERAPY TREATMENT NOTE
Outpatient Speech Language Pathology   Adult Swallow Treatment Note       Patient Name: Keron Gerber  : 1960  MRN: [id number]  Today's Date: 2024         Visit Date: 2024     Mr. Gerber was seen today for swallowing therapy. He was well-appearing and in good spirits. He reports he has been completing 2-3 cups of puree a day. No increased congestion and no noted concerns per patient report. He has PMV in place with strong, clear vocal quality.     He took sips of thin water with clear vocal quality and delayed coughing observed. He reports coughing remains the same, no worsened than before. He reports exercise daily and is encouraged to get stronger. He reports fear of continued weight loss. Weight is 120.4lbs today.     Continue to follow 1 time per week. Patient to continue daily home excercise 3 times per day. I reviewed each excercise with him and re-educated on the importance of completing. He can continue with 2-3 cups of puree items per day with thin water only. Continue close monitoring of toleration. Continue oral care prior to PO and following. Continue all tube feedings per Gtube as prior prescribed by LITZY/MD.     Alma Granger, MS CCC-SLP 2024 10:07 CDT      Patient Active Problem List   Diagnosis    Dyslexia    Prostate cancer    Malignant neoplasm hypopharynx    Malignant melanoma of nose    Current every day smoker    Abnormal PET scan of colon        Visit Dx:    ICD-10-CM ICD-9-CM   1. Oropharyngeal dysphagia  R13.12 787.22        OP SLP Assessment/Plan - 24 1001          SLP Plan    Plan Comments Continue to follow 1 time per week. Patient to continue daily home excercise 3 times per day. I reviewed each excercise with him and re-educated on the importance of completing. He can continue with 2-3 cups of puree items per day with thin water only. Continue close monitoring of toleration. Continue oral care prior to PO and following. Continue all tube feedings per  Gtube as prior prescribed by [facility]ZY/MD.  -MG              User Key  (r) = Recorded By, (t) = Taken By, (c) = Cosigned By      Initials Name Provider Type    MG Alma Granger MS CCC-SLP Speech and Language Pathologist                                       SLP OP Goals       Row Name 05/29/24 0855          Goal Type Needed    Goal Type Needed Other Adult Goals  -MG        Subjective Comments    Subjective Comments Patient was seen today for swallowing therapy.  -MG        Subjective Pain    Able to rate subjective pain? yes  -MG     Pre-Treatment Pain Level 0  -MG     Post-Treatment Pain Level 0  -MG        Other Goals    Other Adult Goal- 1 Patient will increase swallowing frequency during XRT in order to maintain current swallow function once XRT is completed.  -MG     Status: Other Adult Goal- 1 Progressing as expected  -MG     Comments: Other Adult Goal- 1 See note  -MG     Other Adult Goal- 2 Patient will tolerate water throughout and following treatment without complications such as aspiration pneumonia or overt s/s of aspiration.  -MG     Status: Other Adult Goal- 2 Progressing as expected  -MG     Comments: Other Adult Goal- 2 See note.  -MG     Other Adult Goal- 3 Patient will complete oral care daily in order to promote healthy oral mucosa throughout XRT.  -MG     Status: Other Adult Goal- 3 Progressing as expected  -MG     Comments: Other Adult Goal- 3 See note  -MG     Other Adult Goal- 4 Patient will maintain adequate hydration by drinking needed amount of water by mouth or via PEG tube.  -MG     Status: Other Adult Goal- 4 Progressing as expected  -MG     Comments: Other Adult Goal- 4 See note  -MG     Other Adult Goal- 5 Will work towards PO for pleasure as tolerated.  -MG     Status: Other Adult Goal- 5 Progressing as expected  -MG     Comments: Other Adult Goal- 5 See note  -MG     Other Adult Goal- 6 Patient will complete swallowing exercises three times a day in order to maintain current swallow  function and range of motion of oral and pharyngeal structures.  -MG     Status: Other Adult Goal- 6 Progressing as expected  -MG     Comments: Other Adult Goal- 6 See note  -MG        SLP Time Calculation    SLP Goal Re-Cert Due Date 08/14/24  -MG               User Key  (r) = Recorded By, (t) = Taken By, (c) = Cosigned By      Initials Name Provider Type    Alma Rebolledo MS CCC-SLP Speech and Language Pathologist                   OP SLP Education       Row Name 05/29/24 1002       Education    Barriers to Learning No barriers identified  -MG    Education Provided Patient requires further education on strategies, risks  -MG    Assessed Learning needs;Learning motivation;Learning preferences;Learning readiness  -MG    Learning Motivation Strong  -MG    Learning Method Explanation  -MG    Teaching Response Verbalized understanding  -MG    Education Comments See note.  -MG              User Key  (r) = Recorded By, (t) = Taken By, (c) = Cosigned By      Initials Name Effective Dates    MG Alma Granger MS CCC-SLP 07/11/23 -                         Time Calculation:   SLP Start Time: 0855  SLP Stop Time: 0933  SLP Time Calculation (min): 38 min  Untimed Charges  99265-MI Treatment Swallow Minutes: 38  Total Minutes  Untimed Charges Total Minutes: 38   Total Minutes: 38                 Alma Granger MS CCC-SLP  5/29/2024

## 2024-05-30 ENCOUNTER — LAB (OUTPATIENT)
Dept: LAB | Facility: HOSPITAL | Age: 64
End: 2024-05-30
Payer: MEDICAID

## 2024-05-30 ENCOUNTER — INFUSION (OUTPATIENT)
Dept: ONCOLOGY | Facility: HOSPITAL | Age: 64
End: 2024-05-30
Payer: MEDICAID

## 2024-05-30 ENCOUNTER — HOSPITAL ENCOUNTER (OUTPATIENT)
Dept: RADIATION ONCOLOGY | Facility: HOSPITAL | Age: 64
Setting detail: RADIATION/ONCOLOGY SERIES
Discharge: HOME | End: 2024-05-30
Payer: MEDICAID

## 2024-05-30 VITALS
DIASTOLIC BLOOD PRESSURE: 52 MMHG | RESPIRATION RATE: 16 BRPM | OXYGEN SATURATION: 100 % | HEART RATE: 73 BPM | SYSTOLIC BLOOD PRESSURE: 98 MMHG | HEIGHT: 69 IN | TEMPERATURE: 97.5 F | WEIGHT: 120.4 LBS | BODY MASS INDEX: 17.83 KG/M2

## 2024-05-30 DIAGNOSIS — C13.9 MALIGNANT NEOPLASM HYPOPHARYNX: ICD-10-CM

## 2024-05-30 DIAGNOSIS — C13.9 MALIGNANT NEOPLASM HYPOPHARYNX: Primary | ICD-10-CM

## 2024-05-30 LAB
ALBUMIN SERPL-MCNC: 4.1 G/DL (ref 3.5–5.2)
ALBUMIN/GLOB SERPL: 1.5 G/DL
ALP SERPL-CCNC: 84 U/L (ref 39–117)
ALT SERPL W P-5'-P-CCNC: 10 U/L (ref 1–41)
ANION GAP SERPL CALCULATED.3IONS-SCNC: 8 MMOL/L (ref 5–15)
AST SERPL-CCNC: 11 U/L (ref 1–40)
BASOPHILS # BLD AUTO: 0.05 10*3/MM3 (ref 0–0.2)
BASOPHILS NFR BLD AUTO: 0.9 % (ref 0–1.5)
BILIRUB SERPL-MCNC: 0.3 MG/DL (ref 0–1.2)
BUN SERPL-MCNC: 16 MG/DL (ref 8–23)
BUN/CREAT SERPL: 36.4 (ref 7–25)
CALCIUM SPEC-SCNC: 9.2 MG/DL (ref 8.6–10.5)
CHLORIDE SERPL-SCNC: 99 MMOL/L (ref 98–107)
CO2 SERPL-SCNC: 30 MMOL/L (ref 22–29)
CREAT SERPL-MCNC: 0.44 MG/DL (ref 0.76–1.27)
DEPRECATED RDW RBC AUTO: 48.8 FL (ref 37–54)
EGFRCR SERPLBLD CKD-EPI 2021: 120.6 ML/MIN/1.73
EOSINOPHIL # BLD AUTO: 0.09 10*3/MM3 (ref 0–0.4)
EOSINOPHIL NFR BLD AUTO: 1.6 % (ref 0.3–6.2)
ERYTHROCYTE [DISTWIDTH] IN BLOOD BY AUTOMATED COUNT: 14.6 % (ref 12.3–15.4)
GLOBULIN UR ELPH-MCNC: 2.8 GM/DL
GLUCOSE SERPL-MCNC: 110 MG/DL (ref 65–99)
HCT VFR BLD AUTO: 35.2 % (ref 37.5–51)
HGB BLD-MCNC: 11.8 G/DL (ref 13–17.7)
IMM GRANULOCYTES # BLD AUTO: 0.02 10*3/MM3 (ref 0–0.05)
IMM GRANULOCYTES NFR BLD AUTO: 0.3 % (ref 0–0.5)
LYMPHOCYTES # BLD AUTO: 1.27 10*3/MM3 (ref 0.7–3.1)
LYMPHOCYTES NFR BLD AUTO: 21.9 % (ref 19.6–45.3)
MAGNESIUM SERPL-MCNC: 2.1 MG/DL (ref 1.6–2.4)
MCH RBC QN AUTO: 32.5 PG (ref 26.6–33)
MCHC RBC AUTO-ENTMCNC: 33.5 G/DL (ref 31.5–35.7)
MCV RBC AUTO: 97 FL (ref 79–97)
MONOCYTES # BLD AUTO: 0.43 10*3/MM3 (ref 0.1–0.9)
MONOCYTES NFR BLD AUTO: 7.4 % (ref 5–12)
NEUTROPHILS NFR BLD AUTO: 3.93 10*3/MM3 (ref 1.7–7)
NEUTROPHILS NFR BLD AUTO: 67.9 % (ref 42.7–76)
NRBC BLD AUTO-RTO: 0 /100 WBC (ref 0–0.2)
PLATELET # BLD AUTO: 186 10*3/MM3 (ref 140–450)
PMV BLD AUTO: 9.7 FL (ref 6–12)
POTASSIUM SERPL-SCNC: 3.6 MMOL/L (ref 3.5–5.2)
PROT SERPL-MCNC: 6.9 G/DL (ref 6–8.5)
RAD ONC ARIA COURSE ID: NORMAL
RAD ONC ARIA COURSE LAST TREATMENT DATE: NORMAL
RAD ONC ARIA COURSE START DATE: NORMAL
RAD ONC ARIA COURSE TREATMENT ELAPSED DAYS: 24
RAD ONC ARIA FIRST TREATMENT DATE: NORMAL
RAD ONC ARIA PLAN FRACTIONS TREATED TO DATE: 3
RAD ONC ARIA PLAN ID: NORMAL
RAD ONC ARIA PLAN PRESCRIBED DOSE PER FRACTION: 2 GY
RAD ONC ARIA PLAN PRIMARY REFERENCE POINT: NORMAL
RAD ONC ARIA PLAN TOTAL FRACTIONS PRESCRIBED: 8
RAD ONC ARIA PLAN TOTAL PRESCRIBED DOSE: 1600 CGY
RAD ONC ARIA REFERENCE POINT DOSAGE GIVEN TO DATE: 21.91 GY
RAD ONC ARIA REFERENCE POINT DOSAGE GIVEN TO DATE: 36 GY
RAD ONC ARIA REFERENCE POINT ID: NORMAL
RAD ONC ARIA REFERENCE POINT ID: NORMAL
RAD ONC ARIA REFERENCE POINT SESSION DOSAGE GIVEN: 1.21 GY
RAD ONC ARIA REFERENCE POINT SESSION DOSAGE GIVEN: 2 GY
RBC # BLD AUTO: 3.63 10*6/MM3 (ref 4.14–5.8)
SODIUM SERPL-SCNC: 137 MMOL/L (ref 136–145)
WBC NRBC COR # BLD AUTO: 5.79 10*3/MM3 (ref 3.4–10.8)

## 2024-05-30 PROCEDURE — 83735 ASSAY OF MAGNESIUM: CPT

## 2024-05-30 PROCEDURE — 25810000003 SODIUM CHLORIDE 0.9 % SOLUTION 1,000 ML FLEX CONT: Performed by: INTERNAL MEDICINE

## 2024-05-30 PROCEDURE — 77336 RADIATION PHYSICS CONSULT: CPT

## 2024-05-30 PROCEDURE — 25010000002 FOSAPREPITANT PER 1 MG: Performed by: INTERNAL MEDICINE

## 2024-05-30 PROCEDURE — 96367 TX/PROPH/DG ADDL SEQ IV INF: CPT

## 2024-05-30 PROCEDURE — 96368 THER/DIAG CONCURRENT INF: CPT

## 2024-05-30 PROCEDURE — 25810000003 SODIUM CHLORIDE 0.9 % SOLUTION: Performed by: INTERNAL MEDICINE

## 2024-05-30 PROCEDURE — 96413 CHEMO IV INFUSION 1 HR: CPT

## 2024-05-30 PROCEDURE — 85025 COMPLETE CBC W/AUTO DIFF WBC: CPT

## 2024-05-30 PROCEDURE — 25010000002 MAGNESIUM SULFATE PER 500 MG OF MAGNESIUM: Performed by: INTERNAL MEDICINE

## 2024-05-30 PROCEDURE — 77386: CPT

## 2024-05-30 PROCEDURE — 25010000002 FUROSEMIDE PER 20 MG: Performed by: INTERNAL MEDICINE

## 2024-05-30 PROCEDURE — 36415 COLL VENOUS BLD VENIPUNCTURE: CPT

## 2024-05-30 PROCEDURE — 25010000002 DEXAMETHASONE SODIUM PHOSPHATE 100 MG/10ML SOLUTION: Performed by: INTERNAL MEDICINE

## 2024-05-30 PROCEDURE — 96375 TX/PRO/DX INJ NEW DRUG ADDON: CPT

## 2024-05-30 PROCEDURE — 96366 THER/PROPH/DIAG IV INF ADDON: CPT

## 2024-05-30 PROCEDURE — 25010000002 CISPLATIN PER 50 MG: Performed by: INTERNAL MEDICINE

## 2024-05-30 PROCEDURE — 25010000002 POTASSIUM CHLORIDE PER 2 MEQ OF POTASSIUM: Performed by: INTERNAL MEDICINE

## 2024-05-30 PROCEDURE — 80053 COMPREHEN METABOLIC PANEL: CPT

## 2024-05-30 PROCEDURE — 25810000003 SODIUM CHLORIDE 0.9 % SOLUTION 250 ML FLEX CONT: Performed by: INTERNAL MEDICINE

## 2024-05-30 PROCEDURE — 25010000002 PALONOSETRON PER 25 MCG: Performed by: INTERNAL MEDICINE

## 2024-05-30 RX ORDER — FUROSEMIDE 10 MG/ML
20 INJECTION INTRAMUSCULAR; INTRAVENOUS ONCE
Status: DISCONTINUED | OUTPATIENT
Start: 2024-05-30 | End: 2024-05-30 | Stop reason: HOSPADM

## 2024-05-30 RX ORDER — FUROSEMIDE 10 MG/ML
20 INJECTION INTRAMUSCULAR; INTRAVENOUS ONCE
Status: COMPLETED | OUTPATIENT
Start: 2024-05-30 | End: 2024-05-30

## 2024-05-30 RX ORDER — SODIUM CHLORIDE 9 MG/ML
20 INJECTION, SOLUTION INTRAVENOUS ONCE
Status: COMPLETED | OUTPATIENT
Start: 2024-05-30 | End: 2024-05-30

## 2024-05-30 RX ORDER — DIPHENHYDRAMINE HYDROCHLORIDE 50 MG/ML
50 INJECTION INTRAMUSCULAR; INTRAVENOUS AS NEEDED
Status: DISCONTINUED | OUTPATIENT
Start: 2024-05-30 | End: 2024-05-30 | Stop reason: HOSPADM

## 2024-05-30 RX ORDER — PALONOSETRON 0.05 MG/ML
0.25 INJECTION, SOLUTION INTRAVENOUS ONCE
Status: COMPLETED | OUTPATIENT
Start: 2024-05-30 | End: 2024-05-30

## 2024-05-30 RX ORDER — FAMOTIDINE 10 MG/ML
20 INJECTION, SOLUTION INTRAVENOUS AS NEEDED
Status: DISCONTINUED | OUTPATIENT
Start: 2024-05-30 | End: 2024-05-30 | Stop reason: HOSPADM

## 2024-05-30 RX ADMIN — SODIUM CHLORIDE 20 ML/HR: 9 INJECTION, SOLUTION INTRAVENOUS at 08:34

## 2024-05-30 RX ADMIN — FUROSEMIDE 20 MG: 10 INJECTION, SOLUTION INTRAVENOUS at 11:44

## 2024-05-30 RX ADMIN — DEXAMETHASONE SODIUM PHOSPHATE 12 MG: 10 INJECTION, SOLUTION INTRAMUSCULAR; INTRAVENOUS at 10:40

## 2024-05-30 RX ADMIN — PALONOSETRON HYDROCHLORIDE 0.25 MG: 0.25 INJECTION, SOLUTION INTRAVENOUS at 10:40

## 2024-05-30 RX ADMIN — CISPLATIN 68 MG: 50 INJECTION, SOLUTION INTRAVENOUS at 11:44

## 2024-05-30 RX ADMIN — MAGNESIUM SULFATE HEPTAHYDRATE: 500 INJECTION, SOLUTION INTRAMUSCULAR; INTRAVENOUS at 11:44

## 2024-05-30 RX ADMIN — MAGNESIUM SULFATE HEPTAHYDRATE: 500 INJECTION, SOLUTION INTRAMUSCULAR; INTRAVENOUS at 08:35

## 2024-05-30 RX ADMIN — FOSAPREPITANT 150 MG: 150 INJECTION, POWDER, LYOPHILIZED, FOR SOLUTION INTRAVENOUS at 11:02

## 2024-05-31 ENCOUNTER — TELEPHONE (OUTPATIENT)
Dept: SURGERY | Facility: CLINIC | Age: 64
End: 2024-05-31
Payer: MEDICAID

## 2024-05-31 ENCOUNTER — HOSPITAL ENCOUNTER (OUTPATIENT)
Dept: RADIATION ONCOLOGY | Facility: HOSPITAL | Age: 64
Setting detail: RADIATION/ONCOLOGY SERIES
Discharge: HOME | End: 2024-05-31
Payer: MEDICAID

## 2024-05-31 LAB
RAD ONC ARIA COURSE ID: NORMAL
RAD ONC ARIA COURSE LAST TREATMENT DATE: NORMAL
RAD ONC ARIA COURSE START DATE: NORMAL
RAD ONC ARIA COURSE TREATMENT ELAPSED DAYS: 25
RAD ONC ARIA FIRST TREATMENT DATE: NORMAL
RAD ONC ARIA PLAN FRACTIONS TREATED TO DATE: 4
RAD ONC ARIA PLAN ID: NORMAL
RAD ONC ARIA PLAN PRESCRIBED DOSE PER FRACTION: 2 GY
RAD ONC ARIA PLAN PRIMARY REFERENCE POINT: NORMAL
RAD ONC ARIA PLAN TOTAL FRACTIONS PRESCRIBED: 8
RAD ONC ARIA PLAN TOTAL PRESCRIBED DOSE: 1600 CGY
RAD ONC ARIA REFERENCE POINT DOSAGE GIVEN TO DATE: 23.12 GY
RAD ONC ARIA REFERENCE POINT DOSAGE GIVEN TO DATE: 38 GY
RAD ONC ARIA REFERENCE POINT ID: NORMAL
RAD ONC ARIA REFERENCE POINT ID: NORMAL
RAD ONC ARIA REFERENCE POINT SESSION DOSAGE GIVEN: 1.21 GY
RAD ONC ARIA REFERENCE POINT SESSION DOSAGE GIVEN: 2 GY

## 2024-05-31 PROCEDURE — 77386: CPT

## 2024-05-31 NOTE — TELEPHONE ENCOUNTER
Sister (Marisa) calling regarding patient's upcoming video visit on 6/14 at 9:30. He has another appointment at 8:50 AM that morning and that will conflict. His sister is asking if they could do later? If so sister is states afternoon is preferable. I did explain that you operate in the afternoons, but I wondered if you would be able to do like 11/1130 that day, prior to your scopes?

## 2024-06-03 ENCOUNTER — HOSPITAL ENCOUNTER (OUTPATIENT)
Dept: RADIATION ONCOLOGY | Facility: HOSPITAL | Age: 64
Setting detail: RADIATION/ONCOLOGY SERIES
End: 2024-06-03
Payer: MEDICAID

## 2024-06-04 ENCOUNTER — APPOINTMENT (OUTPATIENT)
Dept: SPEECH THERAPY | Facility: HOSPITAL | Age: 64
End: 2024-06-04
Payer: MEDICAID

## 2024-06-04 ENCOUNTER — HOSPITAL ENCOUNTER (OUTPATIENT)
Dept: RADIATION ONCOLOGY | Facility: HOSPITAL | Age: 64
Setting detail: RADIATION/ONCOLOGY SERIES
Discharge: HOME | End: 2024-06-04
Payer: MEDICAID

## 2024-06-04 LAB
RAD ONC ARIA COURSE ID: NORMAL
RAD ONC ARIA COURSE LAST TREATMENT DATE: NORMAL
RAD ONC ARIA COURSE START DATE: NORMAL
RAD ONC ARIA COURSE TREATMENT ELAPSED DAYS: 29
RAD ONC ARIA FIRST TREATMENT DATE: NORMAL
RAD ONC ARIA PLAN FRACTIONS TREATED TO DATE: 5
RAD ONC ARIA PLAN ID: NORMAL
RAD ONC ARIA PLAN PRESCRIBED DOSE PER FRACTION: 2 GY
RAD ONC ARIA PLAN PRIMARY REFERENCE POINT: NORMAL
RAD ONC ARIA PLAN TOTAL FRACTIONS PRESCRIBED: 8
RAD ONC ARIA PLAN TOTAL PRESCRIBED DOSE: 1600 CGY
RAD ONC ARIA REFERENCE POINT DOSAGE GIVEN TO DATE: 24.33 GY
RAD ONC ARIA REFERENCE POINT DOSAGE GIVEN TO DATE: 40 GY
RAD ONC ARIA REFERENCE POINT ID: NORMAL
RAD ONC ARIA REFERENCE POINT ID: NORMAL
RAD ONC ARIA REFERENCE POINT SESSION DOSAGE GIVEN: 1.21 GY
RAD ONC ARIA REFERENCE POINT SESSION DOSAGE GIVEN: 2 GY

## 2024-06-04 PROCEDURE — 77386: CPT

## 2024-06-05 ENCOUNTER — TREATMENT (OUTPATIENT)
Dept: SPEECH THERAPY | Facility: HOSPITAL | Age: 64
End: 2024-06-05
Payer: MEDICAID

## 2024-06-05 ENCOUNTER — HOSPITAL ENCOUNTER (OUTPATIENT)
Dept: RADIATION ONCOLOGY | Facility: HOSPITAL | Age: 64
Setting detail: RADIATION/ONCOLOGY SERIES
Discharge: HOME | End: 2024-06-05
Payer: MEDICAID

## 2024-06-05 DIAGNOSIS — R13.12 OROPHARYNGEAL DYSPHAGIA: Primary | ICD-10-CM

## 2024-06-05 LAB
RAD ONC ARIA COURSE ID: NORMAL
RAD ONC ARIA COURSE LAST TREATMENT DATE: NORMAL
RAD ONC ARIA COURSE START DATE: NORMAL
RAD ONC ARIA COURSE TREATMENT ELAPSED DAYS: 30
RAD ONC ARIA FIRST TREATMENT DATE: NORMAL
RAD ONC ARIA PLAN FRACTIONS TREATED TO DATE: 6
RAD ONC ARIA PLAN ID: NORMAL
RAD ONC ARIA PLAN PRESCRIBED DOSE PER FRACTION: 2 GY
RAD ONC ARIA PLAN PRIMARY REFERENCE POINT: NORMAL
RAD ONC ARIA PLAN TOTAL FRACTIONS PRESCRIBED: 8
RAD ONC ARIA PLAN TOTAL PRESCRIBED DOSE: 1600 CGY
RAD ONC ARIA REFERENCE POINT DOSAGE GIVEN TO DATE: 25.54 GY
RAD ONC ARIA REFERENCE POINT DOSAGE GIVEN TO DATE: 42 GY
RAD ONC ARIA REFERENCE POINT ID: NORMAL
RAD ONC ARIA REFERENCE POINT ID: NORMAL
RAD ONC ARIA REFERENCE POINT SESSION DOSAGE GIVEN: 1.21 GY
RAD ONC ARIA REFERENCE POINT SESSION DOSAGE GIVEN: 2 GY

## 2024-06-05 PROCEDURE — 92526 ORAL FUNCTION THERAPY: CPT | Performed by: SPEECH-LANGUAGE PATHOLOGIST

## 2024-06-05 PROCEDURE — 77386: CPT | Performed by: RADIOLOGY

## 2024-06-05 PROCEDURE — 77300 RADIATION THERAPY DOSE PLAN: CPT

## 2024-06-05 NOTE — THERAPY TREATMENT NOTE
Outpatient Speech Language Pathology   Adult Swallow Treatment Note       Patient Name: Keron Gerber  : 1960  MRN: [id number]  Today's Date: 2024         Visit Date: 2024     Mr. Gerber was seen today for swallowing therapy. He was alert and well-appearing. He is excited and looking forward to increasing PO intake. He reports he wishes to attempt hamburger but knows he would need to grind it. I explained the risk of hamburger as it is typically dry and grainy. This may pose increased difficulty swallowing at this time. We discussed continuing puree items and thin water. We discussed some additional soft food items that I recommend still continue to be put in a  or fork mashed and added to gravy/sauce/puree item for increased cohesion. He verbalized understanding. We discussed completing VFSS in the near future to determine current swallow function and to assist with progression of PO diet, he was agreeable and excited.     He reports no increased congestion, unexplained fevers, or increased overt s/s of aspiration with PO intake. He did report he had attempted jello which did given him more difficulty. I explained to him that this is technically a thin liquids and can be more challenging to swallow. He has been eating apple sauce, pudding, and mashed potatoes with gravy with no difficulty. He asked about eggs. I encouraged him to fork mash this item and place in gravy for increased cohesion if he wishes to attempt.     Continue to follow 1 time per week. Patient to continue home excercise 3 times per day. Continue puree items and thin water as tolerated. Continue to monitor closely for any increased congestion, unexplained fevers, or increased overt s/s of aspiration. Continue main source of nutrition by PEG.     Alma Granger, MS CCC-SLP 2024 09:33 CDT      Patient Active Problem List   Diagnosis    Dyslexia    Prostate cancer    Malignant neoplasm hypopharynx    Malignant  melanoma of nose    Current every day smoker    Abnormal PET scan of colon        Visit Dx:    ICD-10-CM ICD-9-CM   1. Oropharyngeal dysphagia  R13.12 787.22        OP SLP Assessment/Plan - 06/05/24 0926          SLP Plan    Plan Comments Continue to follow 1 time per week. Patient to continue home excercise 3 times per day. Continue puree items and thin water as tolerated. Continue to monitor closely for any increased congestion, unexplained fevers, or increased overt s/s of aspiration. Continue main source of nutrition by PEG.  -MG              User Key  (r) = Recorded By, (t) = Taken By, (c) = Cosigned By      Initials Name Provider Type    MG Alma Granger MS CCC-SLP Speech and Language Pathologist                                       SLP OP Goals       Row Name 06/05/24 0845          Goal Type Needed    Goal Type Needed Other Adult Goals  -MG        Subjective Comments    Subjective Comments Patient was seen today for swallowing therapy.  -MG        Subjective Pain    Able to rate subjective pain? yes  -MG     Pre-Treatment Pain Level 0  -MG     Post-Treatment Pain Level 0  -MG        Other Goals    Other Adult Goal- 1 Patient will increase swallowing frequency during XRT in order to maintain current swallow function once XRT is completed.  -MG     Status: Other Adult Goal- 1 Progressing as expected  -MG     Comments: Other Adult Goal- 1 See note  -MG     Other Adult Goal- 2 Patient will tolerate water throughout and following treatment without complications such as aspiration pneumonia or overt s/s of aspiration.  -MG     Status: Other Adult Goal- 2 Progressing as expected  -MG     Comments: Other Adult Goal- 2 See note.  -MG     Other Adult Goal- 3 Patient will complete oral care daily in order to promote healthy oral mucosa throughout XRT.  -MG     Status: Other Adult Goal- 3 Progressing as expected  -MG     Comments: Other Adult Goal- 3 See note  -MG     Other Adult Goal- 4 Patient will maintain  adequate hydration by drinking needed amount of water by mouth or via PEG tube.  -MG     Status: Other Adult Goal- 4 Progressing as expected  -MG     Comments: Other Adult Goal- 4 See note  -MG     Other Adult Goal- 5 Will work towards PO for pleasure as tolerated.  -MG     Status: Other Adult Goal- 5 Progressing as expected  -MG     Comments: Other Adult Goal- 5 See note  -MG     Other Adult Goal- 6 Patient will complete swallowing exercises three times a day in order to maintain current swallow function and range of motion of oral and pharyngeal structures.  -MG     Status: Other Adult Goal- 6 Progressing as expected  -MG     Comments: Other Adult Goal- 6 See note  -MG        SLP Time Calculation    SLP Goal Re-Cert Due Date 08/14/24  -MG               User Key  (r) = Recorded By, (t) = Taken By, (c) = Cosigned By      Initials Name Provider Type    Alma Rebolledo MS CCC-SLP Speech and Language Pathologist                   OP SLP Education       Row Name 06/05/24 0927       Education    Barriers to Learning No barriers identified  -MG    Education Provided Patient requires further education on strategies, risks  -MG    Assessed Learning needs;Learning motivation;Learning preferences;Learning readiness  -MG    Learning Motivation Strong  -MG    Learning Method Explanation  -MG    Teaching Response Verbalized understanding  -MG    Education Comments See note.  -MG              User Key  (r) = Recorded By, (t) = Taken By, (c) = Cosigned By      Initials Name Effective Dates    Alma eRbolledo MS CCC-SLP 07/11/23 -                         Time Calculation:   SLP Start Time: 0845  SLP Stop Time: 0923  SLP Time Calculation (min): 38 min  Untimed Charges  43314-RP Treatment Swallow Minutes: 38  Total Minutes  Untimed Charges Total Minutes: 38   Total Minutes: 38                 Alma Granger MS CCC-SLP  6/5/2024

## 2024-06-06 ENCOUNTER — INFUSION (OUTPATIENT)
Dept: ONCOLOGY | Facility: HOSPITAL | Age: 64
End: 2024-06-06
Payer: MEDICAID

## 2024-06-06 ENCOUNTER — HOSPITAL ENCOUNTER (OUTPATIENT)
Dept: RADIATION ONCOLOGY | Facility: HOSPITAL | Age: 64
Setting detail: RADIATION/ONCOLOGY SERIES
Discharge: HOME | End: 2024-06-06
Payer: MEDICAID

## 2024-06-06 ENCOUNTER — LAB (OUTPATIENT)
Dept: LAB | Facility: HOSPITAL | Age: 64
End: 2024-06-06
Payer: MEDICAID

## 2024-06-06 ENCOUNTER — OFFICE VISIT (OUTPATIENT)
Dept: ONCOLOGY | Facility: CLINIC | Age: 64
End: 2024-06-06
Payer: MEDICAID

## 2024-06-06 VITALS
HEIGHT: 69 IN | BODY MASS INDEX: 17.63 KG/M2 | OXYGEN SATURATION: 100 % | SYSTOLIC BLOOD PRESSURE: 106 MMHG | TEMPERATURE: 98.5 F | DIASTOLIC BLOOD PRESSURE: 63 MMHG | WEIGHT: 119 LBS | HEART RATE: 71 BPM | RESPIRATION RATE: 18 BRPM

## 2024-06-06 VITALS
OXYGEN SATURATION: 99 % | HEIGHT: 69 IN | TEMPERATURE: 98.8 F | DIASTOLIC BLOOD PRESSURE: 63 MMHG | SYSTOLIC BLOOD PRESSURE: 99 MMHG | WEIGHT: 119.8 LBS | RESPIRATION RATE: 18 BRPM | BODY MASS INDEX: 17.74 KG/M2 | HEART RATE: 67 BPM

## 2024-06-06 DIAGNOSIS — C13.9 MALIGNANT NEOPLASM HYPOPHARYNX: Primary | ICD-10-CM

## 2024-06-06 DIAGNOSIS — C13.9 CANCER OF HYPOPHARYNX: Primary | ICD-10-CM

## 2024-06-06 DIAGNOSIS — C13.9 MALIGNANT NEOPLASM HYPOPHARYNX: ICD-10-CM

## 2024-06-06 LAB
ALBUMIN SERPL-MCNC: 4.1 G/DL (ref 3.5–5.2)
ALBUMIN/GLOB SERPL: 1.6 G/DL
ALP SERPL-CCNC: 84 U/L (ref 39–117)
ALT SERPL W P-5'-P-CCNC: 10 U/L (ref 1–41)
ANION GAP SERPL CALCULATED.3IONS-SCNC: 11 MMOL/L (ref 5–15)
AST SERPL-CCNC: 13 U/L (ref 1–40)
BASOPHILS # BLD AUTO: 0.04 10*3/MM3 (ref 0–0.2)
BASOPHILS NFR BLD AUTO: 0.9 % (ref 0–1.5)
BILIRUB SERPL-MCNC: 0.2 MG/DL (ref 0–1.2)
BUN SERPL-MCNC: 17 MG/DL (ref 8–23)
BUN/CREAT SERPL: 38.6 (ref 7–25)
CALCIUM SPEC-SCNC: 9.2 MG/DL (ref 8.6–10.5)
CHLORIDE SERPL-SCNC: 98 MMOL/L (ref 98–107)
CO2 SERPL-SCNC: 28 MMOL/L (ref 22–29)
CREAT SERPL-MCNC: 0.44 MG/DL (ref 0.76–1.27)
DEPRECATED RDW RBC AUTO: 52 FL (ref 37–54)
EGFRCR SERPLBLD CKD-EPI 2021: 120.6 ML/MIN/1.73
EOSINOPHIL # BLD AUTO: 0.05 10*3/MM3 (ref 0–0.4)
EOSINOPHIL NFR BLD AUTO: 1.1 % (ref 0.3–6.2)
ERYTHROCYTE [DISTWIDTH] IN BLOOD BY AUTOMATED COUNT: 15.2 % (ref 12.3–15.4)
GLOBULIN UR ELPH-MCNC: 2.5 GM/DL
GLUCOSE SERPL-MCNC: 92 MG/DL (ref 65–99)
HCT VFR BLD AUTO: 34.5 % (ref 37.5–51)
HGB BLD-MCNC: 11.5 G/DL (ref 13–17.7)
IMM GRANULOCYTES # BLD AUTO: 0.01 10*3/MM3 (ref 0–0.05)
IMM GRANULOCYTES NFR BLD AUTO: 0.2 % (ref 0–0.5)
LYMPHOCYTES # BLD AUTO: 1.27 10*3/MM3 (ref 0.7–3.1)
LYMPHOCYTES NFR BLD AUTO: 27.6 % (ref 19.6–45.3)
MAGNESIUM SERPL-MCNC: 2 MG/DL (ref 1.6–2.4)
MCH RBC QN AUTO: 32.4 PG (ref 26.6–33)
MCHC RBC AUTO-ENTMCNC: 33.3 G/DL (ref 31.5–35.7)
MCV RBC AUTO: 97.2 FL (ref 79–97)
MONOCYTES # BLD AUTO: 0.36 10*3/MM3 (ref 0.1–0.9)
MONOCYTES NFR BLD AUTO: 7.8 % (ref 5–12)
NEUTROPHILS NFR BLD AUTO: 2.87 10*3/MM3 (ref 1.7–7)
NEUTROPHILS NFR BLD AUTO: 62.4 % (ref 42.7–76)
NRBC BLD AUTO-RTO: 0 /100 WBC (ref 0–0.2)
PLATELET # BLD AUTO: 137 10*3/MM3 (ref 140–450)
PMV BLD AUTO: 9.2 FL (ref 6–12)
POTASSIUM SERPL-SCNC: 3.4 MMOL/L (ref 3.5–5.2)
PROT SERPL-MCNC: 6.6 G/DL (ref 6–8.5)
RAD ONC ARIA COURSE ID: NORMAL
RAD ONC ARIA COURSE LAST TREATMENT DATE: NORMAL
RAD ONC ARIA COURSE START DATE: NORMAL
RAD ONC ARIA COURSE TREATMENT ELAPSED DAYS: 31
RAD ONC ARIA FIRST TREATMENT DATE: NORMAL
RAD ONC ARIA PLAN FRACTIONS TREATED TO DATE: 7
RAD ONC ARIA PLAN ID: NORMAL
RAD ONC ARIA PLAN PRESCRIBED DOSE PER FRACTION: 2 GY
RAD ONC ARIA PLAN PRIMARY REFERENCE POINT: NORMAL
RAD ONC ARIA PLAN TOTAL FRACTIONS PRESCRIBED: 8
RAD ONC ARIA PLAN TOTAL PRESCRIBED DOSE: 1600 CGY
RAD ONC ARIA REFERENCE POINT DOSAGE GIVEN TO DATE: 26.75 GY
RAD ONC ARIA REFERENCE POINT DOSAGE GIVEN TO DATE: 44 GY
RAD ONC ARIA REFERENCE POINT ID: NORMAL
RAD ONC ARIA REFERENCE POINT ID: NORMAL
RAD ONC ARIA REFERENCE POINT SESSION DOSAGE GIVEN: 1.21 GY
RAD ONC ARIA REFERENCE POINT SESSION DOSAGE GIVEN: 2 GY
RBC # BLD AUTO: 3.55 10*6/MM3 (ref 4.14–5.8)
SODIUM SERPL-SCNC: 137 MMOL/L (ref 136–145)
WBC NRBC COR # BLD AUTO: 4.6 10*3/MM3 (ref 3.4–10.8)

## 2024-06-06 PROCEDURE — 25010000002 FUROSEMIDE PER 20 MG: Performed by: INTERNAL MEDICINE

## 2024-06-06 PROCEDURE — 96368 THER/DIAG CONCURRENT INF: CPT

## 2024-06-06 PROCEDURE — 36415 COLL VENOUS BLD VENIPUNCTURE: CPT

## 2024-06-06 PROCEDURE — 96375 TX/PRO/DX INJ NEW DRUG ADDON: CPT

## 2024-06-06 PROCEDURE — 25810000003 SODIUM CHLORIDE 0.9 % SOLUTION 1,000 ML FLEX CONT: Performed by: INTERNAL MEDICINE

## 2024-06-06 PROCEDURE — 77336 RADIATION PHYSICS CONSULT: CPT

## 2024-06-06 PROCEDURE — 25010000002 CISPLATIN PER 50 MG: Performed by: INTERNAL MEDICINE

## 2024-06-06 PROCEDURE — 25010000002 POTASSIUM CHLORIDE PER 2 MEQ OF POTASSIUM: Performed by: INTERNAL MEDICINE

## 2024-06-06 PROCEDURE — 83735 ASSAY OF MAGNESIUM: CPT

## 2024-06-06 PROCEDURE — 77386: CPT | Performed by: RADIOLOGY

## 2024-06-06 PROCEDURE — 85025 COMPLETE CBC W/AUTO DIFF WBC: CPT

## 2024-06-06 PROCEDURE — 25810000003 SODIUM CHLORIDE 0.9 % SOLUTION: Performed by: INTERNAL MEDICINE

## 2024-06-06 PROCEDURE — 25010000002 PALONOSETRON PER 25 MCG: Performed by: INTERNAL MEDICINE

## 2024-06-06 PROCEDURE — 80053 COMPREHEN METABOLIC PANEL: CPT

## 2024-06-06 PROCEDURE — 25010000002 MAGNESIUM SULFATE PER 500 MG OF MAGNESIUM: Performed by: INTERNAL MEDICINE

## 2024-06-06 PROCEDURE — 25810000003 SODIUM CHLORIDE 0.9 % SOLUTION 250 ML FLEX CONT: Performed by: INTERNAL MEDICINE

## 2024-06-06 PROCEDURE — 25010000002 FOSAPREPITANT PER 1 MG: Performed by: INTERNAL MEDICINE

## 2024-06-06 PROCEDURE — 96413 CHEMO IV INFUSION 1 HR: CPT

## 2024-06-06 PROCEDURE — 25010000002 DEXAMETHASONE SODIUM PHOSPHATE 100 MG/10ML SOLUTION: Performed by: INTERNAL MEDICINE

## 2024-06-06 PROCEDURE — 96367 TX/PROPH/DG ADDL SEQ IV INF: CPT

## 2024-06-06 PROCEDURE — 96366 THER/PROPH/DIAG IV INF ADDON: CPT

## 2024-06-06 RX ORDER — FUROSEMIDE 10 MG/ML
20 INJECTION INTRAMUSCULAR; INTRAVENOUS ONCE
Status: COMPLETED | OUTPATIENT
Start: 2024-06-06 | End: 2024-06-06

## 2024-06-06 RX ORDER — PALONOSETRON 0.05 MG/ML
0.25 INJECTION, SOLUTION INTRAVENOUS ONCE
Status: COMPLETED | OUTPATIENT
Start: 2024-06-06 | End: 2024-06-06

## 2024-06-06 RX ORDER — SODIUM CHLORIDE 9 MG/ML
20 INJECTION, SOLUTION INTRAVENOUS ONCE
Status: COMPLETED | OUTPATIENT
Start: 2024-06-06 | End: 2024-06-06

## 2024-06-06 RX ORDER — FAMOTIDINE 10 MG/ML
20 INJECTION, SOLUTION INTRAVENOUS AS NEEDED
Status: DISCONTINUED | OUTPATIENT
Start: 2024-06-06 | End: 2024-06-06 | Stop reason: HOSPADM

## 2024-06-06 RX ORDER — DIPHENHYDRAMINE HYDROCHLORIDE 50 MG/ML
50 INJECTION INTRAMUSCULAR; INTRAVENOUS AS NEEDED
Status: DISCONTINUED | OUTPATIENT
Start: 2024-06-06 | End: 2024-06-06 | Stop reason: HOSPADM

## 2024-06-06 RX ORDER — FUROSEMIDE 10 MG/ML
20 INJECTION INTRAMUSCULAR; INTRAVENOUS ONCE
Status: DISCONTINUED | OUTPATIENT
Start: 2024-06-06 | End: 2024-06-06 | Stop reason: HOSPADM

## 2024-06-06 RX ADMIN — SODIUM CHLORIDE 150 MG: 9 INJECTION, SOLUTION INTRAVENOUS at 11:44

## 2024-06-06 RX ADMIN — SODIUM CHLORIDE 68 MG: 9 INJECTION, SOLUTION INTRAVENOUS at 12:18

## 2024-06-06 RX ADMIN — MAGNESIUM SULFATE HEPTAHYDRATE: 500 INJECTION, SOLUTION INTRAMUSCULAR; INTRAVENOUS at 12:19

## 2024-06-06 RX ADMIN — MAGNESIUM SULFATE HEPTAHYDRATE: 500 INJECTION, SOLUTION INTRAMUSCULAR; INTRAVENOUS at 09:00

## 2024-06-06 RX ADMIN — SODIUM CHLORIDE 20 ML/HR: 9 INJECTION, SOLUTION INTRAVENOUS at 08:59

## 2024-06-06 RX ADMIN — DEXAMETHASONE SODIUM PHOSPHATE 12 MG: 10 INJECTION, SOLUTION INTRAMUSCULAR; INTRAVENOUS at 11:27

## 2024-06-06 RX ADMIN — PALONOSETRON HYDROCHLORIDE 0.25 MG: 0.25 INJECTION, SOLUTION INTRAVENOUS at 11:25

## 2024-06-06 RX ADMIN — FUROSEMIDE 20 MG: 10 INJECTION, SOLUTION INTRAMUSCULAR; INTRAVENOUS at 12:14

## 2024-06-06 NOTE — PROGRESS NOTES
[facility] HEMATOLOGY & ONCOLOGY  [address]  [facility] [place] [facility] [place] [facility] [address] [phone]    Patient Name: Keron Gerber  Encounter Date: 06/06/2024  YOB: 1960  Patient Number: [id number]      REASON FOR FOLLOW-UP: Keron Gerber is a pleasant 63 y.o.  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  He is seen week 5 of cisplatin with radiation.  He is seen with nurse Hendrickson at the chemo suite.  History is obtained from patient.  He is a reliable historian.           DIAGNOSTIC ABNORMALITIES:   He had several months of right ear pain radiating to the right neck and 20 pound weight loss over 6 months.   He noted right neck mass and dysphagia.  CT neck on 2/28/2024.  3.5 x 4 cm lobular enhancing mass in the upper larynx arising from the posterior wall at the level of the base of the epiglottis.  Suspicious for malignancy.  Abnormal mucosal enhancement also noted involving the uvula and tongue base.  CT chest 3/13/2024.  Well-defined 1.7 cm fluid density lesion in the anterior mediastinum could represent a benign etiology such as a thymic cyst.  Recommend follow-up.  Mild diffuse thickening of bilateral adrenal glands could represent adenomatous hyperplasia.  Recommend correlation with prior imaging if available.  No evidence of pulmonary metastasis.  Tracheostomy, direct laryngoscopy, rigid cervical esophagoscopy and biopsy of the posterior pharyngeal mass and biopsy of the left true vocal fold on 3/14/2024. Flexible laryngoscopy revealed a large exophytic lesion coming from the posterior pharyngeal wall protruding off the supraglottis and glottis with inability to visualize the glottis.  PEG placement 3/18/2024.  Pathology report on 3/18/2024.  Vocal cord, left, biopsy: Squamous mucosa with focal dysplasia at least moderate.  Posterior pharyngeal wall biopsy: Invasive poorly  differentiated squamous cell carcinoma.  Posterior pharyngeal wall biopsy: Invasive poorly differentiated squamous cell carcinoma with focal metaplastic features.  Posterior pharyngeal wall biopsy: Invasive poorly differentiated squamous cell carcinoma with focal metaplastic features.  CBC 3/19/2024 revealed a WBC of 10.8, hemoglobin 13.7, hematocrit 40.5 and platelet 329.  CMP revealed creatinine of 0.73.  Patient seen by Dr. Lit Rand on 3/22/2024.  Findings: Nasopharynx and oropharynx patent without masses/lesions; BOT without masses/lesions; exophytic lesion likely coming from posterior pharyngeal wall protruding onto supraglottis and glottis; unable to visualize glottis d/t obstructive view; supraglottis without obvious lesion otherwise.  Multidisciplinary recommendation: Discussed chemoradiation versus surgical resection.  Patient would benefit from chemoradiation at this time.  Note from Dr. Good on 4/2/2024. Plan for 35 fractions. PET 4/16/2024. Hypermetabolic mass of the posterior wall of the hypopharynx consistent with biopsy-proven squamous cell carcinoma, known primary neoplasm. No hypermetabolic cervical lymphadenopathy is seen.  Hypermetabolic mass of the distal sigmoid colon also worrisome for second primary neoplasm, adenocarcinoma of the colon. Colonoscopy is recommended for further evaluation. Plan for colonoscopy.           PREVIOUS INTERVENTIONS:  Undergoing weekly cisplatin 5/9/2024 through present with radiation 5/6/2024 through present.  To complete radiation 6/21/2024.           Oncology/Hematology History   Prostate cancer    Initial Diagnosis    Prostate cancer     1/23/2020 Procedure    PSA 13.96     2/23/2021 Procedure    PSA 4.4     4/23/2022 Procedure    PSA 10.1     5/12/2022 Procedure    PSA 16.70     5/23/2022 Biopsy    Prostate Biopsy:  Left mid medial 1 - prostatic adenocarcinoma, [place] 3+3=6  Left mid lateral 2 - prostatic adenocarcinoma Johanny 3+3=6  Left mid medial 2 -  prostatic adenocarcinoma Johanny 3+3=6  Left apex lateral - prostatic adenocarcinoma, [place] 3+3=6  Left apex medial - prostatic adenocarcinoma, Johanny 3+3=6  Right base medial - prostatic adenocarcinoma, Johanny 3+3=6    Perineural invasion is identified in this case     9/7/2022 Surgery    Final Diagnosis   Prostate, prostatectomy:               - Histologic Type:  Acinar adenocarcinoma.               - Histologic Grade:  Grade group 4 (Johanny score 4 + 4 = 8).               - Intraductal Carcinoma:  Not identified.               - Cribriform Glands:  Present.               - Treatment Effect:  No known presurgical therapy.               - Tumor Quantitation: 41-50%.               - Extraprostatic Extension:  Not identified.               - Urinary Bladder Neck Invasion: Not identified.               - Seminal Vesicle Invasion: Not identified.               - Lymphovascular Invasion: Not identified.               - Margins: All margins are Negative for invasive carcinoma.               - Pathologic Stage:  pT2 pNx pMx        12/8/2022 Procedure    PSA <0.1     5/16/2023 Procedure    PSA <0.1     9/19/2023 Procedure    PSA <0.1     1/23/2024 Procedure    PSA <0.1     Malignant neoplasm hypopharynx    Initial Diagnosis    Malignant neoplasm hypopharynx     2/28/2024 Imaging    CT Neck:  35 x 40 mm lobular enhancing mass in the upper larynx arising from the posterior   wall at the level of the base of the epiglottis. This is suspicious for malignancy.   There is abnormal mucosal enhancement also noted involving the uvula and   tongue base.      3/2/2024 Imaging    CT Neck:  Findings suggest laryngeal mass as noted, suggest ENT evaluation, asymmetric appearance of the vocal cords and larynx  Minimal adenopathy  Postop changes left submandibular region     3/13/2024 Imaging    CT Chest:  Well-defined 1.7 cm fluid density lesion in the anterior mediastinum could   represent a benign etiology such as a thymic cyst.  Given lack of prior imaging   and concern for malignancy, recommend close attention on follow-up imaging   and further workup as clinically necessary.  Mild diffuse thickening of bilateral adrenal glands could represent   adenomatous hyperplasia. Recommend correlation to prior imaging if   available and attention on follow-up imaging.  No evidence of pulmonary metastases.     3/14/2024 Biopsy    Awake tracheostomy/Direct laryngoscopy with suspension with   telescope/right cervical esophagoscopy/biopsy    Findings:  Successful awake tracheostomy in successful awake tracheostomy with   placement of a 6 cuffed Shiley tracheostomy tube.  Large exophytic 5-6 cm posterior oropharyngeal mass extending to the   junction of the lateral posterior pharyngeal wall junction bilaterally, superiorly   comes up to the level of the soft palate. Inferiorly extends into bilateral piriform   sinuses, but does not involve the apex, however there are reactive changes   of the left piriform apex. There is no involvement of the supraglottic structures.  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  No other mucosal masses, lesions, or ulcers visualized.   No mucosal masses, lesions, or ulcers visualized in the esophageal inlet or   cervical esophagus.    DIAGNOSIS:     A. VOCAL CORD, LEFT, BIOPSY:   - Squamous mucosa with focal dysplasia, at least moderate.     B.  POSTERIOR PHARYNGEAL WALL, BIOPSY:   - INVASIVE POORLY DIFFERENTIATED SQUAMOUS CELL CARCINOMA.     C.  POSTERIOR PHARYNGEAL WALL, BIOPSY:   - INVASIVE POORLY DIFFERENTIATED SQUAMOUS CELL CARCINOMA WITH FOCAL METAPLASTIC FEATURES     D.  POSTERIOR PHARYNGEAL WALL, BIOPSY:   - INVASIVE POORLY DIFFERENTIATED SQUAMOUS CELL CARCINOMA WITH FOCAL METAPLASTIC FEATURES      3/18/2024 Procedure    Gastrostomy tube placement     3/22/2024 Procedure    Appointment with Dr. Rand - [facility]  [facility]:  Laryngoscopy:  Findings: nasopharynx and oropharynx patent without masses/lesions; BOT   without masses/lesions; exophytic lesion likely coming from posterior   pharyngeal wall protruding onto supraglottis and glottis; unable to visualize   glottis d/t obstructive view; supraglottis without obvious lesion otherwise    PLAN:   CT chest without mets. Discussed imaging in depth with team. Patient is   currently T3N0, stage III oropharyngeal SCC.   DL had biopsies positive for invasive SCC.   Discussed chemoradiation vs surgical resection with patient. Patient will   benefit from chemoradiation at this time.  Patient would like to have treatment closer to home in [facility]. Will send   out referrals.  Patient may need further evaluation of his carotid stenosis as well.     Appointment with Dr. 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       4/2/2024 Cancer Staged    Staging form: Pharynx - Hypopharynx, AJCC 8th Edition  - Clinical stage from 4/2/2024: Stage III (cT3, cN0, cM0) - Signed by Tee Randhawa MD on 4/2/2024 5/9/2024 -  Chemotherapy    OP HEAD & NECK CISplatin (Weekly) + XRT     Malignant melanoma of nose   8/7/2014 Surgery    FINAL DIAGNOSIS                       A. CYST, RIGHT EYELID:                 EPIDERMAL INCLUSION CYST.            B. EXCISION, SKIN LESION, LEFT NOSE:                 1.   MALIGNANT MELANOMA.                 2.   ARTIE LEVEL III.                 3.   BRESLOW MAXIMUM THICKNESS EQUALS 0.4 MM.                 4.   NO ULCERATION IDENTIFIED.                 5.   NO MICROSATELLITES IDENTIFIED.                 6.   NO MITOTIC FIGURES IDENTIFIED IN TUMOR.                 7.   NO ANGIOLYMPHATIC INVASION IDENTIFIED.                 8.   AJCC STAGE: pT1a pNX           Dictated by: CHANTE GILL MD         9/11/2014 Surgery    FINAL DIAGNOSIS                       SKIN, NOSE, RE-EXCISION FOR MELANOMA:                 NEGATIVE FOR RESIDUAL MELANOMA OR MELANOMA IN SITU.                 BIOPSY SITE IDENTIFIED.                 SEVERE SOLAR ELASTOSIS.                 SEBORRHEIC KERATOSIS.                 MARGINS OF RESECTION ARE NEGATIVE.              PAST MEDICAL HISTORY:  ALLERGIES:  Allergies   Allergen Reactions    Sulfa Antibiotics Other (See Comments)     Headaches     CURRENT MEDICATIONS:  Outpatient Encounter Medications as of 6/6/2024   Medication Sig Dispense Refill    acetaminophen (TYLENOL) 500 MG tablet Take 1 tablet by mouth Every 6 (Six) Hours As Needed for Mild Pain.      ondansetron (Zofran) 8 MG tablet Take 1 tablet by mouth Every 8 (Eight) Hours As Needed for Nausea or Vomiting. 60 tablet 2    prochlorperazine (COMPAZINE) 10 MG tablet Take 1 tablet by mouth Every 4 (Four) Hours As Needed for Nausea or Vomiting. 60 tablet 2    tadalafil (CIALIS) 20 MG tablet Take 1 tablet by mouth As Needed for Erectile Dysfunction. 1-12 hours before activity 12 tablet 11    Varenicline Tartrate, Starter, 0.5 MG X 11 & 1 MG X 42 tablet therapy pack        No facility-administered encounter medications on file as of 6/6/2024.     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    Current every day smoker F17.200    Abnormal PET scan of colon R94.8     SURGERIES:  Past Surgical History:   Procedure Laterality Date    COLONOSCOPY N/A 5/1/2024    Procedure: COLONOSCOPY WITH ANESTHESIA;  Surgeon: Gustabo Ruiz MD;  Location: [facility] ENDOSCOPY;  Service: Gastroenterology;  Laterality: N/A;  preop; abnormal pet scan   postop rectal mass; polyps;   PCP Dr Santos    CYST REMOVAL Right 08/07/2014    RIGHT LOWER EYELID- EPIDERMAL INCLUSION CYST    EXCISION LESION N/A 08/07/2014    MALIGNANT  MELANOMA OF LEFT NASAL TIP    FOREHEAD FLAP  09/11/2014    PEDICLE FLAP  10/02/2014    PEDICLE DIVISION AND FLAP INSET OF NOSE    PROSTATECTOMY N/A 09/07/2022    Procedure: PROSTATECTOMY LAPAROSCOPIC WITH charity: [employer];  Surgeon: Matthew Weaver MD;  Location: [facility] OR;  Service: Robotics - [employer];  Laterality: N/A;    SQUAMOUS CELL CARCINOMA EXCISION  10/29/2013    LEFT FLOOR OF MOUTH    TONSILLECTOMY       HEALTH MAINTENANCE ITEMS:  Health Maintenance Due   Topic Date Due    Pneumococcal Vaccine 0-64 (1 of 2 - PCV) Never done    TDAP/TD VACCINES (1 - Tdap) Never done    ZOSTER VACCINE (1 of 2) Never done    HEPATITIS C SCREENING  Never done    ANNUAL PHYSICAL  Never done    RSV Vaccine - Adults (1 - 1-dose 60+ series) Never done    COVID-19 Vaccine (4 - 2023-24 season) 09/01/2023       <no information>  Last Completed Colonoscopy            COLONOSCOPY (Every 10 Years) Next due on 5/1/2034 05/01/2024  Colonoscopy    05/01/2024  Surgical Procedure: COLONOSCOPY                  Immunization History   Administered Date(s) Administered    COVID-19 (MODERNA) 1st,2nd,3rd Dose Monovalent 02/24/2021, 03/23/2021    COVID-19 (MODERNA) BIVALENT 12+YRS 11/15/2022    COVID-19 (MODERNA) Monovalent Original Booster 12/16/2021     Last Completed Mammogram       This patient has no relevant Health Maintenance data.              FAMILY HISTORY:  Family History   Problem Relation Age of Onset    No Known Problems Mother     Heart disease Father     Colon polyps Father     Colon cancer Neg Hx      SOCIAL HISTORY:  Social History     Socioeconomic History    Marital status: Single   Tobacco Use    Smoking status: Every Day     Current packs/day: 1.00     Types: Cigarettes    Smokeless tobacco: Never   Vaping Use    Vaping status: Never Used   Substance and Sexual Activity    Alcohol use: Not Currently     Comment: 12 PER DAY    Drug use: No    Sexual activity: Defer       REVIEW OF SYSTEMS:    Review of Systems  "  Constitutional:  Negative for fatigue and fever.        \"I feel fine.\"   HENT:  Negative for congestion and mouth sores.         Episodes of ringing of the ear. Observe.   Eyes:  Negative for discharge and redness.   Respiratory:  Negative for shortness of breath and wheezing.    Cardiovascular:  Negative for chest pain and leg swelling.   Gastrointestinal:  Negative for nausea and vomiting.        \"Had diarrhea from tube feedings.\"   Endocrine: Negative for cold intolerance and heat intolerance.   Genitourinary:  Negative for dysuria and flank pain.   Musculoskeletal:  Negative for gait problem and myalgias.   Skin:  Positive for pallor.   Allergic/Immunologic: Negative for food allergies.   Neurological:  Negative for dizziness and weakness.   Hematological:  Does not bruise/bleed easily.   Psychiatric/Behavioral:  Negative for agitation, confusion and hallucinations.        VITAL SIGNS: /63   Pulse 71   Temp 98.5 °F (36.9 °C)   Resp 18   Ht 175.3 cm (69\")   Wt 54 kg (119 lb)   SpO2 100%   BMI 17.57 kg/m²  Lost 3 pounds.   Pain Score    06/06/24 0851   PainSc: 0-No pain       PHYSICAL EXAMINATION:     Physical Exam  Vitals reviewed.   Constitutional:       General: He is not in acute distress.  HENT:      Head: Normocephalic and atraumatic.   Eyes:      General: No scleral icterus.  Cardiovascular:      Rate and Rhythm: Normal rate.   Pulmonary:      Effort: No respiratory distress.      Breath sounds: No wheezing.   Abdominal:      General: Bowel sounds are normal.      Palpations: Abdomen is soft.      Comments: + PEG.   Musculoskeletal:         General: No swelling.      Cervical back: No rigidity.   Skin:     Coloration: Skin is pale.   Neurological:      Mental Status: He is alert and oriented to person, place, and time.   Psychiatric:         Mood and Affect: Mood normal.         Behavior: Behavior normal.         Thought Content: Thought content normal.         Judgment: Judgment normal. " "        LABS    Lab Results - Last 18 Months   Lab Units 06/06/24  0728 05/30/24  0726 05/23/24  0723 05/16/24  0734 05/09/24  0729   HEMOGLOBIN g/dL 11.5* 11.8* 12.4* 13.2 13.5   HEMATOCRIT % 34.5* 35.2* 37.1* 39.8 40.7   MCV fL 97.2* 97.0 96.6 95.9 97.4*   WBC 10*3/mm3 4.60 5.79 7.66 8.51 8.46   RDW % 15.2 14.6 13.6 13.2 13.2   MPV fL 9.2 9.7 9.0 10.3 10.6   PLATELETS 10*3/mm3 137* 186 212 245 267   IMM GRAN % % 0.2 0.3 0.3 0.6* 0.5   NEUTROS ABS 10*3/mm3 2.87 3.93 4.98 5.25 4.73   LYMPHS ABS 10*3/mm3 1.27 1.27 1.86 2.21 2.78   MONOS ABS 10*3/mm3 0.36 0.43 0.62 0.80 0.66   EOS ABS 10*3/mm3 0.05 0.09 0.12 0.14 0.19   BASOS ABS 10*3/mm3 0.04 0.05 0.06 0.06 0.06   IMMATURE GRANS (ABS) 10*3/mm3 0.01 0.02 0.02 0.05 0.04   NRBC /100 WBC 0.0 0.0 0.0 0.0 0.0       Lab Results - Last 18 Months   Lab Units 06/06/24  0728 05/30/24  0726 05/23/24  0723 05/16/24  0734 05/09/24  0729   GLUCOSE mg/dL 92 110* 113* 107*  107* 111*   SODIUM mmol/L 137 137 139 138  138 139   POTASSIUM mmol/L 3.4* 3.6 3.9 3.5  3.5 3.8   CO2 mmol/L 28.0 30.0* 32.0* 33.0*  33.0* 29.0   CHLORIDE mmol/L 98 99 99 98  98 100   ANION GAP mmol/L 11.0 8.0 8.0 7.0  7.0 10.0   CREATININE mg/dL 0.44* 0.44* 0.46* 0.50*  0.50* 0.56*   BUN mg/dL 17 16 14 16  16 15   BUN / CREAT RATIO  38.6* 36.4* 30.4* 32.0*  32.0* 26.8*   CALCIUM mg/dL 9.2 9.2 9.4 9.2  9.2 9.4   ALK PHOS U/L 84 84 98 96 94   TOTAL PROTEIN g/dL 6.6 6.9 6.9 6.9 7.0   ALT (SGPT) U/L 10 10 11 11 10   AST (SGOT) U/L 13 11 13 12 13   BILIRUBIN mg/dL 0.2 0.3 0.2 0.3 0.3   ALBUMIN g/dL 4.1 4.1 4.1 4.1 4.1   GLOBULIN gm/dL 2.5 2.8 2.8 2.8 2.9       No results for input(s): \"MSPIKE\", \"KAPPALAMB\", \"IGLFLC\", \"URICACID\", \"FREEKAPPAL\", \"CEA\", \"LDH\", \"REFLABREPO\" in the last 64558 hours.    No results for input(s): \"IRON\", \"TIBC\", \"LABIRON\", \"FERRITIN\", \"U0WCKNP\", \"TSH\", \"FOLATE\" in the last 24101 hours.    Invalid input(s): \"VITB12\"    Keron Gerber reports a pain score of 0.  " "        ASSESSMENT:  1.  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  Tumor size.3.5 x 4 cm.  AJCC stage:III (cT3, cN0, cM0)  Tumor complications: Dysphagia.  Treatment status: Post tracheostomy and G-tube placement.  Therapy with weekly cisplatin 5/9/2024 through present.   2.  Performance status of 0.  3.  Hypermetabolic mass, high rectal extending to distal sigmoid colon maximum SUV 7.4.  Colonoscopy showed high-grade dysplasia rectosigmoid colon. For resection with Dr. Cruz after completion of cisplatin with radiation.  4.  Prostate cancer 2022, pT2, NX,history of.   Treatment status: Post prostatectomy 9/7/2022 by Dr. Weaver.  Followed by Dr. Luciano.  5.  Tobacco abuse.  He is a current smoker.  6.  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 Lawrence in [place].   7.  Melanoma,  pT1a, nose, in 2014 post resection and reconstruction by ADRIANA Lazaro.   8.  Nicotine abuse. \"Smokes 1 pack per day.\"          PLAN:   Re: Note from Dr. Abram Walker on 5/24/2024.  Plan for surgical treatment of advanced polyp with high-grade dysplasia concerning for underlying malignancy after completion of chemoradiation for hypopharyngeal squamous cell carcinoma.  CT abdomen pelvis ordered.  Telehealth in 3 weeks. Communication with Dr. Abram Cruz on 5/28/2024.  Patient to complete cisplatin with radiation for the pharyngeal squamous cell carcinoma.  Then will undergo resection for a distal sigmoid colon mass.  2.   Re: Heme status.WBC 4.6, hemoglobin 11.5 and platelet 137, observe.   3.   Re: CMP.  .6 ml/min and potassium 3.4.  He will receive pre and post hydration with potassium.  4.   Re: Magnesium at 2.    5.   Re: Monitor for adverse effects of cisplatin like effluxes, infusion reactions, neuropathy, nephrotoxicity, worsening tinnitus, nausea, vomiting or myelosuppression.  6.  Weekly CBC with differential, CMP and magnesium with " chemo.  7.  Schedule chemo:  Cisplatin 40 mg/m2 weekly (Thursdays) with radiation.  8.  Premed:  Aloxi 0.25 mg IV  Decadron 12 mg IV  Fosaprepitant 150 mg IV  9.  Pre-hydrate cisplatin with 1L NS over 2 hours with 1 gram magnesium and 20 mEq of KCl.  Lasix 20 mg IVP after pre-hydration and after cisplatin.   10.  Post Hydration with NS 1L over 2 hours with 20 mEq of KCl and 1 g magnesium.  11. eRx ondansetron 8 mg per PEG. every 8 hours as needed for nausea and vomiting #60, 2 refills if needed.  12. eRx prochlorperazine 10 mg per PEG every 4 hours as needed for nausea and vomiting #60, 2 refills if needed.  13.  Continue care per primary care physician and the other specialists.  14.  Plan of care discussed with patient and nurse Madhavi at the chemo suite.  Understanding expressed.  He is agreeable to proceed.   15.  Return to office in 3 weeks.            I have reviewed the assessment and plan and verified the accuracy of it. No changes to assessment and plan since the information was documented. Tee Randhawa MD 06/06/24       I spent 43 total minutes, face-to-face, caring for Keron vargas. Greater than 50% of this time involved counseling and/or coordination of care as documented within this note.             (Hector Good MD)  (Lit Rand MD [facility]  (Yousif Atkins MD)  (Abram Cruz MD [facility])  Hiram Luciano MD  (Gustabo Ruiz MD)  Yousif Marrero MD  (ZENY Benedict)

## 2024-06-06 NOTE — PROGRESS NOTES
Message to Hem/Onc: Keron Gerber, [birth year], here for treatment today; states he completely forgot about follow up  with Dr. Randhawa yesterday until he got home.  Okay to treat today and do you think Dr. Randhawa will run down here to see him today?  Labs look good except K+ 3.4.  He does have some ringing in right ear, but states he had it prior to being diagnosed with cancer and it hasn't gotten any worse (he didn't remember telling Dr. Randhawa about it); I told him to let Dr. Randhawa know if got any worse.  Please advise.

## 2024-06-07 ENCOUNTER — HOSPITAL ENCOUNTER (OUTPATIENT)
Dept: RADIATION ONCOLOGY | Facility: HOSPITAL | Age: 64
Setting detail: RADIATION/ONCOLOGY SERIES
Discharge: HOME | End: 2024-06-07
Payer: MEDICAID

## 2024-06-07 LAB
RAD ONC ARIA COURSE ID: NORMAL
RAD ONC ARIA COURSE LAST TREATMENT DATE: NORMAL
RAD ONC ARIA COURSE START DATE: NORMAL
RAD ONC ARIA COURSE TREATMENT ELAPSED DAYS: 32
RAD ONC ARIA FIRST TREATMENT DATE: NORMAL
RAD ONC ARIA PLAN FRACTIONS TREATED TO DATE: 8
RAD ONC ARIA PLAN ID: NORMAL
RAD ONC ARIA PLAN PRESCRIBED DOSE PER FRACTION: 2 GY
RAD ONC ARIA PLAN PRIMARY REFERENCE POINT: NORMAL
RAD ONC ARIA PLAN TOTAL FRACTIONS PRESCRIBED: 8
RAD ONC ARIA PLAN TOTAL PRESCRIBED DOSE: 1600 CGY
RAD ONC ARIA REFERENCE POINT DOSAGE GIVEN TO DATE: 27.96 GY
RAD ONC ARIA REFERENCE POINT DOSAGE GIVEN TO DATE: 46 GY
RAD ONC ARIA REFERENCE POINT ID: NORMAL
RAD ONC ARIA REFERENCE POINT ID: NORMAL
RAD ONC ARIA REFERENCE POINT SESSION DOSAGE GIVEN: 1.21 GY
RAD ONC ARIA REFERENCE POINT SESSION DOSAGE GIVEN: 2 GY

## 2024-06-07 PROCEDURE — 77386: CPT

## 2024-06-10 ENCOUNTER — HOSPITAL ENCOUNTER (OUTPATIENT)
Dept: RADIATION ONCOLOGY | Facility: HOSPITAL | Age: 64
Setting detail: RADIATION/ONCOLOGY SERIES
Discharge: HOME | End: 2024-06-10
Payer: MEDICAID

## 2024-06-10 LAB
RAD ONC ARIA COURSE ID: NORMAL
RAD ONC ARIA COURSE LAST TREATMENT DATE: NORMAL
RAD ONC ARIA COURSE START DATE: NORMAL
RAD ONC ARIA COURSE TREATMENT ELAPSED DAYS: 35
RAD ONC ARIA FIRST TREATMENT DATE: NORMAL
RAD ONC ARIA PLAN FRACTIONS TREATED TO DATE: 1
RAD ONC ARIA PLAN ID: NORMAL
RAD ONC ARIA PLAN PRESCRIBED DOSE PER FRACTION: 2 GY
RAD ONC ARIA PLAN PRIMARY REFERENCE POINT: NORMAL
RAD ONC ARIA PLAN TOTAL FRACTIONS PRESCRIBED: 7
RAD ONC ARIA PLAN TOTAL PRESCRIBED DOSE: 1400 CGY
RAD ONC ARIA REFERENCE POINT DOSAGE GIVEN TO DATE: 29.13 GY
RAD ONC ARIA REFERENCE POINT DOSAGE GIVEN TO DATE: 48 GY
RAD ONC ARIA REFERENCE POINT ID: NORMAL
RAD ONC ARIA REFERENCE POINT ID: NORMAL
RAD ONC ARIA REFERENCE POINT SESSION DOSAGE GIVEN: 1.17 GY
RAD ONC ARIA REFERENCE POINT SESSION DOSAGE GIVEN: 2 GY

## 2024-06-10 PROCEDURE — 77386: CPT | Performed by: RADIOLOGY

## 2024-06-11 ENCOUNTER — HOSPITAL ENCOUNTER (OUTPATIENT)
Dept: RADIATION ONCOLOGY | Facility: HOSPITAL | Age: 64
Setting detail: RADIATION/ONCOLOGY SERIES
Discharge: HOME | End: 2024-06-11
Payer: MEDICAID

## 2024-06-11 ENCOUNTER — HOSPITAL ENCOUNTER (OUTPATIENT)
Dept: CT IMAGING | Facility: HOSPITAL | Age: 64
Discharge: HOME | End: 2024-06-11
Admitting: SURGERY
Payer: MEDICAID

## 2024-06-11 ENCOUNTER — APPOINTMENT (OUTPATIENT)
Dept: SPEECH THERAPY | Facility: HOSPITAL | Age: 64
End: 2024-06-11
Payer: MEDICAID

## 2024-06-11 DIAGNOSIS — D49.0 RECTAL TUMOR: ICD-10-CM

## 2024-06-11 LAB
RAD ONC ARIA COURSE ID: NORMAL
RAD ONC ARIA COURSE LAST TREATMENT DATE: NORMAL
RAD ONC ARIA COURSE START DATE: NORMAL
RAD ONC ARIA COURSE TREATMENT ELAPSED DAYS: 36
RAD ONC ARIA FIRST TREATMENT DATE: NORMAL
RAD ONC ARIA PLAN FRACTIONS TREATED TO DATE: 2
RAD ONC ARIA PLAN ID: NORMAL
RAD ONC ARIA PLAN PRESCRIBED DOSE PER FRACTION: 2 GY
RAD ONC ARIA PLAN PRIMARY REFERENCE POINT: NORMAL
RAD ONC ARIA PLAN TOTAL FRACTIONS PRESCRIBED: 7
RAD ONC ARIA PLAN TOTAL PRESCRIBED DOSE: 1400 CGY
RAD ONC ARIA REFERENCE POINT DOSAGE GIVEN TO DATE: 30.31 GY
RAD ONC ARIA REFERENCE POINT DOSAGE GIVEN TO DATE: 50 GY
RAD ONC ARIA REFERENCE POINT ID: NORMAL
RAD ONC ARIA REFERENCE POINT ID: NORMAL
RAD ONC ARIA REFERENCE POINT SESSION DOSAGE GIVEN: 1.17 GY
RAD ONC ARIA REFERENCE POINT SESSION DOSAGE GIVEN: 2 GY

## 2024-06-11 PROCEDURE — 77386: CPT

## 2024-06-11 PROCEDURE — 25510000001 IOPAMIDOL 61 % SOLUTION: Performed by: SURGERY

## 2024-06-11 PROCEDURE — 74177 CT ABD & PELVIS W/CONTRAST: CPT

## 2024-06-11 RX ADMIN — IOPAMIDOL 100 ML: 612 INJECTION, SOLUTION INTRAVENOUS at 15:27

## 2024-06-12 ENCOUNTER — HOSPITAL ENCOUNTER (OUTPATIENT)
Dept: RADIATION ONCOLOGY | Facility: HOSPITAL | Age: 64
Setting detail: RADIATION/ONCOLOGY SERIES
Discharge: HOME | End: 2024-06-12
Payer: MEDICAID

## 2024-06-12 ENCOUNTER — TREATMENT (OUTPATIENT)
Dept: SPEECH THERAPY | Facility: HOSPITAL | Age: 64
End: 2024-06-12
Payer: MEDICAID

## 2024-06-12 DIAGNOSIS — R13.12 OROPHARYNGEAL DYSPHAGIA: Primary | ICD-10-CM

## 2024-06-12 DIAGNOSIS — C13.9 MALIGNANT NEOPLASM HYPOPHARYNX: Primary | ICD-10-CM

## 2024-06-12 LAB
RAD ONC ARIA COURSE ID: NORMAL
RAD ONC ARIA COURSE LAST TREATMENT DATE: NORMAL
RAD ONC ARIA COURSE START DATE: NORMAL
RAD ONC ARIA COURSE TREATMENT ELAPSED DAYS: 37
RAD ONC ARIA FIRST TREATMENT DATE: NORMAL
RAD ONC ARIA PLAN FRACTIONS TREATED TO DATE: 3
RAD ONC ARIA PLAN ID: NORMAL
RAD ONC ARIA PLAN PRESCRIBED DOSE PER FRACTION: 2 GY
RAD ONC ARIA PLAN PRIMARY REFERENCE POINT: NORMAL
RAD ONC ARIA PLAN TOTAL FRACTIONS PRESCRIBED: 7
RAD ONC ARIA PLAN TOTAL PRESCRIBED DOSE: 1400 CGY
RAD ONC ARIA REFERENCE POINT DOSAGE GIVEN TO DATE: 31.48 GY
RAD ONC ARIA REFERENCE POINT DOSAGE GIVEN TO DATE: 52 GY
RAD ONC ARIA REFERENCE POINT ID: NORMAL
RAD ONC ARIA REFERENCE POINT ID: NORMAL
RAD ONC ARIA REFERENCE POINT SESSION DOSAGE GIVEN: 1.17 GY
RAD ONC ARIA REFERENCE POINT SESSION DOSAGE GIVEN: 2 GY

## 2024-06-12 PROCEDURE — 77386: CPT

## 2024-06-12 PROCEDURE — 92526 ORAL FUNCTION THERAPY: CPT | Performed by: SPEECH-LANGUAGE PATHOLOGIST

## 2024-06-12 RX ORDER — SODIUM CHLORIDE 9 MG/ML
20 INJECTION, SOLUTION INTRAVENOUS ONCE
OUTPATIENT
Start: 2024-06-20

## 2024-06-12 RX ORDER — FUROSEMIDE 10 MG/ML
20 INJECTION INTRAMUSCULAR; INTRAVENOUS ONCE
Status: CANCELLED
Start: 2024-06-13 | End: 2024-06-13

## 2024-06-12 RX ORDER — FAMOTIDINE 10 MG/ML
20 INJECTION, SOLUTION INTRAVENOUS AS NEEDED
OUTPATIENT
Start: 2024-06-20

## 2024-06-12 RX ORDER — PALONOSETRON 0.05 MG/ML
0.25 INJECTION, SOLUTION INTRAVENOUS ONCE
Status: CANCELLED | OUTPATIENT
Start: 2024-06-13

## 2024-06-12 RX ORDER — DIPHENHYDRAMINE HYDROCHLORIDE 50 MG/ML
50 INJECTION INTRAMUSCULAR; INTRAVENOUS AS NEEDED
OUTPATIENT
Start: 2024-06-20

## 2024-06-12 RX ORDER — FUROSEMIDE 10 MG/ML
20 INJECTION INTRAMUSCULAR; INTRAVENOUS ONCE
Start: 2024-06-20 | End: 2024-06-20

## 2024-06-12 RX ORDER — DIPHENHYDRAMINE HYDROCHLORIDE 50 MG/ML
50 INJECTION INTRAMUSCULAR; INTRAVENOUS AS NEEDED
Status: CANCELLED | OUTPATIENT
Start: 2024-06-13

## 2024-06-12 RX ORDER — FAMOTIDINE 10 MG/ML
20 INJECTION, SOLUTION INTRAVENOUS AS NEEDED
Status: CANCELLED | OUTPATIENT
Start: 2024-06-13

## 2024-06-12 RX ORDER — SODIUM CHLORIDE 9 MG/ML
20 INJECTION, SOLUTION INTRAVENOUS ONCE
Status: CANCELLED | OUTPATIENT
Start: 2024-06-13

## 2024-06-12 RX ORDER — PALONOSETRON 0.05 MG/ML
0.25 INJECTION, SOLUTION INTRAVENOUS ONCE
OUTPATIENT
Start: 2024-06-20

## 2024-06-12 NOTE — THERAPY PROGRESS REPORT/RE-CERT
Outpatient Speech Language Pathology   Adult Swallow Progress Note       Patient Name: Keron Gerber  : 1960  MRN: [id number]  Today's Date: 2024         Visit Date: 2024     Mr. Gerber was seen today for swallowing therapy. He is well appearing with PMV in place on trach. Voicing is improved today and louder. He reports he has attempted PO this week to include pudding, milkshakes, eggs (fork mashed), and sausage (fork mashed). He reports that the sausage did not go as well as other items so he did not continue eating. He is down some weight today and continues to report missing some feedings. I encouraged him to plan ahead and focus on getting all his feedings in to maintain weight and nutritional support during treatment. I encouraged him to reach out to infusion company as needed if he felt his prescription for feedings needed adjusting.     He is compliant with recommendations and has been completing home exercise program but not always consistently.     Continues to be at risk for progression of dysphagia secondary to location of cancer and current XRT. Continues with trach. Has progressed diet for pleasure to puree items and fork mashed items. Thin liquids. Occasional throat clearing with liquid intake indicates likely pharyngeal impairment. No complications from PO at this time. Continue to follow 1 time per week. Patient to continue puree items and attempt fork mashed items as tolerated. Continue water only for liquid intake. Continue home excercise program.  Will plan to complete VFSS following 6-week follow up.     Alma Granger, MS CCC-SLP 2024 09:55 CDT    Patient Active Problem List   Diagnosis    Dyslexia    Prostate cancer    Malignant neoplasm hypopharynx    Malignant melanoma of nose    Current every day smoker    Abnormal PET scan of colon        Visit Dx:    ICD-10-CM ICD-9-CM   1. Oropharyngeal dysphagia  R13.12 787.22        OP SLP Assessment/Plan - 24  0947          SLP Assessment    Clinical Impression Comments Continues to be at risk for progression of dysphagia secondary to location of cancer and current XRT. Continues with trach. Has progressed diet for pleasure to puree items and fork mashed items. Thin liquids. Occasional throat clearing with liquid intake indicates likely pharyngeal impairment. No complications from PO at this time.  -MG       SLP Plan    Plan Comments Continue to follow 1 time per week. Patient to continue puree items and attempt fork mashed items as tolerated. Continue water only for liquid intake. Continue home excercise program.  -MG              User Key  (r) = Recorded By, (t) = Taken By, (c) = Cosigned By      Initials Name Provider Type    MG Alma Granger MS CCC-SLP Speech and Language Pathologist                                       SLP OP Goals       Row Name 06/12/24 0857          Goal Type Needed    Goal Type Needed Other Adult Goals  -MG        Subjective Pain    Able to rate subjective pain? yes  -MG     Pre-Treatment Pain Level 0  -MG     Post-Treatment Pain Level 0  -MG        Other Goals    Other Adult Goal- 1 Patient will increase swallowing frequency during XRT in order to maintain current swallow function once XRT is completed.  -MG     Status: Other Adult Goal- 1 Progressing as expected  -MG     Comments: Other Adult Goal- 1 See note  -MG     Other Adult Goal- 2 Patient will tolerate water throughout and following treatment without complications such as aspiration pneumonia or overt s/s of aspiration.  -MG     Status: Other Adult Goal- 2 Progressing as expected  -MG     Comments: Other Adult Goal- 2 See note.  -MG     Other Adult Goal- 3 Patient will complete oral care daily in order to promote healthy oral mucosa throughout XRT.  -MG     Status: Other Adult Goal- 3 Progressing as expected  -MG     Comments: Other Adult Goal- 3 See note  -MG     Other Adult Goal- 4 Patient will maintain adequate hydration by  drinking needed amount of water by mouth or via PEG tube.  -MG     Status: Other Adult Goal- 4 Progressing as expected  -MG     Comments: Other Adult Goal- 4 See note  -MG     Other Adult Goal- 5 Will work towards PO for pleasure as tolerated.  -MG     Status: Other Adult Goal- 5 Progressing as expected  -MG     Comments: Other Adult Goal- 5 See note  -MG     Other Adult Goal- 6 Patient will complete swallowing exercises three times a day in order to maintain current swallow function and range of motion of oral and pharyngeal structures.  -MG     Status: Other Adult Goal- 6 Progressing as expected  -MG     Comments: Other Adult Goal- 6 See note  -MG        SLP Time Calculation    SLP Goal Re-Cert Due Date 08/14/24  -MG               User Key  (r) = Recorded By, (t) = Taken By, (c) = Cosigned By      Initials Name Provider Type    Alma Rebolledo MS CCC-SLP Speech and Language Pathologist                   OP SLP Education       Row Name 06/12/24 0950       Education    Barriers to Learning No barriers identified  -MG    Education Provided Patient requires further education on strategies, risks  -MG    Assessed Learning readiness;Learning preferences;Learning motivation;Learning needs  -MG    Learning Motivation Strong  -MG    Learning Method Explanation  -MG    Teaching Response Verbalized understanding  -MG    Education Comments See note  -MG              User Key  (r) = Recorded By, (t) = Taken By, (c) = Cosigned By      Initials Name Effective Dates    Alma Rebolledo MS CCC-SLP 07/11/23 -                         Time Calculation:   SLP Start Time: 0857  SLP Stop Time: 0921  SLP Time Calculation (min): 24 min  Untimed Charges  27163-DH Treatment Swallow Minutes: 24  Total Minutes  Untimed Charges Total Minutes: 24   Total Minutes: 24                 Alma Granger MS CCC-SLP  6/12/2024

## 2024-06-13 ENCOUNTER — HOSPITAL ENCOUNTER (OUTPATIENT)
Dept: RADIATION ONCOLOGY | Facility: HOSPITAL | Age: 64
Setting detail: RADIATION/ONCOLOGY SERIES
Discharge: HOME | End: 2024-06-13
Payer: MEDICAID

## 2024-06-13 ENCOUNTER — LAB (OUTPATIENT)
Dept: LAB | Facility: HOSPITAL | Age: 64
End: 2024-06-13
Payer: MEDICAID

## 2024-06-13 ENCOUNTER — INFUSION (OUTPATIENT)
Dept: ONCOLOGY | Facility: HOSPITAL | Age: 64
End: 2024-06-13
Payer: MEDICAID

## 2024-06-13 VITALS
HEART RATE: 71 BPM | DIASTOLIC BLOOD PRESSURE: 54 MMHG | OXYGEN SATURATION: 100 % | RESPIRATION RATE: 20 BRPM | TEMPERATURE: 98.1 F | SYSTOLIC BLOOD PRESSURE: 93 MMHG | HEIGHT: 69 IN | WEIGHT: 119 LBS | BODY MASS INDEX: 17.63 KG/M2

## 2024-06-13 DIAGNOSIS — C13.9 MALIGNANT NEOPLASM HYPOPHARYNX: Primary | ICD-10-CM

## 2024-06-13 LAB
ALBUMIN SERPL-MCNC: 4.3 G/DL (ref 3.5–5.2)
ALBUMIN/GLOB SERPL: 1.6 G/DL
ALP SERPL-CCNC: 86 U/L (ref 39–117)
ALT SERPL W P-5'-P-CCNC: 11 U/L (ref 1–41)
ANION GAP SERPL CALCULATED.3IONS-SCNC: 8 MMOL/L (ref 5–15)
AST SERPL-CCNC: 12 U/L (ref 1–40)
BASOPHILS # BLD AUTO: 0.02 10*3/MM3 (ref 0–0.2)
BASOPHILS NFR BLD AUTO: 0.5 % (ref 0–1.5)
BILIRUB SERPL-MCNC: 0.2 MG/DL (ref 0–1.2)
BUN SERPL-MCNC: 18 MG/DL (ref 8–23)
BUN/CREAT SERPL: 34.6 (ref 7–25)
CALCIUM SPEC-SCNC: 9.2 MG/DL (ref 8.6–10.5)
CHLORIDE SERPL-SCNC: 97 MMOL/L (ref 98–107)
CO2 SERPL-SCNC: 30 MMOL/L (ref 22–29)
CREAT SERPL-MCNC: 0.52 MG/DL (ref 0.76–1.27)
DEPRECATED RDW RBC AUTO: 53.5 FL (ref 37–54)
EGFRCR SERPLBLD CKD-EPI 2021: 114 ML/MIN/1.73
EOSINOPHIL # BLD AUTO: 0.04 10*3/MM3 (ref 0–0.4)
EOSINOPHIL NFR BLD AUTO: 1 % (ref 0.3–6.2)
ERYTHROCYTE [DISTWIDTH] IN BLOOD BY AUTOMATED COUNT: 15.9 % (ref 12.3–15.4)
GLOBULIN UR ELPH-MCNC: 2.7 GM/DL
GLUCOSE SERPL-MCNC: 128 MG/DL (ref 65–99)
HCT VFR BLD AUTO: 32.5 % (ref 37.5–51)
HGB BLD-MCNC: 11.1 G/DL (ref 13–17.7)
HOLD SPECIMEN: NORMAL
IMM GRANULOCYTES # BLD AUTO: 0.02 10*3/MM3 (ref 0–0.05)
IMM GRANULOCYTES NFR BLD AUTO: 0.5 % (ref 0–0.5)
LYMPHOCYTES # BLD AUTO: 1 10*3/MM3 (ref 0.7–3.1)
LYMPHOCYTES NFR BLD AUTO: 24 % (ref 19.6–45.3)
MAGNESIUM SERPL-MCNC: 2 MG/DL (ref 1.6–2.4)
MCH RBC QN AUTO: 33 PG (ref 26.6–33)
MCHC RBC AUTO-ENTMCNC: 34.2 G/DL (ref 31.5–35.7)
MCV RBC AUTO: 96.7 FL (ref 79–97)
MONOCYTES # BLD AUTO: 0.3 10*3/MM3 (ref 0.1–0.9)
MONOCYTES NFR BLD AUTO: 7.2 % (ref 5–12)
NEUTROPHILS NFR BLD AUTO: 2.78 10*3/MM3 (ref 1.7–7)
NEUTROPHILS NFR BLD AUTO: 66.8 % (ref 42.7–76)
NRBC BLD AUTO-RTO: 0 /100 WBC (ref 0–0.2)
PLATELET # BLD AUTO: 130 10*3/MM3 (ref 140–450)
PMV BLD AUTO: 9.5 FL (ref 6–12)
POTASSIUM SERPL-SCNC: 3.9 MMOL/L (ref 3.5–5.2)
PROT SERPL-MCNC: 7 G/DL (ref 6–8.5)
RAD ONC ARIA COURSE ID: NORMAL
RAD ONC ARIA COURSE LAST TREATMENT DATE: NORMAL
RAD ONC ARIA COURSE START DATE: NORMAL
RAD ONC ARIA COURSE TREATMENT ELAPSED DAYS: 38
RAD ONC ARIA FIRST TREATMENT DATE: NORMAL
RAD ONC ARIA PLAN FRACTIONS TREATED TO DATE: 4
RAD ONC ARIA PLAN ID: NORMAL
RAD ONC ARIA PLAN PRESCRIBED DOSE PER FRACTION: 2 GY
RAD ONC ARIA PLAN PRIMARY REFERENCE POINT: NORMAL
RAD ONC ARIA PLAN TOTAL FRACTIONS PRESCRIBED: 7
RAD ONC ARIA PLAN TOTAL PRESCRIBED DOSE: 1400 CGY
RAD ONC ARIA REFERENCE POINT DOSAGE GIVEN TO DATE: 32.65 GY
RAD ONC ARIA REFERENCE POINT DOSAGE GIVEN TO DATE: 54 GY
RAD ONC ARIA REFERENCE POINT ID: NORMAL
RAD ONC ARIA REFERENCE POINT ID: NORMAL
RAD ONC ARIA REFERENCE POINT SESSION DOSAGE GIVEN: 1.17 GY
RAD ONC ARIA REFERENCE POINT SESSION DOSAGE GIVEN: 2 GY
RBC # BLD AUTO: 3.36 10*6/MM3 (ref 4.14–5.8)
SODIUM SERPL-SCNC: 135 MMOL/L (ref 136–145)
WBC NRBC COR # BLD AUTO: 4.16 10*3/MM3 (ref 3.4–10.8)

## 2024-06-13 PROCEDURE — 96375 TX/PRO/DX INJ NEW DRUG ADDON: CPT

## 2024-06-13 PROCEDURE — 77336 RADIATION PHYSICS CONSULT: CPT

## 2024-06-13 PROCEDURE — 25010000002 FOSAPREPITANT PER 1 MG: Performed by: INTERNAL MEDICINE

## 2024-06-13 PROCEDURE — 83735 ASSAY OF MAGNESIUM: CPT

## 2024-06-13 PROCEDURE — 36415 COLL VENOUS BLD VENIPUNCTURE: CPT

## 2024-06-13 PROCEDURE — 25810000003 SODIUM CHLORIDE 0.9 % SOLUTION 250 ML FLEX CONT: Performed by: INTERNAL MEDICINE

## 2024-06-13 PROCEDURE — 77386: CPT | Performed by: RADIOLOGY

## 2024-06-13 PROCEDURE — 96366 THER/PROPH/DIAG IV INF ADDON: CPT

## 2024-06-13 PROCEDURE — 25010000002 POTASSIUM CHLORIDE PER 2 MEQ OF POTASSIUM: Performed by: INTERNAL MEDICINE

## 2024-06-13 PROCEDURE — 96367 TX/PROPH/DG ADDL SEQ IV INF: CPT

## 2024-06-13 PROCEDURE — 25810000003 SODIUM CHLORIDE 0.9 % SOLUTION 1,000 ML FLEX CONT: Performed by: INTERNAL MEDICINE

## 2024-06-13 PROCEDURE — 96368 THER/DIAG CONCURRENT INF: CPT

## 2024-06-13 PROCEDURE — 25010000002 DEXAMETHASONE SODIUM PHOSPHATE 100 MG/10ML SOLUTION: Performed by: INTERNAL MEDICINE

## 2024-06-13 PROCEDURE — 25010000002 MAGNESIUM SULFATE PER 500 MG OF MAGNESIUM: Performed by: INTERNAL MEDICINE

## 2024-06-13 PROCEDURE — 25010000002 CISPLATIN PER 50 MG: Performed by: INTERNAL MEDICINE

## 2024-06-13 PROCEDURE — 85025 COMPLETE CBC W/AUTO DIFF WBC: CPT

## 2024-06-13 PROCEDURE — 25010000002 FUROSEMIDE PER 20 MG: Performed by: INTERNAL MEDICINE

## 2024-06-13 PROCEDURE — 25810000003 SODIUM CHLORIDE 0.9 % SOLUTION: Performed by: INTERNAL MEDICINE

## 2024-06-13 PROCEDURE — 96413 CHEMO IV INFUSION 1 HR: CPT

## 2024-06-13 PROCEDURE — 80053 COMPREHEN METABOLIC PANEL: CPT

## 2024-06-13 PROCEDURE — 25010000002 PALONOSETRON PER 25 MCG: Performed by: INTERNAL MEDICINE

## 2024-06-13 RX ORDER — FUROSEMIDE 10 MG/ML
20 INJECTION INTRAMUSCULAR; INTRAVENOUS ONCE
Status: COMPLETED | OUTPATIENT
Start: 2024-06-13 | End: 2024-06-13

## 2024-06-13 RX ORDER — DIPHENHYDRAMINE HYDROCHLORIDE 50 MG/ML
50 INJECTION INTRAMUSCULAR; INTRAVENOUS AS NEEDED
Status: DISCONTINUED | OUTPATIENT
Start: 2024-06-13 | End: 2024-06-13 | Stop reason: HOSPADM

## 2024-06-13 RX ORDER — PALONOSETRON 0.05 MG/ML
0.25 INJECTION, SOLUTION INTRAVENOUS ONCE
Status: COMPLETED | OUTPATIENT
Start: 2024-06-13 | End: 2024-06-13

## 2024-06-13 RX ORDER — SODIUM CHLORIDE 9 MG/ML
20 INJECTION, SOLUTION INTRAVENOUS ONCE
Status: COMPLETED | OUTPATIENT
Start: 2024-06-13 | End: 2024-06-13

## 2024-06-13 RX ORDER — FAMOTIDINE 10 MG/ML
20 INJECTION, SOLUTION INTRAVENOUS AS NEEDED
Status: DISCONTINUED | OUTPATIENT
Start: 2024-06-13 | End: 2024-06-13 | Stop reason: HOSPADM

## 2024-06-13 RX ADMIN — FUROSEMIDE 20 MG: 10 INJECTION, SOLUTION INTRAMUSCULAR; INTRAVENOUS at 14:14

## 2024-06-13 RX ADMIN — DEXAMETHASONE SODIUM PHOSPHATE 12 MG: 10 INJECTION, SOLUTION INTRAMUSCULAR; INTRAVENOUS at 11:07

## 2024-06-13 RX ADMIN — FUROSEMIDE 20 MG: 10 INJECTION, SOLUTION INTRAMUSCULAR; INTRAVENOUS at 11:59

## 2024-06-13 RX ADMIN — SODIUM CHLORIDE 20 ML/HR: 9 INJECTION, SOLUTION INTRAVENOUS at 08:51

## 2024-06-13 RX ADMIN — FOSAPREPITANT 150 MG: 150 INJECTION, POWDER, LYOPHILIZED, FOR SOLUTION INTRAVENOUS at 11:27

## 2024-06-13 RX ADMIN — PALONOSETRON HYDROCHLORIDE 0.25 MG: 0.25 INJECTION, SOLUTION INTRAVENOUS at 11:06

## 2024-06-13 RX ADMIN — MAGNESIUM SULFATE HEPTAHYDRATE: 500 INJECTION, SOLUTION INTRAMUSCULAR; INTRAVENOUS at 08:51

## 2024-06-13 RX ADMIN — MAGNESIUM SULFATE HEPTAHYDRATE: 500 INJECTION, SOLUTION INTRAMUSCULAR; INTRAVENOUS at 12:00

## 2024-06-13 RX ADMIN — SODIUM CHLORIDE 68 MG: 9 INJECTION, SOLUTION INTRAVENOUS at 12:13

## 2024-06-14 ENCOUNTER — HOSPITAL ENCOUNTER (OUTPATIENT)
Dept: RADIATION ONCOLOGY | Facility: HOSPITAL | Age: 64
Setting detail: RADIATION/ONCOLOGY SERIES
Discharge: HOME | End: 2024-06-14
Payer: MEDICAID

## 2024-06-14 ENCOUNTER — TELEMEDICINE (OUTPATIENT)
Dept: SURGERY | Facility: CLINIC | Age: 64
End: 2024-06-14
Payer: MEDICAID

## 2024-06-14 DIAGNOSIS — D49.0 RECTAL TUMOR: Primary | ICD-10-CM

## 2024-06-14 LAB
RAD ONC ARIA COURSE ID: NORMAL
RAD ONC ARIA COURSE LAST TREATMENT DATE: NORMAL
RAD ONC ARIA COURSE START DATE: NORMAL
RAD ONC ARIA COURSE TREATMENT ELAPSED DAYS: 39
RAD ONC ARIA FIRST TREATMENT DATE: NORMAL
RAD ONC ARIA PLAN FRACTIONS TREATED TO DATE: 5
RAD ONC ARIA PLAN ID: NORMAL
RAD ONC ARIA PLAN PRESCRIBED DOSE PER FRACTION: 2 GY
RAD ONC ARIA PLAN PRIMARY REFERENCE POINT: NORMAL
RAD ONC ARIA PLAN TOTAL FRACTIONS PRESCRIBED: 7
RAD ONC ARIA PLAN TOTAL PRESCRIBED DOSE: 1400 CGY
RAD ONC ARIA REFERENCE POINT DOSAGE GIVEN TO DATE: 33.82 GY
RAD ONC ARIA REFERENCE POINT DOSAGE GIVEN TO DATE: 56 GY
RAD ONC ARIA REFERENCE POINT ID: NORMAL
RAD ONC ARIA REFERENCE POINT ID: NORMAL
RAD ONC ARIA REFERENCE POINT SESSION DOSAGE GIVEN: 1.17 GY
RAD ONC ARIA REFERENCE POINT SESSION DOSAGE GIVEN: 2 GY

## 2024-06-14 PROCEDURE — 77386: CPT

## 2024-06-14 NOTE — PROGRESS NOTES
Colorectal & General Surgery  Follow - Up    Patient: Keron Gerber  YOB: 1960  MRN: [id number]      Assessment  Keron Gerber is a 64 y.o. 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    We reviewed the results of his staging CT scan of the abdomen pelvis which demonstrates no evidence of metastatic disease and no unanticipated findings related to his large rectal polyp.    He is currently continuing with chemoradiation and will be wrapping that up in about a week and a half.  Unfortunately, he has lost some weight, so he is trying to supplement his diet is much as possible with both oral and gastric tube feedings.    We did brief discussion about surgery, which I expect to be laparoscopic, possible open, low anterior resection with diverting loop ileostomy, which will also require his gastrostomy tube to be temporarily taken down and replaced at the end of surgery.  We discussed the risk, benefits, alternatives to this procedure, including postoperative expectations.    I will touch base with him again on July 3 via telehealth to see how he is doing and start to plan for surgery.    Of paramount importance prior to surgery will be his nutritional and functional optimization.    Chief Complaint: Rectal tumor    History of Present Illness   Keron Gerber is a 64 y.o. male who presents in follow-up today.  He says he is tolerating the chemoradiation relatively well but is unfortunately losing a little bit of weight.  He is really focusing on eating and tube feeds at this time.  Otherwise, he says doing well.  Denies hematochezia, melena, tenesmus, changes with bowel movements but he does continue to have diarrhea secondary to his tube feeds.    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    Tobacco use disorder         Past Surgical History   Past Surgical History:   Procedure Laterality Date    COLONOSCOPY N/A 5/1/2024    Procedure: COLONOSCOPY WITH ANESTHESIA;  Surgeon: Gustabo Ruiz MD;  Location: [facility] ENDOSCOPY;  Service: Gastroenterology;  Laterality: N/A;  preop; abnormal pet scan   postop rectal mass; polyps;   PCP Dr Santos    CYST REMOVAL Right 08/07/2014    RIGHT LOWER EYELID- EPIDERMAL INCLUSION CYST    EXCISION LESION N/A 08/07/2014    MALIGNANT MELANOMA OF LEFT NASAL TIP    FOREHEAD FLAP  09/11/2014    PEDICLE FLAP  10/02/2014    PEDICLE DIVISION AND FLAP INSET OF NOSE    PROSTATECTOMY N/A 09/07/2022    Procedure: PROSTATECTOMY LAPAROSCOPIC WITH [employer] ROBOT;  Surgeon: Matthew Weaver MD;  Location: [facility] OR;  Service: Robotics - DaVinci;  Laterality: N/A;    SQUAMOUS CELL CARCINOMA EXCISION  10/29/2013    LEFT FLOOR OF MOUTH    TONSILLECTOMY         Social History  Social History     Socioeconomic History    Marital status: Single   Tobacco Use    Smoking status: Every Day     Current packs/day: 1.00     Types: Cigarettes    Smokeless tobacco: Never   Vaping Use    Vaping status: Never Used   Substance and Sexual Activity    Alcohol use: Not Currently     Comment: 12 PER DAY    Drug use: No    Sexual activity: Defer       Family History  Family History   Problem Relation Age of Onset    No Known Problems Mother     Heart disease Father     Colon polyps Father     Colon cancer Neg Hx        Colorectal cancer family history: None    Review of Systems  Negative except as documented in the HPI.     Allergies  Allergies   Allergen Reactions    Sulfa Antibiotics Other (See Comments)     Headaches       Medications    Current Outpatient Medications:     acetaminophen (TYLENOL) 500 MG tablet, Take 1 tablet by mouth Every 6 (Six) Hours As Needed for Mild Pain., Disp: , Rfl:     ondansetron  (Zofran) 8 MG tablet, Take 1 tablet by mouth Every 8 (Eight) Hours As Needed for Nausea or Vomiting., Disp: 60 tablet, Rfl: 2    prochlorperazine (COMPAZINE) 10 MG tablet, Take 1 tablet by mouth Every 4 (Four) Hours As Needed for Nausea or Vomiting., Disp: 60 tablet, Rfl: 2    tadalafil (CIALIS) 20 MG tablet, Take 1 tablet by mouth As Needed for Erectile Dysfunction. 1-12 hours before activity, Disp: 12 tablet, Rfl: 11    Varenicline Tartrate, Starter, 0.5 MG X 11 & 1 MG X 42 tablet therapy pack, , Disp: , Rfl:   No current facility-administered medications for this visit.    Vital Signs  There were no vitals filed for this visit.     Physical Exam  Constitutional: Resting comfortably, no acute distress    Laboratory Results  I have personally reviewed laboratory results from June 13, 2024 including CBC with WC 4, hemoglobin 11, platelets 130.  CMP with creatinine 0.52, albumin 4.3, bicarb 30.    Radiology  I have personally reviewed CT scan of the abdomen pelvis demonstrates known rectal tumor in the mid to upper rectum at the rectosigmoid junction.  No evidence of hepatic metastatic disease.  Gastrostomy tube in place.    This visit was conducted via telehealth today utilizing a video conferencing service. Mr. Gerber and his family were at their home within the [facility] at the time of the visit.        Trey Cruz MD  Colorectal & General Surgery  [facility]    [address], [address]  [place], [place], [address]  P: [phone]  F: [phone]

## 2024-06-17 ENCOUNTER — HOSPITAL ENCOUNTER (OUTPATIENT)
Dept: RADIATION ONCOLOGY | Facility: HOSPITAL | Age: 64
Setting detail: RADIATION/ONCOLOGY SERIES
Discharge: HOME | End: 2024-06-17
Payer: MEDICAID

## 2024-06-17 LAB
RAD ONC ARIA COURSE ID: NORMAL
RAD ONC ARIA COURSE LAST TREATMENT DATE: NORMAL
RAD ONC ARIA COURSE START DATE: NORMAL
RAD ONC ARIA COURSE TREATMENT ELAPSED DAYS: 42
RAD ONC ARIA FIRST TREATMENT DATE: NORMAL
RAD ONC ARIA PLAN FRACTIONS TREATED TO DATE: 6
RAD ONC ARIA PLAN ID: NORMAL
RAD ONC ARIA PLAN PRESCRIBED DOSE PER FRACTION: 2 GY
RAD ONC ARIA PLAN PRIMARY REFERENCE POINT: NORMAL
RAD ONC ARIA PLAN TOTAL FRACTIONS PRESCRIBED: 7
RAD ONC ARIA PLAN TOTAL PRESCRIBED DOSE: 1400 CGY
RAD ONC ARIA REFERENCE POINT DOSAGE GIVEN TO DATE: 34.99 GY
RAD ONC ARIA REFERENCE POINT DOSAGE GIVEN TO DATE: 58 GY
RAD ONC ARIA REFERENCE POINT ID: NORMAL
RAD ONC ARIA REFERENCE POINT ID: NORMAL
RAD ONC ARIA REFERENCE POINT SESSION DOSAGE GIVEN: 1.17 GY
RAD ONC ARIA REFERENCE POINT SESSION DOSAGE GIVEN: 2 GY

## 2024-06-17 PROCEDURE — 77386: CPT | Performed by: RADIOLOGY

## 2024-06-18 ENCOUNTER — HOSPITAL ENCOUNTER (OUTPATIENT)
Dept: RADIATION ONCOLOGY | Facility: HOSPITAL | Age: 64
Setting detail: RADIATION/ONCOLOGY SERIES
Discharge: HOME | End: 2024-06-18
Payer: MEDICAID

## 2024-06-18 ENCOUNTER — APPOINTMENT (OUTPATIENT)
Dept: SPEECH THERAPY | Facility: HOSPITAL | Age: 64
End: 2024-06-18
Payer: MEDICAID

## 2024-06-18 LAB
RAD ONC ARIA COURSE ID: NORMAL
RAD ONC ARIA COURSE LAST TREATMENT DATE: NORMAL
RAD ONC ARIA COURSE START DATE: NORMAL
RAD ONC ARIA COURSE TREATMENT ELAPSED DAYS: 43
RAD ONC ARIA FIRST TREATMENT DATE: NORMAL
RAD ONC ARIA PLAN FRACTIONS TREATED TO DATE: 7
RAD ONC ARIA PLAN ID: NORMAL
RAD ONC ARIA PLAN PRESCRIBED DOSE PER FRACTION: 2 GY
RAD ONC ARIA PLAN PRIMARY REFERENCE POINT: NORMAL
RAD ONC ARIA PLAN TOTAL FRACTIONS PRESCRIBED: 7
RAD ONC ARIA PLAN TOTAL PRESCRIBED DOSE: 1400 CGY
RAD ONC ARIA REFERENCE POINT DOSAGE GIVEN TO DATE: 36.17 GY
RAD ONC ARIA REFERENCE POINT DOSAGE GIVEN TO DATE: 60 GY
RAD ONC ARIA REFERENCE POINT ID: NORMAL
RAD ONC ARIA REFERENCE POINT ID: NORMAL
RAD ONC ARIA REFERENCE POINT SESSION DOSAGE GIVEN: 1.17 GY
RAD ONC ARIA REFERENCE POINT SESSION DOSAGE GIVEN: 2 GY

## 2024-06-18 PROCEDURE — 77386: CPT | Performed by: RADIOLOGY

## 2024-06-19 ENCOUNTER — HOSPITAL ENCOUNTER (OUTPATIENT)
Dept: RADIATION ONCOLOGY | Facility: HOSPITAL | Age: 64
Setting detail: RADIATION/ONCOLOGY SERIES
Discharge: HOME | End: 2024-06-19
Payer: MEDICAID

## 2024-06-19 ENCOUNTER — TREATMENT (OUTPATIENT)
Dept: SPEECH THERAPY | Facility: HOSPITAL | Age: 64
End: 2024-06-19
Payer: MEDICAID

## 2024-06-19 DIAGNOSIS — R13.12 OROPHARYNGEAL DYSPHAGIA: Primary | ICD-10-CM

## 2024-06-19 LAB
RAD ONC ARIA COURSE ID: NORMAL
RAD ONC ARIA COURSE LAST TREATMENT DATE: NORMAL
RAD ONC ARIA COURSE START DATE: NORMAL
RAD ONC ARIA COURSE TREATMENT ELAPSED DAYS: 44
RAD ONC ARIA FIRST TREATMENT DATE: NORMAL
RAD ONC ARIA PLAN FRACTIONS TREATED TO DATE: 1
RAD ONC ARIA PLAN ID: NORMAL
RAD ONC ARIA PLAN PRESCRIBED DOSE PER FRACTION: 2 GY
RAD ONC ARIA PLAN PRIMARY REFERENCE POINT: NORMAL
RAD ONC ARIA PLAN TOTAL FRACTIONS PRESCRIBED: 5
RAD ONC ARIA PLAN TOTAL PRESCRIBED DOSE: 1000 CGY
RAD ONC ARIA REFERENCE POINT DOSAGE GIVEN TO DATE: 36.98 GY
RAD ONC ARIA REFERENCE POINT DOSAGE GIVEN TO DATE: 62 GY
RAD ONC ARIA REFERENCE POINT ID: NORMAL
RAD ONC ARIA REFERENCE POINT ID: NORMAL
RAD ONC ARIA REFERENCE POINT SESSION DOSAGE GIVEN: 0.82 GY
RAD ONC ARIA REFERENCE POINT SESSION DOSAGE GIVEN: 2 GY

## 2024-06-19 PROCEDURE — 92526 ORAL FUNCTION THERAPY: CPT | Performed by: SPEECH-LANGUAGE PATHOLOGIST

## 2024-06-19 PROCEDURE — 77386: CPT

## 2024-06-19 NOTE — THERAPY TREATMENT NOTE
Outpatient Speech Language Pathology   Adult Swallow Treatment Note       Patient Name: Keron Gerber  : 1960  MRN: [id number]  Today's Date: 2024         Visit Date: 2024     Mr. Gerber was seen today for swallowing therapy. He was alert and cooperative. PMV in place with strong voicing. He reports he has eaten the following items over the last week; watermelon, cake, cherries. He is up a few pounds to 122lbs. He tells me that his typical weight is around 140. He is wanting to try meats such as chopped steaks. We discussed this as a more complex texture to tolerate at this time. He had tried sausage in prior weeks and reported difficulty. I encouraged him to stick with puree items or fork mashed items that are moist and soft. We did talk about trials of soft fish.     We reviewed importance of completing swallowing exercise program throughout XRT. He reports he has not been doing this.     Patient will finish with XRT next week. Will plan to see in 6-weeks post XRT. Patient to continue puree items and some fork mashed items. Continue water only. Continue tube feedings. Encouraged compliance with home excercise program. Plan for VFSS after 6-week follow up to fully assess swallow function post XRT.     Alma Granger, MS CCC-SLP 2024 09:26 CDT        Patient Active Problem List   Diagnosis    Dyslexia    Prostate cancer    Malignant neoplasm hypopharynx    Malignant melanoma of nose    Current every day smoker    Abnormal PET scan of colon        Visit Dx:    ICD-10-CM ICD-9-CM   1. Oropharyngeal dysphagia  R13.12 787.22        OP SLP Assessment/Plan - 24 0921          SLP Plan    Plan Comments Patient will finish with XRT next week. Will plan to see in 6-weeks post XRT. Patient to continue puree items and some fork mashed items. Continue water only. Continue tube feedings. Encouraged compliance with home excercise program.  -MG              User Key  (r) = Recorded By, (t) =  Taken By, (c) = Cosigned By      Initials Name Provider Type    MG ElkinAlma MS CCC-SLP Speech and Language Pathologist                                       SLP OP Goals       Row Name 06/19/24 0853          Goal Type Needed    Goal Type Needed Other Adult Goals  -MG        Subjective Comments    Subjective Comments Patient was seen today for swallowing therapy.  -MG        Subjective Pain    Able to rate subjective pain? yes  -MG     Pre-Treatment Pain Level 0  -MG     Post-Treatment Pain Level 0  -MG        Other Goals    Other Adult Goal- 1 Patient will increase swallowing frequency during XRT in order to maintain current swallow function once XRT is completed.  -MG     Status: Other Adult Goal- 1 Progressing as expected  -MG     Comments: Other Adult Goal- 1 See note  -MG     Other Adult Goal- 2 Patient will tolerate water throughout and following treatment without complications such as aspiration pneumonia or overt s/s of aspiration.  -MG     Status: Other Adult Goal- 2 Progressing as expected  -MG     Comments: Other Adult Goal- 2 See note.  -MG     Other Adult Goal- 3 Patient will complete oral care daily in order to promote healthy oral mucosa throughout XRT.  -MG     Status: Other Adult Goal- 3 Progressing as expected  -MG     Comments: Other Adult Goal- 3 See note  -MG     Other Adult Goal- 4 Patient will maintain adequate hydration by drinking needed amount of water by mouth or via PEG tube.  -MG     Status: Other Adult Goal- 4 Progressing as expected  -MG     Comments: Other Adult Goal- 4 See note  -MG     Other Adult Goal- 5 Will work towards PO for pleasure as tolerated.  -MG     Status: Other Adult Goal- 5 Progressing as expected  -MG     Comments: Other Adult Goal- 5 See note  -MG     Other Adult Goal- 6 Patient will complete swallowing exercises three times a day in order to maintain current swallow function and range of motion of oral and pharyngeal structures.  -MG     Status: Other  Adult Goal- 6 Progressing as expected  -MG     Comments: Other Adult Goal- 6 See note  -MG        SLP Time Calculation    SLP Goal Re-Cert Due Date 08/14/24  -MG               User Key  (r) = Recorded By, (t) = Taken By, (c) = Cosigned By      Initials Name Provider Type    Alma Rebolledo MS CCC-SLP Speech and Language Pathologist                   OP SLP Education       Row Name 06/19/24 0922       Education    Barriers to Learning No barriers identified  -MG    Education Provided Patient requires further education on strategies, risks  -MG    Assessed Learning readiness;Learning preferences;Learning motivation;Learning needs  -MG    Learning Motivation Strong  -MG    Learning Method Explanation  -MG    Teaching Response Verbalized understanding  -MG    Education Comments See note.  -MG              User Key  (r) = Recorded By, (t) = Taken By, (c) = Cosigned By      Initials Name Effective Dates    Alma Rebolledo MS CCC-SLP 07/11/23 -                         Time Calculation:   SLP Start Time: 0853  SLP Stop Time: 0926  SLP Time Calculation (min): 33 min  Untimed Charges  73595-XD Treatment Swallow Minutes: 33  Total Minutes  Untimed Charges Total Minutes: 33   Total Minutes: 33                 Alma Granger MS CCC-SLP  6/19/2024

## 2024-06-20 ENCOUNTER — HOSPITAL ENCOUNTER (OUTPATIENT)
Dept: RADIATION ONCOLOGY | Facility: HOSPITAL | Age: 64
Setting detail: RADIATION/ONCOLOGY SERIES
Discharge: HOME | End: 2024-06-20
Payer: MEDICAID

## 2024-06-20 ENCOUNTER — LAB (OUTPATIENT)
Dept: LAB | Facility: HOSPITAL | Age: 64
End: 2024-06-20
Payer: MEDICAID

## 2024-06-20 ENCOUNTER — INFUSION (OUTPATIENT)
Dept: ONCOLOGY | Facility: HOSPITAL | Age: 64
End: 2024-06-20
Payer: MEDICAID

## 2024-06-20 VITALS
OXYGEN SATURATION: 100 % | HEART RATE: 62 BPM | DIASTOLIC BLOOD PRESSURE: 53 MMHG | HEIGHT: 69 IN | BODY MASS INDEX: 17.77 KG/M2 | TEMPERATURE: 98.3 F | RESPIRATION RATE: 18 BRPM | WEIGHT: 120 LBS | SYSTOLIC BLOOD PRESSURE: 94 MMHG

## 2024-06-20 DIAGNOSIS — C13.9 MALIGNANT NEOPLASM HYPOPHARYNX: ICD-10-CM

## 2024-06-20 DIAGNOSIS — C13.9 MALIGNANT NEOPLASM HYPOPHARYNX: Primary | ICD-10-CM

## 2024-06-20 LAB
ALBUMIN SERPL-MCNC: 4 G/DL (ref 3.5–5.2)
ALBUMIN/GLOB SERPL: 1.5 G/DL
ALP SERPL-CCNC: 75 U/L (ref 39–117)
ALT SERPL W P-5'-P-CCNC: 11 U/L (ref 1–41)
ANION GAP SERPL CALCULATED.3IONS-SCNC: 6 MMOL/L (ref 5–15)
ANION GAP SERPL CALCULATED.3IONS-SCNC: 7 MMOL/L (ref 5–15)
AST SERPL-CCNC: 13 U/L (ref 1–40)
BASOPHILS # BLD AUTO: 0.02 10*3/MM3 (ref 0–0.2)
BASOPHILS NFR BLD AUTO: 0.6 % (ref 0–1.5)
BILIRUB SERPL-MCNC: 0.2 MG/DL (ref 0–1.2)
BUN SERPL-MCNC: 17 MG/DL (ref 8–23)
BUN SERPL-MCNC: 18 MG/DL (ref 8–23)
BUN/CREAT SERPL: 37.5 (ref 7–25)
BUN/CREAT SERPL: 37.8 (ref 7–25)
CALCIUM SPEC-SCNC: 9.3 MG/DL (ref 8.6–10.5)
CALCIUM SPEC-SCNC: 9.4 MG/DL (ref 8.6–10.5)
CHLORIDE SERPL-SCNC: 98 MMOL/L (ref 98–107)
CHLORIDE SERPL-SCNC: 98 MMOL/L (ref 98–107)
CO2 SERPL-SCNC: 29 MMOL/L (ref 22–29)
CO2 SERPL-SCNC: 30 MMOL/L (ref 22–29)
CREAT SERPL-MCNC: 0.45 MG/DL (ref 0.76–1.27)
CREAT SERPL-MCNC: 0.48 MG/DL (ref 0.76–1.27)
DEPRECATED RDW RBC AUTO: 55.8 FL (ref 37–54)
EGFRCR SERPLBLD CKD-EPI 2021: 116.8 ML/MIN/1.73
EGFRCR SERPLBLD CKD-EPI 2021: 119.1 ML/MIN/1.73
EOSINOPHIL # BLD AUTO: 0.05 10*3/MM3 (ref 0–0.4)
EOSINOPHIL NFR BLD AUTO: 1.6 % (ref 0.3–6.2)
ERYTHROCYTE [DISTWIDTH] IN BLOOD BY AUTOMATED COUNT: 16.4 % (ref 12.3–15.4)
GLOBULIN UR ELPH-MCNC: 2.7 GM/DL
GLUCOSE SERPL-MCNC: 96 MG/DL (ref 65–99)
GLUCOSE SERPL-MCNC: 98 MG/DL (ref 65–99)
HCT VFR BLD AUTO: 32.1 % (ref 37.5–51)
HGB BLD-MCNC: 10.7 G/DL (ref 13–17.7)
IMM GRANULOCYTES # BLD AUTO: 0.01 10*3/MM3 (ref 0–0.05)
IMM GRANULOCYTES NFR BLD AUTO: 0.3 % (ref 0–0.5)
LYMPHOCYTES # BLD AUTO: 0.98 10*3/MM3 (ref 0.7–3.1)
LYMPHOCYTES NFR BLD AUTO: 31.2 % (ref 19.6–45.3)
MAGNESIUM SERPL-MCNC: 1.9 MG/DL (ref 1.6–2.4)
MCH RBC QN AUTO: 32.4 PG (ref 26.6–33)
MCHC RBC AUTO-ENTMCNC: 33.3 G/DL (ref 31.5–35.7)
MCV RBC AUTO: 97.3 FL (ref 79–97)
MONOCYTES # BLD AUTO: 0.32 10*3/MM3 (ref 0.1–0.9)
MONOCYTES NFR BLD AUTO: 10.2 % (ref 5–12)
NEUTROPHILS NFR BLD AUTO: 1.76 10*3/MM3 (ref 1.7–7)
NEUTROPHILS NFR BLD AUTO: 56.1 % (ref 42.7–76)
NRBC BLD AUTO-RTO: 0 /100 WBC (ref 0–0.2)
PLATELET # BLD AUTO: 101 10*3/MM3 (ref 140–450)
PMV BLD AUTO: 9.9 FL (ref 6–12)
POTASSIUM SERPL-SCNC: 3.4 MMOL/L (ref 3.5–5.2)
POTASSIUM SERPL-SCNC: 3.4 MMOL/L (ref 3.5–5.2)
PROT SERPL-MCNC: 6.7 G/DL (ref 6–8.5)
RAD ONC ARIA COURSE ID: NORMAL
RAD ONC ARIA COURSE LAST TREATMENT DATE: NORMAL
RAD ONC ARIA COURSE START DATE: NORMAL
RAD ONC ARIA COURSE TREATMENT ELAPSED DAYS: 45
RAD ONC ARIA FIRST TREATMENT DATE: NORMAL
RAD ONC ARIA PLAN FRACTIONS TREATED TO DATE: 2
RAD ONC ARIA PLAN ID: NORMAL
RAD ONC ARIA PLAN PRESCRIBED DOSE PER FRACTION: 2 GY
RAD ONC ARIA PLAN PRIMARY REFERENCE POINT: NORMAL
RAD ONC ARIA PLAN TOTAL FRACTIONS PRESCRIBED: 5
RAD ONC ARIA PLAN TOTAL PRESCRIBED DOSE: 1000 CGY
RAD ONC ARIA REFERENCE POINT DOSAGE GIVEN TO DATE: 37.8 GY
RAD ONC ARIA REFERENCE POINT DOSAGE GIVEN TO DATE: 64 GY
RAD ONC ARIA REFERENCE POINT ID: NORMAL
RAD ONC ARIA REFERENCE POINT ID: NORMAL
RAD ONC ARIA REFERENCE POINT SESSION DOSAGE GIVEN: 0.82 GY
RAD ONC ARIA REFERENCE POINT SESSION DOSAGE GIVEN: 2 GY
RBC # BLD AUTO: 3.3 10*6/MM3 (ref 4.14–5.8)
SODIUM SERPL-SCNC: 134 MMOL/L (ref 136–145)
SODIUM SERPL-SCNC: 134 MMOL/L (ref 136–145)
WBC NRBC COR # BLD AUTO: 3.14 10*3/MM3 (ref 3.4–10.8)

## 2024-06-20 PROCEDURE — 96368 THER/DIAG CONCURRENT INF: CPT

## 2024-06-20 PROCEDURE — 96413 CHEMO IV INFUSION 1 HR: CPT

## 2024-06-20 PROCEDURE — 25010000002 CISPLATIN PER 50 MG: Performed by: INTERNAL MEDICINE

## 2024-06-20 PROCEDURE — 25010000002 PALONOSETRON PER 25 MCG: Performed by: INTERNAL MEDICINE

## 2024-06-20 PROCEDURE — 36415 COLL VENOUS BLD VENIPUNCTURE: CPT

## 2024-06-20 PROCEDURE — 96375 TX/PRO/DX INJ NEW DRUG ADDON: CPT

## 2024-06-20 PROCEDURE — 96366 THER/PROPH/DIAG IV INF ADDON: CPT

## 2024-06-20 PROCEDURE — 96367 TX/PROPH/DG ADDL SEQ IV INF: CPT

## 2024-06-20 PROCEDURE — 96365 THER/PROPH/DIAG IV INF INIT: CPT

## 2024-06-20 PROCEDURE — 85025 COMPLETE CBC W/AUTO DIFF WBC: CPT

## 2024-06-20 PROCEDURE — 77386: CPT | Performed by: RADIOLOGY

## 2024-06-20 PROCEDURE — 83735 ASSAY OF MAGNESIUM: CPT

## 2024-06-20 PROCEDURE — 80053 COMPREHEN METABOLIC PANEL: CPT

## 2024-06-20 PROCEDURE — 25010000002 POTASSIUM CHLORIDE PER 2 MEQ OF POTASSIUM: Performed by: INTERNAL MEDICINE

## 2024-06-20 PROCEDURE — 25010000002 FOSAPREPITANT PER 1 MG: Performed by: INTERNAL MEDICINE

## 2024-06-20 PROCEDURE — 25010000002 DEXAMETHASONE SODIUM PHOSPHATE 100 MG/10ML SOLUTION: Performed by: INTERNAL MEDICINE

## 2024-06-20 PROCEDURE — 25010000002 FUROSEMIDE PER 20 MG: Performed by: INTERNAL MEDICINE

## 2024-06-20 PROCEDURE — 25810000003 SODIUM CHLORIDE 0.9 % SOLUTION: Performed by: INTERNAL MEDICINE

## 2024-06-20 PROCEDURE — 25010000002 MAGNESIUM SULFATE PER 500 MG OF MAGNESIUM: Performed by: INTERNAL MEDICINE

## 2024-06-20 PROCEDURE — 25810000003 SODIUM CHLORIDE 0.9 % SOLUTION 250 ML FLEX CONT: Performed by: INTERNAL MEDICINE

## 2024-06-20 PROCEDURE — 25810000003 SODIUM CHLORIDE 0.9 % SOLUTION 1,000 ML FLEX CONT: Performed by: INTERNAL MEDICINE

## 2024-06-20 RX ORDER — FUROSEMIDE 10 MG/ML
20 INJECTION INTRAMUSCULAR; INTRAVENOUS ONCE
Status: DISCONTINUED | OUTPATIENT
Start: 2024-06-20 | End: 2024-06-20 | Stop reason: HOSPADM

## 2024-06-20 RX ORDER — PALONOSETRON 0.05 MG/ML
0.25 INJECTION, SOLUTION INTRAVENOUS ONCE
Status: COMPLETED | OUTPATIENT
Start: 2024-06-20 | End: 2024-06-20

## 2024-06-20 RX ORDER — SODIUM CHLORIDE 9 MG/ML
20 INJECTION, SOLUTION INTRAVENOUS ONCE
Status: COMPLETED | OUTPATIENT
Start: 2024-06-20 | End: 2024-06-20

## 2024-06-20 RX ORDER — DIPHENHYDRAMINE HYDROCHLORIDE 50 MG/ML
50 INJECTION INTRAMUSCULAR; INTRAVENOUS AS NEEDED
Status: DISCONTINUED | OUTPATIENT
Start: 2024-06-20 | End: 2024-06-20 | Stop reason: HOSPADM

## 2024-06-20 RX ORDER — FUROSEMIDE 10 MG/ML
20 INJECTION INTRAMUSCULAR; INTRAVENOUS ONCE
Status: COMPLETED | OUTPATIENT
Start: 2024-06-20 | End: 2024-06-20

## 2024-06-20 RX ORDER — FAMOTIDINE 10 MG/ML
20 INJECTION, SOLUTION INTRAVENOUS AS NEEDED
Status: DISCONTINUED | OUTPATIENT
Start: 2024-06-20 | End: 2024-06-20 | Stop reason: HOSPADM

## 2024-06-20 RX ADMIN — SODIUM CHLORIDE 20 ML/HR: 9 INJECTION, SOLUTION INTRAVENOUS at 09:24

## 2024-06-20 RX ADMIN — DEXAMETHASONE SODIUM PHOSPHATE 12 MG: 10 INJECTION, SOLUTION INTRAMUSCULAR; INTRAVENOUS at 11:48

## 2024-06-20 RX ADMIN — FOSAPREPITANT 150 MG: 150 INJECTION, POWDER, LYOPHILIZED, FOR SOLUTION INTRAVENOUS at 12:14

## 2024-06-20 RX ADMIN — CISPLATIN 68 MG: 50 INJECTION, SOLUTION INTRAVENOUS at 13:23

## 2024-06-20 RX ADMIN — PALONOSETRON HYDROCHLORIDE 0.25 MG: 0.25 INJECTION INTRAVENOUS at 11:34

## 2024-06-20 RX ADMIN — MAGNESIUM SULFATE HEPTAHYDRATE: 500 INJECTION, SOLUTION INTRAMUSCULAR; INTRAVENOUS at 13:38

## 2024-06-20 RX ADMIN — MAGNESIUM SULFATE HEPTAHYDRATE: 500 INJECTION, SOLUTION INTRAMUSCULAR; INTRAVENOUS at 09:26

## 2024-06-20 RX ADMIN — FUROSEMIDE 20 MG: 10 INJECTION, SOLUTION INTRAMUSCULAR; INTRAVENOUS at 12:47

## 2024-06-21 ENCOUNTER — APPOINTMENT (OUTPATIENT)
Dept: RADIATION ONCOLOGY | Facility: HOSPITAL | Age: 64
End: 2024-06-21
Payer: MEDICAID

## 2024-06-21 ENCOUNTER — HOSPITAL ENCOUNTER (OUTPATIENT)
Dept: RADIATION ONCOLOGY | Facility: HOSPITAL | Age: 64
Discharge: HOME | End: 2024-06-21

## 2024-06-21 LAB
RAD ONC ARIA COURSE ID: NORMAL
RAD ONC ARIA COURSE LAST TREATMENT DATE: NORMAL
RAD ONC ARIA COURSE START DATE: NORMAL
RAD ONC ARIA COURSE TREATMENT ELAPSED DAYS: 46
RAD ONC ARIA FIRST TREATMENT DATE: NORMAL
RAD ONC ARIA PLAN FRACTIONS TREATED TO DATE: 3
RAD ONC ARIA PLAN ID: NORMAL
RAD ONC ARIA PLAN PRESCRIBED DOSE PER FRACTION: 2 GY
RAD ONC ARIA PLAN PRIMARY REFERENCE POINT: NORMAL
RAD ONC ARIA PLAN TOTAL FRACTIONS PRESCRIBED: 5
RAD ONC ARIA PLAN TOTAL PRESCRIBED DOSE: 1000 CGY
RAD ONC ARIA REFERENCE POINT DOSAGE GIVEN TO DATE: 38.62 GY
RAD ONC ARIA REFERENCE POINT DOSAGE GIVEN TO DATE: 66 GY
RAD ONC ARIA REFERENCE POINT ID: NORMAL
RAD ONC ARIA REFERENCE POINT ID: NORMAL
RAD ONC ARIA REFERENCE POINT SESSION DOSAGE GIVEN: 0.82 GY
RAD ONC ARIA REFERENCE POINT SESSION DOSAGE GIVEN: 2 GY

## 2024-06-21 PROCEDURE — 77336 RADIATION PHYSICS CONSULT: CPT

## 2024-06-21 PROCEDURE — 77386: CPT

## 2024-06-21 NOTE — PROGRESS NOTES
IV site was changed prior to starting Cisplatin due to vein becoming irritated from IV hydration with potassium. IV was still giving excellent blood return and flushed easily without patient discomfort, but area around IV site was irritated, pink-red in color. IV d/c'd and restarted, warm blanket applied to previous IV site. Dr. Randhawa made aware.

## 2024-06-24 ENCOUNTER — HOSPITAL ENCOUNTER (OUTPATIENT)
Dept: RADIATION ONCOLOGY | Facility: HOSPITAL | Age: 64
Discharge: HOME | End: 2024-06-24
Payer: MEDICAID

## 2024-06-24 LAB
RAD ONC ARIA COURSE ID: NORMAL
RAD ONC ARIA COURSE LAST TREATMENT DATE: NORMAL
RAD ONC ARIA COURSE START DATE: NORMAL
RAD ONC ARIA COURSE TREATMENT ELAPSED DAYS: 49
RAD ONC ARIA FIRST TREATMENT DATE: NORMAL
RAD ONC ARIA PLAN FRACTIONS TREATED TO DATE: 4
RAD ONC ARIA PLAN ID: NORMAL
RAD ONC ARIA PLAN PRESCRIBED DOSE PER FRACTION: 2 GY
RAD ONC ARIA PLAN PRIMARY REFERENCE POINT: NORMAL
RAD ONC ARIA PLAN TOTAL FRACTIONS PRESCRIBED: 5
RAD ONC ARIA PLAN TOTAL PRESCRIBED DOSE: 1000 CGY
RAD ONC ARIA REFERENCE POINT DOSAGE GIVEN TO DATE: 39.44 GY
RAD ONC ARIA REFERENCE POINT DOSAGE GIVEN TO DATE: 68 GY
RAD ONC ARIA REFERENCE POINT ID: NORMAL
RAD ONC ARIA REFERENCE POINT ID: NORMAL
RAD ONC ARIA REFERENCE POINT SESSION DOSAGE GIVEN: 0.82 GY
RAD ONC ARIA REFERENCE POINT SESSION DOSAGE GIVEN: 2 GY

## 2024-06-24 PROCEDURE — 77386: CPT | Performed by: RADIOLOGY

## 2024-06-25 ENCOUNTER — APPOINTMENT (OUTPATIENT)
Dept: SPEECH THERAPY | Facility: HOSPITAL | Age: 64
End: 2024-06-25
Payer: MEDICAID

## 2024-06-25 ENCOUNTER — HOSPITAL ENCOUNTER (OUTPATIENT)
Dept: RADIATION ONCOLOGY | Facility: HOSPITAL | Age: 64
Discharge: HOME | End: 2024-06-25

## 2024-06-25 LAB
RAD ONC ARIA COURSE ID: NORMAL
RAD ONC ARIA COURSE LAST TREATMENT DATE: NORMAL
RAD ONC ARIA COURSE START DATE: NORMAL
RAD ONC ARIA COURSE TREATMENT ELAPSED DAYS: 50
RAD ONC ARIA FIRST TREATMENT DATE: NORMAL
RAD ONC ARIA PLAN FRACTIONS TREATED TO DATE: 5
RAD ONC ARIA PLAN ID: NORMAL
RAD ONC ARIA PLAN PRESCRIBED DOSE PER FRACTION: 2 GY
RAD ONC ARIA PLAN PRIMARY REFERENCE POINT: NORMAL
RAD ONC ARIA PLAN TOTAL FRACTIONS PRESCRIBED: 5
RAD ONC ARIA PLAN TOTAL PRESCRIBED DOSE: 1000 CGY
RAD ONC ARIA REFERENCE POINT DOSAGE GIVEN TO DATE: 40.26 GY
RAD ONC ARIA REFERENCE POINT DOSAGE GIVEN TO DATE: 70 GY
RAD ONC ARIA REFERENCE POINT ID: NORMAL
RAD ONC ARIA REFERENCE POINT ID: NORMAL
RAD ONC ARIA REFERENCE POINT SESSION DOSAGE GIVEN: 0.82 GY
RAD ONC ARIA REFERENCE POINT SESSION DOSAGE GIVEN: 2 GY

## 2024-06-25 PROCEDURE — 77386: CPT

## 2024-07-09 ENCOUNTER — TELEMEDICINE (OUTPATIENT)
Dept: SURGERY | Facility: CLINIC | Age: 64
End: 2024-07-09
Payer: MEDICAID

## 2024-07-09 DIAGNOSIS — D49.0 RECTAL TUMOR: Primary | ICD-10-CM

## 2024-07-09 PROCEDURE — 1160F RVW MEDS BY RX/DR IN RCRD: CPT | Performed by: SURGERY

## 2024-07-09 PROCEDURE — 1159F MED LIST DOCD IN RCRD: CPT | Performed by: SURGERY

## 2024-07-09 PROCEDURE — 99214 OFFICE O/P EST MOD 30 MIN: CPT | Performed by: SURGERY

## 2024-07-09 NOTE — PROGRESS NOTES
Colorectal & General Surgery  Follow - Up    Patient: Keron Gerber  YOB: 1960  MRN: [id number]      Assessment  Keron Gerber is a 64 y.o. 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    He has tolerated and completed therapy for his SCC and seems to have relatively good nutrition given what he has gone through. No longer using his gastrostomy tube.     We discussed proceeding with laparoscopic low anterior resection with diverting loop ileostomy creation on July 30. We discussed the risks, benefits, and alternatives to the procedure. Prior to proceeding, I will contact his primary oncologist and radiation oncologist to ensure that they are OK with proceeding with surgery at this time. We will plan to utilize ERAS protocols and pre-admit him to the hospital for bowel prep and IV hydration prior to the surgery.    Regarding his gastrostomy tube, I am able to remove it at the time of surgery if it is appropriate to do so. I will speak with his radiation oncologist about this. If it is not appropriate to remove at that time, we will ensure that he still has a functioning gastrostomy tube at the conclusion of our operation.     Plan  Will discuss overall plan with Alvaro Randhawa and Latisha  Tentatively plan for laparoscopic low anterior resection, diverting loop ileostomy, and possible gastrostomy tube takedown on July 30 in [place]  ERAS protocols  Mechanical bowel prep  Oral abx  Nutritional supplements  Pre-admit on Sunday, July 28 for IV fluid hydration and bowel prep      Chief Complaint: Follow up regarding rectal tumor    History of Present Illness   Keron Gerber is a 64 y.o. male who presents in follow up regarding rectal tumor. He has completed chemoradiation for his oropharyngeal SCC. He is eating relatively well and has no significant dysphagia. No longer using g tube.  No issues with bowel function.     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    Tobacco use disorder         Past Surgical History   Past Surgical History:   Procedure Laterality Date    COLONOSCOPY N/A 5/1/2024    Procedure: COLONOSCOPY WITH ANESTHESIA;  Surgeon: Gustabo Ruiz MD;  Location: [facility] ENDOSCOPY;  Service: Gastroenterology;  Laterality: N/A;  preop; abnormal pet scan   postop rectal mass; polyps;   PCP Dr Santos    CYST REMOVAL Right 08/07/2014    RIGHT LOWER EYELID- EPIDERMAL INCLUSION CYST    EXCISION LESION N/A 08/07/2014    MALIGNANT MELANOMA OF LEFT NASAL TIP    FOREHEAD FLAP  09/11/2014    PEDICLE FLAP  10/02/2014    PEDICLE DIVISION AND FLAP INSET OF NOSE    PROSTATECTOMY N/A 09/07/2022    Procedure: PROSTATECTOMY LAPAROSCOPIC WITH [employer] ROBOT;  Surgeon: Matthew Weaver MD;  Location: [facility] OR;  Service: Robotics - DaVinci;  Laterality: N/A;    SQUAMOUS CELL CARCINOMA EXCISION  10/29/2013    LEFT FLOOR OF MOUTH    TONSILLECTOMY         Social History  Social History     Socioeconomic History    Marital status: Single   Tobacco Use    Smoking status: Every Day     Current packs/day: 1.00     Types: Cigarettes    Smokeless tobacco: Never   Vaping Use    Vaping status: Never Used   Substance and Sexual Activity    Alcohol use: Not Currently     Comment: 12 PER DAY    Drug use: No    Sexual activity: Defer       Family History  Family History   Problem Relation Age of Onset    No Known Problems Mother     Heart disease Father     Colon polyps Father     Colon cancer Neg Hx        Colorectal cancer family history: None    Review of Systems  Negative except as documented in the HPI.     Allergies  Allergies   Allergen Reactions    Sulfa Antibiotics Other (See Comments)     Headaches       Medications    Current  Outpatient Medications:     acetaminophen (TYLENOL) 500 MG tablet, Take 1 tablet by mouth Every 6 (Six) Hours As Needed for Mild Pain., Disp: , Rfl:     ondansetron (Zofran) 8 MG tablet, Take 1 tablet by mouth Every 8 (Eight) Hours As Needed for Nausea or Vomiting., Disp: 60 tablet, Rfl: 2    prochlorperazine (COMPAZINE) 10 MG tablet, Take 1 tablet by mouth Every 4 (Four) Hours As Needed for Nausea or Vomiting., Disp: 60 tablet, Rfl: 2    tadalafil (CIALIS) 20 MG tablet, Take 1 tablet by mouth As Needed for Erectile Dysfunction. 1-12 hours before activity, Disp: 12 tablet, Rfl: 11    Varenicline Tartrate, Starter, 0.5 MG X 11 & 1 MG X 42 tablet therapy pack, , Disp: , Rfl:     Vital Signs  There were no vitals filed for this visit.     Physical Exam  Constitutional: Resting comfortably, no acute distress  Neck: Supple, trachea midline  Respiratory: No increased work of breathing, Symmetric excursion    Laboratory Results  I have personally reviewed CMP with Cr 0.45, Albumin 4.0. CBC with wbc 3, hgb 10, plt 101.     Radiology  None to review    Endoscopy  No new studies to review    I spent 38 minutes caring for Keron on this date of service. This time includes time spent by me in the following activities:preparing for the visit, reviewing tests, counseling and educating the patient/family/caregiver, ordering medications, tests, or procedures, referring and communicating with other health care professionals , documenting information in the medical record, and care coordination    This visit was conducted via telehealth today utilizing a video conferencing service. Mr. Gerber and his family were at their home within the [facility] at the time of the visit.      Trey Cruz MD  Colorectal & General Surgery  [facility]    [address], [address]  [place], [place], [address]  P: [phone]  F: [phone]

## 2024-07-11 ENCOUNTER — PREP FOR SURGERY (OUTPATIENT)
Dept: OTHER | Facility: HOSPITAL | Age: 64
End: 2024-07-11
Payer: MEDICAID

## 2024-07-11 DIAGNOSIS — D49.0 RECTAL TUMOR: Primary | ICD-10-CM

## 2024-07-12 ENCOUNTER — TELEPHONE (OUTPATIENT)
Dept: ONCOLOGY | Facility: CLINIC | Age: 64
End: 2024-07-12
Payer: MEDICAID

## 2024-07-12 NOTE — TELEPHONE ENCOUNTER
Received call from patient Care Giver Ed, he reports patient Keron has completed his radiation therapy aprox 2 weeks ago, he is erika for surgical procedure July 30/2024 with Dr Cruz due to a mass located in his colon. Ed questions what are the plans going forward from Dr Randhawa standpoint?    Relayed all information to Dr Randhawa, he reviewed Keron's chart and notes that patient was no show on 6/27/24 at his last erika apt erika with Dr Randhawa. Dr Randhawa recommends another f/u apt to be scheduled in office:    Apt Erika: Tuesday 7/16/24 @ 8 am  Phone call placed to Care Giver Ed, message was left on voice mail regarding patient erika apt with Dr Randhawa  NOTES: apt can be viewed in patient MY CHART which caregiver has access to.

## 2024-07-12 NOTE — PROGRESS NOTES
[facility] HEMATOLOGY & ONCOLOGY  [address]  [place] [address] [phone]    Patient Name: Keron Gerber  Encounter Date: 07/16/2024  YOB: 1960  Patient Number: [id number]      REASON FOR FOLLOW-UP: Keron Gerber is a pleasant 64 y.o.  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  He is seen 4 weeks post cisplatin with radiation.  He is seen with his mother Portia and brother in law.   History is obtained from patient.  History is considered reliable.           DIAGNOSTIC ABNORMALITIES:   He had several months of right ear pain radiating to the right neck and 20 pound weight loss over 6 months.   He noted right neck mass and dysphagia.  CT neck on 2/28/2024.  3.5 x 4 cm lobular enhancing mass in the upper larynx arising from the posterior wall at the level of the base of the epiglottis.  Suspicious for malignancy.  Abnormal mucosal enhancement also noted involving the uvula and tongue base.  CT chest 3/13/2024.  Well-defined 1.7 cm fluid density lesion in the anterior mediastinum could represent a benign etiology such as a thymic cyst.  Recommend follow-up.  Mild diffuse thickening of bilateral adrenal glands could represent adenomatous hyperplasia.  Recommend correlation with prior imaging if available.  No evidence of pulmonary metastasis.  Tracheostomy, direct laryngoscopy, rigid cervical esophagoscopy and biopsy of the posterior pharyngeal mass and biopsy of the left true vocal fold on 3/14/2024. Flexible laryngoscopy revealed a large exophytic lesion coming from the posterior pharyngeal wall protruding off the supraglottis and glottis with inability to visualize the glottis.  PEG placement 3/18/2024.  Pathology report on 3/18/2024.  Vocal cord, left, biopsy: Squamous mucosa with focal dysplasia at least moderate.  Posterior pharyngeal wall biopsy:  Invasive poorly differentiated squamous cell carcinoma.  Posterior pharyngeal wall biopsy: Invasive poorly differentiated squamous cell carcinoma with focal metaplastic features.  Posterior pharyngeal wall biopsy: Invasive poorly differentiated squamous cell carcinoma with focal metaplastic features.  CBC 3/19/2024 revealed a WBC of 10.8, hemoglobin 13.7, hematocrit 40.5 and platelet 329.  CMP revealed creatinine of 0.73.  Patient seen by Dr. Lit Rand on 3/22/2024.  Findings: Nasopharynx and oropharynx patent without masses/lesions; BOT without masses/lesions; exophytic lesion likely coming from posterior pharyngeal wall protruding onto supraglottis and glottis; unable to visualize glottis d/t obstructive view; supraglottis without obvious lesion otherwise.  Multidisciplinary recommendation: Discussed chemoradiation versus surgical resection.  Patient would benefit from chemoradiation at this time.  Note from Dr. Good on 4/2/2024. Plan for 35 fractions. PET 4/16/2024. Hypermetabolic mass of the posterior wall of the hypopharynx consistent with biopsy-proven squamous cell carcinoma, known primary neoplasm. No hypermetabolic cervical lymphadenopathy is seen.  Hypermetabolic mass of the distal sigmoid colon also worrisome for second primary neoplasm, adenocarcinoma of the colon. Colonoscopy is recommended for further evaluation. Plan for colonoscopy.           PREVIOUS INTERVENTIONS:  Undergoing weekly cisplatin 5/9/2024 through present with radiation 5/6/2024 through 6/20/2024.  Completed radiation 6/21/2024.      DIAGNOSTIC ABNORMALITIES: Distal sigmoid mass.  PET scan 4/16/2024. Hypermetabolic mass of the distal sigmoid colon also worrisome for  second primary neoplasm, adenocarcinoma of the colon. Colonoscopy is recommended for further evaluation.  Colonoscopy 5/1/2024.Foreign body in the cecum. Removal was successful.  One 4 mm polyp in the distal transverse colon, removed with a cold snare. Resected and  retrieved.  One 5 mm polyp in the proximal sigmoid colon, removed with a cold snare. Complete resection. Polyp tissue not retrieved.  One 6 mm polyp at 27 cm proximal to the anus, removed with a cold snare. Resected and retrieved.  One 10 mm polyp at 17 cm proximal to the anus, removed with a hot snare. Resected and retrieved.  Likely malignant partially obstructing tumor from 10 to 14 cm proximal to the anus. Biopsied. This appears to be a large polypoid lesion but given the fact that it was noted on pet imaging this is concerning for malignant cells being present.  Internal hemorrhoids. The examination was otherwise normal on direct and retroflexion views.  An area at 9 cm proximal to the anus was tattooed.   MRI pelvis 5/10/2024.Study is limited by motion artifact.  High rectal polypoid mass extending to the distal sigmoid approximately measuring 5.1 cm in length; T1/T2.  Small bowel segment abut the sigmoid colon without evidence of definite invasion.  No regional lymphadenopathy.   CT abdomen and pelvis 6/11/2024. Large sessile appearing mass within the lumen of the colon at the  rectosigmoid junction appears to arise from the more distal wall and measures 6.2 x 4.9 x 3.7 cm. A small portion of the mass may extend to the serosal surface of the colon posteriorly as demonstrated on images 57-58 of series 2. No evidence of regional lymphadenopathy, no distant metastases. The liver has a normal appearance.  Pathology report 5/2/2024.Mass, rectosigmoid colon, biopsies:Tubulovillous adenoma.High-grade dysplasia is present.  Note from Dr. Abram Walker on 5/24/2024.  Plan for surgical treatment of advanced polyp with high-grade dysplasia concerning for underlying malignancy after completion of chemoradiation for hypopharyngeal squamous cell carcinoma.  CT abdomen pelvis ordered.  Telehealth in 3 weeks. Communication with Dr. Abram Cruz on 5/28/2024.  Patient to complete cisplatin with radiation for the  pharyngeal squamous cell carcinoma.  Then will undergo resection for a distal sigmoid colon mass.      PREVIOUS INTERVENTIONS: Pending resection.      Oncology/Hematology History   Prostate cancer    Initial Diagnosis    Prostate cancer     1/23/2020 Procedure    PSA 13.96     2/23/2021 Procedure    PSA 4.4     4/23/2022 Procedure    PSA 10.1     5/12/2022 Procedure    PSA 16.70     5/23/2022 Biopsy    Prostate Biopsy:  Left mid medial 1 - prostatic adenocarcinoma, [place] 3+3=6  Left mid lateral 2 - prostatic adenocarcinoma Johanny 3+3=6  Left mid medial 2 - prostatic adenocarcinoma [place] 3+3=6  Left apex lateral - prostatic adenocarcinoma, Johanny 3+3=6  Left apex medial - prostatic adenocarcinoma, Johanny 3+3=6  Right base medial - prostatic adenocarcinoma, [place] 3+3=6    Perineural invasion is identified in this case     9/7/2022 Surgery    Final Diagnosis   Prostate, prostatectomy:               - Histologic Type:  Acinar adenocarcinoma.               - Histologic Grade:  Grade group 4 (Johanny score 4 + 4 = 8).               - Intraductal Carcinoma:  Not identified.               - Cribriform Glands:  Present.               - Treatment Effect:  No known presurgical therapy.               - Tumor Quantitation: 41-50%.               - Extraprostatic Extension:  Not identified.               - Urinary Bladder Neck Invasion: Not identified.               - Seminal Vesicle Invasion: Not identified.               - Lymphovascular Invasion: Not identified.               - Margins: All margins are Negative for invasive carcinoma.               - Pathologic Stage:  pT2 pNx pMx        12/8/2022 Procedure    PSA <0.1     5/16/2023 Procedure    PSA <0.1     9/19/2023 Procedure    PSA <0.1     1/23/2024 Procedure    PSA <0.1     Malignant neoplasm hypopharynx    Initial Diagnosis    Malignant neoplasm hypopharynx     2/28/2024 Imaging    CT Neck:  35 x 40 mm lobular enhancing mass in the upper larynx arising from the  posterior   wall at the level of the base of the epiglottis. This is suspicious for malignancy.   There is abnormal mucosal enhancement also noted involving the uvula and   tongue base.      3/2/2024 Imaging    CT Neck:  Findings suggest laryngeal mass as noted, suggest ENT evaluation, asymmetric appearance of the vocal cords and larynx  Minimal adenopathy  Postop changes left submandibular region     3/13/2024 Imaging    CT Chest:  Well-defined 1.7 cm fluid density lesion in the anterior mediastinum could   represent a benign etiology such as a thymic cyst. Given lack of prior imaging   and concern for malignancy, recommend close attention on follow-up imaging   and further workup as clinically necessary.  Mild diffuse thickening of bilateral adrenal glands could represent   adenomatous hyperplasia. Recommend correlation to prior imaging if   available and attention on follow-up imaging.  No evidence of pulmonary metastases.     3/14/2024 Biopsy    Awake tracheostomy/Direct laryngoscopy with suspension with   telescope/right cervical esophagoscopy/biopsy    Findings:  Successful awake tracheostomy in successful awake tracheostomy with   placement of a 6 cuffed Shiley tracheostomy tube.  Large exophytic 5-6 cm posterior oropharyngeal mass extending to the   junction of the lateral posterior pharyngeal wall junction bilaterally, superiorly   comes up to the level of the soft palate. Inferiorly extends into bilateral piriform   sinuses, but does not involve the apex, however there are reactive changes   of the left piriform apex. There is no involvement of the supraglottic structures.  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  No other mucosal masses, lesions, or ulcers visualized.   No mucosal masses, lesions, or ulcers visualized in the esophageal inlet or   cervical esophagus.    DIAGNOSIS:     A. VOCAL CORD, LEFT,  BIOPSY:   - Squamous mucosa with focal dysplasia, at least moderate.     B.  POSTERIOR PHARYNGEAL WALL, BIOPSY:   - INVASIVE POORLY DIFFERENTIATED SQUAMOUS CELL CARCINOMA.     C.  POSTERIOR PHARYNGEAL WALL, BIOPSY:   - INVASIVE POORLY DIFFERENTIATED SQUAMOUS CELL CARCINOMA WITH FOCAL METAPLASTIC FEATURES     D.  POSTERIOR PHARYNGEAL WALL, BIOPSY:   - INVASIVE POORLY DIFFERENTIATED SQUAMOUS CELL CARCINOMA WITH FOCAL METAPLASTIC FEATURES      3/18/2024 Procedure    Gastrostomy tube placement     3/22/2024 Procedure    Appointment with Dr. Rand - [facility]:  Laryngoscopy:  Findings: nasopharynx and oropharynx patent without masses/lesions; BOT   without masses/lesions; exophytic lesion likely coming from posterior   pharyngeal wall protruding onto supraglottis and glottis; unable to visualize   glottis d/t obstructive view; supraglottis without obvious lesion otherwise    PLAN:   CT chest without mets. Discussed imaging in depth with team. Patient is   currently T3N0, stage III oropharyngeal SCC.   DL had biopsies positive for invasive SCC.   Discussed chemoradiation vs surgical resection with patient. Patient will   benefit from chemoradiation at this time.  Patient would like to have treatment closer to home in [facility]. Will send   out referrals.  Patient may need further evaluation of his carotid stenosis as well.     Appointment with Dr. 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       4/2/2024 Cancer Staged    Staging form: Pharynx - Hypopharynx, AJCC 8th Edition  - Clinical stage from 4/2/2024: Stage III (cT3, cN0, cM0) - Signed by Tee Randhawa MD on 4/2/2024 5/9/2024 - 6/20/2024 Chemotherapy    OP HEAD & NECK CISplatin (Weekly) + XRT     Malignant melanoma of nose   8/7/2014 Surgery    FINAL DIAGNOSIS                       A.  CYST, RIGHT EYELID:                 EPIDERMAL INCLUSION CYST.            B. EXCISION, SKIN LESION, LEFT NOSE:                 1.   MALIGNANT MELANOMA.                 2.   ARTIE LEVEL III.                 3.   BRESLOW MAXIMUM THICKNESS EQUALS 0.4 MM.                 4.   NO ULCERATION IDENTIFIED.                 5.   NO MICROSATELLITES IDENTIFIED.                 6.   NO MITOTIC FIGURES IDENTIFIED IN TUMOR.                 7.   NO ANGIOLYMPHATIC INVASION IDENTIFIED.                 8.   AJCC STAGE: pT1a pNX           Dictated by: CHANTE GILL MD        9/11/2014 Surgery    FINAL DIAGNOSIS                       SKIN, NOSE, RE-EXCISION FOR MELANOMA:                 NEGATIVE FOR RESIDUAL MELANOMA OR MELANOMA IN SITU.                 BIOPSY SITE IDENTIFIED.                 SEVERE SOLAR ELASTOSIS.                 SEBORRHEIC KERATOSIS.                 MARGINS OF RESECTION ARE NEGATIVE.              PAST MEDICAL HISTORY:  ALLERGIES:  Allergies   Allergen Reactions    Sulfa Antibiotics Other (See Comments)     Headaches     CURRENT MEDICATIONS:  Outpatient Encounter Medications as of 7/16/2024   Medication Sig Dispense Refill    acetaminophen (TYLENOL) 500 MG tablet Take 1 tablet by mouth Every 6 (Six) Hours As Needed for Mild Pain.      ondansetron (Zofran) 8 MG tablet Take 1 tablet by mouth Every 8 (Eight) Hours As Needed for Nausea or Vomiting. 60 tablet 2    prochlorperazine (COMPAZINE) 10 MG tablet Take 1 tablet by mouth Every 4 (Four) Hours As Needed for Nausea or Vomiting. 60 tablet 2    tadalafil (CIALIS) 20 MG tablet Take 1 tablet by mouth As Needed for Erectile Dysfunction. 1-12 hours before activity 12 tablet 11    Varenicline Tartrate, Starter, 0.5 MG X 11 & 1 MG X 42 tablet therapy pack        No facility-administered encounter medications on file as of 7/16/2024.     ADULT ILLNESSES:  Patient Active Problem List   Diagnosis Code    Dyslexia R48.0    Prostate cancer C61    Malignant neoplasm hypopharynx C13.9     Malignant melanoma of nose C43.31    Current every day smoker F17.200    Abnormal PET scan of colon R94.8    Rectal tumor D49.0     SURGERIES:  Past Surgical History:   Procedure Laterality Date    COLONOSCOPY N/A 5/1/2024    Procedure: COLONOSCOPY WITH ANESTHESIA;  Surgeon: Gustabo Ruiz MD;  Location: [facility] ENDOSCOPY;  Service: Gastroenterology;  Laterality: N/A;  preop; abnormal pet scan   postop rectal mass; polyps;   PCP Dr Santos    CYST REMOVAL Right 08/07/2014    RIGHT LOWER EYELID- EPIDERMAL INCLUSION CYST    EXCISION LESION N/A 08/07/2014    MALIGNANT MELANOMA OF LEFT NASAL TIP    FOREHEAD FLAP  09/11/2014    PEDICLE FLAP  10/02/2014    PEDICLE DIVISION AND FLAP INSET OF NOSE    PROSTATECTOMY N/A 09/07/2022    Procedure: PROSTATECTOMY LAPAROSCOPIC WITH DAVINCI ROBOT;  Surgeon: Matthew Weaver MD;  Location: [facility] OR;  Service: Robotics - DaVinci;  Laterality: N/A;    SQUAMOUS CELL CARCINOMA EXCISION  10/29/2013    LEFT FLOOR OF MOUTH    TONSILLECTOMY       HEALTH MAINTENANCE ITEMS:  Health Maintenance Due   Topic Date Due    Pneumococcal Vaccine 0-64 (1 of 2 - PCV) Never done    TDAP/TD VACCINES (1 - Tdap) Never done    ZOSTER VACCINE (1 of 2) Never done    HEPATITIS C SCREENING  Never done    ANNUAL PHYSICAL  Never done    COVID-19 Vaccine (4 - 2023-24 season) 09/01/2023       <no information>  Last Completed Colonoscopy            COLONOSCOPY (Every 10 Years) Next due on 5/1/2034 05/01/2024  Colonoscopy    05/01/2024  Surgical Procedure: COLONOSCOPY                  Immunization History   Administered Date(s) Administered    COVID-19 (MODERNA) 1st,2nd,3rd Dose Monovalent 02/24/2021, 03/23/2021    COVID-19 (MODERNA) BIVALENT 12+YRS 11/15/2022    COVID-19 (MODERNA) Monovalent Original Booster 12/16/2021     Last Completed Mammogram       This patient has no relevant Health Maintenance data.              FAMILY HISTORY:  Family History   Problem Relation Age of Onset    No Known Problems  "Mother     Heart disease Father     Colon polyps Father     Colon cancer Neg Hx      SOCIAL HISTORY:  Social History     Socioeconomic History    Marital status: Single   Tobacco Use    Smoking status: Every Day     Current packs/day: 1.00     Types: Cigarettes    Smokeless tobacco: Never   Vaping Use    Vaping status: Never Used   Substance and Sexual Activity    Alcohol use: Not Currently     Comment: 12 PER DAY    Drug use: No    Sexual activity: Defer       REVIEW OF SYSTEMS:    Review of Systems   Constitutional:  Positive for fatigue. Negative for fever and unexpected weight change.        \"I am getting better.\"   HENT:  Negative for congestion and trouble swallowing.    Eyes:  Negative for discharge and redness.   Respiratory:  Negative for shortness of breath and wheezing.    Cardiovascular:  Negative for chest pain and leg swelling.   Gastrointestinal:  Negative for abdominal pain, nausea and vomiting.   Endocrine: Negative for cold intolerance and heat intolerance.   Genitourinary:  Negative for difficulty urinating and enuresis.   Musculoskeletal:  Negative for gait problem and myalgias.   Skin:  Positive for pallor.   Allergic/Immunologic: Negative for food allergies.   Neurological:  Negative for dizziness, speech difficulty and weakness.   Hematological:  Negative for adenopathy. Does not bruise/bleed easily.   Psychiatric/Behavioral:  Negative for agitation, confusion and hallucinations.        VITAL SIGNS: /62   Pulse 70   Temp 97.7 °F (36.5 °C)   Resp 18   Ht 175.3 cm (69\")   Wt 54.7 kg (120 lb 9.6 oz)   SpO2 97%   BMI 17.81 kg/m²   Pain Score    07/16/24 0803   PainSc: 0-No pain       PHYSICAL EXAMINATION:     Physical Exam  Vitals reviewed.   Constitutional:       General: He is not in acute distress.  HENT:      Head: Normocephalic and atraumatic.   Eyes:      General: No scleral icterus.  Cardiovascular:      Rate and Rhythm: Normal rate.   Pulmonary:      Effort: No respiratory " distress.      Breath sounds: No wheezing.   Abdominal:      General: Bowel sounds are normal.      Palpations: Abdomen is soft.      Tenderness: There is no abdominal tenderness.      Comments: +PEG.   Musculoskeletal:         General: No swelling.      Cervical back: Neck supple.   Skin:     Coloration: Skin is pale.   Neurological:      Mental Status: He is alert and oriented to person, place, and time.   Psychiatric:         Mood and Affect: Mood normal.         Behavior: Behavior normal.         Thought Content: Thought content normal.         Judgment: Judgment normal.         LABS    Lab Results - Last 18 Months   Lab Units 06/20/24  0730 06/13/24  0730 06/06/24  0728 05/30/24  0726 05/23/24  0723 05/16/24  0734   HEMOGLOBIN g/dL 10.7* 11.1* 11.5* 11.8* 12.4* 13.2   HEMATOCRIT % 32.1* 32.5* 34.5* 35.2* 37.1* 39.8   MCV fL 97.3* 96.7 97.2* 97.0 96.6 95.9   WBC 10*3/mm3 3.14* 4.16 4.60 5.79 7.66 8.51   RDW % 16.4* 15.9* 15.2 14.6 13.6 13.2   MPV fL 9.9 9.5 9.2 9.7 9.0 10.3   PLATELETS 10*3/mm3 101* 130* 137* 186 212 245   IMM GRAN % % 0.3 0.5 0.2 0.3 0.3 0.6*   NEUTROS ABS 10*3/mm3 1.76 2.78 2.87 3.93 4.98 5.25   LYMPHS ABS 10*3/mm3 0.98 1.00 1.27 1.27 1.86 2.21   MONOS ABS 10*3/mm3 0.32 0.30 0.36 0.43 0.62 0.80   EOS ABS 10*3/mm3 0.05 0.04 0.05 0.09 0.12 0.14   BASOS ABS 10*3/mm3 0.02 0.02 0.04 0.05 0.06 0.06   IMMATURE GRANS (ABS) 10*3/mm3 0.01 0.02 0.01 0.02 0.02 0.05   NRBC /100 WBC 0.0 0.0 0.0 0.0 0.0 0.0       Lab Results - Last 18 Months   Lab Units 06/20/24  0730 06/13/24  0730 06/06/24  0728 05/30/24  0726 05/23/24  0723 05/16/24  0734   GLUCOSE mg/dL 98  96 128* 92 110* 113* 107*  107*   SODIUM mmol/L 134*  134* 135* 137 137 139 138  138   POTASSIUM mmol/L 3.4*  3.4* 3.9 3.4* 3.6 3.9 3.5  3.5   CO2 mmol/L 29.0  30.0* 30.0* 28.0 30.0* 32.0* 33.0*  33.0*   CHLORIDE mmol/L 98  98 97* 98 99 99 98  98   ANION GAP mmol/L 7.0  6.0 8.0 11.0 8.0 8.0 7.0  7.0   CREATININE mg/dL 0.45*  0.48* 0.52*  "0.44* 0.44* 0.46* 0.50*  0.50*   BUN mg/dL 17  18 18 17 16 14 16  16   BUN / CREAT RATIO  37.8*  37.5* 34.6* 38.6* 36.4* 30.4* 32.0*  32.0*   CALCIUM mg/dL 9.3  9.4 9.2 9.2 9.2 9.4 9.2  9.2   ALK PHOS U/L 75 86 84 84 98 96   TOTAL PROTEIN g/dL 6.7 7.0 6.6 6.9 6.9 6.9   ALT (SGPT) U/L 11 11 10 10 11 11   AST (SGOT) U/L 13 12 13 11 13 12   BILIRUBIN mg/dL 0.2 0.2 0.2 0.3 0.2 0.3   ALBUMIN g/dL 4.0 4.3 4.1 4.1 4.1 4.1   GLOBULIN gm/dL 2.7 2.7 2.5 2.8 2.8 2.8       No results for input(s): \"MSPIKE\", \"KAPPALAMB\", \"IGLFLC\", \"URICACID\", \"FREEKAPPAL\", \"CEA\", \"LDH\", \"REFLABREPO\" in the last 86106 hours.    No results for input(s): \"IRON\", \"TIBC\", \"LABIRON\", \"FERRITIN\", \"F4BAKSO\", \"TSH\", \"FOLATE\" in the last 45863 hours.    Invalid input(s): \"VITB12\"    Keron Gerber reports a pain score of 0.        ASSESSMENT:  1.  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  Tumor size.3.5 x 4 cm.  AJCC stage:III (cT3, cN0, cM0)  Tumor complications: Dysphagia.  Treatment status: Post tracheostomy and G-tube placement.  Therapy with weekly cisplatin 5/9/2024 through 6/20/2024.  Completed radiation on 6/25/2024 at 7000 cGy  2.  Performance status of 1.  3.  Hypermetabolic mass, high rectal extending to distal sigmoid colon maximum SUV 7.4.  Colonoscopy showed high-grade dysplasia rectosigmoid colon.  To undergo resection by Dr. Cruz post cisplatin with radiation.  4.  Prostate cancer 2022, pT2, NX,history of.   Treatment status: Post prostatectomy 9/7/2022 by Dr. Weaver.  Followed by Dr. Luciano.  5.  Tobacco abuse.  He is a current smoker.  6.  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 Lawrence in [place].   7.  Melanoma,  pT1a, nose, in 2014 post resection and reconstruction by ADRIANA Lazaro.   8.  Nicotine abuse. \"Smokes up to 1 pack per day.\"           PLAN:   Re: CT abdomen pelvis 6/11/2024. Large sessile appearing mass within the lumen of the colon at the rectosigmoid " junction appears to arise from the more distal wall and measures 6.2 x 4.9 x 3.7 cm. A small portion of the mass may extend to the serosal surface of the colon posteriorly as demonstrated on images 57-58 of series 2. No evidence of regional lymphadenopathy, no distant metastases. The liver has a normal appearance.  2.   Re: Heme status.WBC 3.1, hemoglobin 10.7 and platelet 101, observe.   3.   Re: CMP.  .1 ml/min.  4.   Re: Magnesium at 1.9.    5.   Re: Completion of cisplatin with radiation.  6.   Re: Follow-up with Dr. Walker for resection of the distal sigmoid mass.  Telehealth with Dr. Cruz on 7/9/2024.  Tentatively plan for laparoscopic low anterior resection, diverting loop ileostomy and possible gastrostomy tube takedown on 7/30/2024.  7.  Plan for CT soft tissue neck and chest 8 weeks post chemoradiation to assess response.  Observation if negative.  Neck dissection for residual or persistent disease or progression.  Follow-up 1 to 3 months for the first year, 2 to 6 months for the second year, 4 to 8 months for year 3-5 and annually thereafter.  8. eRx ondansetron 8 mg per PEG. every 8 hours as needed for nausea and vomiting #60, 2 refills if needed.  9. eRx prochlorperazine 10 mg per PEG every 4 hours as needed for nausea and vomiting #60, 2 refills if needed.  10.  Continue care per primary care physician and the other specialists.  11.  Plan of care discussed with patient and family.  Understanding expressed.  He agreed to proceed.   12.  Return to office in 4 weeks with CBC with differential, CMP and magnesium, same day and CT neck to follow hypopharynx cancer 9/2024.            I have reviewed the assessment and plan and verified the accuracy of it. No changes to assessment and plan since the information was documented. Tee Randhawa MD 07/16/24         I spent 41 total minutes, face-to-face, caring for Keron vargas. Greater than 50% of this time involved  counseling and/or coordination of care as documented within this note.          (Hector Good MD)  (Lit Rand MD [facility]  (Yousif Atkins MD)  (Abram Cruz MD [facility])  (Hiram Luciano MD)  (Gustabo Ruiz MD)  Yousif Marrero MD  (ZENY Benedict)

## 2024-07-16 ENCOUNTER — OFFICE VISIT (OUTPATIENT)
Dept: ONCOLOGY | Facility: CLINIC | Age: 64
End: 2024-07-16
Payer: MEDICAID

## 2024-07-16 VITALS
BODY MASS INDEX: 17.86 KG/M2 | SYSTOLIC BLOOD PRESSURE: 120 MMHG | TEMPERATURE: 97.7 F | WEIGHT: 120.6 LBS | OXYGEN SATURATION: 97 % | HEIGHT: 69 IN | DIASTOLIC BLOOD PRESSURE: 62 MMHG | HEART RATE: 70 BPM | RESPIRATION RATE: 18 BRPM

## 2024-07-16 DIAGNOSIS — C61 PROSTATE CANCER: Primary | ICD-10-CM

## 2024-07-16 DIAGNOSIS — C13.9 MALIGNANT NEOPLASM HYPOPHARYNX: ICD-10-CM

## 2024-07-16 PROCEDURE — 1159F MED LIST DOCD IN RCRD: CPT | Performed by: INTERNAL MEDICINE

## 2024-07-16 PROCEDURE — 1126F AMNT PAIN NOTED NONE PRSNT: CPT | Performed by: INTERNAL MEDICINE

## 2024-07-16 PROCEDURE — 1160F RVW MEDS BY RX/DR IN RCRD: CPT | Performed by: INTERNAL MEDICINE

## 2024-07-16 PROCEDURE — 99215 OFFICE O/P EST HI 40 MIN: CPT | Performed by: INTERNAL MEDICINE

## 2024-07-18 ENCOUNTER — TELEPHONE (OUTPATIENT)
Dept: SURGERY | Facility: CLINIC | Age: 64
End: 2024-07-18
Payer: MEDICAID

## 2024-07-18 NOTE — TELEPHONE ENCOUNTER
Spoke with pt's brother and he had some questions about length of stay and wondering how long pt would have the colostomy bag. He did also have some additional questions about once the colostomy was reversed. They did ask would it be possible for you to call and answer some more questions they had.

## 2024-07-28 ENCOUNTER — HOSPITAL ENCOUNTER (INPATIENT)
Facility: HOSPITAL | Age: 64
LOS: 9 days | Discharge: HOME | DRG: 329 | End: 2024-08-06
Attending: SURGERY | Admitting: SURGERY
Payer: MEDICAID

## 2024-07-28 DIAGNOSIS — D49.0 RECTAL TUMOR: ICD-10-CM

## 2024-07-28 LAB
ABO GROUP BLD: NORMAL
ALBUMIN SERPL-MCNC: 4 G/DL (ref 3.5–5.2)
ALBUMIN/GLOB SERPL: 1.7 G/DL
ALP SERPL-CCNC: 79 U/L (ref 39–117)
ALT SERPL W P-5'-P-CCNC: 7 U/L (ref 1–41)
ANION GAP SERPL CALCULATED.3IONS-SCNC: 10.4 MMOL/L (ref 5–15)
AST SERPL-CCNC: 12 U/L (ref 1–40)
BILIRUB SERPL-MCNC: <0.2 MG/DL (ref 0–1.2)
BLD GP AB SCN SERPL QL: NEGATIVE
BUN SERPL-MCNC: 10 MG/DL (ref 8–23)
BUN/CREAT SERPL: 14.3 (ref 7–25)
CALCIUM SPEC-SCNC: 9 MG/DL (ref 8.6–10.5)
CHLORIDE SERPL-SCNC: 100 MMOL/L (ref 98–107)
CO2 SERPL-SCNC: 24.6 MMOL/L (ref 22–29)
CREAT SERPL-MCNC: 0.7 MG/DL (ref 0.76–1.27)
DEPRECATED RDW RBC AUTO: 69.8 FL (ref 37–54)
EGFRCR SERPLBLD CKD-EPI 2021: 104.2 ML/MIN/1.73
ERYTHROCYTE [DISTWIDTH] IN BLOOD BY AUTOMATED COUNT: 18.4 % (ref 12.3–15.4)
GLOBULIN UR ELPH-MCNC: 2.3 GM/DL
GLUCOSE SERPL-MCNC: 83 MG/DL (ref 65–99)
HCT VFR BLD AUTO: 33.1 % (ref 37.5–51)
HGB BLD-MCNC: 10.9 G/DL (ref 13–17.7)
INR PPP: 1.01 (ref 0.9–1.1)
MCH RBC QN AUTO: 34.1 PG (ref 26.6–33)
MCHC RBC AUTO-ENTMCNC: 32.9 G/DL (ref 31.5–35.7)
MCV RBC AUTO: 103.4 FL (ref 79–97)
PLATELET # BLD AUTO: 166 10*3/MM3 (ref 140–450)
PMV BLD AUTO: 9.1 FL (ref 6–12)
POTASSIUM SERPL-SCNC: 4.7 MMOL/L (ref 3.5–5.2)
PREALB SERPL-MCNC: 18.9 MG/DL (ref 20–40)
PROT SERPL-MCNC: 6.3 G/DL (ref 6–8.5)
PROTHROMBIN TIME: 13.5 SECONDS (ref 11.7–14.2)
RBC # BLD AUTO: 3.2 10*6/MM3 (ref 4.14–5.8)
RH BLD: POSITIVE
SODIUM SERPL-SCNC: 135 MMOL/L (ref 136–145)
T&S EXPIRATION DATE: NORMAL
WBC NRBC COR # BLD AUTO: 6.75 10*3/MM3 (ref 3.4–10.8)

## 2024-07-28 PROCEDURE — 80053 COMPREHEN METABOLIC PANEL: CPT | Performed by: SURGERY

## 2024-07-28 PROCEDURE — 86901 BLOOD TYPING SEROLOGIC RH(D): CPT | Performed by: SURGERY

## 2024-07-28 PROCEDURE — 85027 COMPLETE CBC AUTOMATED: CPT | Performed by: SURGERY

## 2024-07-28 PROCEDURE — 86900 BLOOD TYPING SEROLOGIC ABO: CPT | Performed by: SURGERY

## 2024-07-28 PROCEDURE — 85610 PROTHROMBIN TIME: CPT | Performed by: SURGERY

## 2024-07-28 PROCEDURE — 84134 ASSAY OF PREALBUMIN: CPT | Performed by: SURGERY

## 2024-07-28 PROCEDURE — 86850 RBC ANTIBODY SCREEN: CPT | Performed by: SURGERY

## 2024-07-28 RX ORDER — METRONIDAZOLE 500 MG/1
500 TABLET ORAL
Status: COMPLETED | OUTPATIENT
Start: 2024-07-29 | End: 2024-07-29

## 2024-07-28 RX ORDER — BISACODYL 5 MG/1
10 TABLET, DELAYED RELEASE ORAL
Status: COMPLETED | OUTPATIENT
Start: 2024-07-29 | End: 2024-07-29

## 2024-07-28 RX ORDER — POLYETHYLENE GLYCOL 3350 17 G/17G
0.5 POWDER, FOR SOLUTION ORAL EVERY 12 HOURS
Status: COMPLETED | OUTPATIENT
Start: 2024-07-29 | End: 2024-07-29

## 2024-07-28 RX ORDER — SODIUM CHLORIDE 0.9 % (FLUSH) 0.9 %
10 SYRINGE (ML) INJECTION AS NEEDED
Status: DISCONTINUED | OUTPATIENT
Start: 2024-07-28 | End: 2024-07-30

## 2024-07-28 RX ORDER — SODIUM CHLORIDE 9 MG/ML
100 INJECTION, SOLUTION INTRAVENOUS CONTINUOUS
Status: DISCONTINUED | OUTPATIENT
Start: 2024-07-28 | End: 2024-07-28

## 2024-07-28 RX ORDER — ACETAMINOPHEN 325 MG/1
650 TABLET ORAL EVERY 6 HOURS PRN
Status: DISCONTINUED | OUTPATIENT
Start: 2024-07-28 | End: 2024-07-30

## 2024-07-28 RX ORDER — NEOMYCIN SULFATE 500 MG/1
1000 TABLET ORAL
Status: COMPLETED | OUTPATIENT
Start: 2024-07-29 | End: 2024-07-29

## 2024-07-28 RX ORDER — SODIUM CHLORIDE 0.9 % (FLUSH) 0.9 %
10 SYRINGE (ML) INJECTION EVERY 12 HOURS SCHEDULED
Status: DISCONTINUED | OUTPATIENT
Start: 2024-07-28 | End: 2024-07-30

## 2024-07-28 RX ORDER — ONDANSETRON 2 MG/ML
4 INJECTION INTRAMUSCULAR; INTRAVENOUS EVERY 6 HOURS PRN
Status: DISCONTINUED | OUTPATIENT
Start: 2024-07-28 | End: 2024-07-30

## 2024-07-28 RX ADMIN — Medication 10 ML: at 21:11

## 2024-07-28 RX ADMIN — NICOTINE 1 PATCH: 7 PATCH, EXTENDED RELEASE TRANSDERMAL at 17:48

## 2024-07-28 NOTE — PLAN OF CARE
Goal Outcome Evaluation:      Patient arrived from [place] at approximately 1530.  He is pleasant, alert and oriented x 4, on room air and up ad michelle.  Wife and daughter at bedside.    Patient admission completed, IV inserted by IV team and patient very concerned as to why he is on clears as opposed to a regular diet and clears tomorrow.  Explanations given, patient requests explanation again.    TM

## 2024-07-29 PROCEDURE — 99222 1ST HOSP IP/OBS MODERATE 55: CPT | Performed by: SURGERY

## 2024-07-29 PROCEDURE — 44213 LAP MOBIL SPLENIC FL ADD-ON: CPT | Performed by: SURGERY

## 2024-07-29 PROCEDURE — 44187 LAP ILEO/JEJUNO-STOMY: CPT | Performed by: SURGERY

## 2024-07-29 PROCEDURE — 25010000002 ENOXAPARIN PER 10 MG: Performed by: SURGERY

## 2024-07-29 RX ORDER — ENOXAPARIN SODIUM 100 MG/ML
40 INJECTION SUBCUTANEOUS EVERY 24 HOURS
Status: DISCONTINUED | OUTPATIENT
Start: 2024-07-29 | End: 2024-08-06 | Stop reason: HOSPADM

## 2024-07-29 RX ADMIN — METRONIDAZOLE 500 MG: 500 TABLET, FILM COATED ORAL at 13:12

## 2024-07-29 RX ADMIN — Medication 10 ML: at 20:13

## 2024-07-29 RX ADMIN — METRONIDAZOLE 500 MG: 500 TABLET, FILM COATED ORAL at 21:37

## 2024-07-29 RX ADMIN — NEOMYCIN SULFATE 1000 MG: 500 TABLET ORAL at 21:37

## 2024-07-29 RX ADMIN — NEOMYCIN SULFATE 1000 MG: 500 TABLET ORAL at 13:11

## 2024-07-29 RX ADMIN — Medication 10 ML: at 09:11

## 2024-07-29 RX ADMIN — BISACODYL 10 MG: 5 TABLET, COATED ORAL at 11:01

## 2024-07-29 RX ADMIN — BISACODYL 10 MG: 5 TABLET, COATED ORAL at 09:07

## 2024-07-29 RX ADMIN — POLYETHYLENE GLYCOL 3350 0.5 BOTTLE: 17 POWDER, FOR SOLUTION ORAL at 09:07

## 2024-07-29 RX ADMIN — ENOXAPARIN SODIUM 40 MG: 100 INJECTION SUBCUTANEOUS at 09:07

## 2024-07-29 RX ADMIN — METRONIDAZOLE 500 MG: 500 TABLET, FILM COATED ORAL at 14:14

## 2024-07-29 RX ADMIN — POLYETHYLENE GLYCOL 3350 0.5 BOTTLE: 17 POWDER, FOR SOLUTION ORAL at 20:12

## 2024-07-29 RX ADMIN — NICOTINE 1 PATCH: 7 PATCH, EXTENDED RELEASE TRANSDERMAL at 09:11

## 2024-07-29 RX ADMIN — NEOMYCIN SULFATE 1000 MG: 500 TABLET ORAL at 14:14

## 2024-07-29 NOTE — NURSING NOTE
07/29/24 1407   Stoma Site Marking   Procedure Marking For ileostomy   Site Marked RLQ: right lower quadrant   Patient Assessment Screen rectus muscle identified;marked within patient's visual field;bony prominences avoided;waistband avoided;creases/scars avoided   How Was Patient Marked? surgical marker;transparent dressing   Stoma Marking Comments Pre-op ileostomy teaching adn stoma site marking completed. Site verified by 2 [facility] nurses.   Patient Position During Marking sitting;standing  (bending)

## 2024-07-29 NOTE — PLAN OF CARE
Problem: Adult Inpatient Plan of Care  Goal: Plan of Care Review  Outcome: Ongoing, Progressing  Flowsheets (Taken 7/29/2024 0616)  Progress: no change  Plan of Care Reviewed With: patient  Outcome Evaluation: roxana rested well through shift. no c/o.   Goal Outcome Evaluation:  Plan of Care Reviewed With: patient        Progress: no change  Outcome Evaluation: roxana rested well through shift. no c/o.

## 2024-07-29 NOTE — PAYOR COMM NOTE
"Kathy Gerber (64 y.o. Male)          INPATIENT REQUEST FOR [id number]    THE AUTH IS DATED FOR 7/30/24 BUT HE WAS ADMITTED 7/29/24 FOR SURGERY ON 7/30/24.    CONTACT ROSETTA MCKNIGHT  P# [phone]  F# [phone]         Date of Birth   1960    Social Security Number       [address] [place] [place] [address]    Home Phone   [phone]    MRN   [id number]       [employer]   Other    Marital Status   Single                            Admission Date   7/28/24    Admission Type   Urgent    Admitting Provider   Abram Cruz MD    Attending Provider   Abram Cruz MD    Department, Room/Bed   [address], P383/1       Discharge Date       Discharge Disposition       Discharge Destination                                 Attending Provider: Abram Cruz MD    Allergies: Sulfa Antibiotics    Isolation: None   Infection: None   Code Status: CPR    Ht: 175.3 cm (69\")   Wt: 55.7 kg (122 lb 12.7 oz)    Admission Cmt: None   Principal Problem: Rectal tumor [D49.0]                   Active Insurance as of 7/28/2024       Primary Coverage       Payor Plan Insurance Group Employer/Plan Group    [place] [id number]       Payor Plan Address Payor Plan Phone Number Payor Plan Fax Number Effective Dates    HU[employer]NA [employer]ICAL [address] [address] [phone]  7/1/2024 - None Entered    [facility] [id number]         Subscriber Name Subscriber Birth Date Member ID       KATHY GERBER 1960 [id number]                     Emergency Contacts        (Rel.) Home Phone Work Phone Mobile Phone    ALIX GONZALEZ (Sister) -- -- [phone]    Portia Gerber (Mother) [phone] -- [phone]    RENY GONZALEZ (Brother) -- -- [phone]                 History & Physical        Abram Cruz MD at 07/29/24 0842          Colorectal & General Surgery  History and Physical    Patient: Kathy Gerber  Date of Birth: " 1960  MRN: [id number]      Assessment  Keron Gerber is a 64 y.o. 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    To prepare him for surgery tomorrow, we will initiate bowel prep today including antibiotics.  I am asking the WOCN team to see him today or tomorrow for marking for possible diverting loop ileostomy.    Plan  Bowel prep today  WOCN consult for marking today or tomorrow  Plan proceed the operating room tomorrow      Chief Complaint: Rectal tumor    History of Present Illness   Keron Gerber is a 64 y.o. male who presents to the hospital for resection of his rectal tumor.  Overall, he feels well.  Is been tolerating his diet.  Having good bowel function.    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    Tobacco use disorder         Past Surgical History   Past Surgical History:   Procedure Laterality Date    COLONOSCOPY N/A 5/1/2024    Procedure: COLONOSCOPY WITH ANESTHESIA;  Surgeon: Gustabo Ruiz MD;  Location: [facility] ENDOSCOPY;  Service: Gastroenterology;  Laterality: N/A;  preop; abnormal pet scan   postop rectal mass; polyps;   PCP Dr Santos    CYST REMOVAL Right 08/07/2014    RIGHT LOWER EYELID- EPIDERMAL INCLUSION CYST    EXCISION LESION N/A 08/07/2014    MALIGNANT MELANOMA OF LEFT NASAL TIP    FOREHEAD FLAP  09/11/2014    PEDICLE FLAP  10/02/2014    PEDICLE DIVISION AND FLAP INSET OF NOSE    PROSTATECTOMY N/A 09/07/2022    Procedure: PROSTATECTOMY LAPAROSCOPIC WITH [employer] ROBOT;  Surgeon: Matthew Weaver MD;  Location: [facility] OR;  Service: Robotics - [employer];   Laterality: N/A;    SQUAMOUS CELL CARCINOMA EXCISION  10/29/2013    LEFT FLOOR OF MOUTH    TONSILLECTOMY         Social History  Social History     Socioeconomic History    Marital status: Single   Tobacco Use    Smoking status: Every Day     Current packs/day: 1.00     Types: Cigarettes    Smokeless tobacco: Never   Vaping Use    Vaping status: Never Used   Substance and Sexual Activity    Alcohol use: Not Currently     Comment: 12 PER DAY    Drug use: No    Sexual activity: Defer       Family History  Family History   Problem Relation Age of Onset    No Known Problems Mother     Heart disease Father     Colon polyps Father     Colon cancer Neg Hx        Colorectal cancer family history: None    Review of Systems  Negative except as documented in the HPI.     Allergies  Allergies   Allergen Reactions    Sulfa Antibiotics Other (See Comments)     Headaches       Medications    Current Facility-Administered Medications:     acetaminophen (TYLENOL) tablet 650 mg, 650 mg, Oral, Q6H PRN, Abram Cruz MD    bisacodyl (DULCOLAX) EC tablet 10 mg, 10 mg, Oral, 2 times per day on Monday, Abram Cruz MD    Enoxaparin Sodium (LOVENOX) syringe 40 mg, 40 mg, Subcutaneous, Q24H, Abram Cruz MD    metroNIDAZOLE (FLAGYL) tablet 500 mg, 500 mg, Oral, 3 times per day on Monday, Abram Cruz MD    neomycin (MYCIFRADIN) tablet 1,000 mg, 1,000 mg, Oral, 3 times per day on Monday, Abram Cruz MD    nicotine (NICODERM CQ) 7 MG/24HR patch 1 patch, 1 patch, Transdermal, Q24H, Abram Cruz MD, 1 patch at 07/28/24 1748    ondansetron (ZOFRAN) injection 4 mg, 4 mg, Intravenous, Q6H PRN, Abram Cruz MD    polyethylene glycol (MIRALAX) powder 0.5 bottle, 0.5 bottle, Oral, Q12H, Abram Cruz MD    sodium chloride 0.9 % flush 10 mL, 10 mL, Intravenous, Q12H, Abram Cruz MD, 10 mL at 07/28/24 2111    sodium chloride 0.9 % flush 10 mL, 10  mL, Intravenous, PRN, Abram Cruz MD    Vital Signs  Vitals:    07/29/24 0813   BP: 94/57   Pulse: 65   Resp: 16   Temp: 97.9 °F (36.6 °C)   SpO2: 97%        Physical Exam  Constitutional: Resting comfortably, no acute distress  Neck: Supple, trachea midline  Respiratory: No increased work of breathing, Symmetric excursion  Cardiovascular: Well pefursed, no jugular venous distention evident   Abdominal: Gastrostomy tube in place.  Soft, non-tender, non-distended  Lymphatics: No cervical or suprascapular adenopathy  Skin: Warm, dry, no rash on visualized skin surfaces  Musculoskeletal: Symmetric strength, no obvious gross abnormalities  Psychiatric: Alert and oriented ×3, normal affect     Laboratory Results  I have personally reviewed CBC with WBC 6, hemoglobin 10, platelets 166.  INR 1.0.  Prealbumin 18.9.  CMP with creatinine 0.7, albumin 4.0.    Radiology  None to review    Endoscopy  None to review         Trey Cruz MD  Colorectal & General Surgery  [facility]    [address], [address]  [place], [place], [facility]  P: [phone]  F: [phone]      Electronically signed by Abram Cruz MD at 07/29/24 1126       Physician Progress Notes (last 48 hours)  Notes from 07/27/24 1336 through 07/29/24 1336   No notes of this type exist for this encounter.       Consult Notes (last 48 hours)  Notes from 07/27/24 1336 through 07/29/24 1336   No notes of this type exist for this encounter.

## 2024-07-29 NOTE — DISCHARGE PLACEMENT REQUEST
"Kathy Gerber (64 y.o. Male)       Date of Birth   1960    Social Security Number       Address   [address] [place] [address]    Home Phone   [phone]    MRN   [id number]       [place]   Other    Marital Status   Single                            Admission Date   7/28/24    Admission Type   Urgent    Admitting Provider   Abram Cruz MD    Attending Provider   Abram Cruz MD    Department, Room/Bed   [address], P383/1       Discharge Date       Discharge Disposition       Discharge Destination                                 Attending Provider: Abram Cruz MD    Allergies: Sulfa Antibiotics    Isolation: None   Infection: None   Code Status: CPR    Ht: 175.3 cm (69\")   Wt: 55.7 kg (122 lb 12.7 oz)    Admission Cmt: None   Principal Problem: Rectal tumor [D49.0]                   Active Insurance as of 7/28/2024       Primary Coverage       Payor Plan Insurance Group Employer/Plan Group    [place] [id number]       Payor Plan Address Payor Plan Phone Number Payor Plan Fax Number Effective Dates    [employer]ICAL [address] [address] [phone]  7/1/2024 - None Entered    [facility] [id number]         Subscriber Name Subscriber Birth Date Member ID       KATHY GERBER 1960 [id number]                     Emergency Contacts        (Rel.) Home Phone Work Phone Mobile Phone    ALIX GONZALEZ (Sister) -- -- [phone]    Portia Gerber (Mother) [phone] -- [phone]    RENY GONZALEZ (Brother) -- -- [phone]                "

## 2024-07-29 NOTE — PROGRESS NOTES
Nutrition Services    Patient Name:  Keron Gerber  YOB: 1960  MRN: [id number]  Admit Date:  7/28/2024    Assessment Date:  07/29/24    Summary: BMI / MST    Pt is a 64 y.o. 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 Plan for surgery tomorrow. Bowel prep initiated today. Currently on CLD, tolerating well.  Agreeable to Boost Breeze TID. NPO at midnight. Pt reports eating 3 meals/day at home. Pt has a g-tube but eats po. Pt reports a 20 lb wt loss from April to June. Wt hx stable wt x 3 months but does reflect a 8.8% wt loss x 6 months (not significant). Labs, skin, meds reviewed.    Patient meets ASPEN/AND criteria for nutrition diagnosis of moderate malnutrition of acute illness based on: NFPE    Plan/Recommend:  Addition of Boost Breeze TID  Encourage good po intake  Will continue to monitor intake, labs, wt    RD to follow.    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    Tobacco use disorder        Past Surgical History:   Procedure Laterality Date    COLONOSCOPY N/A 5/1/2024    Procedure: COLONOSCOPY WITH ANESTHESIA;  Surgeon: Gustabo Ruiz MD;  Location: [facility] ENDOSCOPY;  Service: Gastroenterology;  Laterality: N/A;  preop; abnormal pet scan   postop rectal mass; polyps;   PCP Dr Santos    CYST REMOVAL Right 08/07/2014    RIGHT LOWER EYELID- EPIDERMAL INCLUSION CYST    EXCISION LESION N/A 08/07/2014    MALIGNANT MELANOMA OF LEFT NASAL TIP  "   FOREHEAD FLAP  09/11/2014    PEDICLE FLAP  10/02/2014    PEDICLE DIVISION AND FLAP INSET OF NOSE    PROSTATECTOMY N/A 09/07/2022    Procedure: PROSTATECTOMY LAPAROSCOPIC WITH [employer] ROBOT;  Surgeon: Matthew Weaver MD;  Location: [facility] OR;  Service: Robotics [employer];  Laterality: N/A;    SQUAMOUS CELL CARCINOMA EXCISION  10/29/2013    LEFT FLOOR OF MOUTH    TONSILLECTOMY          Anthropometrics        Current Height  Current Weight  BMI kg/m2 Height: 175.3 cm (69\")  Weight: 55.7 kg (122 lb 12.7 oz) (07/28/24 1559)  Body mass index is 18.13 kg/m².   Adjusted BMI (if applicable)    BMI Category Underweight (18.4 or below)   Ideal Body Weight (IBW) 155 lb   Usual Body Weight (UBW) Unknown   Weight Trend Loss, Amount/Timeframe: 8.8% x 6 months   Weight History Wt Readings from Last 30 Encounters:   07/28/24 1559 55.7 kg (122 lb 12.7 oz)   07/28/24 1549 55.7 kg (122 lb 12.7 oz)   07/16/24 0803 54.7 kg (120 lb 9.6 oz)   06/20/24 0812 54.4 kg (120 lb)   06/13/24 0813 54 kg (119 lb)   06/06/24 0851 54 kg (119 lb)   06/06/24 0802 54.3 kg (119 lb 12.8 oz)   05/30/24 0801 54.6 kg (120 lb 6.4 oz)   05/23/24 0749 54.6 kg (120 lb 6.4 oz)   05/16/24 0752 55.1 kg (121 lb 6.4 oz)   05/14/24 1450 54.9 kg (121 lb)   05/14/24 1239 55.4 kg (122 lb 3.2 oz)   05/09/24 0754 55.5 kg (122 lb 6.4 oz)   05/07/24 1523 55.3 kg (122 lb)   05/01/24 1010 55.3 kg (122 lb)   04/26/24 0928 56.2 kg (124 lb)   04/23/24 1400 57.1 kg (125 lb 12.8 oz)   04/02/24 1427 56.7 kg (125 lb)   04/02/24 1331 56.7 kg (125 lb)   01/30/24 0903 61.1 kg (134 lb 12.8 oz)   09/26/23 0841 59.9 kg (132 lb)   07/05/23 0853 58.5 kg (129 lb)   05/23/23 0855 58.3 kg (128 lb 9.6 oz)   12/15/22 1055 60 kg (132 lb 3.2 oz)   09/15/22 1037 57 kg (125 lb 9.6 oz)   09/07/22 0753 54.5 kg (120 lb 2.4 oz)   08/08/22 1024 56.6 kg (124 lb 12.5 oz)   07/19/22 0829 58.2 kg (128 lb 3.2 oz)   04/20/21 1331 64 kg (141 lb)   08/07/19 1040 57.5 kg (126 lb 12.8 oz)   09/12/18 0921 55.3 " "kg (122 lb)      --  Labs       Pertinent Labs    Results from last 7 days   Lab Units 07/28/24  1637   SODIUM mmol/L 135*   POTASSIUM mmol/L 4.7   CHLORIDE mmol/L 100   CO2 mmol/L 24.6   BUN mg/dL 10   CREATININE mg/dL 0.70*   CALCIUM mg/dL 9.0   BILIRUBIN mg/dL <0.2   ALK PHOS U/L 79   ALT (SGPT) U/L 7   AST (SGOT) U/L 12   GLUCOSE mg/dL 83     Results from last 7 days   Lab Units 07/28/24  1637   HEMOGLOBIN g/dL 10.9*   HEMATOCRIT % 33.1*   WBC 10*3/mm3 6.75   ALBUMIN g/dL 4.0   PREALBUMIN mg/dL 18.9*     Results from last 7 days   Lab Units 07/28/24  1637   INR  1.01   PLATELETS 10*3/mm3 166     COVID19   Date Value Ref Range Status   08/08/2022 Detected (C) Not Detected - Ref. Range Final     No results found for: \"HGBA1C\"       Medications           Scheduled Medications enoxaparin, 40 mg, Subcutaneous, Q24H  metroNIDAZOLE, 500 mg, Oral, 3 times per day on Monday  neomycin, 1,000 mg, Oral, 3 times per day on Monday  nicotine, 1 patch, Transdermal, Q24H  polyethylene glycol, 0.5 bottle, Oral, Q12H  sodium chloride, 10 mL, Intravenous, Q12H       Infusions     PRN Medications   acetaminophen    ondansetron    sodium chloride     Physical Findings          General Findings alert, oriented, room air   Oral/Mouth Cavity dental appliance   Edema  no edema   Gastrointestinal WDL, last bowel movement: 7/29   Skin  surgical incision: throat, left upper abdomen   Tubes/Drains/Lines none   NFPE See Malnutrition Severity Assessment     Malnutrition Severity Assessment      Patient meets criteria for : (P) Moderate (non-severe) Malnutrition  Malnutrition Type (Last 8 Hours)       Malnutrition Severity Assessment       Row Name 07/29/24 1445       Malnutrition Severity Assessment    Malnutrition Type Acute Disease or Injury - Related Malnutrition (P)       Row Name 07/29/24 1445       Muscle Loss    Loss of Muscle Mass Findings Moderate (P)     Religious Region Moderate - slight depression (P)     Clavicle Bone Region Moderate " - some protrusion in females, visible in males (P)     Acromion Bone Region Moderate - acromion may slightly protrude (P)       Row Name 07/29/24 1445       Fat Loss    Subcutaneous Fat Loss Findings Moderate (P)     Orbital Region  Moderate -  somewhat hollowness, slightly dark circles (P)     Upper Arm Region Moderate - some fat tissue, not ample (P)       Row Name 07/29/24 1445       Criteria Met (Must meet criteria for severity in at least 2 of these categories: M Wasting, Fat Loss, Fluid, Secondary Signs, Wt. Status, Intake)    Patient meets criteria for  Moderate (non-severe) Malnutrition (P)                        Current Nutrition Orders & Evaluation of Intake       Oral Nutrition     Food Allergies NKFA   Current PO Diet Diet: Liquid; Clear Liquid; Fluid Consistency: Thin (IDDSI 0)  NPO Diet NPO Type: Ice Chips, Sips with Meds   Supplement n/a   PO Evaluation     % PO Intake 100%    Factors Affecting Intake: altered GI function     Estimated Requirements         Weight used  55.7 kg    Calories  1671 - 1950 kcals (30-35 kcal/kg)    Protein  67 - 84 g (1.2 - 1.5 gm/kg)    Fluid   (1 mL/kcal)     PES STATEMENT / NUTRITION DIAGNOSIS      Nutrition Dx Problem  Problem: Malnutrition (moderate) and Altered GI Function  Etiology: Medical Diagnosis - 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 now status post chemoradiation, tracheostomy, and gastrostomy tube placement    Signs/Symptoms: Clear Liquid Diet and Report/Observation     NUTRITION INTERVENTION / PLAN OF CARE      Intervention Goal(s) Reduce/improve symptoms, Tolerate PO , Accepts oral nutrition supplement, Advance diet, No significant weight loss, and PO intake goal %: 75%         RD Intervention/Action Interview for preferences, Supplement offered/declined, Encourage intake, and Continue to monitor   --      Prescription/Orders:       PO Diet       Supplements Boost Breeze TID      Enteral Nutrition       Parenteral Nutrition    New  Prescription Ordered? Yes   --      Monitor/Evaluation Per protocol   Discharge Plan/Needs Pending clinical course   --    RD to follow per protocol.      Electronically signed by:  Patricia Phillips  07/29/24 12:32 EDT

## 2024-07-29 NOTE — PLAN OF CARE
Goal Outcome Evaluation:            Patient is alert and oriented x 4, on room air, tolerating his clear liquid diet, calm and cooperative.    Patient is taking all of his bowel prep this shift and it seeing results.    Wife at bedside.    WCTM

## 2024-07-29 NOTE — H&P
Colorectal & General Surgery  History and Physical    Patient: Keron Gerber  YOB: 1960  MRN: [id number]      Assessment  Keron Gerber is a 64 y.o. 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    To prepare him for surgery tomorrow, we will initiate bowel prep today including antibiotics.  I am asking the WOCN team to see him today or tomorrow for marking for possible diverting loop ileostomy.    Plan  Bowel prep today  WOCN consult for marking today or tomorrow  Plan proceed the operating room tomorrow      Chief Complaint: Rectal tumor    History of Present Illness   Keron Gerber is a 64 y.o. male who presents to the hospital for resection of his rectal tumor.  Overall, he feels well.  Is been tolerating his diet.  Having good bowel function.    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    Tobacco use disorder         Past Surgical History   Past Surgical History:   Procedure Laterality Date    COLONOSCOPY N/A 5/1/2024    Procedure: COLONOSCOPY WITH ANESTHESIA;  Surgeon: Gustabo Ruiz MD;  Location: [facility] ENDOSCOPY;  Service: Gastroenterology;  Laterality: N/A;  preop; abnormal pet scan   postop rectal mass; polyps;   PCP Dr Santos    CYST REMOVAL Right 08/07/2014    RIGHT LOWER EYELID- EPIDERMAL INCLUSION CYST    EXCISION LESION N/A 08/07/2014    MALIGNANT MELANOMA OF LEFT NASAL TIP    FOREHEAD FLAP  09/11/2014    PEDICLE FLAP  10/02/2014    PEDICLE DIVISION AND FLAP INSET OF NOSE    PROSTATECTOMY N/A 09/07/2022    Procedure: PROSTATECTOMY LAPAROSCOPIC WITH LEX  ROBOT;  Surgeon: Matthew Weaver MD;  Location: [facility] OR;  Service: Robotics - [employer];  Laterality: N/A;    SQUAMOUS CELL CARCINOMA EXCISION  10/29/2013    LEFT FLOOR OF MOUTH    TONSILLECTOMY         Social History  Social History     Socioeconomic History    Marital status: Single   Tobacco Use    Smoking status: Every Day     Current packs/day: 1.00     Types: Cigarettes    Smokeless tobacco: Never   Vaping Use    Vaping status: Never Used   Substance and Sexual Activity    Alcohol use: Not Currently     Comment: 12 PER DAY    Drug use: No    Sexual activity: Defer       Family History  Family History   Problem Relation Age of Onset    No Known Problems Mother     Heart disease Father     Colon polyps Father     Colon cancer Neg Hx        Colorectal cancer family history: None    Review of Systems  Negative except as documented in the HPI.     Allergies  Allergies   Allergen Reactions    Sulfa Antibiotics Other (See Comments)     Headaches       Medications    Current Facility-Administered Medications:     acetaminophen (TYLENOL) tablet 650 mg, 650 mg, Oral, Q6H PRN, Abram Cruz MD    bisacodyl (DULCOLAX) EC tablet 10 mg, 10 mg, Oral, 2 times per day on Monday, Abram Cruz MD    Enoxaparin Sodium (LOVENOX) syringe 40 mg, 40 mg, Subcutaneous, Q24H, Abram Cruz MD    metroNIDAZOLE (FLAGYL) tablet 500 mg, 500 mg, Oral, 3 times per day on Monday, Abram Cruz MD    neomycin (MYCIFRADIN) tablet 1,000 mg, 1,000 mg, Oral, 3 times per day on Monday, Abram Cruz MD    nicotine (NICODERM CQ) 7 MG/24HR patch 1 patch, 1 patch, Transdermal, Q24H, Abram Cruz MD, 1 patch at 07/28/24 1748    ondansetron (ZOFRAN) injection 4 mg, 4 mg, Intravenous, Q6H PRN, Abram Cruz MD    polyethylene glycol (MIRALAX) powder 0.5 bottle, 0.5 bottle, Oral, Q12H, Abram Cruz MD    sodium chloride 0.9 % flush 10 mL, 10 mL, Intravenous, Q12H,  Abram Cruz MD, 10 mL at 07/28/24 2111    sodium chloride 0.9 % flush 10 mL, 10 mL, Intravenous, PRN, Abram Cruz MD    Vital Signs  Vitals:    07/29/24 0813   BP: 94/57   Pulse: 65   Resp: 16   Temp: 97.9 °F (36.6 °C)   SpO2: 97%        Physical Exam  Constitutional: Resting comfortably, no acute distress  Neck: Supple, trachea midline  Respiratory: No increased work of breathing, Symmetric excursion  Cardiovascular: Well pefursed, no jugular venous distention evident   Abdominal: Gastrostomy tube in place.  Soft, non-tender, non-distended  Lymphatics: No cervical or suprascapular adenopathy  Skin: Warm, dry, no rash on visualized skin surfaces  Musculoskeletal: Symmetric strength, no obvious gross abnormalities  Psychiatric: Alert and oriented ×3, normal affect     Laboratory Results  I have personally reviewed CBC with WBC 6, hemoglobin 10, platelets 166.  INR 1.0.  Prealbumin 18.9.  CMP with creatinine 0.7, albumin 4.0.    Radiology  None to review    Endoscopy  None to review         Trey Cruz MD  Colorectal & General Surgery  [facility]    [address], [address]  [place], [place], [address]  P: [phone]  F: [phone]

## 2024-07-29 NOTE — CASE MANAGEMENT/SOCIAL WORK
Discharge Planning Assessment  [facility]     Patient Name: Keron Gerber  MRN: [id number]  Today's Date: 7/29/2024    Admit Date: 7/28/2024    Plan: Home with assist with HH (pending)   Discharge Needs Assessment       Row Name 07/29/24 1516       Living Environment    People in Home alone    Potentially Unsafe Housing Conditions none    Primary Care Provided by self    Provides Primary Care For no one    Family Caregiver if Needed parent(s);sibling(s)    Quality of Family Relationships helpful;involved;supportive    Able to Return to Prior Arrangements yes       Resource/Environmental Concerns    Resource/Environmental Concerns none    Transportation Concerns none       Transition Planning    Patient/Family Anticipates Transition to home with help/services    Patient/Family Anticipated Services at Transition none    Transportation Anticipated family or friend will provide       Discharge Needs Assessment    Readmission Within the Last 30 Days no previous admission in last 30 days    Equipment Currently Used at Home cane, quad;trach supplies    Anticipated Changes Related to Illness none    Equipment Needed After Discharge none                   Discharge Plan       Row Name 07/29/24 1518       Plan    Plan Home with assist with HH (pending)    Patient/Family in Agreement with Plan yes    Provided Post Acute Provider Quality & Resource List? N/A    Plan Comments CCP met with patient at bedside. Introduced self and explained role of CCP. Patient confirmed the information on his face sheet is accurate. Patients PCP is Yousif Marrero. Patient uses [facility]. Patient lives at home alone. He has a G-tube and Trach that he takes care of himself and is comfortable doing so. Patient administers his own feeds. Patient has procedure tomorrow for ileostomy. CCP spoke to patient about HH for wound care and ostomy help. Patient is agreeable to a referral to a company that comes to his home. CCP made a referral to  [facility] out of [place]. Patient stated that his sister can transport him home at discharge. CCP following.                  Continued Care and Services - Admitted Since 7/28/2024       Home Medical Care       Service Provider Request Status Selected Services Address Phone Fax Patient Preferred    [facility] [place] Pending - Request Sent N/A [address] [place] [address] [phone] --                     Demographic Summary       Row Name 07/29/24 1505       General Information    Admission Type inpatient    Arrived From emergency department    Referral Source admission list    Reason for Consult decision-making    Preferred Language English                   Functional Status       Row Name 07/29/24 1506       Functional Status    Usual Activity Tolerance good    Current Activity Tolerance good       Functional Status, IADL    Medications independent    Meal Preparation independent    Housekeeping independent    Laundry independent    Shopping independent       Mental Status    General Appearance WDL WDL       Mental Status Summary    Recent Changes in Mental Status/Cognitive Functioning no changes       Employment/    Employment Status disabled                   Psychosocial    No documentation.                  Abuse/Neglect    No documentation.                  Legal    No documentation.                  Substance Abuse    No documentation.                  Patient Forms    No documentation.

## 2024-07-30 ENCOUNTER — ANESTHESIA EVENT (OUTPATIENT)
Dept: PERIOP | Facility: HOSPITAL | Age: 64
End: 2024-07-30
Payer: MEDICAID

## 2024-07-30 ENCOUNTER — ANESTHESIA (OUTPATIENT)
Dept: PERIOP | Facility: HOSPITAL | Age: 64
End: 2024-07-30
Payer: MEDICAID

## 2024-07-30 PROCEDURE — C9290 INJ, BUPIVACAINE LIPOSOME: HCPCS | Performed by: ANESTHESIOLOGY

## 2024-07-30 PROCEDURE — 25810000003 SODIUM CHLORIDE 0.9 % SOLUTION 250 ML FLEX CONT: Performed by: NURSE ANESTHETIST, CERTIFIED REGISTERED

## 2024-07-30 PROCEDURE — 25010000002 FENTANYL CITRATE (PF) 50 MCG/ML SOLUTION: Performed by: NURSE ANESTHETIST, CERTIFIED REGISTERED

## 2024-07-30 PROCEDURE — 25010000002 ONDANSETRON PER 1 MG: Performed by: NURSE ANESTHETIST, CERTIFIED REGISTERED

## 2024-07-30 PROCEDURE — 25010000002 PHENYLEPHRINE 10 MG/ML SOLUTION 5 ML VIAL: Performed by: NURSE ANESTHETIST, CERTIFIED REGISTERED

## 2024-07-30 PROCEDURE — 25010000002 CEFOXITIN PER 1 G: Performed by: NURSE ANESTHETIST, CERTIFIED REGISTERED

## 2024-07-30 PROCEDURE — 44207 L COLECTOMY/COLOPROCTOSTOMY: CPT | Performed by: SURGERY

## 2024-07-30 PROCEDURE — 0DTP4ZZ RESECTION OF RECTUM, PERCUTANEOUS ENDOSCOPIC APPROACH: ICD-10-PCS | Performed by: SURGERY

## 2024-07-30 PROCEDURE — 25010000002 MIDAZOLAM PER 1 MG: Performed by: ANESTHESIOLOGY

## 2024-07-30 PROCEDURE — 25010000002 GLYCOPYRROLATE 0.2 MG/ML SOLUTION: Performed by: NURSE ANESTHETIST, CERTIFIED REGISTERED

## 2024-07-30 PROCEDURE — 25010000002 SUGAMMADEX 200 MG/2ML SOLUTION: Performed by: NURSE ANESTHETIST, CERTIFIED REGISTERED

## 2024-07-30 PROCEDURE — 0 BUPIVACAINE LIPOSOME 1.3 % SUSPENSION: Performed by: ANESTHESIOLOGY

## 2024-07-30 PROCEDURE — 88309 TISSUE EXAM BY PATHOLOGIST: CPT | Performed by: SURGERY

## 2024-07-30 PROCEDURE — 25010000002 BUPIVACAINE (PF) 0.25 % SOLUTION: Performed by: ANESTHESIOLOGY

## 2024-07-30 PROCEDURE — 25810000003 LACTATED RINGERS PER 1000 ML: Performed by: SURGERY

## 2024-07-30 PROCEDURE — 25810000003 SODIUM CHLORIDE PER 500 ML: Performed by: SURGERY

## 2024-07-30 PROCEDURE — 25810000003 LACTATED RINGERS PER 1000 ML: Performed by: ANESTHESIOLOGY

## 2024-07-30 PROCEDURE — 25010000002 PROPOFOL 10 MG/ML EMULSION: Performed by: NURSE ANESTHETIST, CERTIFIED REGISTERED

## 2024-07-30 PROCEDURE — 25010000002 LIDOCAINE PER 10 MG: Performed by: NURSE ANESTHETIST, CERTIFIED REGISTERED

## 2024-07-30 PROCEDURE — 0D1B4Z4 BYPASS ILEUM TO CUTANEOUS, PERCUTANEOUS ENDOSCOPIC APPROACH: ICD-10-PCS | Performed by: SURGERY

## 2024-07-30 PROCEDURE — 25010000002 CEFOXITIN PER 1 G: Performed by: SURGERY

## 2024-07-30 PROCEDURE — 25010000002 MAGNESIUM SULFATE PER 500 MG OF MAGNESIUM: Performed by: NURSE ANESTHETIST, CERTIFIED REGISTERED

## 2024-07-30 PROCEDURE — 25010000002 DROPERIDOL PER 5 MG: Performed by: NURSE ANESTHETIST, CERTIFIED REGISTERED

## 2024-07-30 PROCEDURE — 25810000003 LACTATED RINGERS PER 1000 ML: Performed by: NURSE ANESTHETIST, CERTIFIED REGISTERED

## 2024-07-30 PROCEDURE — 25010000002 HYDROMORPHONE PER 4 MG: Performed by: NURSE ANESTHETIST, CERTIFIED REGISTERED

## 2024-07-30 PROCEDURE — 25010000002 DEXAMETHASONE PER 1 MG: Performed by: NURSE ANESTHETIST, CERTIFIED REGISTERED

## 2024-07-30 DEVICE — ENDOPATH ECHELON ENDOSCOPIC LINEAR CUTTER RELOADS, BLUE, 60MM
Type: IMPLANTABLE DEVICE | Site: ABDOMEN | Status: FUNCTIONAL
Brand: ECHELON ENDOPATH

## 2024-07-30 DEVICE — ENDOSCOPIC LINEAR CUTTER RELOADS GRAY 2.0MM, 6 ROWS
Type: IMPLANTABLE DEVICE | Site: ABDOMEN | Status: FUNCTIONAL
Brand: ECHELON ENDOPATH

## 2024-07-30 DEVICE — CELLULAR STAPLER WITH TRI-STAPLE TECHNOLOGY
Type: IMPLANTABLE DEVICE | Site: ABDOMEN | Status: FUNCTIONAL
Brand: EEA

## 2024-07-30 RX ORDER — CEFOXITIN 2 G/1
2 INJECTION, POWDER, FOR SOLUTION INTRAVENOUS ONCE
Status: DISCONTINUED | OUTPATIENT
Start: 2024-07-30 | End: 2024-07-30 | Stop reason: SDUPTHER

## 2024-07-30 RX ORDER — EPHEDRINE SULFATE 50 MG/ML
5 INJECTION, SOLUTION INTRAVENOUS ONCE AS NEEDED
Status: DISCONTINUED | OUTPATIENT
Start: 2024-07-30 | End: 2024-07-30 | Stop reason: HOSPADM

## 2024-07-30 RX ORDER — ACETAMINOPHEN 500 MG
1000 TABLET ORAL EVERY 6 HOURS
Status: DISCONTINUED | OUTPATIENT
Start: 2024-07-30 | End: 2024-08-06 | Stop reason: HOSPADM

## 2024-07-30 RX ORDER — MIDAZOLAM HYDROCHLORIDE 1 MG/ML
1 INJECTION INTRAMUSCULAR; INTRAVENOUS
Status: DISCONTINUED | OUTPATIENT
Start: 2024-07-30 | End: 2024-07-30 | Stop reason: HOSPADM

## 2024-07-30 RX ORDER — OXYCODONE AND ACETAMINOPHEN 7.5; 325 MG/1; MG/1
1 TABLET ORAL EVERY 4 HOURS PRN
Status: DISCONTINUED | OUTPATIENT
Start: 2024-07-30 | End: 2024-07-30 | Stop reason: HOSPADM

## 2024-07-30 RX ORDER — ROCURONIUM BROMIDE 10 MG/ML
INJECTION, SOLUTION INTRAVENOUS AS NEEDED
Status: DISCONTINUED | OUTPATIENT
Start: 2024-07-30 | End: 2024-07-30 | Stop reason: SURG

## 2024-07-30 RX ORDER — NALOXONE HCL 0.4 MG/ML
0.2 VIAL (ML) INJECTION AS NEEDED
Status: DISCONTINUED | OUTPATIENT
Start: 2024-07-30 | End: 2024-07-30 | Stop reason: HOSPADM

## 2024-07-30 RX ORDER — MAGNESIUM SULFATE HEPTAHYDRATE 500 MG/ML
INJECTION, SOLUTION INTRAMUSCULAR; INTRAVENOUS AS NEEDED
Status: DISCONTINUED | OUTPATIENT
Start: 2024-07-30 | End: 2024-07-30 | Stop reason: SURG

## 2024-07-30 RX ORDER — LIDOCAINE HYDROCHLORIDE 20 MG/ML
INJECTION, SOLUTION INFILTRATION; PERINEURAL AS NEEDED
Status: DISCONTINUED | OUTPATIENT
Start: 2024-07-30 | End: 2024-07-30

## 2024-07-30 RX ORDER — SODIUM CHLORIDE 9 MG/ML
INJECTION, SOLUTION INTRAVENOUS AS NEEDED
Status: DISCONTINUED | OUTPATIENT
Start: 2024-07-30 | End: 2024-07-30 | Stop reason: HOSPADM

## 2024-07-30 RX ORDER — DROPERIDOL 2.5 MG/ML
0.62 INJECTION, SOLUTION INTRAMUSCULAR; INTRAVENOUS
Status: DISCONTINUED | OUTPATIENT
Start: 2024-07-30 | End: 2024-07-30 | Stop reason: HOSPADM

## 2024-07-30 RX ORDER — FENTANYL CITRATE 50 UG/ML
50 INJECTION, SOLUTION INTRAMUSCULAR; INTRAVENOUS
Status: DISCONTINUED | OUTPATIENT
Start: 2024-07-30 | End: 2024-07-30 | Stop reason: HOSPADM

## 2024-07-30 RX ORDER — FENTANYL CITRATE 50 UG/ML
50 INJECTION, SOLUTION INTRAMUSCULAR; INTRAVENOUS ONCE AS NEEDED
Status: DISCONTINUED | OUTPATIENT
Start: 2024-07-30 | End: 2024-07-30 | Stop reason: HOSPADM

## 2024-07-30 RX ORDER — PROPOFOL 10 MG/ML
VIAL (ML) INTRAVENOUS AS NEEDED
Status: DISCONTINUED | OUTPATIENT
Start: 2024-07-30 | End: 2024-07-30 | Stop reason: SURG

## 2024-07-30 RX ORDER — PROMETHAZINE HYDROCHLORIDE 25 MG/1
25 TABLET ORAL ONCE AS NEEDED
Status: DISCONTINUED | OUTPATIENT
Start: 2024-07-30 | End: 2024-07-30 | Stop reason: HOSPADM

## 2024-07-30 RX ORDER — ONDANSETRON 4 MG/1
4 TABLET, ORALLY DISINTEGRATING ORAL EVERY 6 HOURS PRN
Status: DISCONTINUED | OUTPATIENT
Start: 2024-07-30 | End: 2024-08-06 | Stop reason: HOSPADM

## 2024-07-30 RX ORDER — SODIUM CHLORIDE 0.9 % (FLUSH) 0.9 %
3 SYRINGE (ML) INJECTION EVERY 12 HOURS SCHEDULED
Status: DISCONTINUED | OUTPATIENT
Start: 2024-07-30 | End: 2024-07-30 | Stop reason: HOSPADM

## 2024-07-30 RX ORDER — OXYCODONE HYDROCHLORIDE 5 MG/1
5 TABLET ORAL EVERY 4 HOURS PRN
Status: DISCONTINUED | OUTPATIENT
Start: 2024-07-30 | End: 2024-08-06 | Stop reason: HOSPADM

## 2024-07-30 RX ORDER — SODIUM CHLORIDE, SODIUM LACTATE, POTASSIUM CHLORIDE, CALCIUM CHLORIDE 600; 310; 30; 20 MG/100ML; MG/100ML; MG/100ML; MG/100ML
9 INJECTION, SOLUTION INTRAVENOUS CONTINUOUS
Status: DISCONTINUED | OUTPATIENT
Start: 2024-07-30 | End: 2024-07-30

## 2024-07-30 RX ORDER — SODIUM CHLORIDE, SODIUM LACTATE, POTASSIUM CHLORIDE, CALCIUM CHLORIDE 600; 310; 30; 20 MG/100ML; MG/100ML; MG/100ML; MG/100ML
INJECTION, SOLUTION INTRAVENOUS CONTINUOUS PRN
Status: DISCONTINUED | OUTPATIENT
Start: 2024-07-30 | End: 2024-07-30 | Stop reason: SURG

## 2024-07-30 RX ORDER — FLUMAZENIL 0.1 MG/ML
0.2 INJECTION INTRAVENOUS AS NEEDED
Status: DISCONTINUED | OUTPATIENT
Start: 2024-07-30 | End: 2024-07-30 | Stop reason: HOSPADM

## 2024-07-30 RX ORDER — PROMETHAZINE HYDROCHLORIDE 25 MG/1
25 SUPPOSITORY RECTAL ONCE AS NEEDED
Status: DISCONTINUED | OUTPATIENT
Start: 2024-07-30 | End: 2024-07-30 | Stop reason: HOSPADM

## 2024-07-30 RX ORDER — SODIUM CHLORIDE 0.9 % (FLUSH) 0.9 %
3-10 SYRINGE (ML) INJECTION AS NEEDED
Status: DISCONTINUED | OUTPATIENT
Start: 2024-07-30 | End: 2024-07-30 | Stop reason: HOSPADM

## 2024-07-30 RX ORDER — BUPIVACAINE HYDROCHLORIDE 2.5 MG/ML
INJECTION, SOLUTION EPIDURAL; INFILTRATION; INTRACAUDAL
Status: COMPLETED | OUTPATIENT
Start: 2024-07-30 | End: 2024-07-30

## 2024-07-30 RX ORDER — IPRATROPIUM BROMIDE AND ALBUTEROL SULFATE 2.5; .5 MG/3ML; MG/3ML
3 SOLUTION RESPIRATORY (INHALATION) ONCE AS NEEDED
Status: DISCONTINUED | OUTPATIENT
Start: 2024-07-30 | End: 2024-07-30 | Stop reason: HOSPADM

## 2024-07-30 RX ORDER — LIDOCAINE HYDROCHLORIDE 10 MG/ML
0.5 INJECTION, SOLUTION INFILTRATION; PERINEURAL ONCE AS NEEDED
Status: DISCONTINUED | OUTPATIENT
Start: 2024-07-30 | End: 2024-07-30 | Stop reason: HOSPADM

## 2024-07-30 RX ORDER — NICOTINE 21 MG/24HR
1 PATCH, TRANSDERMAL 24 HOURS TRANSDERMAL
Status: DISCONTINUED | OUTPATIENT
Start: 2024-07-30 | End: 2024-08-06 | Stop reason: HOSPADM

## 2024-07-30 RX ORDER — HYDROMORPHONE HYDROCHLORIDE 1 MG/ML
0.5 INJECTION, SOLUTION INTRAMUSCULAR; INTRAVENOUS; SUBCUTANEOUS
Status: DISPENSED | OUTPATIENT
Start: 2024-07-30 | End: 2024-08-04

## 2024-07-30 RX ORDER — FAMOTIDINE 10 MG/ML
20 INJECTION, SOLUTION INTRAVENOUS ONCE
Status: COMPLETED | OUTPATIENT
Start: 2024-07-30 | End: 2024-07-30

## 2024-07-30 RX ORDER — HYDROCODONE BITARTRATE AND ACETAMINOPHEN 5; 325 MG/1; MG/1
1 TABLET ORAL ONCE AS NEEDED
Status: DISCONTINUED | OUTPATIENT
Start: 2024-07-30 | End: 2024-07-30 | Stop reason: HOSPADM

## 2024-07-30 RX ORDER — CEFOXITIN 2 G/1
INJECTION, POWDER, FOR SOLUTION INTRAVENOUS AS NEEDED
Status: DISCONTINUED | OUTPATIENT
Start: 2024-07-30 | End: 2024-07-30 | Stop reason: SURG

## 2024-07-30 RX ORDER — DEXAMETHASONE SODIUM PHOSPHATE 4 MG/ML
INJECTION, SOLUTION INTRA-ARTICULAR; INTRALESIONAL; INTRAMUSCULAR; INTRAVENOUS; SOFT TISSUE AS NEEDED
Status: DISCONTINUED | OUTPATIENT
Start: 2024-07-30 | End: 2024-07-30 | Stop reason: SURG

## 2024-07-30 RX ORDER — ONDANSETRON 2 MG/ML
4 INJECTION INTRAMUSCULAR; INTRAVENOUS ONCE AS NEEDED
Status: DISCONTINUED | OUTPATIENT
Start: 2024-07-30 | End: 2024-07-30 | Stop reason: HOSPADM

## 2024-07-30 RX ORDER — FENTANYL CITRATE 50 UG/ML
INJECTION, SOLUTION INTRAMUSCULAR; INTRAVENOUS AS NEEDED
Status: DISCONTINUED | OUTPATIENT
Start: 2024-07-30 | End: 2024-07-30 | Stop reason: SURG

## 2024-07-30 RX ORDER — ONDANSETRON 2 MG/ML
INJECTION INTRAMUSCULAR; INTRAVENOUS AS NEEDED
Status: DISCONTINUED | OUTPATIENT
Start: 2024-07-30 | End: 2024-07-30 | Stop reason: SURG

## 2024-07-30 RX ORDER — ONDANSETRON 2 MG/ML
4 INJECTION INTRAMUSCULAR; INTRAVENOUS EVERY 6 HOURS PRN
Status: DISCONTINUED | OUTPATIENT
Start: 2024-07-30 | End: 2024-08-06 | Stop reason: HOSPADM

## 2024-07-30 RX ORDER — FAMOTIDINE 10 MG/ML
20 INJECTION, SOLUTION INTRAVENOUS ONCE
Status: DISCONTINUED | OUTPATIENT
Start: 2024-07-30 | End: 2024-07-30 | Stop reason: HOSPADM

## 2024-07-30 RX ORDER — HYDROMORPHONE HYDROCHLORIDE 1 MG/ML
0.5 INJECTION, SOLUTION INTRAMUSCULAR; INTRAVENOUS; SUBCUTANEOUS
Status: DISCONTINUED | OUTPATIENT
Start: 2024-07-30 | End: 2024-07-30 | Stop reason: HOSPADM

## 2024-07-30 RX ORDER — MAGNESIUM HYDROXIDE 1200 MG/15ML
LIQUID ORAL AS NEEDED
Status: DISCONTINUED | OUTPATIENT
Start: 2024-07-30 | End: 2024-07-30 | Stop reason: HOSPADM

## 2024-07-30 RX ORDER — LIDOCAINE HYDROCHLORIDE ANHYDROUS AND DEXTROSE MONOHYDRATE 5; 400 G/100ML; MG/100ML
INJECTION, SOLUTION INTRAVENOUS CONTINUOUS PRN
Status: DISCONTINUED | OUTPATIENT
Start: 2024-07-30 | End: 2024-07-30 | Stop reason: SURG

## 2024-07-30 RX ORDER — DIPHENHYDRAMINE HYDROCHLORIDE 50 MG/ML
12.5 INJECTION INTRAMUSCULAR; INTRAVENOUS
Status: DISCONTINUED | OUTPATIENT
Start: 2024-07-30 | End: 2024-07-30 | Stop reason: HOSPADM

## 2024-07-30 RX ORDER — METHOCARBAMOL 750 MG/1
750 TABLET, FILM COATED ORAL 4 TIMES DAILY
Status: DISCONTINUED | OUTPATIENT
Start: 2024-07-30 | End: 2024-08-06 | Stop reason: HOSPADM

## 2024-07-30 RX ORDER — LABETALOL HYDROCHLORIDE 5 MG/ML
5 INJECTION, SOLUTION INTRAVENOUS
Status: DISCONTINUED | OUTPATIENT
Start: 2024-07-30 | End: 2024-07-30 | Stop reason: HOSPADM

## 2024-07-30 RX ORDER — SODIUM CHLORIDE, SODIUM LACTATE, POTASSIUM CHLORIDE, CALCIUM CHLORIDE 600; 310; 30; 20 MG/100ML; MG/100ML; MG/100ML; MG/100ML
50 INJECTION, SOLUTION INTRAVENOUS CONTINUOUS
Status: DISCONTINUED | OUTPATIENT
Start: 2024-07-30 | End: 2024-08-01

## 2024-07-30 RX ORDER — GLYCOPYRROLATE 0.2 MG/ML
INJECTION INTRAMUSCULAR; INTRAVENOUS AS NEEDED
Status: DISCONTINUED | OUTPATIENT
Start: 2024-07-30 | End: 2024-07-30 | Stop reason: SURG

## 2024-07-30 RX ORDER — HYDRALAZINE HYDROCHLORIDE 20 MG/ML
5 INJECTION INTRAMUSCULAR; INTRAVENOUS
Status: DISCONTINUED | OUTPATIENT
Start: 2024-07-30 | End: 2024-07-30 | Stop reason: HOSPADM

## 2024-07-30 RX ORDER — OXYCODONE HYDROCHLORIDE 5 MG/1
10 TABLET ORAL EVERY 4 HOURS PRN
Status: DISCONTINUED | OUTPATIENT
Start: 2024-07-30 | End: 2024-08-06 | Stop reason: HOSPADM

## 2024-07-30 RX ADMIN — ROCURONIUM BROMIDE 30 MG: 10 INJECTION, SOLUTION INTRAVENOUS at 15:28

## 2024-07-30 RX ADMIN — ONDANSETRON 4 MG: 2 INJECTION INTRAMUSCULAR; INTRAVENOUS at 18:22

## 2024-07-30 RX ADMIN — ROCURONIUM BROMIDE 20 MG: 10 INJECTION, SOLUTION INTRAVENOUS at 16:44

## 2024-07-30 RX ADMIN — LIDOCAINE HYDROCHLORIDE 2 MG/KG/HR: 4 INJECTION, SOLUTION INTRAVENOUS at 15:33

## 2024-07-30 RX ADMIN — DEXAMETHASONE SODIUM PHOSPHATE 8 MG: 4 INJECTION, SOLUTION INTRA-ARTICULAR; INTRALESIONAL; INTRAMUSCULAR; INTRAVENOUS; SOFT TISSUE at 15:39

## 2024-07-30 RX ADMIN — HYDROMORPHONE HYDROCHLORIDE 0.5 MG: 1 INJECTION, SOLUTION INTRAMUSCULAR; INTRAVENOUS; SUBCUTANEOUS at 19:16

## 2024-07-30 RX ADMIN — PHENYLEPHRINE HYDROCHLORIDE 0.5 MCG/KG/MIN: 10 INJECTION, SOLUTION INTRAVENOUS at 15:28

## 2024-07-30 RX ADMIN — PROPOFOL 50 MG: 10 INJECTION, EMULSION INTRAVENOUS at 15:26

## 2024-07-30 RX ADMIN — FENTANYL CITRATE 50 MCG: 50 INJECTION, SOLUTION INTRAMUSCULAR; INTRAVENOUS at 18:56

## 2024-07-30 RX ADMIN — SODIUM CHLORIDE, POTASSIUM CHLORIDE, SODIUM LACTATE AND CALCIUM CHLORIDE 50 ML/HR: 600; 310; 30; 20 INJECTION, SOLUTION INTRAVENOUS at 20:27

## 2024-07-30 RX ADMIN — SODIUM CHLORIDE, POTASSIUM CHLORIDE, SODIUM LACTATE AND CALCIUM CHLORIDE: 600; 310; 30; 20 INJECTION, SOLUTION INTRAVENOUS at 17:07

## 2024-07-30 RX ADMIN — FENTANYL CITRATE 25 MCG: 50 INJECTION, SOLUTION INTRAMUSCULAR; INTRAVENOUS at 16:13

## 2024-07-30 RX ADMIN — MAGNESIUM SULFATE HEPTAHYDRATE 2 G: 500 INJECTION, SOLUTION INTRAMUSCULAR; INTRAVENOUS at 15:42

## 2024-07-30 RX ADMIN — Medication 20 MG: at 15:30

## 2024-07-30 RX ADMIN — FAMOTIDINE 20 MG: 10 INJECTION INTRAVENOUS at 14:42

## 2024-07-30 RX ADMIN — MIDAZOLAM 1 MG: 1 INJECTION INTRAMUSCULAR; INTRAVENOUS at 14:46

## 2024-07-30 RX ADMIN — SODIUM CHLORIDE, POTASSIUM CHLORIDE, SODIUM LACTATE AND CALCIUM CHLORIDE: 600; 310; 30; 20 INJECTION, SOLUTION INTRAVENOUS at 15:11

## 2024-07-30 RX ADMIN — BUPIVACAINE HYDROCHLORIDE 15 ML: 2.5 INJECTION, SOLUTION EPIDURAL; INFILTRATION; INTRACAUDAL; PERINEURAL at 15:17

## 2024-07-30 RX ADMIN — CEFOXITIN SODIUM 2 G: 2 POWDER, FOR SOLUTION INTRAVENOUS at 17:05

## 2024-07-30 RX ADMIN — SUGAMMADEX 200 MG: 100 INJECTION, SOLUTION INTRAVENOUS at 18:34

## 2024-07-30 RX ADMIN — DROPERIDOL 0.62 MG: 2.5 INJECTION, SOLUTION INTRAMUSCULAR; INTRAVENOUS at 19:00

## 2024-07-30 RX ADMIN — Medication 10 MG: at 17:31

## 2024-07-30 RX ADMIN — BUPIVACAINE 15 ML: 13.3 INJECTION, SUSPENSION, LIPOSOMAL INFILTRATION at 15:12

## 2024-07-30 RX ADMIN — Medication 10 ML: at 08:01

## 2024-07-30 RX ADMIN — Medication 10 MG: at 16:32

## 2024-07-30 RX ADMIN — FENTANYL CITRATE 25 MCG: 50 INJECTION, SOLUTION INTRAMUSCULAR; INTRAVENOUS at 16:27

## 2024-07-30 RX ADMIN — ACETAMINOPHEN 1000 MG: 500 TABLET ORAL at 20:27

## 2024-07-30 RX ADMIN — HYDROMORPHONE HYDROCHLORIDE 0.5 MG: 1 INJECTION, SOLUTION INTRAMUSCULAR; INTRAVENOUS; SUBCUTANEOUS at 19:50

## 2024-07-30 RX ADMIN — NICOTINE 1 PATCH: 7 PATCH, EXTENDED RELEASE TRANSDERMAL at 09:32

## 2024-07-30 RX ADMIN — METHOCARBAMOL TABLETS 750 MG: 750 TABLET, COATED ORAL at 20:27

## 2024-07-30 RX ADMIN — Medication 10 MG: at 18:16

## 2024-07-30 RX ADMIN — FENTANYL CITRATE 50 MCG: 50 INJECTION, SOLUTION INTRAMUSCULAR; INTRAVENOUS at 15:56

## 2024-07-30 RX ADMIN — PROPOFOL 50 MG: 10 INJECTION, EMULSION INTRAVENOUS at 15:27

## 2024-07-30 RX ADMIN — CEFOXITIN SODIUM 2 G: 2 POWDER, FOR SOLUTION INTRAVENOUS at 15:05

## 2024-07-30 RX ADMIN — ROCURONIUM BROMIDE 10 MG: 10 INJECTION, SOLUTION INTRAVENOUS at 17:49

## 2024-07-30 RX ADMIN — HYDROMORPHONE HYDROCHLORIDE 0.5 MG: 1 INJECTION, SOLUTION INTRAMUSCULAR; INTRAVENOUS; SUBCUTANEOUS at 19:25

## 2024-07-30 RX ADMIN — GLYCOPYRROLATE 0.1 MG: 0.2 INJECTION INTRAMUSCULAR; INTRAVENOUS at 15:39

## 2024-07-30 RX ADMIN — GLYCOPYRROLATE 0.1 MG: 0.2 INJECTION INTRAMUSCULAR; INTRAVENOUS at 15:42

## 2024-07-30 RX ADMIN — NICOTINE 1 PATCH: 14 PATCH TRANSDERMAL at 11:38

## 2024-07-30 RX ADMIN — SODIUM CHLORIDE, POTASSIUM CHLORIDE, SODIUM LACTATE AND CALCIUM CHLORIDE 9 ML/HR: 600; 310; 30; 20 INJECTION, SOLUTION INTRAVENOUS at 15:06

## 2024-07-30 NOTE — OP NOTE
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  It was located just proximal to the anterior peritoneal reflection.  Approximately 2 cm of gross distal margin obtained.  Total mesorectal excision performed with high ligation of the inferior mesenteric artery.  20 mm stapled coloproctostomy created without tension.  Diverting loop ileostomy created in standard fashion.  It was pink and above the skin at the conclusion of the operation.    SURGEON:  Trey Cruz MD    ASSISTANT:  Assistant: Quintin Huynh CSA; Lior King CSA was responsible for performing the following activities: Retraction, Suction, Irrigation, Suturing, Closing, Placing Dressing, and Held/Positioned Camera and their skilled assistance was necessary for the success of this case.     ANESTHESIA:  General-endotracheal  Tap blocks    EBL:  25 mL    SPECIMEN:  Sigmoid colon and rectum    OPERATIVE DESCRIPTION:  The patient was brought to the operating room under the care of the nursing staff.  The patient was placed on the operating room table in the supine position where anesthesia was induced.  The patient was then prepped and positioned in the usual sterile fashion.  A standardized timeout was then performed.    5 mm supraumbilical incision created.  Veress needle inserted.  Saline drop test reassuring.   Abdomen insufflated to 15 mmHg without incident.  Optical trocar then placed through incision and into the abdomen atraumatically.  Additional 12 mm trocar placed in the right lower quadrant.  5 mm trocar placed in the right mid abdomen.  5 mm trocar placed in the left mid abdomen.  Gastrocolic ligament was incised.  Dissection carried out up to the level of splenic flexure.  White line of Toldt incised on the proximal descending colon.  Planes of dissection were connected and the splenic flexure was completely mobilized.  The descending colon was then mobilized away from the sidewall and the white line of Toldt.  Sigmoid colon was densely adherent to the pelvic brim and vessels.  Careful dissection was carried out to separate these 2 structures.  The right side of the sigmoid colon mesentery was then incised and the inferior mesenteric artery was identified.  A window was created on either side of the inferior mesenteric artery.  Ureter was identified again and kept safe.  The vessel was divided with a gray load of the Plano 60 stapler.  The remainder of the descending colon mesentery was divided up to the level of the splenic flexure.  Total mesorectal excision was then performed by dissecting out the presacral space.  Either side of the peritoneum of the rectum was incised down to the level of the anterior peritoneal reflection.  The rectum was dissected posteriorly all the way down to the pelvic floor.  The mass was relatively mobile and retracted as proximally as possible.  At a point appropriate for  transection of the rectum was identified and its mesentery was divided circumferentially including the anterior peritoneal reflection.  A blue load of the Plano 60 stapler was used to divide the rectum.  This required 2 fires.  The specimen was extracorporealized through an extraction site in the left lower quadrant.  Wound protector was placed.  Specimen was extracted.  Doppler signal was identified within  the mesentery of the colonic conduit at the point of division.  Mesentery was divided with the LigaSure device.  Colon was divided with Bovie.  Specimen passed off the sigmoid colon the rectum.  2 a pursestring suture placed with Prolene stitch.  20 mm anvil was placed.  Mesentery was cleared off of the surface of the anvil.  The colonic conduit was then placed back into the abdomen.  Wound protector was removed.  Gloves were changed as well as instruments.  Posterior fascia closed with 0 PDS suture.  Anterior fascia closed with 0 PDS suture.  Abdomen was reinsufflated.  Colonic conduit reached the pelvis easily.  20 mm stapled coloproctostomy was created in standard fashion.  Leak test was performed and was reassuring.  Small bowel was rotated out from under the colonic conduit.  19 Bengali drain placed in the pelvis posterior to the anastomosis.  A point approximately 20 cm proximal to the ileocecal valve within the terminal ileum was identified for diverting loop ileostomy creation.  The ileostomy trephine was created and the bowel was eviscerated with no tension.  All incisions were closed with 4-0 Monocryl and Exofin.  Loop ileostomy matured in standard fashion.  Stoma appliance placed.    All needle, sponge, and instrument counts were correct at the end of the case.    The patient tolerated the procedure well and was transferred to the postanesthesia care unit in stable condition.    Trey Cruz M.D.  Colorectal & General Surgery  [facility], [place], operated by [facility]    [address], [address]  [place], [place], [address]  P: [phone]  F: [phone]

## 2024-07-30 NOTE — PLAN OF CARE
Goal Outcome Evaluation:  Plan of Care Reviewed With: patient        Progress: no change  Outcome Evaluation: Bowel prep is complete, NPO since MN, surgery to be done today at 3pm, wife at bedside

## 2024-07-30 NOTE — PLAN OF CARE
Goal Outcome Evaluation:           Progress: improving  Outcome Evaluation: Patient left 3 park for surgery at 1345, patient left alert and oriented, on room air, trach site capped, NPO, ad michelle, g tube clamped, vitals stable, plan of care continued

## 2024-07-30 NOTE — ANESTHESIA PROCEDURE NOTES
Airway  Urgency: elective    Date/Time: 7/30/2024 3:28 PM  Airway not difficult    General Information and Staff    Patient location during procedure: OR    Indications and Patient Condition  Indications for airway management: airway protection    Preoxygenated: yes  Mask difficulty assessment: 0 - not attempted    Final Airway Details  Final airway type: endotracheal airway      Successful airway: reinforced tube  Cuffed: yes   Successful intubation technique: exchange catheter  Endotracheal tube insertion site: oral  Placement verified by: chest auscultation and capnometry   Number of attempts at approach: 1  Assessment: lips, teeth, and gum same as pre-op and atraumatic intubation    Additional Comments  6.5 ETT

## 2024-07-30 NOTE — ANESTHESIA PREPROCEDURE EVALUATION
Anesthesia Evaluation     Patient summary reviewed and Nursing notes reviewed   NPO Solid Status: > 8 hours  NPO Liquid Status: > 4 hours           Airway   Mallampati: II  Neck ROM: full  No difficulty expected  Comment: Tracheostomy present  Dental      Pulmonary     breath sounds clear to auscultation  (+) a smoker Current,  Cardiovascular     Rhythm: regular        Neuro/Psych  GI/Hepatic/Renal/Endo      Musculoskeletal     Abdominal    Substance History      OB/GYN          Other      history of cancer (prostate, rectal, melanoma, hypopharynx) active                  Anesthesia Plan    ASA 4     general     (Primary service request TAP blocks)  intravenous induction     Anesthetic plan, risks, benefits, and alternatives have been provided, discussed and informed consent has been obtained with: patient.    CODE STATUS:    Code Status (Patient has no pulse and is not breathing): CPR (Attempt to Resuscitate)  Medical Interventions (Patient has pulse or is breathing): Full Support

## 2024-07-30 NOTE — PROGRESS NOTES
Colorectal & General Surgery  Progress Note    Patient: Keron Gerber  YOB: 1960  MRN: [id number]      Assessment  Keron Gerber is a 64 y.o. 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  We will proceed to the operating today for laparoscopic low anterior resection with diverting loop ileostomy creation.  We may or may not need to replace his gastrostomy tube.  Informed consent obtained for the procedure after thorough discussion of the risk, benefits, alternatives, including the risk of ureteral injury, damage to other surrounding structures, anastomotic leak, need for additional procedures, bleeding, infection.    Plan  Proceed to the operating for laparoscopic low anterior resection with diverting loop ileostomy creation      Subjective  Overall feels well.  Eager for surgery.  Tolerated bowel prep well.    Objective    Vitals:    07/30/24 0752   BP: 92/63   Pulse: 80   Resp: 18   Temp: 97.8 °F (36.6 °C)   SpO2: 98%       Physical Exam  Constitutional: Well-developed well-nourished, no acute distress  Neck: Supple, trachea midline  Respiratory: No increased work of breathing, Symmetric excursion  Cardiovascular: Well pefursed, no jugular venous distention evident   Abdominal: Marking in place in the right lower quadrant.  Gastrostomy tube in the left upper quadrant.  Soft, non-tender, non-distended  Skin: Warm, dry, no rash on visualized skin surfaces  Psychiatric: Alert and oriented ×3, normal affect          Trey Cruz MD  Colorectal & General Surgery  [facility] [address], [address]  [place], [place], [address]  P: [phone]  F: [phone]

## 2024-07-30 NOTE — ANESTHESIA PROCEDURE NOTES
Peripheral Block      Patient reassessed immediately prior to procedure    Patient location during procedure: holding area  Start time: 7/30/2024 3:12 PM  Stop time: 7/30/2024 3:17 PM  Reason for block: at surgeon's request and post-op pain management  Performed by  Anesthesiologist: Awa Grady MD  Preanesthetic Checklist  Completed: patient identified, IV checked, site marked, risks and benefits discussed, surgical consent, monitors and equipment checked, pre-op evaluation and timeout performed  Prep:  Sterile barriers:gloves, cap and mask  Prep: ChloraPrep  Patient monitoring: blood pressure monitoring, continuous pulse oximetry and EKG  Procedure    Sedation: yes    Guidance:ultrasound guided    ULTRASOUND INTERPRETATION.  Using ultrasound guidance a 21 G gauge needle was placed in close proximity to the nerve, at which point, under ultrasound guidance anesthetic was injected in the area of the nerve and spread of the anesthesia was seen on ultrasound in close proximity thereto.  There were no abnormalities seen on ultrasound; a digital image was taken; and the patient tolerated the procedure with no complications. Images:still images obtained, printed/placed on chart    Laterality:BilateralInjection Technique:single-shot  Needle Type:echogenic  Needle Gauge:21 G  Loss of resistance: Normal     Medications Used: bupivacaine liposome (EXPAREL) 1.3 % injection - Peripheral Nerve   15 mL - 7/30/2024 3:12:00 PM  bupivacaine PF (MARCAINE) 0.25 % injection - Injection   15 mL - 7/30/2024 3:17:00 PM      Post Assessment  Injection Assessment: negative aspiration for heme, no paresthesia on injection and incremental injection  Patient Tolerance:comfortable throughout block  Complications:no  Additional Notes  Ultrasound guidance was used to guide needle placement, and to view and verify medication disbursement.  Performed by: Awa Grady MD

## 2024-07-31 LAB
ANION GAP SERPL CALCULATED.3IONS-SCNC: 12.1 MMOL/L (ref 5–15)
BASOPHILS # BLD AUTO: 0.02 10*3/MM3 (ref 0–0.2)
BASOPHILS NFR BLD AUTO: 0.2 % (ref 0–1.5)
BUN SERPL-MCNC: 6 MG/DL (ref 8–23)
BUN/CREAT SERPL: 10.3 (ref 7–25)
CALCIUM SPEC-SCNC: 8.8 MG/DL (ref 8.6–10.5)
CHLORIDE SERPL-SCNC: 97 MMOL/L (ref 98–107)
CO2 SERPL-SCNC: 21.9 MMOL/L (ref 22–29)
CREAT SERPL-MCNC: 0.58 MG/DL (ref 0.76–1.27)
DEPRECATED RDW RBC AUTO: 63.1 FL (ref 37–54)
EGFRCR SERPLBLD CKD-EPI 2021: 110.3 ML/MIN/1.73
EOSINOPHIL # BLD AUTO: 0.02 10*3/MM3 (ref 0–0.4)
EOSINOPHIL NFR BLD AUTO: 0.2 % (ref 0.3–6.2)
ERYTHROCYTE [DISTWIDTH] IN BLOOD BY AUTOMATED COUNT: 17.6 % (ref 12.3–15.4)
GLUCOSE SERPL-MCNC: 119 MG/DL (ref 65–99)
HCT VFR BLD AUTO: 31.8 % (ref 37.5–51)
HGB BLD-MCNC: 10.8 G/DL (ref 13–17.7)
IMM GRANULOCYTES # BLD AUTO: 0.05 10*3/MM3 (ref 0–0.05)
IMM GRANULOCYTES NFR BLD AUTO: 0.4 % (ref 0–0.5)
LYMPHOCYTES # BLD AUTO: 1.37 10*3/MM3 (ref 0.7–3.1)
LYMPHOCYTES NFR BLD AUTO: 11.7 % (ref 19.6–45.3)
MAGNESIUM SERPL-MCNC: 1.8 MG/DL (ref 1.6–2.4)
MCH RBC QN AUTO: 34 PG (ref 26.6–33)
MCHC RBC AUTO-ENTMCNC: 34 G/DL (ref 31.5–35.7)
MCV RBC AUTO: 100 FL (ref 79–97)
MONOCYTES # BLD AUTO: 1.04 10*3/MM3 (ref 0.1–0.9)
MONOCYTES NFR BLD AUTO: 8.9 % (ref 5–12)
NEUTROPHILS NFR BLD AUTO: 78.6 % (ref 42.7–76)
NEUTROPHILS NFR BLD AUTO: 9.23 10*3/MM3 (ref 1.7–7)
NRBC BLD AUTO-RTO: 0 /100 WBC (ref 0–0.2)
PHOSPHATE SERPL-MCNC: 2.8 MG/DL (ref 2.5–4.5)
PLATELET # BLD AUTO: 181 10*3/MM3 (ref 140–450)
PMV BLD AUTO: 9.3 FL (ref 6–12)
POTASSIUM SERPL-SCNC: 3.4 MMOL/L (ref 3.5–5.2)
RBC # BLD AUTO: 3.18 10*6/MM3 (ref 4.14–5.8)
SODIUM SERPL-SCNC: 131 MMOL/L (ref 136–145)
WBC NRBC COR # BLD AUTO: 11.73 10*3/MM3 (ref 3.4–10.8)

## 2024-07-31 PROCEDURE — 85025 COMPLETE CBC W/AUTO DIFF WBC: CPT | Performed by: SURGERY

## 2024-07-31 PROCEDURE — 83735 ASSAY OF MAGNESIUM: CPT | Performed by: SURGERY

## 2024-07-31 PROCEDURE — 25810000003 LACTATED RINGERS PER 1000 ML: Performed by: SURGERY

## 2024-07-31 PROCEDURE — 84100 ASSAY OF PHOSPHORUS: CPT | Performed by: SURGERY

## 2024-07-31 PROCEDURE — 25010000002 ONDANSETRON PER 1 MG: Performed by: SURGERY

## 2024-07-31 PROCEDURE — 25010000002 ENOXAPARIN PER 10 MG: Performed by: SURGERY

## 2024-07-31 PROCEDURE — 80048 BASIC METABOLIC PNL TOTAL CA: CPT | Performed by: SURGERY

## 2024-07-31 RX ORDER — BENZONATATE 100 MG/1
100 CAPSULE ORAL 3 TIMES DAILY PRN
Status: DISCONTINUED | OUTPATIENT
Start: 2024-07-31 | End: 2024-08-06 | Stop reason: HOSPADM

## 2024-07-31 RX ORDER — GUAIFENESIN AND DEXTROMETHORPHAN HYDROBROMIDE 600; 30 MG/1; MG/1
1 TABLET, EXTENDED RELEASE ORAL 2 TIMES DAILY PRN
Status: DISCONTINUED | OUTPATIENT
Start: 2024-07-31 | End: 2024-08-06 | Stop reason: HOSPADM

## 2024-07-31 RX ADMIN — METHOCARBAMOL TABLETS 750 MG: 750 TABLET, COATED ORAL at 08:37

## 2024-07-31 RX ADMIN — ONDANSETRON 4 MG: 2 INJECTION INTRAMUSCULAR; INTRAVENOUS at 18:10

## 2024-07-31 RX ADMIN — SODIUM CHLORIDE, POTASSIUM CHLORIDE, SODIUM LACTATE AND CALCIUM CHLORIDE 50 ML/HR: 600; 310; 30; 20 INJECTION, SOLUTION INTRAVENOUS at 15:32

## 2024-07-31 RX ADMIN — NICOTINE 1 PATCH: 14 PATCH TRANSDERMAL at 08:47

## 2024-07-31 RX ADMIN — ACETAMINOPHEN 1000 MG: 500 TABLET ORAL at 08:48

## 2024-07-31 RX ADMIN — ACETAMINOPHEN 1000 MG: 500 TABLET ORAL at 02:00

## 2024-07-31 RX ADMIN — METHOCARBAMOL TABLETS 750 MG: 750 TABLET, COATED ORAL at 18:03

## 2024-07-31 RX ADMIN — ACETAMINOPHEN 1000 MG: 500 TABLET ORAL at 15:11

## 2024-07-31 RX ADMIN — METHOCARBAMOL TABLETS 750 MG: 750 TABLET, COATED ORAL at 12:38

## 2024-07-31 RX ADMIN — ACETAMINOPHEN 1000 MG: 500 TABLET ORAL at 20:35

## 2024-07-31 RX ADMIN — METHOCARBAMOL TABLETS 750 MG: 750 TABLET, COATED ORAL at 20:35

## 2024-07-31 RX ADMIN — ENOXAPARIN SODIUM 40 MG: 100 INJECTION SUBCUTANEOUS at 08:48

## 2024-07-31 RX ADMIN — BENZONATATE 100 MG: 100 CAPSULE ORAL at 22:31

## 2024-07-31 NOTE — PROGRESS NOTES
Enter Query Response Below      Query Response: Malnutrition             If applicable, please update the problem list.     Patient: Keron Gerber        : 1960  Account: [id number]           Admit Date:         How to Respond to this query:       a. Click New Note     b. Answer query within the yellow box.                c. Update the Problem List, if applicable.      If you have any questions about this query contact me at: [phone]     Dr. Cruz:    64 y.o. 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  Patient BMI 18.13.  Registered dietitian note states patient meets criteria for moderate malnutrition with moderate muscle loss, moderate fat loss and recommended Boost Breeze TID.     Please clarify diagnosis treated/monitored:    Non-Severe (Moderate) malnutrition  Malnutrition  Underweight  Other- specify __________  Unable to determine       By submitting this query, we are merely seeking further clarification of documentation to accurately reflect all conditions that you are monitoring, evaluating, treating or that extend the hospitalization or utilize additional resources of care. Please utilize your independent clinical judgment when addressing the question(s) above.     This query and your response, once completed, will be entered into the legal medical record.    Sincerely,  Katherin Dubose RN, MSN, CCDS  Clinical Documentation Integrity Program   [email or web address]

## 2024-07-31 NOTE — NURSING NOTE
"   07/31/24 1324   Ileostomy RLQ   Placement date: If unknown, DO NOT use \"Add Comment\" note/Placement time: If unknown, DO NOT use \"Add Comment\" note: 07/30/24 1811   Inserted by: DR DE JESUS  Hand Hygiene Completed: Yes  Location: RLQ   Stomal Appliance 2 piece;Changed   Stoma Appearance irregular;rosebud appearance;moist;red;protruding above skin level  (stoma 35x 30mm)   Peristomal Assessment Clean;Intact   Accessories/Skin Care cleansed with water;wafer barrier over peristomal skin;skin barrier ring  (Lanse 2 3/4\" wafer and pouch)   Stoma Function no flatus;no stool   Treatment Bag change;Site care     CWON note: pt seen for post-op ileostomy teaching. He is attentive to teaching, but concerned he will not be able to do the pouch change.  I assured him that over time, he would feel confident with doing the change. Mom is at bedside, but was not participating in any teaching. WOC team will follow 5x week while in-patient. He will need HH at discharge.   Ostomy education provided to the patient:    [x]Pouch changed   [x]Pt observed   []Pt participated    []CG participated   [x]Pouch emptied and cleaned   [x]Pt observed   []Pt participated    []CG participated   []Teaching packet/handouts provided/reviewed  []Return to ADL's  [x]Peristomal skin care  []Diet   []Adequate po fluid intake   []S/S of dehydration   []Foods to thicken stool   []Foods to avoid to prevent blockage  [x]Supplies at bedside  []Samples ordered  "

## 2024-07-31 NOTE — PLAN OF CARE
Goal Outcome Evaluation:      Patient is a pleasant gentleman is S/P day 1.  He is alert and oriented x 4, on room air, using his IS, vitals stable, and tolerating his diet.  He is coughing up copious amounts of secretions.  Suction and yankeur provided.    Patient encouraged to ambulate which he did walk approximately 200 feet.  His is currently in the chair.    Mother is at bedside.    Patient denies pain except when moving.    TM

## 2024-07-31 NOTE — PROGRESS NOTES
General Surgery    Postoperative day 1 status post laparoscopic low anterior resection with diverting loop ileostomy    Overall doing very well.  Sitting up in bed and feels great.  Pain is controlled.  Tolerating his breakfast.  No ostomy function yet.    Afebrile, vital stable  On room air  Sitting up comfortably  G-tube and trach in place  Abdomen is soft, nondistended, ostomy is pink and patent with no stool in the bag, REMBERTO serosanguinous     WBC 11.7 Hgb 10.8 platelets 181     Plan:   On a full liquid diet, advance as tolerated.  Saline lock IV.  Await return of bowel function.  As needed pain and nausea meds.  Out of bed and ambulate as tolerated.  On DVT prophylaxis.  Pathology pending.    Rani Bonilla MD

## 2024-08-01 LAB
LAB AP CASE REPORT: NORMAL
LAB AP DIAGNOSIS COMMENT: NORMAL
PATH REPORT.FINAL DX SPEC: NORMAL
PATH REPORT.GROSS SPEC: NORMAL

## 2024-08-01 PROCEDURE — 25010000002 ONDANSETRON PER 1 MG: Performed by: SURGERY

## 2024-08-01 PROCEDURE — 25010000002 ENOXAPARIN PER 10 MG: Performed by: SURGERY

## 2024-08-01 PROCEDURE — 99024 POSTOP FOLLOW-UP VISIT: CPT | Performed by: SURGERY

## 2024-08-01 RX ORDER — HYDROXYZINE HYDROCHLORIDE 25 MG/1
25 TABLET, FILM COATED ORAL 3 TIMES DAILY PRN
Status: DISCONTINUED | OUTPATIENT
Start: 2024-08-01 | End: 2024-08-06 | Stop reason: HOSPADM

## 2024-08-01 RX ADMIN — Medication 5 MG: at 20:55

## 2024-08-01 RX ADMIN — GUAIFENESIN AND DEXTROMETHORPHAN HYDROBROMIDE 1 TABLET: 600; 30 TABLET, EXTENDED RELEASE ORAL at 03:55

## 2024-08-01 RX ADMIN — ACETAMINOPHEN 1000 MG: 500 TABLET ORAL at 20:55

## 2024-08-01 RX ADMIN — ENOXAPARIN SODIUM 40 MG: 100 INJECTION SUBCUTANEOUS at 08:34

## 2024-08-01 RX ADMIN — HYDROXYZINE HYDROCHLORIDE 25 MG: 25 TABLET ORAL at 19:55

## 2024-08-01 RX ADMIN — ONDANSETRON 4 MG: 2 INJECTION INTRAMUSCULAR; INTRAVENOUS at 19:55

## 2024-08-01 RX ADMIN — METHOCARBAMOL TABLETS 750 MG: 750 TABLET, COATED ORAL at 20:55

## 2024-08-01 RX ADMIN — NICOTINE 1 PATCH: 14 PATCH TRANSDERMAL at 08:37

## 2024-08-01 RX ADMIN — METHOCARBAMOL TABLETS 750 MG: 750 TABLET, COATED ORAL at 11:41

## 2024-08-01 RX ADMIN — METHOCARBAMOL TABLETS 750 MG: 750 TABLET, COATED ORAL at 17:26

## 2024-08-01 RX ADMIN — ACETAMINOPHEN 1000 MG: 500 TABLET ORAL at 03:55

## 2024-08-01 RX ADMIN — METHOCARBAMOL TABLETS 750 MG: 750 TABLET, COATED ORAL at 08:47

## 2024-08-01 RX ADMIN — OXYCODONE HYDROCHLORIDE 5 MG: 5 TABLET ORAL at 08:47

## 2024-08-01 RX ADMIN — ONDANSETRON 4 MG: 2 INJECTION INTRAMUSCULAR; INTRAVENOUS at 14:20

## 2024-08-01 RX ADMIN — ACETAMINOPHEN 1000 MG: 500 TABLET ORAL at 14:19

## 2024-08-01 RX ADMIN — ONDANSETRON 4 MG: 2 INJECTION INTRAMUSCULAR; INTRAVENOUS at 08:34

## 2024-08-01 NOTE — NURSING NOTE
"   08/01/24 1050   Ileostomy RLQ   Placement date: If unknown, DO NOT use \"Add Comment\" note/Placement time: If unknown, DO NOT use \"Add Comment\" note: 07/30/24 1811   Inserted by: DR DE JESUS  Hand Hygiene Completed: Yes  Location: RLQ   Stomal Appliance 2 piece;Clean;Dry;Intact   Stoma Function flatus;stool   Stool Color green   Stool Consistency liquid   Treatment Placement checked     CWON note: pt seen for follow up ileostomy teaching. He reports an episode of emesis this morning. His coughing is better since he was able to change his trach canula and cough up a mucous plug yesterday. I am concerned about hernia formation with all the increased intra-abdominal pressure. I have asked the RN to order an abdominal binder for him to use. I have discussed this with the pt and Dr De Jesus.     Staff have been emptying output from his ileostomy this AM, he has been watching and I told him, he needs to assist so he can get comfortable with emptying. We will plan to change his appliance again tomorrow. We reviewed the entire [place] teaching folder this morning. All questions were answered. He is from a town near [place], he will need HH follow up and can see the [facility] nurses at [facility] if needed for OP follow up. He is agreeable to have samples sent to his home.     Ostomy education provided to the patient/family:    []Pouch changed   []Pt observed   []Pt participated    []CG participated   []Pouch emptied and cleaned   []Pt observed   []Pt participated    []CG participated   [x]Teaching packet/handouts provided/reviewed  [x]Return to ADL's  [x]Peristomal skin care  [x]Diet   [x]Adequate po fluid intake   [x]S/S of dehydration   [x]Foods to thicken stool   [x]Foods to avoid to prevent blockage  [x]Supplies at bedside  [x]Samples ordered  "

## 2024-08-01 NOTE — ANESTHESIA POSTPROCEDURE EVALUATION
"Patient: Keron Gerber    Procedure Summary       Date: 07/30/24 Room / Location: [facility] OR 09 / [facility]    Anesthesia Start: 1511 Anesthesia Stop: 1849    Procedure: Laparoscopic low anterior resection, diverting loop ileostomy (Abdomen) Diagnosis:       Rectal tumor      (Rectal tumor [D49.0])    Surgeons: Abram Cruz MD Provider: Awa Grady MD    Anesthesia Type: general ASA Status: 4            Anesthesia Type: general    Vitals  Vitals Value Taken Time   /63 07/30/24 1945   Temp 36.5 °C (97.7 °F) 07/30/24 1941   Pulse 79 07/30/24 1951   Resp 16 07/30/24 1945   SpO2 97 % 07/30/24 1951   Vitals shown include unfiled device data.        Post Anesthesia Care and Evaluation    Patient location during evaluation: bedside  Patient participation: complete - patient participated  Level of consciousness: awake  Pain management: adequate    Airway patency: patent  Anesthetic complications: No anesthetic complications    Cardiovascular status: acceptable  Respiratory status: acceptable  Hydration status: acceptable    Comments: /77 (BP Location: Right arm, Patient Position: Lying)   Pulse 73   Temp 36.7 °C (98 °F) (Oral)   Resp 18   Ht 175.3 cm (69\")   Wt 55.7 kg (122 lb 12.7 oz)   SpO2 96%   BMI 18.13 kg/m²     "

## 2024-08-01 NOTE — CASE MANAGEMENT/SOCIAL WORK
Continued Stay Note  [facility]     Patient Name: Keron Gerber  MRN: [id number]  Today's Date: 8/1/2024    Admit Date: 7/28/2024    Plan: Home with OP wound care at [facility]   Discharge Plan       Row Name 08/01/24 1604       Plan    Plan Home with OP wound care at [facility]    Patient/Family in Agreement with Plan yes    Plan Comments CCP spoke to patient. Original plan was wound care with . [facility], [facility], [facility], and [facility] do not take the patients insurance. Patient was given the option for [facility] for out patient wound care. He wants to go to [place]. CCP called and spoke to Madelyn in scheduling. She requested a fax with patient demographics and clinicals. CCP sent the fax. Madelyn stated that once the patient is processed tomorrow they will call the patient to schedule him. CCP followin .                   Discharge Codes    No documentation.                 Expected Discharge Date and Time       Expected Discharge Date Expected Discharge Time    Aug 5, 2024

## 2024-08-01 NOTE — PROGRESS NOTES
Colorectal & General Surgery  Progress Note    Patient: Keron Gerber  YOB: 1960  MRN: [id number]      Assessment  Keron Gerber is a 64 y.o. 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    He continues to recover well.  No tachycardia, normotensive, abdomen exam benign.  Ileostomy is functioning.  Tolerating full liquids well.  Urine output adequate.  Drain serosanguineous.    Plan  Discontinue Lozano catheter  Advance to regular diet  Discontinue intravenous fluids      Subjective  Had an episode of coughing yesterday secondary to some mucous plugging of his tracheostomy.  Otherwise doing well.  Denies any significant abdominal pain this morning.    Objective    Vitals:    08/01/24 0350   BP: 122/77   Pulse: 73   Resp: 18   Temp: 98 °F (36.7 °C)   SpO2: 96%       Physical Exam  Constitutional: Well-developed well-nourished, no acute distress  Neck: Supple, trachea midline  Respiratory: No increased work of breathing, Symmetric excursion  Cardiovascular: Well pefursed, no jugular venous distention evident   Abdominal: Incisions healing well.  Ileostomy functioning.  Soft, non-tender, non-distended  Skin: Warm, dry, no rash on visualized skin surfaces  Psychiatric: Alert and oriented ×3, normal affect     Laboratory Results  None to review    Radiology  None to review         Trey Cruz MD  Colorectal & General Surgery  [facility]    [address] [place] [address], [address]  [place], [place], [address]  P: [phone]  F: [phone]

## 2024-08-01 NOTE — PLAN OF CARE
Goal Outcome Evaluation:      Patient pleasant, alert/oriented x4. Lozano catheter removed this AM, now voiding spontaneously. Ileostomy with moderate amounts of liquid stool.  Started on a regular diet. Intermittently nauseous, Zofran x2. Melatonin/Atarax added to MAR for sleep/anxiety. Lap sites clean and intact. REMBERTO drain with serosanguinous output. Small abdominal binder ordered and at bedside. Accumax applied to bed. SCD's on. Connected to continuous pulse ox. Mother has remained at the bedside.

## 2024-08-02 LAB
ANION GAP SERPL CALCULATED.3IONS-SCNC: 19 MMOL/L (ref 5–15)
BUN SERPL-MCNC: 12 MG/DL (ref 8–23)
BUN/CREAT SERPL: 16.4 (ref 7–25)
CALCIUM SPEC-SCNC: 9.7 MG/DL (ref 8.6–10.5)
CHLORIDE SERPL-SCNC: 86 MMOL/L (ref 98–107)
CO2 SERPL-SCNC: 23 MMOL/L (ref 22–29)
CREAT SERPL-MCNC: 0.73 MG/DL (ref 0.76–1.27)
DEPRECATED RDW RBC AUTO: 62.3 FL (ref 37–54)
EGFRCR SERPLBLD CKD-EPI 2021: 102.9 ML/MIN/1.73
ERYTHROCYTE [DISTWIDTH] IN BLOOD BY AUTOMATED COUNT: 17.5 % (ref 12.3–15.4)
GLUCOSE SERPL-MCNC: 132 MG/DL (ref 65–99)
HCT VFR BLD AUTO: 38.9 % (ref 37.5–51)
HGB BLD-MCNC: 13.3 G/DL (ref 13–17.7)
MCH RBC QN AUTO: 34.3 PG (ref 26.6–33)
MCHC RBC AUTO-ENTMCNC: 34.2 G/DL (ref 31.5–35.7)
MCV RBC AUTO: 100.3 FL (ref 79–97)
PLATELET # BLD AUTO: 203 10*3/MM3 (ref 140–450)
PMV BLD AUTO: 9.8 FL (ref 6–12)
POTASSIUM SERPL-SCNC: 3.3 MMOL/L (ref 3.5–5.2)
RBC # BLD AUTO: 3.88 10*6/MM3 (ref 4.14–5.8)
SODIUM SERPL-SCNC: 128 MMOL/L (ref 136–145)
WBC NRBC COR # BLD AUTO: 15.25 10*3/MM3 (ref 3.4–10.8)

## 2024-08-02 PROCEDURE — 80048 BASIC METABOLIC PNL TOTAL CA: CPT | Performed by: SURGERY

## 2024-08-02 PROCEDURE — 99024 POSTOP FOLLOW-UP VISIT: CPT | Performed by: SURGERY

## 2024-08-02 PROCEDURE — 25010000002 ONDANSETRON PER 1 MG: Performed by: SURGERY

## 2024-08-02 PROCEDURE — 85027 COMPLETE CBC AUTOMATED: CPT | Performed by: SURGERY

## 2024-08-02 PROCEDURE — 25010000002 ENOXAPARIN PER 10 MG: Performed by: SURGERY

## 2024-08-02 PROCEDURE — 25810000003 LACTATED RINGERS PER 1000 ML: Performed by: SURGERY

## 2024-08-02 RX ORDER — SODIUM CHLORIDE, SODIUM LACTATE, POTASSIUM CHLORIDE, CALCIUM CHLORIDE 600; 310; 30; 20 MG/100ML; MG/100ML; MG/100ML; MG/100ML
50 INJECTION, SOLUTION INTRAVENOUS CONTINUOUS
Status: DISCONTINUED | OUTPATIENT
Start: 2024-08-02 | End: 2024-08-03

## 2024-08-02 RX ADMIN — HYDROXYZINE HYDROCHLORIDE 25 MG: 25 TABLET ORAL at 08:52

## 2024-08-02 RX ADMIN — METHOCARBAMOL TABLETS 750 MG: 750 TABLET, COATED ORAL at 20:47

## 2024-08-02 RX ADMIN — ACETAMINOPHEN 1000 MG: 500 TABLET ORAL at 16:22

## 2024-08-02 RX ADMIN — ACETAMINOPHEN 1000 MG: 500 TABLET ORAL at 20:47

## 2024-08-02 RX ADMIN — ENOXAPARIN SODIUM 40 MG: 100 INJECTION SUBCUTANEOUS at 08:52

## 2024-08-02 RX ADMIN — SODIUM CHLORIDE, POTASSIUM CHLORIDE, SODIUM LACTATE AND CALCIUM CHLORIDE 50 ML/HR: 600; 310; 30; 20 INJECTION, SOLUTION INTRAVENOUS at 11:03

## 2024-08-02 RX ADMIN — ONDANSETRON 4 MG: 2 INJECTION INTRAMUSCULAR; INTRAVENOUS at 08:52

## 2024-08-02 RX ADMIN — ACETAMINOPHEN 1000 MG: 500 TABLET ORAL at 08:52

## 2024-08-02 RX ADMIN — NICOTINE 1 PATCH: 14 PATCH TRANSDERMAL at 08:51

## 2024-08-02 RX ADMIN — METHOCARBAMOL TABLETS 750 MG: 750 TABLET, COATED ORAL at 08:52

## 2024-08-02 RX ADMIN — METHOCARBAMOL TABLETS 750 MG: 750 TABLET, COATED ORAL at 16:22

## 2024-08-02 RX ADMIN — Medication 5 MG: at 20:47

## 2024-08-02 NOTE — PROGRESS NOTES
"Nutrition Services    Patient Name:  Keron Gerber  YOB: 1960  MRN: [id number]  Admit Date:  7/28/2024    Assessment Date:  08/02/24    Summary: follow-up    Post op day 3 laparoscopic low anterior resection with diverting loop ileostomy. Pt c/o N/V this morning. Pt made NPO by surgery as well as placed his gastrostomy tube to drainage and reinitiated IVFs. Pt had been started on a regular diet yesterday with 25% po intake recorded x 1 meal for that time period. Will monitor for diet. Continue nutrition supplements as tolerated once diet advanced.    Plan/Recommend:  Continue Boost Breeze TID once diet resumed  Will monitor for diet advancement    RD to follow.    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   Adjusted BMI (if applicable)    BMI Category Underweight (18.4 or below)   Ideal Body Weight (IBW) 155 lb   Usual Body Weight (UBW) Unknown   Weight Trend Loss, Amount/Timeframe: 8.8% x 6 months   --  Labs       Pertinent Labs    Results from last 7 days   Lab Units 07/31/24  1032 07/28/24  1637   SODIUM mmol/L 131* 135*   POTASSIUM mmol/L 3.4* 4.7   CHLORIDE mmol/L 97* 100   CO2 mmol/L 21.9* 24.6   BUN mg/dL 6* 10   CREATININE mg/dL 0.58* 0.70*   CALCIUM mg/dL 8.8 9.0   BILIRUBIN mg/dL  --  <0.2   ALK PHOS U/L  --  79   ALT (SGPT) U/L  --  7   AST (SGOT) U/L  --  12   GLUCOSE mg/dL 119* 83     Results from last 7 days   Lab Units 07/31/24  1032 07/31/24  0650 07/28/24  1637   MAGNESIUM mg/dL 1.8  --   --    PHOSPHORUS mg/dL 2.8  --   --    HEMOGLOBIN g/dL  --  10.8* 10.9*   HEMATOCRIT %  --  31.8* 33.1*   WBC 10*3/mm3  --  11.73* 6.75 " "  ALBUMIN g/dL  --   --  4.0   PREALBUMIN mg/dL  --   --  18.9*     Results from last 7 days   Lab Units 07/31/24  0650 07/28/24  1637   INR   --  1.01   PLATELETS 10*3/mm3 181 166     COVID19   Date Value Ref Range Status   08/08/2022 Detected (C) Not Detected - Ref. Range Final     No results found for: \"HGBA1C\"       Medications           Scheduled Medications acetaminophen, 1,000 mg, Oral, Q6H  enoxaparin, 40 mg, Subcutaneous, Q24H  melatonin, 5 mg, Oral, Nightly  methocarbamol, 750 mg, Oral, 4x Daily  nicotine, 1 patch, Transdermal, Q24H       Infusions lactated ringers, 50 mL/hr, Last Rate: 50 mL/hr (08/02/24 1103)       PRN Medications   benzonatate    guaifenesin-dextromethorphan 600-30 mg    HYDROmorphone    hydrOXYzine    ondansetron ODT **OR** ondansetron    oxyCODONE **OR** oxyCODONE     Physical Findings          General Findings alert, oriented, room air   Oral/Mouth Cavity dental appliance   Edema  no edema   Gastrointestinal abdominal pain, non-distended , last bowel movement: 7/31   Skin  surgical incision: throat, left upper abdomen   Tubes/Drains/Lines gastrostomy tube, ileostomy   NFPE See Malnutrition Severity Assessment     Malnutrition Severity Assessment      Patient meets criteria for : (P) Moderate (non-severe) Malnutrition           Current Nutrition Orders & Evaluation of Intake       Oral Nutrition     Food Allergies NKFA   Current PO Diet NPO Diet NPO Type: Sips with Meds, Ice Chips   Supplement n/a   PO Evaluation     % PO Intake Pt made NPO this morning    Factors Affecting Intake: altered GI function     Estimated Requirements         Weight used  55.7 kg    Calories  1671 - 1950 kcals (30-35 kcal/kg)    Protein  67 - 84 g (1.2 - 1.5 gm/kg)    Fluid   (1 mL/kcal)     PES STATEMENT / NUTRITION DIAGNOSIS      Nutrition Dx Problem  Problem: Malnutrition (moderate) and Altered GI Function  Etiology: Medical Diagnosis - 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 now status post " chemoradiation, tracheostomy, and gastrostomy tube placement    Signs/Symptoms: Clear Liquid Diet and Report/Observation     NUTRITION INTERVENTION / PLAN OF CARE      Intervention Goal(s) Reduce/improve symptoms, Tolerate PO , Accepts oral nutrition supplement, Advance diet, No significant weight loss, and PO intake goal %: 75%         RD Intervention/Action Interview for preferences, Supplement offered/declined, Encourage intake, and Continue to monitor   --      Prescription/Orders:       PO Diet       Supplements Boost Breeze TID      Enteral Nutrition       Parenteral Nutrition    New Prescription Ordered? Continue same per protocol, No changes at this time   --      Monitor/Evaluation Per protocol   Discharge Plan/Needs Pending clinical course   --    RD to follow per protocol.      Electronically signed by:  Patricia Phillips  08/02/24 12:17 EDT

## 2024-08-02 NOTE — PROGRESS NOTES
Colorectal & General Surgery  Progress Note    Patient: Keron Gerber  YOB: 1960  MRN: [id number]      Assessment  Keron Gerber is a 64 y.o. 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    Despite having ileostomy output, he has an ileus at this time.  We will place his gastrostomy tube to drainage, make him n.p.o., and reinitiate intravenous fluids.  His abdominal exam remains very benign.  REMBERTO drain is serosanguineous with decreasing output, so I will discontinue that today.    Subjective  Complains of some nausea and emesis this morning.  Pain well-controlled.  Has not walked in the last 24 hours.    Objective    Vitals:    08/02/24 0750   BP: 97/72   Pulse: 73   Resp: 18   Temp: 98.1 °F (36.7 °C)   SpO2: 99%       Physical Exam  Constitutional: Well-developed well-nourished, no acute distress  Neck: Supple, trachea midline  Respiratory: No increased work of breathing, Symmetric excursion  Cardiovascular: Well pefursed, no jugular venous distention evident   Abdominal: Incision is intact and in good order.  Gastrostomy tube in place.  Diverting loop ileostomy pink and above the skin with semisolid output.  Soft, non-tender, mildly distended.  Skin: Warm, dry, no rash on visualized skin surfaces  Psychiatric: Alert and oriented ×3, normal affect     Laboratory Results  Pending at this time    Radiology  None to review         Trey Cruz MD  Colorectal & General Surgery  [facility]    [address], [address]  [place], [place], [address]  P: [phone]  F: [phone]

## 2024-08-02 NOTE — PLAN OF CARE
Goal Outcome Evaluation:      Patient pleasant, alert/oriented x4. Diet changed to NPO due to N/V. Sips/ice chips only for comfort. G-tube to gravity, getting a moderate amount of liquid output. Patient states he feels a lot better after doing this. Ileostomy still with output but has decreased in amount compared to yesterday. Continuous IV fluids started. REMBERTO drain removed. Trach care completed. Connected to continuous pulse ox. Patient did ambulate more today. Abdominal lap sites clean and intact. Abdominal pad applied on site where some of glue has come off. SCDs on. Mother has remained at the bedside. Vitals stable.

## 2024-08-02 NOTE — NURSING NOTE
"   08/02/24 1317   Gastrostomy/Enterostomy Gastrostomy LUQ   Placement date: If unknown, DO NOT use \"Add Comment\" note/Placement time: If unknown, DO NOT use \"Add Comment\" note: (c) 07/29/24 1900   Present on Admission? Select all that apply: (c) Other (Comment)  Type: Gastrostomy  Location: LUQ   Drain Status Open to gravity drainage   Drainage Appearance Green   Ileostomy RLQ   Placement date: If unknown, DO NOT use \"Add Comment\" note/Placement time: If unknown, DO NOT use \"Add Comment\" note: 07/30/24 1811   Inserted by: DR DE JESUS  Hand Hygiene Completed: Yes  Location: RLQ   Stomal Appliance 2 piece;Changed   Stoma Appearance irregular;rosebud appearance;moist;pink;protruding above skin level   Peristomal Assessment Clean;Intact   Accessories/Skin Care cleansed with water;skin barrier ring;wafer barrier over peristomal skin  ([place] 2 3/4\" wafer and pouch with barrier ring)   Stoma Function flatus;stool   Stool Color brown   Stool Consistency loose   Treatment Bag change;Site care   Output (mL) 50 mL     CWON note: pt seen for follow up ileostomy teaching. He was able to participate in emptying and changing his appliance today. Mom at bedside and observed, but did not actively participate. Plan to continue teaching 5x week while in-patient, next teaching session will be Monday.     Ostomy education provided to the patient/family:    [x]Pouch changed   []Pt observed   [x]Pt participated    []CG participated   [x]Pouch emptied and cleaned   []Pt observed   [x]Pt participated    []CG participated   []Teaching packet/handouts provided/reviewed  []Return to ADL's  [x]Peristomal skin care  []Diet   []Adequate po fluid intake   []S/S of dehydration   []Foods to thicken stool   []Foods to avoid to prevent blockage  [x]Supplies at bedside  []Samples ordered      His G-tube was connected to gravity drainage with an adaptor that can be obtained from distribution (theses are located in [place] office).     "

## 2024-08-03 PROBLEM — E44.0 MODERATE MALNUTRITION: Status: ACTIVE | Noted: 2024-08-03

## 2024-08-03 LAB
ANION GAP SERPL CALCULATED.3IONS-SCNC: 21 MMOL/L (ref 5–15)
BUN SERPL-MCNC: 24 MG/DL (ref 8–23)
BUN/CREAT SERPL: 21.2 (ref 7–25)
CALCIUM SPEC-SCNC: 9.5 MG/DL (ref 8.6–10.5)
CHLORIDE SERPL-SCNC: 83 MMOL/L (ref 98–107)
CO2 SERPL-SCNC: 27 MMOL/L (ref 22–29)
CREAT SERPL-MCNC: 1.13 MG/DL (ref 0.76–1.27)
EGFRCR SERPLBLD CKD-EPI 2021: 73.5 ML/MIN/1.73
GLUCOSE SERPL-MCNC: 117 MG/DL (ref 65–99)
POTASSIUM SERPL-SCNC: 3.5 MMOL/L (ref 3.5–5.2)
SODIUM SERPL-SCNC: 131 MMOL/L (ref 136–145)

## 2024-08-03 PROCEDURE — 99024 POSTOP FOLLOW-UP VISIT: CPT | Performed by: SURGERY

## 2024-08-03 PROCEDURE — 97530 THERAPEUTIC ACTIVITIES: CPT

## 2024-08-03 PROCEDURE — 97162 PT EVAL MOD COMPLEX 30 MIN: CPT

## 2024-08-03 PROCEDURE — 25010000002 ENOXAPARIN PER 10 MG: Performed by: SURGERY

## 2024-08-03 PROCEDURE — 25810000003 SODIUM CHLORIDE 0.9 % SOLUTION: Performed by: SURGERY

## 2024-08-03 PROCEDURE — 80048 BASIC METABOLIC PNL TOTAL CA: CPT | Performed by: SURGERY

## 2024-08-03 RX ORDER — SODIUM CHLORIDE 9 MG/ML
50 INJECTION, SOLUTION INTRAVENOUS CONTINUOUS
Status: DISCONTINUED | OUTPATIENT
Start: 2024-08-03 | End: 2024-08-05

## 2024-08-03 RX ORDER — POTASSIUM CHLORIDE 1.5 G/1.58G
40 POWDER, FOR SOLUTION ORAL ONCE
Status: COMPLETED | OUTPATIENT
Start: 2024-08-03 | End: 2024-08-03

## 2024-08-03 RX ADMIN — NICOTINE 1 PATCH: 14 PATCH TRANSDERMAL at 09:06

## 2024-08-03 RX ADMIN — METHOCARBAMOL TABLETS 750 MG: 750 TABLET, COATED ORAL at 17:48

## 2024-08-03 RX ADMIN — SODIUM CHLORIDE 50 ML/HR: 9 INJECTION, SOLUTION INTRAVENOUS at 07:47

## 2024-08-03 RX ADMIN — ACETAMINOPHEN 1000 MG: 500 TABLET ORAL at 09:06

## 2024-08-03 RX ADMIN — Medication 5 MG: at 20:26

## 2024-08-03 RX ADMIN — ENOXAPARIN SODIUM 40 MG: 100 INJECTION SUBCUTANEOUS at 09:07

## 2024-08-03 RX ADMIN — ACETAMINOPHEN 1000 MG: 500 TABLET ORAL at 16:38

## 2024-08-03 RX ADMIN — SODIUM CHLORIDE 50 ML/HR: 9 INJECTION, SOLUTION INTRAVENOUS at 09:49

## 2024-08-03 RX ADMIN — POTASSIUM CHLORIDE 40 MEQ: 1.5 POWDER, FOR SOLUTION ORAL at 09:45

## 2024-08-03 RX ADMIN — METHOCARBAMOL TABLETS 750 MG: 750 TABLET, COATED ORAL at 11:54

## 2024-08-03 RX ADMIN — ACETAMINOPHEN 1000 MG: 500 TABLET ORAL at 20:26

## 2024-08-03 RX ADMIN — METHOCARBAMOL TABLETS 750 MG: 750 TABLET, COATED ORAL at 20:26

## 2024-08-03 RX ADMIN — METHOCARBAMOL TABLETS 750 MG: 750 TABLET, COATED ORAL at 09:06

## 2024-08-03 NOTE — PLAN OF CARE
Goal Outcome Evaluation:     Patient resting in bed at this time. See MAR for medications administered this shift. No distress noted. Patient able to make needs known. Plan of care is ongoing.

## 2024-08-03 NOTE — THERAPY TREATMENT NOTE
Patient Name: Keron Gerber  : 1960    MRN: [id number]                              Today's Date: 8/3/2024       Admit Date: 2024    Visit Dx:     ICD-10-CM ICD-9-CM   1. 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    Tobacco use disorder      Past Surgical History:   Procedure Laterality Date    COLON RESECTION N/A 2024    Procedure: Laparoscopic low anterior resection, diverting loop ileostomy;  Surgeon: Abram Cruz MD;  Location: [facility] OR;  Service: General;  Laterality: N/A;    COLONOSCOPY N/A 2024    Procedure: COLONOSCOPY WITH ANESTHESIA;  Surgeon: Gustabo Ruiz MD;  Location: [facility] ENDOSCOPY;  Service: Gastroenterology;  Laterality: N/A;  preop; abnormal pet scan   postop rectal mass; polyps;   PCP Dr Santos    CYST REMOVAL Right 2014    RIGHT LOWER EYELID- EPIDERMAL INCLUSION CYST    EXCISION LESION N/A 2014    MALIGNANT MELANOMA OF LEFT NASAL TIP    FOREHEAD FLAP  2014    PEDICLE FLAP  10/02/2014    PEDICLE DIVISION AND FLAP INSET OF NOSE    PROSTATECTOMY N/A 2022    Procedure: PROSTATECTOMY LAPAROSCOPIC WITH DAVINCI ROBOT;  Surgeon: Matthew Weaver MD;  Location: [facility] OR;  Service: Robotics - DaVinci;  Laterality: N/A;    SQUAMOUS CELL CARCINOMA EXCISION  10/29/2013    LEFT FLOOR OF MOUTH    TONSILLECTOMY        General Information       Row Name 24 1337          Physical Therapy Time and Intention    Document Type evaluation  -MP     Mode of Treatment physical therapy;individual therapy  -MP       Row Name  08/03/24 1337          General Information    Patient Profile Reviewed yes  -MP     Existing Precautions/Restrictions fall  -MP     Barriers to Rehab medically complex  -MP       Row Name 08/03/24 1337          Living Environment    People in Home alone  -MP       Row Name 08/03/24 1337          Home Main Entrance    Number of Stairs, Main Entrance none  -MP       Row Name 08/03/24 1337          Stairs Within Home, Primary    Number of Stairs, Within Home, Primary none  -MP       Row Name 08/03/24 1337          Cognition    Orientation Status (Cognition) oriented x 3  -MP       Row Name 08/03/24 1337          Safety Issues, Functional Mobility    Safety Issues Affecting Function (Mobility) awareness of need for assistance;insight into deficits/self-awareness  -MP     Impairments Affecting Function (Mobility) strength;endurance/activity tolerance;shortness of breath  -MP               User Key  (r) = Recorded By, (t) = Taken By, (c) = Cosigned By      Initials Name Provider Type    Kailey Martin PT Physical Therapist                   Mobility       Row Name 08/03/24 1337          Bed Mobility    Bed Mobility supine-sit;sit-supine  -MP     Supine-Sit [place] (Bed Mobility) standby assist;1 person assist  -MP     Sit-Supine [place] (Bed Mobility) standby assist;1 person assist  -MP       Row Name 08/03/24 1337          Sit-Stand Transfer    Sit-Stand [place] (Transfers) contact guard;1 person assist;verbal cues  -MP     Assistive Device (Sit-Stand Transfers) walker, front-wheeled  -MP       Row Name 08/03/24 1337          Gait/Stairs (Locomotion)    [place] Level (Gait) contact guard;1 person assist;verbal cues;nonverbal cues (demo/gesture)  -MP     Assistive Device (Gait) walker, front-wheeled  -MP     Distance in Feet (Gait) 100  x2  -MP     Deviations/Abnormal Patterns (Gait) stride length decreased;gait speed decreased  -MP     Bilateral Gait Deviations forward flexed posture  -MP                User Key  (r) = Recorded By, (t) = Taken By, (c) = Cosigned By      Initials Name Provider Type    MP Kailey Leggett PT Physical Therapist                   Obj/Interventions       Row Name 08/03/24 1340          Range of Motion Comprehensive    General Range of Motion no range of motion deficits identified  -MP       Row Name 08/03/24 1340          Strength Comprehensive (MMT)    General Manual Muscle Testing (MMT) Assessment lower extremity strength deficits identified  -MP     Comment, General Manual Muscle Testing (MMT) Assessment generalized weakness  -MP       Row Name 08/03/24 1340          Sensory Assessment (Somatosensory)    Sensory Assessment (Somatosensory) not tested  -MP               User Key  (r) = Recorded By, (t) = Taken By, (c) = Cosigned By      Initials Name Provider Type    MP Kailey Leggett PT Physical Therapist                   Goals/Plan       Row Name 08/03/24 1345          Bed Mobility Goal 1 (PT)    Activity/Assistive Device (Bed Mobility Goal 1, PT) sit to supine;supine to sit  -MP     Person Level/Cues Needed (Bed Mobility Goal 1, PT) independent  -MP     Time Frame (Bed Mobility Goal 1, PT) 1 week  -MP       Row Name 08/03/24 3515          Transfer Goal 1 (PT)    Activity/Assistive Device (Transfer Goal 1, PT) sit-to-stand/stand-to-sit;bed-to-chair/chair-to-bed  -MP     Person Level/Cues Needed (Transfer Goal 1, PT) standby assist  -MP     Time Frame (Transfer Goal 1, PT) 1 week  -MP       Row Name 08/03/24 1345          Gait Training Goal 1 (PT)    Activity/Assistive Device (Gait Training Goal 1, PT) gait (walking locomotion);walker, rolling  -MP     Person Level (Gait Training Goal 1, PT) standby assist  -MP     Distance (Gait Training Goal 1, PT) 75  -MP     Time Frame (Gait Training Goal 1, PT) 1 week  -MP       [facility] Name 08/03/24 1349          Therapy Assessment/Plan (PT)    Planned Therapy Interventions (PT) balance training;bed mobility  training;gait training;patient/family education;home exercise program;neuromuscular re-education;postural re-education;transfer training;strengthening;stretching;stair training;ROM (range of motion)  -MP               User Key  (r) = Recorded By, (t) = Taken By, (c) = Cosigned By      Initials Name Provider Type    MP Kailey Leggett, PT Physical Therapist                   Clinical Impression       Row Name 08/03/24 9698          Pain    Pretreatment Pain Rating 0/10 - no pain  -MP     Posttreatment Pain Rating 0/10 - no pain  -MP       Row Name 08/03/24 1342          Plan of Care Review    Plan of Care Reviewed With patient  -MP     Outcome Evaluation Pt. 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 He is now POD4. At baseline he lives alone in single level home with supportive family/friends who check on him daily. He uses a SPC for ambulation and is iADLs. He presents this date functioning below baseline with impaired strength, endurance, and mobility. He required SBA for bed mobility, CGA for STS and ambulation for 100'x2 with rwx. Anticipate D/C home with assist and possible benefit from  PT pending improved activity tolerance.  -       Row Name 08/03/24 0800          Therapy Assessment/Plan (PT)    Rehab Potential (PT) good, to achieve stated therapy goals  -MP     Criteria for Skilled Interventions Met (PT) yes  -MP     Therapy Frequency (PT) 5 times/wk  -MP     Predicted Duration of Therapy Intervention (PT) 1 week  -       Row Name 08/03/24 5568          Positioning and Restraints    Pre-Treatment Position in bed  -MP     Post Treatment Position bed  -MP     In Bed supine;call light within reach;encouraged to call for assist;exit alarm on;with family/caregiver  -MP               User Key  (r) = Recorded By, (t) = Taken By,  (c) = Cosigned By      Initials Name Provider Type    Kailey Martin PT Physical Therapist                   Outcome Measures       Row Name 08/03/24 1345          How much help from another person do you currently need...    Turning from your back to your side while in flat bed without using bedrails? 4  -MP     Moving from lying on back to sitting on the side of a flat bed without bedrails? 4  -MP     Moving to and from a bed to a chair (including a wheelchair)? 3  -MP     Standing up from a chair using your arms (e.g., wheelchair, bedside chair)? 3  -MP     Climbing 3-5 steps with a railing? 2  -MP     To walk in hospital room? 3  -MP     AM-PAC 6 Clicks Score (PT) 19  -MP     Highest Level of Mobility Goal 6 --> Walk 10 steps or more  -       Row Name 08/03/24 1340          Functional Assessment    Outcome Measure Options AM-PAC 6 Clicks Basic Mobility (PT)  -               User Key  (r) = Recorded By, (t) = Taken By, (c) = Cosigned By      Initials Name Provider Type    Kailey Mratin PT Physical Therapist                                 Physical Therapy Education       Title: PT OT SLP Therapies (In Progress)       Topic: Physical Therapy (In Progress)       Point: Mobility training (Done)       Learning Progress Summary             Patient Acceptance, E,TB,D, VU,DU,NR by  at 8/3/2024 1345                         Point: Home exercise program (Not Started)       Learner Progress:  Not documented in this visit.              Point: Body mechanics (Done)       Learning Progress Summary             Patient Acceptance, E,TB,D, VU,DU,NR by  at 8/3/2024 1345                         Point: Precautions (Not Started)       Learner Progress:  Not documented in this visit.                              User Key       Initials Effective Dates Name Provider Type Discipline     02/07/24 -  Kailey Leggett PT Physical Therapist PT                  PT Recommendation and Plan  Planned Therapy Interventions  (PT): balance training, bed mobility training, gait training, patient/family education, home exercise program, neuromuscular re-education, postural re-education, transfer training, strengthening, stretching, stair training, ROM (range of motion)  Plan of Care Reviewed With: patient  Outcome Evaluation: Pt. 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 He is now POD4. At baseline he lives alone in single level home with supportive family/friends who check on him daily. He uses a SPC for ambulation and is iADLs. He presents this date functioning below baseline with impaired strength, endurance, and mobility. He required SBA for bed mobility, CGA for STS and ambulation for 100'x2 with rwx. Anticipate D/C home with assist and possible benefit from  PT pending improved activity tolerance.     Time Calculation:         PT Charges       Row Name 08/03/24 1346             Time Calculation    Start Time 1214  -MP      Stop Time 1230  -MP      Time Calculation (min) 16 min  -MP      PT Received On 08/03/24  -MP      PT - Next Appointment 08/05/24  -MP      PT Goal Re-Cert Due Date 08/10/24  -MP         Time Calculation- PT    Total Timed Code Minutes- PT 8 minute(s)  -MP         Timed Charges    35825 - PT Therapeutic Activity Minutes 8  -MP         Total Minutes    Timed Charges Total Minutes 8  -MP       Total Minutes 8  -MP                User Key  (r) = Recorded By, (t) = Taken By, (c) = Cosigned By      Initials Name Provider Type     Kailey Leggett PT Physical Therapist                  Therapy Charges for Today       Code Description Service Date Service Provider Modifiers Qty    39292016659  PT THERAPEUTIC ACT EA 15 MIN 8/3/2024 Kailey Leggett, PT GP 1    [id number]  PT EVAL MOD COMPLEXITY 2 8/3/2024 Kailey Leggett, PT GP 1             PT G-Codes  Outcome Measure Options: AM-PAC 6 Clicks Basic Mobility (PT)  AM-PAC 6 Clicks Score (PT): 19  PT Discharge Summary  Anticipated Discharge Disposition (PT): home with assist, home with home health    Kailey Leggett, PT  8/3/2024

## 2024-08-03 NOTE — PLAN OF CARE
Goal Outcome Evaluation:  Plan of Care Reviewed With: patient           Outcome Evaluation: Pt. 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 He is now POD4. At baseline he lives alone in single level home with supportive family/friends who check on him daily. He uses a SPC for ambulation and is iADLs. He presents this date functioning below baseline with impaired strength, endurance, and mobility. He required SBA for bed mobility, CGA for STS and ambulation for 100'x2 with rwx. Anticipate D/C home with assist and possible benefit from  PT pending improved activity tolerance.      Anticipated Discharge Disposition (PT): home with assist, home with home health

## 2024-08-03 NOTE — PLAN OF CARE
Goal Outcome Evaluation:           Progress: improving  Outcome Evaluation: Patient alert and oriented, on room air, assist x1 around the room encouraged to ambulate as tolerated, abd incision clean dry intact, ostomy site intact with good output, patient tolerated 6hr clamp and is trying clear liquid diet, normal saline infusing 50ml/hr, not voicing pain or nausea, trach site clean dry intact and is capped, vitals stable, plan of care continued

## 2024-08-03 NOTE — THERAPY EVALUATION
Patient Name: Keron Gerber  : 1960    MRN: [id number]                              Today's Date: 8/3/2024       Admit Date: 2024    Visit Dx:     ICD-10-CM ICD-9-CM   1. 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    Tobacco use disorder      Past Surgical History:   Procedure Laterality Date    COLON RESECTION N/A 2024    Procedure: Laparoscopic low anterior resection, diverting loop ileostomy;  Surgeon: Abram Cruz MD;  Location: [facility] OR;  Service: General;  Laterality: N/A;    COLONOSCOPY N/A 2024    Procedure: COLONOSCOPY WITH ANESTHESIA;  Surgeon: Gustabo Ruiz MD;  Location: [facility] ENDOSCOPY;  Service: Gastroenterology;  Laterality: N/A;  preop; abnormal pet scan   postop rectal mass; polyps;   PCP Dr Santos    CYST REMOVAL Right 2014    RIGHT LOWER EYELID- EPIDERMAL INCLUSION CYST    EXCISION LESION N/A 2014    MALIGNANT MELANOMA OF LEFT NASAL TIP    FOREHEAD FLAP  2014    PEDICLE FLAP  10/02/2014    PEDICLE DIVISION AND FLAP INSET OF NOSE    PROSTATECTOMY N/A 2022    Procedure: PROSTATECTOMY LAPAROSCOPIC WITH DAVINCI ROBOT;  Surgeon: Matthew Weaver MD;  Location: [facility] OR;  Service: Robotics - DaVinci;  Laterality: N/A;    SQUAMOUS CELL CARCINOMA EXCISION  10/29/2013    LEFT FLOOR OF MOUTH    TONSILLECTOMY        General Information       Row Name 24 1337          Physical Therapy Time and Intention    Document Type evaluation  -MP     Mode of Treatment physical therapy;individual therapy  -MP       Row Name  08/03/24 1337          General Information    Patient Profile Reviewed yes  -MP     Existing Precautions/Restrictions fall  -MP     Barriers to Rehab medically complex  -MP       Row Name 08/03/24 1337          Living Environment    People in Home alone  -MP       Row Name 08/03/24 1337          Home Main Entrance    Number of Stairs, Main Entrance none  -MP       Row Name 08/03/24 1337          Stairs Within Home, Primary    Number of Stairs, Within Home, Primary none  -MP       Row Name 08/03/24 1337          Cognition    Orientation Status (Cognition) oriented x 3  -MP       Row Name 08/03/24 1337          Safety Issues, Functional Mobility    Safety Issues Affecting Function (Mobility) awareness of need for assistance;insight into deficits/self-awareness  -MP     Impairments Affecting Function (Mobility) strength;endurance/activity tolerance;shortness of breath  -MP               User Key  (r) = Recorded By, (t) = Taken By, (c) = Cosigned By      Initials Name Provider Type    Kailey Martin PT Physical Therapist                   Mobility       Row Name 08/03/24 1337          Bed Mobility    Bed Mobility supine-sit;sit-supine  -MP     Supine-Sit [place] (Bed Mobility) standby assist;1 person assist  -MP     Sit-Supine [place] (Bed Mobility) standby assist;1 person assist  -MP       Row Name 08/03/24 1337          Sit-Stand Transfer    Sit-Stand [place] (Transfers) contact guard;1 person assist;verbal cues  -MP     Assistive Device (Sit-Stand Transfers) walker, front-wheeled  -MP       Row Name 08/03/24 1337          Gait/Stairs (Locomotion)    [place] Level (Gait) contact guard;1 person assist;verbal cues;nonverbal cues (demo/gesture)  -MP     Assistive Device (Gait) walker, front-wheeled  -MP     Distance in Feet (Gait) 100  x2  -MP     Deviations/Abnormal Patterns (Gait) stride length decreased;gait speed decreased  -MP     Bilateral Gait Deviations forward flexed posture  -MP                User Key  (r) = Recorded By, (t) = Taken By, (c) = Cosigned By      Initials Name Provider Type    MP Kailey Leggett PT Physical Therapist                   Obj/Interventions       Row Name 08/03/24 1340          Range of Motion Comprehensive    General Range of Motion no range of motion deficits identified  -MP       Row Name 08/03/24 1340          Strength Comprehensive (MMT)    General Manual Muscle Testing (MMT) Assessment lower extremity strength deficits identified  -MP     Comment, General Manual Muscle Testing (MMT) Assessment generalized weakness  -MP       Row Name 08/03/24 1340          Sensory Assessment (Somatosensory)    Sensory Assessment (Somatosensory) not tested  -MP               User Key  (r) = Recorded By, (t) = Taken By, (c) = Cosigned By      Initials Name Provider Type    MP Kailey Leggett PT Physical Therapist                   Goals/Plan       Row Name 08/03/24 1345          Bed Mobility Goal 1 (PT)    Activity/Assistive Device (Bed Mobility Goal 1, PT) sit to supine;supine to sit  -MP     [place] Level/Cues Needed (Bed Mobility Goal 1, PT) independent  -MP     Time Frame (Bed Mobility Goal 1, PT) 1 week  -MP       Row Name 08/03/24 4195          Transfer Goal 1 (PT)    Activity/Assistive Device (Transfer Goal 1, PT) sit-to-stand/stand-to-sit;bed-to-chair/chair-to-bed  -MP     [place] Level/Cues Needed (Transfer Goal 1, PT) standby assist  -MP     Time Frame (Transfer Goal 1, PT) 1 week  -MP       Row Name 08/03/24 1345          Gait Training Goal 1 (PT)    Activity/Assistive Device (Gait Training Goal 1, PT) gait (walking locomotion);walker, rolling  -MP     [place] Level (Gait Training Goal 1, PT) standby assist  -MP     Distance (Gait Training Goal 1, PT) 75  -MP     Time Frame (Gait Training Goal 1, PT) 1 week  -MP       [facility] Name 08/03/24 1342          Therapy Assessment/Plan (PT)    Planned Therapy Interventions (PT) balance training;bed mobility  training;gait training;patient/family education;home exercise program;neuromuscular re-education;postural re-education;transfer training;strengthening;stretching;stair training;ROM (range of motion)  -MP               User Key  (r) = Recorded By, (t) = Taken By, (c) = Cosigned By      Initials Name Provider Type    MP Kailey Leggett, PT Physical Therapist                   Clinical Impression       Row Name 08/03/24 6929          Pain    Pretreatment Pain Rating 0/10 - no pain  -MP     Posttreatment Pain Rating 0/10 - no pain  -MP       Row Name 08/03/24 1348          Plan of Care Review    Plan of Care Reviewed With patient  -MP     Outcome Evaluation Pt. 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 He is now POD4. At baseline he lives alone in single level home with supportive family/friends who check on him daily. He uses a SPC for ambulation and is iADLs. He presents this date functioning below baseline with impaired strength, endurance, and mobility. He required SBA for bed mobility, CGA for STS and ambulation for 100'x2 with rwx. Anticipate D/C home with assist and possible benefit from  PT pending improved activity tolerance.  -       Row Name 08/03/24 5433          Therapy Assessment/Plan (PT)    Rehab Potential (PT) good, to achieve stated therapy goals  -MP     Criteria for Skilled Interventions Met (PT) yes  -MP     Therapy Frequency (PT) 5 times/wk  -MP     Predicted Duration of Therapy Intervention (PT) 1 week  -       Row Name 08/03/24 4440          Positioning and Restraints    Pre-Treatment Position in bed  -MP     Post Treatment Position bed  -MP     In Bed supine;call light within reach;encouraged to call for assist;exit alarm on;with family/caregiver  -MP               User Key  (r) = Recorded By, (t) = Taken By,  (c) = Cosigned By      Initials Name Provider Type    Kailey Martin PT Physical Therapist                   Outcome Measures       Row Name 08/03/24 1345          How much help from another person do you currently need...    Turning from your back to your side while in flat bed without using bedrails? 4  -MP     Moving from lying on back to sitting on the side of a flat bed without bedrails? 4  -MP     Moving to and from a bed to a chair (including a wheelchair)? 3  -MP     Standing up from a chair using your arms (e.g., wheelchair, bedside chair)? 3  -MP     Climbing 3-5 steps with a railing? 2  -MP     To walk in hospital room? 3  -MP     AM-PAC 6 Clicks Score (PT) 19  -MP     Highest Level of Mobility Goal 6 --> Walk 10 steps or more  -       Row Name 08/03/24 1349          Functional Assessment    Outcome Measure Options AM-PAC 6 Clicks Basic Mobility (PT)  -               User Key  (r) = Recorded By, (t) = Taken By, (c) = Cosigned By      Initials Name Provider Type    Kailey Martin PT Physical Therapist                                 Physical Therapy Education       Title: PT OT SLP Therapies (In Progress)       Topic: Physical Therapy (In Progress)       Point: Mobility training (Done)       Learning Progress Summary             Patient Acceptance, E,TB,D, VU,DU,NR by  at 8/3/2024 1345                         Point: Home exercise program (Not Started)       Learner Progress:  Not documented in this visit.              Point: Body mechanics (Done)       Learning Progress Summary             Patient Acceptance, E,TB,D, VU,DU,NR by  at 8/3/2024 1345                         Point: Precautions (Not Started)       Learner Progress:  Not documented in this visit.                              User Key       Initials Effective Dates Name Provider Type Discipline     02/07/24 -  Kailey Leggett PT Physical Therapist PT                  PT Recommendation and Plan  Planned Therapy Interventions  (PT): balance training, bed mobility training, gait training, patient/family education, home exercise program, neuromuscular re-education, postural re-education, transfer training, strengthening, stretching, stair training, ROM (range of motion)  Plan of Care Reviewed With: patient  Outcome Evaluation: Pt. 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 He is now POD4. At baseline he lives alone in single level home with supportive family/friends who check on him daily. He uses a SPC for ambulation and is iADLs. He presents this date functioning below baseline with impaired strength, endurance, and mobility. He required SBA for bed mobility, CGA for STS and ambulation for 100'x2 with rwx. Anticipate D/C home with assist and possible benefit from  PT pending improved activity tolerance.     Time Calculation:         PT Charges       Row Name 08/03/24 1346             Time Calculation    Start Time 1214  -MP      Stop Time 1230  -MP      Time Calculation (min) 16 min  -MP      PT Received On 08/03/24  -MP      PT - Next Appointment 08/05/24  -MP      PT Goal Re-Cert Due Date 08/10/24  -MP         Time Calculation- PT    Total Timed Code Minutes- PT 8 minute(s)  -MP         Timed Charges    66693 - PT Therapeutic Activity Minutes 8  -MP         Total Minutes    Timed Charges Total Minutes 8  -MP       Total Minutes 8  -MP                User Key  (r) = Recorded By, (t) = Taken By, (c) = Cosigned By      Initials Name Provider Type     Kailey Leggett PT Physical Therapist                  Therapy Charges for Today       Code Description Service Date Service Provider Modifiers Qty    80787617106  PT THERAPEUTIC ACT EA 15 MIN 8/3/2024 Kailey Leggett, PT GP 1    [id number]  PT EVAL MOD COMPLEXITY 2 8/3/2024 Kailey Leggett, PT GP 1             PT G-Codes  Outcome Measure Options: AM-PAC 6 Clicks Basic Mobility (PT)  AM-PAC 6 Clicks Score (PT): 19  PT Discharge Summary  Anticipated Discharge Disposition (PT): home with assist, home with home health    Kailey Leggett, PT  8/3/2024

## 2024-08-03 NOTE — PROGRESS NOTES
IMPRESSION & PLAN:  64-year-old gentleman status post laparoscopic low anterior resection with diverting loop ileostomy.  G-tube will be clamped today as he has had improvement in his nausea and emesis and there is only gastric contents within the tube.  If tolerates clamping for 6 hours then clear liquid diet can be initiated.  Having ostomy output.  Pain controlled.  Continue ambulation.  Potassium 3.5 today.  Will supplement due to concern of ileus with goal of 4.  Recheck BMP and CBC tomorrow.    CC:  No chief complaint on file.        HPI: He reports nausea and distention has greatly improved with gastrostomy tube placed to drain.  He continues to have ileostomy output.  He assisted in changing the appliance yesterday.  He is ambulating in the hallways.    ROS:   Per HPI  PE:    VS:   Vitals:    08/03/24 0807   BP: 113/73   Pulse: 96   Resp: 18   Temp: 97.3 °F (36.3 °C)   SpO2: 96%          Intake/Output Summary (Last 24 hours) at 8/3/2024 0901  Last data filed at 8/3/2024 0523  Gross per 24 hour   Intake --   Output 2400 ml   Net -2400 ml        CONST: Awake, alert  LUNGS: symmetric excursion, normal inspiratory effort  CV: regular rate, well perfused  Abdomen: Soft, incisions well-healed, ileostomy pink and patent with thin bilious output, G-tube to gravity drainage with thin yellow fluid in the bag.      LABS:  Results from last 7 days   Lab Units 08/02/24  1201 07/31/24  0650 07/28/24  1637   WBC 10*3/mm3 15.25* 11.73* 6.75   HEMOGLOBIN g/dL 13.3 10.8* 10.9*   HEMATOCRIT % 38.9 31.8* 33.1*   PLATELETS 10*3/mm3 203 181 166     Results from last 7 days   Lab Units 08/03/24  0641 08/02/24  1201 07/31/24  1032 07/28/24  1637   SODIUM mmol/L 131* 128* 131* 135*   POTASSIUM mmol/L 3.5 3.3* 3.4* 4.7   CHLORIDE mmol/L 83* 86* 97* 100   CO2 mmol/L 27.0 23.0 21.9* 24.6   BUN mg/dL 24* 12 6* 10   CREATININE mg/dL 1.13 0.73* 0.58* 0.70*   CALCIUM mg/dL 9.5 9.7 8.8 9.0   BILIRUBIN mg/dL  --   --   --  <0.2   ALK PHOS  U/L  --   --   --  79   ALT (SGPT) U/L  --   --   --  7   AST (SGOT) U/L  --   --   --  12   GLUCOSE mg/dL 117* 132* 119* 83

## 2024-08-04 LAB
ANION GAP SERPL CALCULATED.3IONS-SCNC: 13 MMOL/L (ref 5–15)
BUN SERPL-MCNC: 35 MG/DL (ref 8–23)
BUN/CREAT SERPL: 28.5 (ref 7–25)
CALCIUM SPEC-SCNC: 9.3 MG/DL (ref 8.6–10.5)
CHLORIDE SERPL-SCNC: 88 MMOL/L (ref 98–107)
CO2 SERPL-SCNC: 29 MMOL/L (ref 22–29)
CREAT SERPL-MCNC: 1.23 MG/DL (ref 0.76–1.27)
DEPRECATED RDW RBC AUTO: 64.3 FL (ref 37–54)
EGFRCR SERPLBLD CKD-EPI 2021: 66.4 ML/MIN/1.73
ERYTHROCYTE [DISTWIDTH] IN BLOOD BY AUTOMATED COUNT: 17.4 % (ref 12.3–15.4)
GLUCOSE SERPL-MCNC: 124 MG/DL (ref 65–99)
HCT VFR BLD AUTO: 39.6 % (ref 37.5–51)
HGB BLD-MCNC: 13.6 G/DL (ref 13–17.7)
MCH RBC QN AUTO: 35.1 PG (ref 26.6–33)
MCHC RBC AUTO-ENTMCNC: 34.3 G/DL (ref 31.5–35.7)
MCV RBC AUTO: 102.1 FL (ref 79–97)
PLATELET # BLD AUTO: 207 10*3/MM3 (ref 140–450)
PMV BLD AUTO: 9.9 FL (ref 6–12)
POTASSIUM SERPL-SCNC: 3.4 MMOL/L (ref 3.5–5.2)
RBC # BLD AUTO: 3.88 10*6/MM3 (ref 4.14–5.8)
SODIUM SERPL-SCNC: 130 MMOL/L (ref 136–145)
WBC NRBC COR # BLD AUTO: 8.3 10*3/MM3 (ref 3.4–10.8)

## 2024-08-04 PROCEDURE — 85027 COMPLETE CBC AUTOMATED: CPT | Performed by: SURGERY

## 2024-08-04 PROCEDURE — 99024 POSTOP FOLLOW-UP VISIT: CPT | Performed by: SURGERY

## 2024-08-04 PROCEDURE — 80048 BASIC METABOLIC PNL TOTAL CA: CPT | Performed by: SURGERY

## 2024-08-04 PROCEDURE — 25010000002 ENOXAPARIN PER 10 MG: Performed by: SURGERY

## 2024-08-04 RX ORDER — POTASSIUM CHLORIDE 1.5 G/1.58G
40 POWDER, FOR SOLUTION ORAL ONCE
Status: COMPLETED | OUTPATIENT
Start: 2024-08-04 | End: 2024-08-04

## 2024-08-04 RX ADMIN — ACETAMINOPHEN 1000 MG: 500 TABLET ORAL at 12:22

## 2024-08-04 RX ADMIN — METHOCARBAMOL TABLETS 750 MG: 750 TABLET, COATED ORAL at 12:34

## 2024-08-04 RX ADMIN — ACETAMINOPHEN 1000 MG: 500 TABLET ORAL at 05:22

## 2024-08-04 RX ADMIN — NICOTINE 1 PATCH: 14 PATCH TRANSDERMAL at 08:38

## 2024-08-04 RX ADMIN — METHOCARBAMOL TABLETS 750 MG: 750 TABLET, COATED ORAL at 08:36

## 2024-08-04 RX ADMIN — POTASSIUM CHLORIDE 40 MEQ: 1.5 POWDER, FOR SOLUTION ORAL at 14:58

## 2024-08-04 RX ADMIN — ENOXAPARIN SODIUM 40 MG: 100 INJECTION SUBCUTANEOUS at 08:37

## 2024-08-04 RX ADMIN — SODIUM CHLORIDE 50 ML/HR: 9 INJECTION, SOLUTION INTRAVENOUS at 10:00

## 2024-08-04 NOTE — PROGRESS NOTES
IMPRESSION & PLAN:  64-year-old gentleman status post laparoscopic low anterior resection with diverting loop ileostomy.  Ileus appears to be clinically resolving.  Will replete potassium.  Advance to regular diet and see how he tolerates.      CC:  No chief complaint on file.        HPI: Denies nausea or distention.  Tolerating clear liquids.    ROS:   Per HPI  PE:    VS:   Vitals:    08/04/24 0803   BP: 99/66   Pulse: 88   Resp: 18   Temp: 97.4 °F (36.3 °C)   SpO2: 100%          Intake/Output Summary (Last 24 hours) at 8/4/2024 1158  Last data filed at 8/4/2024 0336  Gross per 24 hour   Intake --   Output 1200 ml   Net -1200 ml        CONST: Awake, alert  LUNGS: symmetric excursion, normal inspiratory effort  CV: regular rate, well perfused  Abdomen: Soft, incisions well-healed, ileostomy pink and patent with thin bilious output, gastrostomy tube clamped      LABS:  Results from last 7 days   Lab Units 08/04/24  0559 08/02/24  1201 07/31/24  0650 07/28/24  1637   WBC 10*3/mm3 8.30 15.25* 11.73* 6.75   HEMOGLOBIN g/dL 13.6 13.3 10.8* 10.9*   HEMATOCRIT % 39.6 38.9 31.8* 33.1*   PLATELETS 10*3/mm3 207 203 181 166     Results from last 7 days   Lab Units 08/04/24  0559 08/03/24  0641 08/02/24  1201 07/31/24  1032 07/28/24  1637   SODIUM mmol/L 130* 131* 128*   < > 135*   POTASSIUM mmol/L 3.4* 3.5 3.3*   < > 4.7   CHLORIDE mmol/L 88* 83* 86*   < > 100   CO2 mmol/L 29.0 27.0 23.0   < > 24.6   BUN mg/dL 35* 24* 12   < > 10   CREATININE mg/dL 1.23 1.13 0.73*   < > 0.70*   CALCIUM mg/dL 9.3 9.5 9.7   < > 9.0   BILIRUBIN mg/dL  --   --   --   --  <0.2   ALK PHOS U/L  --   --   --   --  79   ALT (SGPT) U/L  --   --   --   --  7   AST (SGOT) U/L  --   --   --   --  12   GLUCOSE mg/dL 124* 117* 132*   < > 83    < > = values in this interval not displayed.

## 2024-08-04 NOTE — PLAN OF CARE
Goal Outcome Evaluation:         Patient resting in bed at this time, See MAR for medication administration, no distress noted. Plan of care is ongoing.

## 2024-08-05 PROCEDURE — 25010000002 ENOXAPARIN PER 10 MG: Performed by: SURGERY

## 2024-08-05 PROCEDURE — 99024 POSTOP FOLLOW-UP VISIT: CPT | Performed by: SURGERY

## 2024-08-05 RX ADMIN — ACETAMINOPHEN 1000 MG: 500 TABLET ORAL at 11:30

## 2024-08-05 RX ADMIN — METHOCARBAMOL TABLETS 750 MG: 750 TABLET, COATED ORAL at 08:36

## 2024-08-05 RX ADMIN — METHOCARBAMOL TABLETS 750 MG: 750 TABLET, COATED ORAL at 11:26

## 2024-08-05 RX ADMIN — ENOXAPARIN SODIUM 40 MG: 100 INJECTION SUBCUTANEOUS at 08:36

## 2024-08-05 RX ADMIN — NICOTINE 1 PATCH: 14 PATCH TRANSDERMAL at 08:37

## 2024-08-05 NOTE — PROGRESS NOTES
Colorectal & General Surgery  Progress Note    Patient: Keron Gerber  YOB: 1960  MRN: [id number]      Assessment  Keron Gerber is a 64 y.o. male with rectal tubulovillous adenoma now postoperative day 6 from laparoscopic low anterior resection with diverting loop ileostomy creation.  His ileus is resolved.  Tolerating regular diet without any difficulty.  Pain well-controlled.  Anticipate discharge home tomorrow.    Plan  Discontinue intravenous fluids  Continue regular diet      Subjective  Feels well today.    Objective    Vitals:    08/05/24 0825   BP: 110/87   Pulse: 82   Resp: 16   Temp: 98.2 °F (36.8 °C)   SpO2: 98%       Physical Exam  Constitutional: Well-developed well-nourished, no acute distress  Neck: Supple, trachea midline  Respiratory: No increased work of breathing, Symmetric excursion  Cardiovascular: Well pefursed, no jugular venous distention evident   Abdominal: Ileostomy in good order.  Incisions in good order.  Soft, non-tender, non-distended  Skin: Warm, dry, no rash on visualized skin surfaces  Psychiatric: Alert and oriented ×3, normal affect          Trey Cruz MD  Colorectal & General Surgery  [facility]    [address], [address]  [place], [place], [address]  P: [phone]  F: [phone]

## 2024-08-05 NOTE — NURSING NOTE
"   08/05/24 0928   Ileostomy RLQ   Placement date: If unknown, DO NOT use \"Add Comment\" note/Placement time: If unknown, DO NOT use \"Add Comment\" note: 07/30/24 1811   Inserted by: DR DE JESUS  Hand Hygiene Completed: Yes  Location: RLQ   Stomal Appliance 2 piece;Changed   Stoma Appearance irregular;rosebud appearance;swollen;moist;pink;protruding above skin level   Peristomal Assessment Intact;Clean   Accessories/Skin Care cleansed with water;skin barrier ring;wafer barrier over peristomal skin  (Lucas 2 3/4\" wafer and pouch)   Stoma Function flatus;stool   Stool Color brown   Stool Consistency liquid   Treatment Bag change;Site care   Output (mL) 175 mL     CWON note: pt seen for follow up ileostomy teaching. He was unable to participate much with pouch change today, stating it was upsetting his stomach. His daughter is an RN and works at the hospital by his home. He will ask her to assist with his pouch changes 2x week and he can empty his appliance independently in between pouch changes. I did provide an [email or web address] catalog with all orders numbers and supplies marked in the catalog.     Ostomy education provided to the patient/family:    [x]Pouch changed   [x]Pt observed   [x]Pt participated    []CG participated   [x]Pouch emptied and cleaned   []Pt observed   [x]Pt participated    []CG participated   [x]Teaching packet/handouts provided/reviewed  [x]Return to ADL's  [x]Peristomal skin care  [x]Diet   [x]Adequate po fluid intake   [x]S/S of dehydration   [x]Foods to thicken stool   []Foods to avoid to prevent blockage  [x]Supplies at bedside  []Samples ordered  "

## 2024-08-05 NOTE — PLAN OF CARE
Goal Outcome Evaluation:         Patient pleasant, alert/oriented x4, up adlib.  Diet changed to regular and pt is tolerating well, IV saline locked, G tube clamped, Trach care completed by pt independently. Mother has remained at the bedside. Discharge tomorrow.

## 2024-08-05 NOTE — CASE MANAGEMENT/SOCIAL WORK
Continued Stay Note  [facility]     Patient Name: Keron Gerber  MRN: [id number]  Today's Date: 8/5/2024    Admit Date: 7/28/2024    Plan: Home with OP wound care at [facility]   Discharge Plan       Row Name 08/05/24 1319       Plan    Plan Home with OP wound care at [facility]    Patient/Family in Agreement with Plan yes    Plan Comments Plan remains home with out paitent wound care at [facility]. CCP called and spouse to Madelyn in scheduling to make sure patient is ready to go with their wound care. Madelyn stated that they are still waiting on insurance authorization before they can call the patient to set up scheduling. CCP will check again tomorrow.                   Discharge Codes    No documentation.                 Expected Discharge Date and Time       Expected Discharge Date Expected Discharge Time    Aug 5, 2024

## 2024-08-05 NOTE — PLAN OF CARE
Goal Outcome Evaluation:      Patient resting in bed at this time, mom at bedside. See MAR for medication administration this shift as all meds this shift were refused. No distress noted. Patient alert and oriented and able to make needs known. Plan of care is ongoing.

## 2024-08-06 ENCOUNTER — READMISSION MANAGEMENT (OUTPATIENT)
Dept: CALL CENTER | Facility: HOSPITAL | Age: 64
End: 2024-08-06
Payer: MEDICAID

## 2024-08-06 ENCOUNTER — APPOINTMENT (OUTPATIENT)
Dept: SPEECH THERAPY | Facility: HOSPITAL | Age: 64
End: 2024-08-06
Payer: MEDICAID

## 2024-08-06 VITALS
SYSTOLIC BLOOD PRESSURE: 92 MMHG | HEIGHT: 69 IN | DIASTOLIC BLOOD PRESSURE: 61 MMHG | RESPIRATION RATE: 16 BRPM | OXYGEN SATURATION: 100 % | BODY MASS INDEX: 18.19 KG/M2 | TEMPERATURE: 97 F | WEIGHT: 122.8 LBS | HEART RATE: 97 BPM

## 2024-08-06 PROCEDURE — 99024 POSTOP FOLLOW-UP VISIT: CPT | Performed by: SURGERY

## 2024-08-06 RX ORDER — OXYCODONE HYDROCHLORIDE 5 MG/1
5 TABLET ORAL EVERY 4 HOURS PRN
Qty: 10 TABLET | Refills: 0 | Status: SHIPPED | OUTPATIENT
Start: 2024-08-06 | End: 2024-08-13

## 2024-08-06 RX ORDER — LOPERAMIDE HYDROCHLORIDE 2 MG/1
2 CAPSULE ORAL
Qty: 160 CAPSULE | Refills: 0 | Status: SHIPPED | OUTPATIENT
Start: 2024-08-06 | End: 2024-09-15

## 2024-08-06 RX ORDER — LOPERAMIDE HYDROCHLORIDE 2 MG/1
2 CAPSULE ORAL
Status: DISCONTINUED | OUTPATIENT
Start: 2024-08-06 | End: 2024-08-06 | Stop reason: HOSPADM

## 2024-08-06 RX ORDER — ENOXAPARIN SODIUM 100 MG/ML
40 INJECTION SUBCUTANEOUS EVERY 24 HOURS
Qty: 8.4 ML | Refills: 0 | Status: SHIPPED | OUTPATIENT
Start: 2024-08-06 | End: 2024-08-27

## 2024-08-06 RX ADMIN — NICOTINE 1 PATCH: 14 PATCH TRANSDERMAL at 09:25

## 2024-08-06 RX ADMIN — HYDROXYZINE HYDROCHLORIDE 25 MG: 25 TABLET ORAL at 11:52

## 2024-08-06 NOTE — PAYOR COMM NOTE
"Kathy Gerber (64 y.o. Male)          DC SUMMARY FOR [id number]          Date of Birth   1960    Social Security Number       Address   [address] [place] [address]    Home Phone   [phone]    MRN   [id number]       Jain   Other    Marital Status   Single                            Admission Date   7/28/24    Admission Type   Urgent    Admitting Provider   Abram Cruz MD    Attending Provider       Department, Room/Bed   [address], P383/1       Discharge Date   8/6/2024    Discharge Disposition   Home or Self Care    Discharge Destination                                 Attending Provider: (none)   Allergies: Sulfa Antibiotics    Isolation: None   Infection: None   Code Status: CPR    Ht: 175.3 cm (69\")   Wt: 55.7 kg (122 lb 12.7 oz)    Admission Cmt: None   Principal Problem: Rectal tumor [D49.0]                   Active Insurance as of 7/28/2024       Primary Coverage       Payor Plan Insurance Group Employer/Plan Group    [place] [id number]       Payor Plan Address Payor Plan Phone Number Payor Plan Fax Number Effective Dates    [employer] [address] [address] [phone]  7/1/2024 - None Entered    [facility] [id number]         Subscriber Name Subscriber Birth Date Member ID       KATHY GERBER 1960 [id number]                     Emergency Contacts        (Rel.) Home Phone Work Phone Mobile Phone    ALIX GONZALEZ (Sister) -- -- [phone]    Portia Gerber (Mother) [phone] -- [phone]    RENY GONZALEZ (Brother) -- -- [phone]                 Discharge Summary        Abram Cruz MD at 08/06/24 0730          Colorectal & General Surgery  Discharge Summary    DATE OF ADMIT:    07/17/2024     DATE OF DISCHARGE:    08/06/24     DIAGNOSIS:    Rectal tumor, tubulovillous adenoma with high-grade dysplasia  Squamous cell carcinoma of the hypopharynx  Tracheostomy in " place  Gastrostomy tube in place  Severe chronic protein calorie malnutrition    PROCEDURES:    Laparoscopic low anterior resection with creation of diverting loop ileostomy and mobilization of splenic flexure    FINAL PATHOLOGY:    Tubulovillous adenoma with high-grade dysplasia    SUMMARY OF HOSPITAL COURSE:     He was admitted to the hospital in preparation for elective laparoscopic low anterior resection for rectal tumor.  He tolerated the bowel prep well.  Tolerated the operation well.  Postoperatively, he had a relatively uneventful postoperative course aside from an ileus that resolved relatively quickly.  He was able to care for his ileostomy and had well-controlled pain.  He tolerated regular diet.  Appropriate for discharge on postoperative day 7.    PHYSICAL EXAM  Constitutional: Resting comfortably, no acute distress  Neck: Supple, trachea midline  Respiratory: No increased work of breathing, Symmetric excursion  Cardiovascular: Well pefursed, no jugular venous distention evident   Abdominal: Ileostomy is pink and above the skin.  Gastrostomy tube in place.  Soft, non-tender, non-distended  Lymphatics: No cervical or suprascapular adenopathy  Skin: Warm, dry, no rash on visualized skin surfaces  Musculoskeletal: Symmetric strength, no obvious gross abnormalities  Psychiatric: Alert and oriented ×3, normal affect      DIET: Regular, as tolerated    ACTIVITY: No lifting more than 10 pounds    MEDICATIONS: Refer to MAR    FOLLOW-UP: 3 days by phone    Trey Cruz MD  Colorectal & General Surgery  [facility]    [address], [address]  [place], [place], [address]  P: [phone]  F: [phone]       Electronically signed by Abram Cruz MD at 08/06/24 6332

## 2024-08-06 NOTE — CASE MANAGEMENT/SOCIAL WORK
Case Management Discharge Note      Final Note: Home with out patient wound care at [facility] in [place]. Patient has an appointment with Becca rojas wound care nurse tomorrow morning at 11:00. Patient is aware and agreeable to go. Appointment was added to patients AVS and printed off and given to him. Doe cramer is transport patient home.    Provided Post Acute Provider Quality & Resource List?: N/A    Selected Continued Care - Admitted Since 7/28/2024       Destination    No services have been selected for the patient.                Durable Medical Equipment    No services have been selected for the patient.                Dialysis/Infusion    No services have been selected for the patient.                Home Medical Care    No services have been selected for the patient.                Therapy    No services have been selected for the patient.                Community Resources    No services have been selected for the patient.                Community & DME    No services have been selected for the patient.                    Transportation Services  Private: Car    Final Discharge Disposition Code: 01 - home or self-care

## 2024-08-06 NOTE — DISCHARGE INSTRUCTIONS
POST OP RECOMMENDATIONS  Dr. Trey Cruz  [phone]  Discharge Instructions    Activity  You should get up and move about several times daily to reduce the risk of developing a blood clot in your legs.   No lifting more than 10 pounds for 6 weeks  Diet  Avoid raw fruits and vegetables  Avoid tough meats like steak and pork  If you have an ileostomy, avoid concentrated sugary drinks (Gatorade, soft drinks, milk products)  At your follow-up appointment, we can discuss returning to a normal diet  Dressings  The glue on your incisions will fall off on its own in 1-2 weeks.  You do not need to pack any of your incisions  Bathing  It's ok to shower anytime.   Do not submerge your incisions (bath, pool, lake, ocean, etc.) for 3 weeks.  You can wash your incisions with warm soapy water very gently and pat dry  Pain Management  Unless you have been told otherwise, it's ok to take Tylenol 1000 mg every 6 hours as needed.  I have provided you a prescription for a narcotic pain medication. Take this as needed.   I have also provided you a muscle relaxer if you were still using it in the hospital. Take this scheduled for a couple days, and then you can transition to taking it only as needed.   Please call the office if you have intense pain that is not relieved.   Blood clot prevention  You should be up walking multiple times each day to prevent blood clots from forming.   If I sent you home on a low-dose blood thinner shot, please take those every day until you run out.   Driving  Do not drive while taking narcotic pain medications.   Before driving, make sure that you are able to move and rotate appropriately to keep you and the rest of us safe on the road.   Follow up appointment  My office will call you with a follow up appointment if you do not have one already. It will be in 2-3 weeks.   If you do not hear from my office in 1 week, please call [phone] to ask about scheduling.   Remember to contact me for any of  the following:   Fever > 101 degrees  Severe pain that cannot be controlled by taking your pain pills  Severe nausea or vomiting that cannot be controlled by taking your nausea pills  Significant bleeding of your incisions  Drainage that has a bad smell or is yellow or green in appearance  Any other questions or concerns

## 2024-08-06 NOTE — DISCHARGE INSTR - APPOINTMENTS
Aug 7, 2024 11:00 AM CDT   Ostomy Wound Care Follow up with Becca Sanchez   [address], [address]  In the pavilion not attached to the hospital   [phone]- Option 1 for

## 2024-08-06 NOTE — DISCHARGE SUMMARY
Colorectal & General Surgery  Discharge Summary    DATE OF ADMIT:    07/17/2024     DATE OF DISCHARGE:    08/06/24     DIAGNOSIS:    Rectal tumor, tubulovillous adenoma with high-grade dysplasia  Squamous cell carcinoma of the hypopharynx  Tracheostomy in place  Gastrostomy tube in place  Severe chronic protein calorie malnutrition    PROCEDURES:    Laparoscopic low anterior resection with creation of diverting loop ileostomy and mobilization of splenic flexure    FINAL PATHOLOGY:    Tubulovillous adenoma with high-grade dysplasia    SUMMARY OF HOSPITAL COURSE:     He was admitted to the hospital in preparation for elective laparoscopic low anterior resection for rectal tumor.  He tolerated the bowel prep well.  Tolerated the operation well.  Postoperatively, he had a relatively uneventful postoperative course aside from an ileus that resolved relatively quickly.  He was able to care for his ileostomy and had well-controlled pain.  He tolerated regular diet.  Appropriate for discharge on postoperative day 7.    PHYSICAL EXAM  Constitutional: Resting comfortably, no acute distress  Neck: Supple, trachea midline  Respiratory: No increased work of breathing, Symmetric excursion  Cardiovascular: Well pefursed, no jugular venous distention evident   Abdominal: Ileostomy is pink and above the skin.  Gastrostomy tube in place.  Soft, non-tender, non-distended  Lymphatics: No cervical or suprascapular adenopathy  Skin: Warm, dry, no rash on visualized skin surfaces  Musculoskeletal: Symmetric strength, no obvious gross abnormalities  Psychiatric: Alert and oriented ×3, normal affect      DIET: Regular, as tolerated    ACTIVITY: No lifting more than 10 pounds    MEDICATIONS: Refer to MAR    FOLLOW-UP: 3 days by phone    Trey Cruz MD  Colorectal & General Surgery  [facility]    [address] [place] [address], [address]  [place], [place], [facility]  P: [phone]  F: [phone]

## 2024-08-06 NOTE — PLAN OF CARE
Goal Outcome Evaluation:      Patient resting in bed at this time, see MAR for medications administered this shift. No distress noted, denies pain, non skid socks in place. Patient has no interest in providing site care and changing his colostomy. While this nurse was teaching him how to change/empty his bag, and do sit care, patient put a face cloth over his nose and began explaining this he just can't do it because it has an odor. This nurse acknowledged there was a slight unpleasant odor but nothing unusual. Patient refused to allow this nurse to rinse his colostomy bag out, instead he requested a new bag. Patient took all the supplies used to clean his colostomy and placed then in the trash and set the trash can out into the hallway. Pt educated on proper procedure. Pt could use additional training on ostomy care. Plan of care is ongoing.

## 2024-08-06 NOTE — NURSING NOTE
"   08/06/24 0835   Ileostomy RLQ   Placement date: If unknown, DO NOT use \"Add Comment\" note/Placement time: If unknown, DO NOT use \"Add Comment\" note: 07/30/24 1811   Inserted by: DR DE JESUS  Hand Hygiene Completed: Yes  Location: RLQ   Stomal Appliance 2 piece;Clean;Dry;Intact   Stoma Appearance irregular;rosebud appearance;moist;pink;protruding above skin level   Stoma Function flatus;stool   Stool Color green   Stool Consistency loose   Treatment Placement checked   Output (mL) 100 mL     CWON note: pt is being discharged home today without HH. He can follow up with the WOC at [facility] if needed. His daughter is planning to change his ileostomy appliance 2x week, pt can empty. I reinforced teaching regarding emptying, measuring, and recording output and that he should try to keep his output at 1000cc's/ 24 hours or less. Re-enforced adequate po fluid intake, s/s of dehydration and foods to thicken stools. Pt verbalized understanding of all instructions. 2 clean graduates provided for measuring his output.     Ostomy education provided to the patient/family:    []Pouch changed   []Pt observed   []Pt participated    []CG participated   [x]Pouch emptied and cleaned   []Pt observed   [x]Pt participated (emptying, measuring, and recording output)     []CG participated   [x]Teaching packet/handouts provided/reviewed  []Return to ADL's  []Peristomal skin care  [x]Diet   [x]Adequate po fluid intake   [x]S/S of dehydration   [x]Foods to thicken stool   [x]Foods to avoid to prevent blockage  [x]Supplies at bedside  []Samples ordered  "

## 2024-08-07 NOTE — OUTREACH NOTE
Prep Survey      Flowsheet Row Responses   [facility] facility patient discharged from? [place]   Is LACE score < 7 ? No   Eligibility Readm Mgmt   Discharge diagnosis Lap resection, diverting loop ileostomy   Does the patient have one of the following disease processes/diagnoses(primary or secondary)? General Surgery   Does the patient have Home health ordered? No   Is there a DME ordered? No   Comments regarding appointments Wound care at [facility]   Prep survey completed? Yes            JENI HANEY - Registered Nurse

## 2024-08-09 ENCOUNTER — TELEMEDICINE (OUTPATIENT)
Dept: SURGERY | Facility: CLINIC | Age: 64
End: 2024-08-09
Payer: MEDICAID

## 2024-08-09 ENCOUNTER — READMISSION MANAGEMENT (OUTPATIENT)
Dept: CALL CENTER | Facility: HOSPITAL | Age: 64
End: 2024-08-09
Payer: MEDICAID

## 2024-08-09 DIAGNOSIS — D12.8 TUBULOVILLOUS ADENOMA OF RECTUM: Primary | ICD-10-CM

## 2024-08-09 PROCEDURE — 99024 POSTOP FOLLOW-UP VISIT: CPT | Performed by: SURGERY

## 2024-08-09 NOTE — OUTREACH NOTE
Medical Week 1 Survey      Flowsheet Row Responses   [facility] patient discharged from? [place]   Does the patient have one of the following disease processes/diagnoses(primary or secondary)? General Surgery   Call start time 1134   Call end time 1138   Discharge diagnosis Lap resection, diverting loop ileostomy   Meds reviewed with patient/caregiver? Yes   Is the patient having any side effects they believe may be caused by any medication additions or changes? No   Is the patient taking all medications as directed (includes completed medication regime)? Yes   Has the patient kept scheduled appointments due by today? N/A   Has home health visited the patient within 72 hours of discharge? N/A   Psychosocial issues? No   Did the patient receive a copy of their discharge instructions? Yes   Nursing interventions Reviewed instructions with patient   What is the patient's perception of their health status since discharge? Improving   Is the patient/caregiver able to teach back the hierarchy of who to call/visit for symptoms/problems? PCP, Specialist, Home health nurse, Urgent Care, ED, 911 Yes   Wrap up additional comments Pt reports that he is doing ok. Just still tired. Daughter is a nurse and is assisting him .   Call end time 1138            RAOUL PEREZ - Registered Nurse

## 2024-08-11 PROBLEM — D49.0 RECTAL TUMOR: Status: RESOLVED | Noted: 2024-07-09 | Resolved: 2024-08-11

## 2024-08-11 PROBLEM — D12.8 TUBULOVILLOUS ADENOMA OF RECTUM: Status: ACTIVE | Noted: 2024-08-11

## 2024-08-11 NOTE — PROGRESS NOTES
I called Mr. Gerber for a post-op visit. He is doing well. Reports less than 1L of ileostomy output daily. He's tolerating regular diet and has no abdominal pain. His ileostomy is functioning well, and he has no issues pouching his stoma. He is increasing his activity level slowly and overall feels that he is recovering as expected.     I will arrange another visit with him in a couple weeks and plan for water soluble enema study prior to reversal of his loop ileostomy in about 4.5 weeks.     Tery Cruz MD  Colorectal & General Surgery  [facility]    [address], [address]  [place], [place], [address]  P: [phone]  F: [phone]

## 2024-08-11 NOTE — PROGRESS NOTES
[facility] HEMATOLOGY & ONCOLOGY  [address]  [place] [facility] [place] [address] [phone]    Patient Name: Keron Gerber  Encounter Date: 08/20/2024  YOB: 1960  Patient Number: [id number]      REASON FOR FOLLOW-UP: Keron Gerber is a pleasant 64 y.o.  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  He is seen 1.75 months  post cisplatin with radiation.  He is seen with Portia, his mother.   History is obtained from patient.  History is relaible.           DIAGNOSTIC ABNORMALITIES:   He had several months of right ear pain radiating to the right neck and 20 pound weight loss over 6 months.   He noted right neck mass and dysphagia.  CT neck on 2/28/2024.  3.5 x 4 cm lobular enhancing mass in the upper larynx arising from the posterior wall at the level of the base of the epiglottis.  Suspicious for malignancy.  Abnormal mucosal enhancement also noted involving the uvula and tongue base.  CT chest 3/13/2024.  Well-defined 1.7 cm fluid density lesion in the anterior mediastinum could represent a benign etiology such as a thymic cyst.  Recommend follow-up.  Mild diffuse thickening of bilateral adrenal glands could represent adenomatous hyperplasia.  Recommend correlation with prior imaging if available.  No evidence of pulmonary metastasis.  Tracheostomy, direct laryngoscopy, rigid cervical esophagoscopy and biopsy of the posterior pharyngeal mass and biopsy of the left true vocal fold on 3/14/2024. Flexible laryngoscopy revealed a large exophytic lesion coming from the posterior pharyngeal wall protruding off the supraglottis and glottis with inability to visualize the glottis.  PEG placement 3/18/2024.  Pathology report on 3/18/2024.  Vocal cord, left, biopsy: Squamous mucosa with focal dysplasia at least moderate.  Posterior pharyngeal wall biopsy: Invasive poorly  differentiated squamous cell carcinoma.  Posterior pharyngeal wall biopsy: Invasive poorly differentiated squamous cell carcinoma with focal metaplastic features.  Posterior pharyngeal wall biopsy: Invasive poorly differentiated squamous cell carcinoma with focal metaplastic features.  CBC 3/19/2024 revealed a WBC of 10.8, hemoglobin 13.7, hematocrit 40.5 and platelet 329.  CMP revealed creatinine of 0.73.  Patient seen by Dr. Lit Rand on 3/22/2024.  Findings: Nasopharynx and oropharynx patent without masses/lesions; BOT without masses/lesions; exophytic lesion likely coming from posterior pharyngeal wall protruding onto supraglottis and glottis; unable to visualize glottis d/t obstructive view; supraglottis without obvious lesion otherwise.  Multidisciplinary recommendation: Discussed chemoradiation versus surgical resection.  Patient would benefit from chemoradiation at this time.  Note from Dr. Good on 4/2/2024. Plan for 35 fractions. PET 4/16/2024. Hypermetabolic mass of the posterior wall of the hypopharynx consistent with biopsy-proven squamous cell carcinoma, known primary neoplasm. No hypermetabolic cervical lymphadenopathy is seen.  Hypermetabolic mass of the distal sigmoid colon also worrisome for second primary neoplasm, adenocarcinoma of the colon. Colonoscopy is recommended for further evaluation. Plan for colonoscopy.           PREVIOUS INTERVENTIONS:  He had weekly cisplatin 5/9/2024 through present with radiation 5/6/2024 through 6/20/2024.  Completed radiation 6/25/2024.        DIAGNOSTIC ABNORMALITIES: Distal sigmoid mass.  PET scan 4/16/2024. Hypermetabolic mass of the distal sigmoid colon also worrisome for  second primary neoplasm, adenocarcinoma of the colon. Colonoscopy is recommended for further evaluation.  Colonoscopy 5/1/2024.Foreign body in the cecum. Removal was successful.  One 4 mm polyp in the distal transverse colon, removed with a cold snare. Resected and retrieved.  One 5 mm  polyp in the proximal sigmoid colon, removed with a cold snare. Complete resection. Polyp tissue not retrieved.  One 6 mm polyp at 27 cm proximal to the anus, removed with a cold snare. Resected and retrieved.  One 10 mm polyp at 17 cm proximal to the anus, removed with a hot snare. Resected and retrieved.  Likely malignant partially obstructing tumor from 10 to 14 cm proximal to the anus. Biopsied. This appears to be a large polypoid lesion but given the fact that it was noted on pet imaging this is concerning for malignant cells being present.  Internal hemorrhoids. The examination was otherwise normal on direct and retroflexion views.  An area at 9 cm proximal to the anus was tattooed.   MRI pelvis 5/10/2024.Study is limited by motion artifact.  High rectal polypoid mass extending to the distal sigmoid approximately measuring 5.1 cm in length; T1/T2.  Small bowel segment abut the sigmoid colon without evidence of definite invasion.  No regional lymphadenopathy.   CT abdomen and pelvis 6/11/2024. Large sessile appearing mass within the lumen of the colon at the  rectosigmoid junction appears to arise from the more distal wall and measures 6.2 x 4.9 x 3.7 cm. A small portion of the mass may extend to the serosal surface of the colon posteriorly as demonstrated on images 57-58 of series 2. No evidence of regional lymphadenopathy, no distant metastases. The liver has a normal appearance.  Pathology report 5/2/2024.Mass, rectosigmoid colon, biopsies:Tubulovillous adenoma.High-grade dysplasia is present.  Note from Dr. Abram Walker on 5/24/2024.  Plan for surgical treatment of advanced polyp with high-grade dysplasia concerning for underlying malignancy after completion of chemoradiation for hypopharyngeal squamous cell carcinoma.  CT abdomen pelvis ordered.  Telehealth in 3 weeks. Communication with Dr. Abram Cruz on 5/28/2024.  Patient to complete cisplatin with radiation for the pharyngeal squamous cell  carcinoma.  Then will undergo resection for a distal sigmoid colon mass.  CT abdomen pelvis 6/11/2024. Large sessile appearing mass within the lumen of the colon at the rectosigmoid junction appears to arise from the more distal wall and measures 6.2 x 4.9 x 3.7 cm. A small portion of the mass may extend to the serosal surface of the colon posteriorly as demonstrated on images 57-58 of series 2. No evidence of regional lymphadenopathy, no distant metastases. The liver has a normal appearance       PREVIOUS INTERVENTIONS: Patient had undergone laparoscopic lung resection and creation of diverting loop ileostomy on 8/2/2024.        Oncology/Hematology History   Prostate cancer    Initial Diagnosis    Prostate cancer     1/23/2020 Procedure    PSA 13.96     2/23/2021 Procedure    PSA 4.4     4/23/2022 Procedure    PSA 10.1     5/12/2022 Procedure    PSA 16.70     5/23/2022 Biopsy    Prostate Biopsy:  Left mid medial 1 - prostatic adenocarcinoma, [place] 3+3=6  Left mid lateral 2 - prostatic adenocarcinoma [place] 3+3=6  Left mid medial 2 - prostatic adenocarcinoma [place] 3+3=6  Left apex lateral - prostatic adenocarcinoma, [place] 3+3=6  Left apex medial - prostatic adenocarcinoma, [place] 3+3=6  Right base medial - prostatic adenocarcinoma, [place] 3+3=6    Perineural invasion is identified in this case     9/7/2022 Surgery    Final Diagnosis   Prostate, prostatectomy:               - Histologic Type:  Acinar adenocarcinoma.               - Histologic Grade:  Grade group 4 ([place] score 4 + 4 = 8).               - Intraductal Carcinoma:  Not identified.               - Cribriform Glands:  Present.               - Treatment Effect:  No known presurgical therapy.               - Tumor Quantitation: 41-50%.               - Extraprostatic Extension:  Not identified.               - Urinary Bladder Neck Invasion: Not identified.               - Seminal Vesicle Invasion: Not identified.               - Lymphovascular  Invasion: Not identified.               - Margins: All margins are Negative for invasive carcinoma.               - Pathologic Stage:  pT2 pNx pMx        12/8/2022 Procedure    PSA <0.1     5/16/2023 Procedure    PSA <0.1     9/19/2023 Procedure    PSA <0.1     1/23/2024 Procedure    PSA <0.1     Malignant neoplasm hypopharynx    Initial Diagnosis    Malignant neoplasm hypopharynx     2/28/2024 Imaging    CT Neck:  35 x 40 mm lobular enhancing mass in the upper larynx arising from the posterior   wall at the level of the base of the epiglottis. This is suspicious for malignancy.   There is abnormal mucosal enhancement also noted involving the uvula and   tongue base.      3/2/2024 Imaging    CT Neck:  Findings suggest laryngeal mass as noted, suggest ENT evaluation, asymmetric appearance of the vocal cords and larynx  Minimal adenopathy  Postop changes left submandibular region     3/13/2024 Imaging    CT Chest:  Well-defined 1.7 cm fluid density lesion in the anterior mediastinum could   represent a benign etiology such as a thymic cyst. Given lack of prior imaging   and concern for malignancy, recommend close attention on follow-up imaging   and further workup as clinically necessary.  Mild diffuse thickening of bilateral adrenal glands could represent   adenomatous hyperplasia. Recommend correlation to prior imaging if   available and attention on follow-up imaging.  No evidence of pulmonary metastases.     3/14/2024 Biopsy    Awake tracheostomy/Direct laryngoscopy with suspension with   telescope/right cervical esophagoscopy/biopsy    Findings:  Successful awake tracheostomy in successful awake tracheostomy with   placement of a 6 cuffed Shiley tracheostomy tube.  Large exophytic 5-6 cm posterior oropharyngeal mass extending to the   junction of the lateral posterior pharyngeal wall junction bilaterally, superiorly   comes up to the level of the soft palate. Inferiorly extends into bilateral piriform   sinuses,  but does not involve the apex, however there are reactive changes   of the left piriform apex. There is no involvement of the supraglottic structures.  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  No other mucosal masses, lesions, or ulcers visualized.   No mucosal masses, lesions, or ulcers visualized in the esophageal inlet or   cervical esophagus.    DIAGNOSIS:     A. VOCAL CORD, LEFT, BIOPSY:   - Squamous mucosa with focal dysplasia, at least moderate.     B.  POSTERIOR PHARYNGEAL WALL, BIOPSY:   - INVASIVE POORLY DIFFERENTIATED SQUAMOUS CELL CARCINOMA.     C.  POSTERIOR PHARYNGEAL WALL, BIOPSY:   - INVASIVE POORLY DIFFERENTIATED SQUAMOUS CELL CARCINOMA WITH FOCAL METAPLASTIC FEATURES     D.  POSTERIOR PHARYNGEAL WALL, BIOPSY:   - INVASIVE POORLY DIFFERENTIATED SQUAMOUS CELL CARCINOMA WITH FOCAL METAPLASTIC FEATURES      3/18/2024 Procedure    Gastrostomy tube placement     3/22/2024 Procedure    Appointment with Dr. Rand - [facility]:  Laryngoscopy:  Findings: nasopharynx and oropharynx patent without masses/lesions; BOT   without masses/lesions; exophytic lesion likely coming from posterior   pharyngeal wall protruding onto supraglottis and glottis; unable to visualize   glottis d/t obstructive view; supraglottis without obvious lesion otherwise    PLAN:   CT chest without mets. Discussed imaging in depth with team. Patient is   currently T3N0, stage III oropharyngeal SCC.   DL had biopsies positive for invasive SCC.   Discussed chemoradiation vs surgical resection with patient. Patient will   benefit from chemoradiation at this time.  Patient would like to have treatment [facility] [place] [facility]. Will send   out referrals.  Patient may need further evaluation of his carotid stenosis as well.     Appointment with Dr. Kohler - Rad [facility]:  Likely Stage IVB (cT4b N0 M0) p16 negative hypopharynx SCC due to    probable prevertebral fasica involvement  Consensus recommendation of the board was for patient to undergo definitive chemo/RT.       4/2/2024 Cancer Staged    Staging form: Pharynx - Hypopharynx, AJCC 8th Edition  - Clinical stage from 4/2/2024: Stage III (cT3, cN0, cM0) - Signed by Tee Randhawa MD on 4/2/2024 5/9/2024 - 6/20/2024 Chemotherapy    OP HEAD & NECK CISplatin (Weekly) + XRT     Malignant melanoma of nose   8/7/2014 Surgery    FINAL DIAGNOSIS                       A. CYST, RIGHT EYELID:                 EPIDERMAL INCLUSION CYST.            B. EXCISION, SKIN LESION, LEFT NOSE:                 1.   MALIGNANT MELANOMA.                 2.   ARTIE LEVEL III.                 3.   BRESLOW MAXIMUM THICKNESS EQUALS 0.4 MM.                 4.   NO ULCERATION IDENTIFIED.                 5.   NO MICROSATELLITES IDENTIFIED.                 6.   NO MITOTIC FIGURES IDENTIFIED IN TUMOR.                 7.   NO ANGIOLYMPHATIC INVASION IDENTIFIED.                 8.   AJCC STAGE: pT1a pNX           Dictated by: CHANTE GILL MD        9/11/2014 Surgery    FINAL DIAGNOSIS                       SKIN, NOSE, RE-EXCISION FOR MELANOMA:                 NEGATIVE FOR RESIDUAL MELANOMA OR MELANOMA IN SITU.                 BIOPSY SITE IDENTIFIED.                 SEVERE SOLAR ELASTOSIS.                 SEBORRHEIC KERATOSIS.                 MARGINS OF RESECTION ARE NEGATIVE.              PAST MEDICAL HISTORY:  ALLERGIES:  Allergies   Allergen Reactions    Sulfa Antibiotics Other (See Comments)     Headaches     CURRENT MEDICATIONS:  Outpatient Encounter Medications as of 8/20/2024   Medication Sig Dispense Refill    acetaminophen (TYLENOL) 500 MG tablet Take 1 tablet by mouth Every 6 (Six) Hours As Needed for Mild Pain.      Enoxaparin Sodium (LOVENOX) 40 MG/0.4ML solution prefilled syringe syringe Inject 0.4 mL (40mg) under the skin into the appropriate area as directed Daily for 21 days. Indications: Prevention of Unwanted  Clot in Veins 8.4 mL 0    loperamide (IMODIUM) 2 MG capsule Take 1 capsule by mouth 4 (Four) Times a Day Before Meals & at Bedtime for 40 days. 160 capsule 0    ondansetron (Zofran) 8 MG tablet Take 1 tablet by mouth Every 8 (Eight) Hours As Needed for Nausea or Vomiting. 60 tablet 2    prochlorperazine (COMPAZINE) 10 MG tablet Take 1 tablet by mouth Every 4 (Four) Hours As Needed for Nausea or Vomiting. 60 tablet 2    psyllium (METAMUCIL) 58.6 % powder Mix 1 rounded spoonful in 8 ounces of liquid and take by mouth 2 (Two) Times a Day Before Meals for 40 days. 660 g 0    tadalafil (CIALIS) 20 MG tablet Take 1 tablet by mouth As Needed for Erectile Dysfunction. 1-12 hours before activity 12 tablet 11    Varenicline Tartrate, Starter, 0.5 MG X 11 & 1 MG X 42 tablet therapy pack       [] oxyCODONE (ROXICODONE) 5 MG immediate release tablet Take 1 tablet by mouth Every 4 (Four) Hours As Needed for Moderate Pain for up to 7 days. 10 tablet 0     No facility-administered encounter medications on file as of 2024.     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    Current every day smoker F17.200    Abnormal PET scan of colon R94.8    Moderate malnutrition E44.0    Tubulovillous adenoma of rectum D12.8     SURGERIES:  Past Surgical History:   Procedure Laterality Date    COLON RESECTION N/A 2024    Procedure: Laparoscopic low anterior resection, diverting loop ileostomy;  Surgeon: Abram Cruz MD;  Location: [facility] MAIN OR;  Service: General;  Laterality: N/A;    COLONOSCOPY N/A 2024    Procedure: COLONOSCOPY WITH ANESTHESIA;  Surgeon: Gustabo Ruiz MD;  Location: [facility] ENDOSCOPY;  Service: Gastroenterology;  Laterality: N/A;  preop; abnormal pet scan   postop rectal mass; polyps;   PCP Dr Santos    CYST REMOVAL Right 2014    RIGHT LOWER EYELID-  EPIDERMAL INCLUSION CYST    EXCISION LESION N/A 08/07/2014    MALIGNANT MELANOMA OF LEFT NASAL TIP    FOREHEAD FLAP  09/11/2014    PEDICLE FLAP  10/02/2014    PEDICLE DIVISION AND FLAP INSET OF NOSE    PROSTATECTOMY N/A 09/07/2022    Procedure: PROSTATECTOMY LAPAROSCOPIC WITH [employer] ROBOT;  Surgeon: Matthew Weaver MD;  Location: [facility] OR;  Service: Robotics - DaVinci;  Laterality: N/A;    SQUAMOUS CELL CARCINOMA EXCISION  10/29/2013    LEFT FLOOR OF MOUTH    TONSILLECTOMY       HEALTH MAINTENANCE ITEMS:  Health Maintenance Due   Topic Date Due    Pneumococcal Vaccine 0-64 (1 of 2 - PCV) Never done    TDAP/TD VACCINES (1 - Tdap) Never done    ZOSTER VACCINE (1 of 2) Never done    HEPATITIS C SCREENING  Never done    ANNUAL PHYSICAL  Never done    COVID-19 Vaccine (4 - 2023-24 season) 09/01/2023    INFLUENZA VACCINE  08/01/2024       <no information>  Last Completed Colonoscopy            COLONOSCOPY (Every 10 Years) Next due on 5/1/2034 05/01/2024  Colonoscopy    05/01/2024  Surgical Procedure: COLONOSCOPY                  Immunization History   Administered Date(s) Administered    COVID-19 (MODERNA) 1st,2nd,3rd Dose Monovalent 02/24/2021, 03/23/2021    COVID-19 (MODERNA) BIVALENT 12+YRS 11/15/2022    COVID-19 (MODERNA) Monovalent Original Booster 12/16/2021     Last Completed Mammogram       This patient has no relevant Health Maintenance data.              FAMILY HISTORY:  Family History   Problem Relation Age of Onset    No Known Problems Mother     Heart disease Father     Colon polyps Father     Colon cancer Neg Hx      SOCIAL HISTORY:  Social History     Socioeconomic History    Marital status: Single   Tobacco Use    Smoking status: Every Day     Current packs/day: 1.00     Types: Cigarettes    Smokeless tobacco: Never   Vaping Use    Vaping status: Never Used   Substance and Sexual Activity    Alcohol use: Not Currently     Comment: 12 PER DAY    Drug use: No    Sexual activity: Defer       REVIEW  "OF SYSTEMS:    Review of Systems   Constitutional:  Positive for fatigue. Negative for fever and unexpected weight change.        \"Energy is better. I am eating steaks and pork chops.\"   HENT:  Negative for mouth sores and trouble swallowing.    Eyes:  Negative for redness and visual disturbance.   Respiratory:  Negative for shortness of breath and wheezing.    Cardiovascular:  Negative for chest pain and leg swelling.   Gastrointestinal:  Negative for constipation, diarrhea, nausea and vomiting.   Endocrine: Negative for cold intolerance and heat intolerance.   Genitourinary:  Negative for difficulty urinating and dysuria.   Musculoskeletal:  Negative for joint swelling and myalgias.   Skin:  Positive for pallor.   Allergic/Immunologic: Negative for food allergies.   Neurological:  Negative for dizziness, speech difficulty and weakness.   Hematological:  Negative for adenopathy. Does not bruise/bleed easily.   Psychiatric/Behavioral:  Negative for agitation, confusion and hallucinations.        VITAL SIGNS: /58   Pulse 70   Temp 98.1 °F (36.7 °C)   Resp 18   Ht 175.3 cm (69\")   Wt 51.3 kg (113 lb)   SpO2 99%   BMI 16.69 kg/m²  Lost 7 pounds. \"I had surgery.\"  Pain Score    08/20/24 0934   PainSc: 0-No pain       PHYSICAL EXAMINATION:     Physical Exam  Vitals reviewed.   Constitutional:       General: He is not in acute distress.  HENT:      Head: Normocephalic and atraumatic.   Eyes:      General: No scleral icterus.  Neck:      Comments: +trach.   Cardiovascular:      Rate and Rhythm: Normal rate.   Pulmonary:      Effort: No respiratory distress.   Abdominal:      General: Bowel sounds are normal. There is no distension.      Palpations: Abdomen is soft.      Comments: +PEG. + ileostomy.    Musculoskeletal:         General: No swelling.      Cervical back: Neck supple.   Skin:     Coloration: Skin is pale.   Neurological:      Mental Status: He is alert and oriented to person, place, and time. "   Psychiatric:         Mood and Affect: Mood normal.         Behavior: Behavior normal.         Thought Content: Thought content normal.         Judgment: Judgment normal.         LABS    Lab Results - Last 18 Months   Lab Units 08/20/24  0857 08/04/24  0459 08/02/24  1101 07/31/24  0550 07/28/24  1537 06/20/24  0730 06/13/24  0730 06/06/24  0728 05/30/24  0726   HEMOGLOBIN g/dL 11.0* 13.6 13.3 10.8* 10.9* 10.7* 11.1* 11.5* 11.8*   HEMATOCRIT % 32.4* 39.6 38.9 31.8* 33.1* 32.1* 32.5* 34.5* 35.2*   MCV fL 103.5* 102.1* 100.3* 100.0* 103.4* 97.3* 96.7 97.2* 97.0   WBC 10*3/mm3 8.40 8.30 15.25* 11.73* 6.75 3.14* 4.16 4.60 5.79   RDW % 16.3* 17.4* 17.5* 17.6* 18.4* 16.4* 15.9* 15.2 14.6   MPV fL 9.1 9.9 9.8 9.3 9.1 9.9 9.5 9.2 9.7   PLATELETS 10*3/mm3 181 207 203 181 166 101* 130* 137* 186   IMM GRAN % % 0.5  --   --  0.4  --  0.3 0.5 0.2 0.3   NEUTROS ABS 10*3/mm3 5.41  --   --  9.23*  --  1.76 2.78 2.87 3.93   LYMPHS ABS 10*3/mm3 2.06  --   --  1.37  --  0.98 1.00 1.27 1.27   MONOS ABS 10*3/mm3 0.62  --   --  1.04*  --  0.32 0.30 0.36 0.43   EOS ABS 10*3/mm3 0.23  --   --  0.02  --  0.05 0.04 0.05 0.09   BASOS ABS 10*3/mm3 0.04  --   --  0.02  --  0.02 0.02 0.04 0.05   IMMATURE GRANS (ABS) 10*3/mm3 0.04  --   --  0.05  --  0.01 0.02 0.01 0.02   NRBC /100 WBC 0.0  --   --  0.0  --  0.0 0.0 0.0 0.0       Lab Results - Last 18 Months   Lab Units 08/20/24  0857 08/04/24  0459 08/03/24  0541 08/02/24  1101 07/31/24  0932 07/28/24  1537 06/20/24  0730 06/13/24  0730 06/06/24  0728 05/30/24  0726   GLUCOSE mg/dL 103* 124* 117* 132* 119* 83 98  96 128* 92 110*   SODIUM mmol/L 137 130* 131* 128* 131* 135* 134*  134* 135* 137 137   POTASSIUM mmol/L 3.7 3.4* 3.5 3.3* 3.4* 4.7 3.4*  3.4* 3.9 3.4* 3.6   CO2 mmol/L 27.0 29.0 27.0 23.0 21.9* 24.6 29.0  30.0* 30.0* 28.0 30.0*   CHLORIDE mmol/L 99 88* 83* 86* 97* 100 98  98 97* 98 99   ANION GAP mmol/L 11.0 13.0 21.0* 19.0* 12.1 10.4 7.0  6.0 8.0 11.0 8.0   CREATININE mg/dL  "0.58* 1.23 1.13 0.73* 0.58* 0.70* 0.45*  0.48* 0.52* 0.44* 0.44*   BUN mg/dL 10 35* 24* 12 6* 10 17  18 18 17 16   BUN / CREAT RATIO  17.2 28.5* 21.2 16.4 10.3 14.3 37.8*  37.5* 34.6* 38.6* 36.4*   CALCIUM mg/dL 9.3 9.3 9.5 9.7 8.8 9.0 9.3  9.4 9.2 9.2 9.2   ALK PHOS U/L 82  --   --   --   --  79 75 86 84 84   TOTAL PROTEIN g/dL 6.4  --   --   --   --  6.3 6.7 7.0 6.6 6.9   ALT (SGPT) U/L 7  --   --   --   --  7 11 11 10 10   AST (SGOT) U/L 11  --   --   --   --  12 13 12 13 11   BILIRUBIN mg/dL 0.2  --   --   --   --  <0.2 0.2 0.2 0.2 0.3   ALBUMIN g/dL 3.9  --   --   --   --  4.0 4.0 4.3 4.1 4.1   GLOBULIN gm/dL 2.5  --   --   --   --  2.3 2.7 2.7 2.5 2.8       No results for input(s): \"MSPIKE\", \"KAPPALAMB\", \"IGLFLC\", \"URICACID\", \"FREEKAPPAL\", \"CEA\", \"LDH\", \"REFLABREPO\" in the last 91737 hours.    No results for input(s): \"IRON\", \"TIBC\", \"LABIRON\", \"FERRITIN\", \"W3MPBKL\", \"TSH\", \"FOLATE\" in the last 43308 hours.    Invalid input(s): \"VITB12\"    Keron Gerber reports a pain score of 0.       ASSESSMENT:  1.  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  Tumor size.3.5 x 4 cm.  AJCC stage:III (cT3, cN0, cM0)  Tumor complications: Dysphagia.  Treatment status: Post tracheostomy and G-tube placement.  He was treated with weekly cisplatin 5/9/2024 through 6/20/2024.  Completed radiation on 6/25/2024 at 7000 cGy  2.  Performance status of 1.  3.  Hypermetabolic mass, high rectal extending to distal sigmoid colon maximum SUV 7.4.  Colonoscopy showed high-grade dysplasia rectosigmoid colon.  Patient had undergone resection 7/20/2022 pathology showed no malignancy.  4.  Prostate cancer 2022, pT2, NX,history of.   Treatment status: Post prostatectomy 9/7/2022 by Dr. Weaver.  Followed by Dr. Luciano.  5.  Tobacco abuse.  He is a current smoker.  6.  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 Lawrence in [place].   7.  Melanoma,  pT1a, nose, in 2014 post resection and " "reconstruction by ADRIANA Lazaro.   8.  Nicotine abuse. \"Smokes up to 1 pack per day.\"           PLAN:   Re: Laparoscopic low anterior resection and creation of diverting loop ileostomy on 7/30/2024.  2.   Re: Heme status.WBC 8.4, hemoglobin 11 and platelet 181.  Ferritin and iron saturation pending. Note from Eliazar Power, APRN 8/20/2024. No evidence of disease.   3.   Re: CMP.  .3 ml/min.  4.   Re: Magnesium at 1.7.    5.   Re: Patient was discharged on 8/6/2024.  6.   Re: Pathology port on 8/1/2024.  Sigmoid colon and rectum, low anterior resection: Tubulovillous adenoma 6 cm with foci of high-grade dysplasia/intraepithelial carcinoma (pTis).  Tumor site: Mid rectum.  High-grade dysplasia is confined to the epithelial surface without evidence of invasion (see comment).  All margins are widely free of tumor (closest are 4 cm from the distal mucosal and radial rectal margins).  12 benign lymph nodes (0/12).  Uncomplicated diverticulosis.  7.   Re: CT neck scheduled on on 9/25/2024.  8.   Re:  Stable for observation. Neck dissection for residual or persistent disease or progression.  Follow-up 1 to 3 months for the first year, 2 to 6 months for the second year, 4 to 8 months for year 3-5 and annually thereafter.  8. eRx ondansetron 8 mg per PEG. every 8 hours as needed for nausea and vomiting #60, 2 refills if needed.  9. eRx prochlorperazine 10 mg per PEG every 4 hours as needed for nausea and vomiting #60, 2 refills if needed.  10.  Continue care per primary care physician and the other specialists.  11.  Plan of care discussed with patient and his mother.  Understanding expressed.  Patient is agreeable to proceed.   12.  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            I have reviewed the assessment and plan and verified the accuracy of it. No changes to " assessment and plan since the information was documented. Tee Randhawa MD 08/20/24         I spent 31 total minutes, face-to-face, caring for Keron vargas. Greater than 50% of this time involved counseling and/or coordination of care as documented within this note.         (Hector Good MD)  (Lit Rand MD [facility].)  (Yousif Atkins MD)  (Abram Cruz MD [facility])  (Hiram Luciano MD)  (Gustabo Ruiz MD)  Yousif Marrero MD  (ZENY Benedict)

## 2024-08-12 ENCOUNTER — HOSPITAL ENCOUNTER (OUTPATIENT)
Dept: WOUND CARE | Age: 64
Discharge: HOME | End: 2024-08-12
Payer: MEDICAID

## 2024-08-12 VITALS
WEIGHT: 109 LBS | RESPIRATION RATE: 18 BRPM | DIASTOLIC BLOOD PRESSURE: 58 MMHG | SYSTOLIC BLOOD PRESSURE: 85 MMHG | TEMPERATURE: 98 F | HEART RATE: 70 BPM

## 2024-08-12 DIAGNOSIS — Z43.2 ILEOSTOMY CARE (HCC): Primary | ICD-10-CM

## 2024-08-12 PROCEDURE — 99214 OFFICE O/P EST MOD 30 MIN: CPT

## 2024-08-12 RX ORDER — LOPERAMIDE HYDROCHLORIDE 2 MG/1
2 CAPSULE ORAL 4 TIMES DAILY PRN
COMMUNITY

## 2024-08-12 RX ORDER — ENOXAPARIN SODIUM 300 MG/3ML
INJECTION INTRAVENOUS; SUBCUTANEOUS DAILY
COMMUNITY

## 2024-08-12 RX ORDER — ONDANSETRON 4 MG/1
4 TABLET, FILM COATED ORAL EVERY 8 HOURS PRN
COMMUNITY

## 2024-08-12 NOTE — DISCHARGE INSTRUCTIONS
[facility]   Physician Orders and Discharge Instructions  [address] [place] [address] Drive  [address]  [place] [address]  Telephone: [phone]      FAX [phone]    NAME:  Denzel Win  YOB: 1960  MEDICAL RECORD NUMBER:  [id number]  DATE:  8/12/2024    Discharge condition: Stable    Discharge to: Home    Left via:Private automobile    Accompanied by:  family    ECF/HHA: Shaun    [facility]  ([employer] 1-piece [employer])      Change Pouch and Wafer: Every 3-4 days and as needed for leaking   Supplies: Pouch # [id number], Obey Ring  #[id number], Cavilon No Sting Skin Protectant, and Adhesive remover, [employer] Barrier Extenders #[id number]    Gather supplies needed to change pouch and wafer.     Gently remove your old pouch using Adhesive Remover     Look at the back of pouch before throwing away to see how it has worn.    Wash the skin around your stoma with water or mild soap. Pat completely dry.    Use the measuring guide to measure your stoma size or use the old pattern to trace correct size opening on plastic backing of wafer.     Cut out the opening and remove the plastic backing from the wafer.      Apply Skin Barrier to skin around the stoma.     Apply Obey Rings (barrier ring) around the stoma snuggly.     Apply your wafer by centering the opening of the wafer around the stoma and press the wafer down firmly.     Remove the white tape around the edges of the wafer and press the tape to your skin.     Lay quietly for 15-20 minutes to allow the adhesives to mold to your skin.    Empty Pouch    Empty the pouch when it’s 1/3 to 1/2 full of gas or stool     Wipe out tail-end of pouch with toilet tissue.      Close the end of the pouch by folding tail-end up 3 x’s and flipping over clear plastic end and pressing down completely.   Other:      If the skin around your stoma becomes red, irritated, itchy and/ or your ostomy

## 2024-08-12 NOTE — PROGRESS NOTES
Enter Query Response Below      Query Response: yes             If applicable, please update the problem list.   Patient: Keorn Gerber        : 1960  Account: [id number]           Admit Date: 2024      Please update your Discharge Summary with the answer to the following query:        Mahsa  Date: 2024    Based on Inpatient coding guidelines [facility] hospitals are not allowed to use diagnoses from pathology reports as the sole basis for billing. Any findings noted on a pathology report must be affirmed by the treating physician before billing.      Colon Resection Laparoscopic Sigmoid or Low Anterior    The pathologist reported that the specimen shows                      Final Diagnosis -- -- --  [facility]   Result:  1.  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               -Tumor site: Mid rectum.               -High-grade dysplasia is confined to the epithelial surface without evidence of invasion (see comment).               -All margins are widely free of tumor (closest are 4 cm from the distal mucosal and radial rectal margins).               -12 benign lymph nodes (0/12).               -Uncomplicated diverticulosis.         Is this finding consistent with your clinical impression and treatment plan for this patient?    Yes  No  Other explanation for clinical impression and treatment plan_________  Clinically indeterminable    If you have questions about this query, please contact me at [phone]    Sincerely,  Norma CAPONE [facility] Coding Department

## 2024-08-12 NOTE — PLAN OF CARE
Wound Care Supplies      Supply Company:     [employer] [facility] [address] [facility] [address] [place] [address]  p: [phone] f: [phone]     Ordering Center:     [facility]  [address] [place] [address] [address]  [facility] [address] [phone]  WOUND CARE Dept: [phone]   FAX NUMBER [phone]    Patient Information:      Leonides Win  [address] [facility] [facility]  [place] [id number] [phone]   : 1960  AGE: 64 y.o.     GENDER: male   EPISODE DATE: 2024    Insurance:      PRIMARY INSURANCE:  Plan: [employer] [place]  Coverage: [employer]  Effective Date: 2021  Group Number: [unfilled]  Subscriber Number: [id number] - (Medicaid Managed)    Payer/Plan Subscr  Sex Relation Sub. Ins. ID Effective Group Num   1. [employer] MED[employer]* LEONIDES WIN 1960 Male Self [id number]                                   [address] [address]       Patient Wound Information:      Problem List Items Addressed This Visit          Other    Ileostomy care (HCC) - Primary       WOUNDS REQUIRING DRESSING SUPPLIES:             Supplies Requested :      WOUND #:   Ileosotmy   PRIMARY DRESSING:  Supplies: Pouch # [id number], Obey Ring  #[id number], Cavilon No Sting Skin Protectant, and Adhesive remover, [employer] Barrier Extenders #[id number]        FREQUENCY OF DRESSING CHANGES:  Other every 2 3 days           ADDITIONAL ITEMS:  [] Gloves Small  [x] Gloves Medium [] Gloves Large [] Gloves XLarge  [] Tape 1\" [x] Tape 2\" [] Tape 3\"  [] Medipore Tape  [] Saline  [] Vashe  [x] Skin Prep   [x] Adhesive Remover   [] Cotton Tip Applicators   [] Other:    Patient Wound(s) Debrided: [] Yes if yes please add date    [x] No    Ileosotmy    Is the patient currently on an antibiotic for their Wound(s): [] Yes if yes please add name and dose    [] No    Patient currently being seen by Home Health: [] Yes   [x] No    Duration for needed supplies:  []15  [x]30  []60  []90 
  Problem: Wound:  Goal: Will show signs of wound healing; wound closure and no evidence of infection  Description: Will show signs of wound healing; wound closure and no evidence of infection  Outcome: Progressing     Problem: Smoking cessation:  Goal: Ability to formulate a plan to maintain a tobacco-free life will be supported  Description: Ability to formulate a plan to maintain a tobacco-free life will be supported  Outcome: Progressing     
demonstration visit.   Pouching/discharge procedure revised/reviewed with patient/family/caregiver.      Contact with outside resources; i.e. communication with Surgeon/ PCP, home health, ECF.    Contact/referral to ostomy appliance supplier for new or additional products.   Review when to call [facility] or schedule follow-up visit.   Referral to Emergency Department   Documentation in [facility] completed. [x]   3       Is this the Patient's First Visit with [facility] @ [facility]?  Yes    Is this Patient Established to this [facility] within the last 3 years?   Yes             Clinical Level of Care      Points  0-3  Level 1 []     Points  4-6  Level 2 []     Points  7-8  Level 3 []     Points  9-10  Level 4 [x]     Points  11-12  Level 5 []       Electronically signed by Magy Macias RN on 8/12/2024 at 12:51 PM   
                                                                                     Please call Magy VERDIN, CWS, OMS, [facility]  [phone] for ostomy questions and issues.     [facility] follow up visit ________as needed_____________________  (Please note your next appointment above and if you are unable to keep, kindly give a 24 hour notice. Thank you.)          If you experience any of the following, please call the Wound Care Center during business hours:    * Increase in Pain  * Temperature over 101  * Increase in drainage from your wound  * Drainage with a foul odor  * Bleeding  * Increase in swelling  * Need for compression bandage changes due to slippage, breakthrough drainage.    If you need medical attention outside of the business hours of the Wound Care Centers please contact your PCP or go to the nearest emergency room.

## 2024-08-12 NOTE — PATIENT INSTRUCTIONS
[facility]   Physician Orders and Discharge Instructions  [address] [place] [address] Drive  [address]  [place] [address]  Telephone: [phone]      FAX [phone]    NAME:  Denzel Win  YOB: 1960  MEDICAL RECORD NUMBER:  [id number]  DATE:  8/12/2024    Discharge condition: Stable    Discharge to: Home    Left via:Private automobile    Accompanied by:  family    ECF/HHA: Abelino    [facility]  ([employer] 1-piece [employer])      Change Pouch and Wafer: Every 3-4 days and as needed for leaking   Supplies: Pouch # [id number], Obey Ring  #8815, Cavilon No Sting Skin Protectant, and Adhesive remover, [employer] Barrier Extenders #[id number]    Gather supplies needed to change pouch and wafer.     Gently remove your old pouch using Adhesive Remover     Look at the back of pouch before throwing away to see how it has worn.    Wash the skin around your stoma with water or mild soap. Pat completely dry.    Use the measuring guide to measure your stoma size or use the old pattern to trace correct size opening on plastic backing of wafer.     Cut out the opening and remove the plastic backing from the wafer.      Apply Skin Barrier to skin around the stoma.     Apply Obey Rings (barrier ring) around the stoma snuggly.     Apply your wafer by centering the opening of the wafer around the stoma and press the wafer down firmly.     Remove the white tape around the edges of the wafer and press the tape to your skin.     Lay quietly for 15-20 minutes to allow the adhesives to mold to your skin.    Empty Pouch    Empty the pouch when it’s 1/3 to 1/2 full of gas or stool     Wipe out tail-end of pouch with toilet tissue.      Close the end of the pouch by folding tail-end up 3 x’s and flipping over clear plastic end and pressing down completely.   Other:      If the skin around your stoma becomes red, irritated, itchy and/ or your ostomy

## 2024-08-13 ENCOUNTER — TELEPHONE (OUTPATIENT)
Dept: SURGERY | Facility: CLINIC | Age: 64
End: 2024-08-13
Payer: MEDICAID

## 2024-08-13 NOTE — TELEPHONE ENCOUNTER
Patient's brother called needing to reschedule a telehealth visit. What time would work best for you?

## 2024-08-14 ENCOUNTER — TELEPHONE (OUTPATIENT)
Dept: WOUND CARE | Age: 64
End: 2024-08-14

## 2024-08-14 NOTE — TELEPHONE ENCOUNTER
Follow up call with patient re:ostomy supplies and how he likes the appliance being used. Message left with return phone number [phone] to call back with questions or concerns.

## 2024-08-19 ENCOUNTER — HOSPITAL ENCOUNTER (OUTPATIENT)
Dept: RADIATION ONCOLOGY | Facility: HOSPITAL | Age: 64
Setting detail: RADIATION/ONCOLOGY SERIES
End: 2024-08-19
Payer: MEDICAID

## 2024-08-19 NOTE — PROGRESS NOTES
[facility]  Radiation Oncology Clinic   Hector Tran MD, FACR  Eliazar MOURA  _______________________________________________  [facility]  Department of Radiation Oncology  [address] [place] [id number]  Office: [phone]  Fax: [phone]    DATE: 08/20/2024  PATIENT: Keron Gerber  1960                         MEDICAL RECORD #: [id number]    1. History of head and neck cancer    2. Status post radiation therapy    3. Colonic mass    4. History of prostate cancer    5. History of melanoma    6. Current every day smoker                                              REASON FOR VISIT:    Chief Complaint   Patient presents with    Squamous Cell Carcinoma     H&N     Keron Gerber is a very pleasant 64 y.o. patient that has completed radiation therapy to the posterior oropharynx and regional lymph nodes and returns to the clinic today for inital follow up exam. He reports fatigue and unexpected weight change. Denies activity change, appetite change, sore throat, trouble swallowing, nasuea/vomiting, diarrhea, light-headedness, weakness, and headaches. He continues to follow  and .    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 He completed 7000 cGy in 35 fractions to the posterior oropharynx and regional lymph nodes on 06/25/2024.    02/28/2024 - CT Neck:  35 x 40 mm lobular enhancing mass in the upper larynx arising from the posterior wall at the level of the base of the epiglottis. This is suspicious for malignancy.   There is abnormal mucosal enhancement also noted involving the uvula and tongue base.     03/02/2024 - CT Soft tissue neck with contrast:  Findings suggest laryngeal mass as noted, suggest ENT evaluation, asymmetric appearance of the vocal cords and larynx  Minimal adenopathy  Postop changes left  submandibular region    03/13/2024 - CT Chest with contrast:  Well-defined 1.7 cm fluid density lesion in the anterior mediastinum could represent a benign etiology such as a thymic cyst. Given lack of prior imaging and concern for malignancy, recommend close attention on follow-up imaging and further workup as clinically necessary.  Mild diffuse thickening of bilateral adrenal glands could represent adenomatous hyperplasia. Recommend correlation to prior imaging if available and attention on follow-up imaging.  No evidence of pulmonary metastases.    03/14/2024 - Awake tracheostomy/Direct laryngoscopy with suspension with   telescope/right cervical esophagoscopy/biopsy:  Findings:  Successful awake tracheostomy in successful awake tracheostomy with placement of a 6 cuffed Shiley tracheostomy tube.  Large exophytic 5-6 cm posterior oropharyngeal mass extending to the junction of the lateral posterior pharyngeal wall junction bilaterally, superiorly comes up to the level of the soft palate. Inferiorly extends into bilateral piriform sinuses, but does not involve the apex, however there are reactive changes of the left piriform apex. There is no involvement of the supraglottic structures.  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  No other mucosal masses, lesions, or ulcers visualized.   No mucosal masses, lesions, or ulcers visualized in the esophageal inlet or cervical esophagus.  Pathology:  VOCAL CORD, LEFT, BIOPSY:   Squamous mucosa with focal dysplasia, at least moderate.   POSTERIOR PHARYNGEAL WALL, BIOPSY:   INVASIVE POORLY DIFFERENTIATED SQUAMOUS CELL CARCINOMA.   POSTERIOR PHARYNGEAL WALL, BIOPSY:   INVASIVE POORLY DIFFERENTIATED SQUAMOUS CELL CARCINOMA WITH FOCAL METAPLASTIC FEATURES   POSTERIOR PHARYNGEAL WALL, BIOPSY:   INVASIVE POORLY DIFFERENTIATED SQUAMOUS CELL CARCINOMA WITH FOCAL METAPLASTIC FEATURES      03/18/2024 - Gastrostomy tube placement    03/22/2024 - Appointment with Dr. Rand - ENT at [facility]:  Laryngoscopy:  Findings: nasopharynx and oropharynx patent without masses/lesions; BOT without masses/lesions; exophytic lesion likely coming from posterior pharyngeal wall protruding onto supraglottis and glottis; unable to visualize glottis d/t obstructive view; supraglottis without obvious lesion otherwise  PLAN:   CT chest without mets. Discussed imaging in depth with team. Patient is currently T3N0, stage III oropharyngeal SCC.   DL had biopsies positive for invasive SCC.   Discussed chemoradiation vs surgical resection with patient. Patient will benefit from chemoradiation at this time.  Patient would like to have treatment closer to home in [facility]. Will send out referrals.  Patient may need further evaluation of his carotid stenosis as well.     03/22/2024 - Appointment with Dr. 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    04/02/2024 - Consult with :  In consideration of diagnostic data and evaluation of the patient,  I recommend a definitive course of radiation therapy, anticipate a dose of 7000 cGy over 35 fractions. Will coordinate care with Dr. Randhawa for delivery of chemotherapy per their direction.  The patient and his family verbalize understanding of this discussion, voice no further questions and wishes to proceed with recommendations. Will simulate treatment fields today to begin treatment planning. Final course pending. Will order PET scan to complete disease staging.     04/16/2024 - PET Scan:  Hypermetabolic mass of the posterior wall of the hypopharynx consistent with biopsy-proven squamous cell carcinoma, known primary neoplasm. No hypermetabolic cervical lymphadenopathy is seen.  Hypermetabolic mass of the  distal sigmoid colon also worrisome for second primary neoplasm, adenocarcinoma of the colon. Colonoscopy is recommended for further evaluation.    05/01/2024 - Colonoscopy with biopsy per :  Mass, rectosigmoid colon, biopsies:  Tubulovillous adenoma.  High-grade dysplasia is present.  See comment.  Polyp, distal transverse colon, biopsy:  Tubular adenoma.  High-grade dysplasia is not present.  Polyp at 27 cm, colon, biopsies:  Tubular adenoma.  High-grade dysplasia is not.  Polyp at 17 cm, colon, biopsies:  Tubulovillus adenoma.  Focal high-grade dysplasia is present.  See comment.    05/06/2024 - 06/25/2024 - Completed radiation course:  Received 7000 cGy in 35 fractions to the posterior oropharynx and regional lymph nodes    06/06/2024 - Appointment with :   Re: Note from Dr. Abram Walker on 5/24/2024.  Plan for surgical treatment of advanced polyp with high-grade dysplasia concerning for underlying malignancy after completion of chemoradiation for hypopharyngeal squamous cell carcinoma.  CT abdomen pelvis ordered.  Telehealth in 3 weeks. Communication with Dr. Abram Cruz on 5/28/2024.  Patient to complete cisplatin with radiation for the pharyngeal squamous cell carcinoma.  Then will undergo resection for a distal sigmoid colon mass.   Re: Heme status.WBC 4.6, hemoglobin 11.5 and platelet 137, observe.    Re: CMP.  .6 ml/min and potassium 3.4.  He will receive pre and post hydration with potassium.   Re: Magnesium at 2.     Re: Monitor for adverse effects of cisplatin like effluxes, infusion reactions, neuropathy, nephrotoxicity, worsening tinnitus, nausea, vomiting or myelosuppression.  Weekly CBC with differential, CMP and magnesium with chemo.  Schedule chemo:  Cisplatin 40 mg/m2 weekly (Thursdays) with radiation.  Premed:  Aloxi 0.25 mg IV  Decadron 12 mg IV  Fosaprepitant 150 mg IV  Pre-hydrate cisplatin with 1L NS over 2 hours with 1 gram  magnesium and 20 mEq of KCl.  Lasix 20 mg IVP after pre-hydration and after cisplatin.   Post Hydration with NS 1L over 2 hours with 20 mEq of KCl and 1 g magnesium.  eRx ondansetron 8 mg per PEG. every 8 hours as needed for nausea and vomiting #60, 2 refills if needed.  eRx prochlorperazine 10 mg per PEG every 4 hours as needed for nausea and vomiting #60, 2 refills if needed.  Continue care per primary care physician and the other specialists.  Plan of care discussed with patient and nurse Madhavi at the chemo suite.  Understanding expressed.  He is agreeable to proceed.   Return to office in 3 weeks.    05/10/2024 - MRI pelvis with and without contrast:  Study is limited by motion artifact.  High rectal polypoid mass extending to the distal sigmoid approximately measuring 5.1 cm in length; T1/T2.    Small bowel segment abut the sigmoid colon without evidence of definite invasion.   No regional lymphadenopathy.     06/11/2024 - CT Abdomen/Pelvis with contrast:  Large sessile appearing mass within the lumen of the colon at the rectosigmoid junction appears to arise from the more distal wall and measures 6.2 x 4.9 x 3.7 cm.   A small portion of the mass may extend to the serosal surface of the colon posteriorly as demonstrated on images 57-58 of series 2.   No evidence of regional lymphadenopathy, no distant metastases.   The liver has a normal appearance.    07/09/2024 - Telemedicine per :  We discussed proceeding with laparoscopic low anterior resection with diverting loop ileostomy creation on July 30. We discussed the risks, benefits, and alternatives to the procedure. Prior to proceeding, I will contact his primary oncologist and radiation oncologist to ensure that they are OK with proceeding with surgery at this time. We will plan to utilize ERAS protocols and pre-admit him to the hospital for bowel prep and IV hydration prior to the surgery.  Regarding his gastrostomy tube, I am able to remove it at  the time of surgery if it is appropriate to do so. I will speak with his radiation oncologist about this. If it is not appropriate to remove at that time, we will ensure that he still has a functioning gastrostomy tube at the conclusion of our operation.   Plan  Will discuss overall plan with Alvaro Randhawa and Latisha  Tentatively plan for laparoscopic low anterior resection, diverting loop ileostomy, and possible gastrostomy tube takedown on July 30 in [facility]  Mechanical bowel prep  Oral abx  Nutritional supplements  Pre-admit on Sunday, July 28 for IV fluid hydration and bowel prep    07/16/2024 - Appointment with :   Re: CT abdomen pelvis 6/11/2024. Large sessile appearing mass within the lumen of the colon at the rectosigmoid junction appears to arise from the more distal wall and measures 6.2 x 4.9 x 3.7 cm. A small portion of the mass may extend to the serosal surface of the colon posteriorly as demonstrated on images 57-58 of series 2. No evidence of regional lymphadenopathy, no distant metastases. The liver has a normal appearance.   Re: Heme status.WBC 3.1, hemoglobin 10.7 and platelet 101, observe.    Re: CMP.  .1 ml/min.   Re: Magnesium at 1.9.     Re: Completion of cisplatin with radiation.   Re: Follow-up with Dr. Walker for resection of the distal sigmoid mass.  Telehealth with Dr. Cruz on 7/9/2024.  Tentatively plan for laparoscopic low anterior resection, diverting loop ileostomy and possible gastrostomy tube takedown on 7/30/2024.  Plan for CT soft tissue neck and chest 8 weeks post chemoradiation to assess response.  Observation if negative.  Neck dissection for residual or persistent disease or progression.  Follow-up 1 to 3 months for the first year, 2 to 6 months for the second year, 4 to 8 months for year 3-5 and annually thereafter.  eRx ondansetron 8 mg per PEG. every 8 hours as needed for nausea and vomiting #60, 2  refills if needed.  eRx prochlorperazine 10 mg per PEG every 4 hours as needed for nausea and vomiting #60, 2 refills if needed.  Continue care per primary care physician and the other specialists.  Plan of care discussed with patient and family.  Understanding expressed.  He agreed to proceed.   Return to office in 4 weeks with CBC with differential, CMP and magnesium, same day and CT neck to follow hypopharynx cancer 9/2024.    07/30/2024 - Sigmoid colon and rectum, low anterior resection per :  TUBULOVILLOUS ADENOMA (6.0 CM) WITH FOCI OF HIGH GRADE DYSPLASIA/INTRAEPITHELIAL CARCINOMA (pTis)  Tumor site: Mid rectum.  High-grade dysplasia is confined to the epithelial surface without evidence of invasion (see comment).  All margins are widely free of tumor (closest are 4 cm from the distal mucosal and radial rectal margins).  12 benign lymph nodes (0/12).  Uncomplicated diverticulosis.    08/09/2024 - Telemedicine per :  I called Mr. Gerber for a post-op visit. He is doing well. Reports less than 1L of ileostomy output daily. He's tolerating regular diet and has no abdominal pain. His ileostomy is functioning well, and he has no issues pouching his stoma. He is increasing his activity level slowly and overall feels that he is recovering as expected.   I will arrange another visit with him in a couple weeks and plan for water soluble enema study prior to reversal of his loop ileostomy in about 4.5 weeks.     09/25/2024 - CT Soft tissue neck with contrast:        History of Prostate cancer:    01/23/2020 - PSA: 13.96    02/23/2021 - PSA: 4.4    04/23/2022 - PSA: 10.1    05/12/2022 - PSA: 16.70    05/23/2022 - Prostate Biopsy:  Left mid medial 1:  Prostatic adenocarcinoma, [place] 3+3=6  Left mid lateral 2:  Prostatic adenocarcinoma [place] 3+3=6  Left mid medial 2:  Prostatic adenocarcinoma [place] 3+3=6  Left apex lateral:  Prostatic adenocarcinoma, [place] 3+3=6  Left apex medial:  Prostatic  adenocarcinoma, [place] 3+3=6  Right base medial:  Prostatic adenocarcinoma, [place] 3+3=6  Perineural invasion is identified in this case    09/07/2022 - Prostate, prostatectomy:               - Histologic Type:  Acinar adenocarcinoma.               - Histologic Grade:  Grade group 4 ([place] score 4 + 4 = 8).               - Intraductal Carcinoma:  Not identified.               - Cribriform Glands:  Present.               - Treatment Effect:  No known presurgical therapy.               - Tumor Quantitation: 41-50%.               - Extraprostatic Extension:  Not identified.               - Urinary Bladder Neck Invasion: Not identified.               - Seminal Vesicle Invasion: Not identified.               - Lymphovascular Invasion: Not identified.               - Margins: All margins are Negative for invasive carcinoma.               - Pathologic Stage:  pT2 pNx pMx    12/08/2022 - PSA: <0.1    05/16/2023 - PSA: <0.1    09/19/2023 - PSA: <0.1    01/23/2024 - PSA: <0.1        History of malignant melanoma of nose:    08/07/2014 - Left nose lesion excision:  CYST, RIGHT EYELID:   EPIDERMAL INCLUSION CYST.   EXCISION, SKIN LESION, LEFT NOSE:   MALIGNANT MELANOMA.   ARTIE LEVEL III.   BRESLOW MAXIMUM THICKNESS EQUALS 0.4 MM.   NO ULCERATION IDENTIFIED.   NO MICROSATELLITES IDENTIFIED.   NO MITOTIC FIGURES IDENTIFIED IN TUMOR.   NO ANGIOLYMPHATIC INVASION IDENTIFIED.   AJCC STAGE: pT1a pNX     09/11/2014 - SKIN, NOSE, RE-EXCISION FOR MELANOMA:      NEGATIVE FOR RESIDUAL MELANOMA OR MELANOMA IN SITU.   BIOPSY SITE IDENTIFIED.  SEVERE SOLAR ELASTOSIS.   SEBORRHEIC KERATOSIS.   MARGINS OF RESECTION ARE NEGATIVE.           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    Tobacco use disorder       PAST  SURGICAL HISTORY  Past Surgical History:   Procedure Laterality Date    COLON RESECTION N/A 7/30/2024    Procedure: Laparoscopic low anterior resection, diverting loop ileostomy;  Surgeon: Abram Cruz MD;  Location:  [facility];  Service: General;  Laterality: N/A;    COLONOSCOPY N/A 5/1/2024    Procedure: COLONOSCOPY WITH ANESTHESIA;  Surgeon: Gustabo Ruiz MD;  Location: [facility] ENDOSCOPY;  Service: Gastroenterology;  Laterality: N/A;  preop; abnormal pet scan   postop rectal mass; polyps;   PCP Dr Santos    CYST REMOVAL Right 08/07/2014    RIGHT LOWER EYELID- EPIDERMAL INCLUSION CYST    EXCISION LESION N/A 08/07/2014    MALIGNANT MELANOMA OF LEFT NASAL TIP    FOREHEAD FLAP  09/11/2014    PEDICLE FLAP  10/02/2014    PEDICLE DIVISION AND FLAP INSET OF NOSE    PROSTATECTOMY N/A 09/07/2022    Procedure: PROSTATECTOMY LAPAROSCOPIC WITH DAVINCI ROBOT;  Surgeon: Matthew Weaver MD;  Location: [facility] OR;  Service: Robotics - DaVinci;  Laterality: N/A;    SQUAMOUS CELL CARCINOMA EXCISION  10/29/2013    LEFT FLOOR OF MOUTH    TONSILLECTOMY        FAMILY HISTORY  family history includes Colon polyps in his father; Heart disease in his father; No Known Problems in his mother.    SOCIAL HISTORY  Social History     Tobacco Use    Smoking status: Every Day     Current packs/day: 1.00     Types: Cigarettes    Smokeless tobacco: Never   Vaping Use    Vaping status: Never Used   Substance Use Topics    Alcohol use: Not Currently     Comment: 12 PER DAY    Drug use: No     ALLERGIES  Sulfa antibiotics     MEDICATIONS    Current Outpatient Medications:     acetaminophen (TYLENOL) 500 MG tablet, Take 1 tablet by mouth Every 6 (Six) Hours As Needed for Mild Pain., Disp: , Rfl:     Enoxaparin Sodium (LOVENOX) 40 MG/0.4ML solution prefilled syringe syringe, Inject 0.4 mL (40mg) under the skin into the appropriate area as directed Daily for 21 days. Indications: Prevention of Unwanted Clot in Veins, Disp: 8.4 mL,  "Rfl: 0    loperamide (IMODIUM) 2 MG capsule, Take 1 capsule by mouth 4 (Four) Times a Day Before Meals & at Bedtime for 40 days., Disp: 160 capsule, Rfl: 0    ondansetron (Zofran) 8 MG tablet, Take 1 tablet by mouth Every 8 (Eight) Hours As Needed for Nausea or Vomiting., Disp: 60 tablet, Rfl: 2    prochlorperazine (COMPAZINE) 10 MG tablet, Take 1 tablet by mouth Every 4 (Four) Hours As Needed for Nausea or Vomiting., Disp: 60 tablet, Rfl: 2    psyllium (METAMUCIL) 58.6 % powder, Mix 1 rounded spoonful in 8 ounces of liquid and take by mouth 2 (Two) Times a Day Before Meals for 40 days., Disp: 660 g, Rfl: 0    tadalafil (CIALIS) 20 MG tablet, Take 1 tablet by mouth As Needed for Erectile Dysfunction. 1-12 hours before activity, Disp: 12 tablet, Rfl: 11    Varenicline Tartrate, Starter, 0.5 MG X 11 & 1 MG X 42 tablet therapy pack, , Disp: , Rfl:     Current outpatient and discharge medications have been reconciled for the patient.  Reviewed by: ZENY Ferrera    The following portions of the patient's history were reviewed and updated as appropriate: allergies, current medications, past family history, past medical history, past social history, past surgical history and problem list.    REVIEW OF SYSTEMS  Review of Systems   Constitutional: Negative.  Positive for fatigue and unexpected weight change. Negative for activity change and appetite change.   HENT:  Negative for sore throat and trouble swallowing.    Respiratory: Negative.     Cardiovascular: Negative.    Gastrointestinal: Negative.         Ileostomy   Genitourinary: Negative.    Musculoskeletal: Negative.    Skin: Negative.    Neurological: Negative.    Hematological: Negative.    Psychiatric/Behavioral: Negative.     All other systems reviewed and are negative.    PHYSICAL EXAM  VITAL SIGNS:   Vitals:    08/20/24 1012   BP: 110/60   Weight: 51.7 kg (114 lb)   Height: 175.3 cm (69\")   PainSc: 0-No pain     Physical Exam  Vitals reviewed. "   Constitutional:       Appearance: Normal appearance.   HENT:      Head: Normocephalic.      Nose: Nose normal.      Mouth/Throat:      Mouth: Mucous membranes are moist.      Dentition: No gum lesions.      Tongue: No lesions.   Eyes:      Pupils: Pupils are equal, round, and reactive to light.   Neck:      Comments: Trach in place.  Cardiovascular:      Rate and Rhythm: Normal rate and regular rhythm.      Pulses: Normal pulses.      Heart sounds: Normal heart sounds.   Pulmonary:      Effort: Pulmonary effort is normal. No respiratory distress.      Breath sounds: Normal breath sounds. No wheezing.   Abdominal:      General: Bowel sounds are normal.      Palpations: There is no mass.      Comments: Ostomy and peg tube in place.   Musculoskeletal:         General: Normal range of motion.      Cervical back: Normal range of motion and neck supple. No tenderness.   Lymphadenopathy:      Cervical: No cervical adenopathy.   Skin:     General: Skin is warm and dry.      Capillary Refill: Capillary refill takes less than 2 seconds.   Neurological:      General: No focal deficit present.      Mental Status: He is alert and oriented to person, place, and time.      Motor: No weakness.   Psychiatric:         Mood and Affect: Mood normal.         Behavior: Behavior normal.        Performance Status: ECOG (1) Restricted in physically strenuous activity, ambulatory and able to do work of light nature    Clinical Quality Measures  - Pain Documented by Standardized Tool, FPS Keron Gerber reports a pain score of 0.  Given his pain assessment as noted, treatment options were discussed and the following options were decided upon as a follow-up plan to address the patient's pain:  no pain reported, no intervention .    Pain Medications               acetaminophen (TYLENOL) 500 MG tablet Take 1 tablet by mouth Every 6 (Six) Hours As Needed for Mild Pain.    prochlorperazine (COMPAZINE) 10 MG tablet Take 1 tablet by mouth Every  4 (Four) Hours As Needed for Nausea or Vomiting.    oxyCODONE (ROXICODONE) 5 MG immediate release tablet () Take 1 tablet by mouth Every 4 (Four) Hours As Needed for Moderate Pain for up to 7 days.          - Body Mass Index Screening and Follow-Up Plan   Body mass index is 16.83 kg/m². Discussed increased PO intake and additional oral supplements.     - Tobacco Use: Screening and Cessation Intervention  Social History    Tobacco Use      Smoking status: Every Day        Packs/day: 1.00        Types: Cigarettes      Smokeless tobacco: Never    Smoking cessation information given in after visit summary.    - Advanced Care Planning Advance Care Planning   ACP discussion was held with the patient during this visit. Patient has an advance directive in EMR which is still valid.     - Depression screening - PHQ-9 Total Score: 0    ASSESSMENT AND PLAN  1. History of head and neck cancer    2. Status post radiation therapy    3. Colonic mass    4. History of prostate cancer    5. History of melanoma    6. Current every day smoker      Orders Placed This Encounter   Procedures    Ambulatory Referral to ENT (Otolaryngology)     RECOMMENDATIONS: Keron Gerber is status post completion of radiation therapy to the posterior oropharynx and regional lymph nodes and presents to our clinic today for inital follow up exam. 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 He completed 7000 cGy in 35 fractions to the posterior oropharynx and regional lymph nodes on 2024.    We discussed the expected post radiation side effects and continued care and monitoring per NCCN Guidelines. We discussed the signs and symptoms of head and neck cancer including neck mass, persistent sore throat, ear pain, hemoptysis, weight loss and hoarseness. If any of these symptoms occur, to call for evaluation.    he is doing well today with no new or significant treatment or  disease related complaints and I do not see evidence for recurrent or metastatic disease. CT neck is scheduled next month for surveillance. He will return to ENT locally for close follow up with scopes. We did discuss increasing oral intake concerning his continued weight loss. Will continue follow-up/surveillance as discussed in 3 months and will continue care with other MDs.    Keron Gerber  reports that he has been smoking cigarettes. He has never used smokeless tobacco. I have educated him on the risk of diseases from using tobacco products such as cancer, COPD, and heart disease. I advised him to quit and he is not willing to quit. I spent >10 minutes counseling the patient.    Patient Instructions   1) return in 3 months   2) Return to ENT for follow up    Return in about 3 months (around 11/20/2024).    Time Spent: I spent 44 minutes caring for Keron on this date of service. This time includes time spent by me in the following activities: preparing for the visit, reviewing tests, obtaining and/or reviewing a separately obtained history, performing a medically appropriate examination and/or evaluation, counseling and educating the patient/family/caregiver, ordering medications, tests, or procedures, referring and communicating with other health care professionals, documenting information in the medical record, independently interpreting results and communicating that information with the patient/family/caregiver, and care coordination.   Eliazar Power, APRN   08/20/2024

## 2024-08-20 ENCOUNTER — LAB (OUTPATIENT)
Dept: LAB | Facility: HOSPITAL | Age: 64
End: 2024-08-20
Payer: MEDICAID

## 2024-08-20 ENCOUNTER — TREATMENT (OUTPATIENT)
Dept: SPEECH THERAPY | Facility: HOSPITAL | Age: 64
End: 2024-08-20
Payer: MEDICAID

## 2024-08-20 ENCOUNTER — TELEPHONE (OUTPATIENT)
Dept: OTOLARYNGOLOGY | Facility: CLINIC | Age: 64
End: 2024-08-20
Payer: MEDICAID

## 2024-08-20 ENCOUNTER — OFFICE VISIT (OUTPATIENT)
Dept: ONCOLOGY | Facility: CLINIC | Age: 64
End: 2024-08-20
Payer: MEDICAID

## 2024-08-20 ENCOUNTER — OFFICE VISIT (OUTPATIENT)
Age: 64
End: 2024-08-20
Payer: MEDICAID

## 2024-08-20 VITALS
HEIGHT: 69 IN | DIASTOLIC BLOOD PRESSURE: 58 MMHG | WEIGHT: 113 LBS | HEART RATE: 70 BPM | SYSTOLIC BLOOD PRESSURE: 110 MMHG | BODY MASS INDEX: 16.74 KG/M2 | OXYGEN SATURATION: 99 % | TEMPERATURE: 98.1 F | RESPIRATION RATE: 18 BRPM

## 2024-08-20 VITALS
WEIGHT: 114 LBS | BODY MASS INDEX: 16.88 KG/M2 | DIASTOLIC BLOOD PRESSURE: 60 MMHG | SYSTOLIC BLOOD PRESSURE: 110 MMHG | HEIGHT: 69 IN

## 2024-08-20 DIAGNOSIS — F17.200 CURRENT EVERY DAY SMOKER: ICD-10-CM

## 2024-08-20 DIAGNOSIS — R13.12 OROPHARYNGEAL DYSPHAGIA: Primary | ICD-10-CM

## 2024-08-20 DIAGNOSIS — Z85.89 HISTORY OF HEAD AND NECK CANCER: Primary | ICD-10-CM

## 2024-08-20 DIAGNOSIS — K63.89 COLONIC MASS: ICD-10-CM

## 2024-08-20 DIAGNOSIS — D50.8 IRON DEFICIENCY ANEMIA SECONDARY TO INADEQUATE DIETARY IRON INTAKE: ICD-10-CM

## 2024-08-20 DIAGNOSIS — C13.9 MALIGNANT NEOPLASM HYPOPHARYNX: Primary | ICD-10-CM

## 2024-08-20 DIAGNOSIS — C13.9 MALIGNANT NEOPLASM HYPOPHARYNX: ICD-10-CM

## 2024-08-20 DIAGNOSIS — Z85.820 HISTORY OF MELANOMA: ICD-10-CM

## 2024-08-20 DIAGNOSIS — Z92.3 STATUS POST RADIATION THERAPY: ICD-10-CM

## 2024-08-20 DIAGNOSIS — Z85.46 HISTORY OF PROSTATE CANCER: ICD-10-CM

## 2024-08-20 LAB
ALBUMIN SERPL-MCNC: 3.9 G/DL (ref 3.5–5.2)
ALBUMIN/GLOB SERPL: 1.6 G/DL
ALP SERPL-CCNC: 82 U/L (ref 39–117)
ALT SERPL W P-5'-P-CCNC: 7 U/L (ref 1–41)
ANION GAP SERPL CALCULATED.3IONS-SCNC: 11 MMOL/L (ref 5–15)
AST SERPL-CCNC: 11 U/L (ref 1–40)
BASOPHILS # BLD AUTO: 0.04 10*3/MM3 (ref 0–0.2)
BASOPHILS NFR BLD AUTO: 0.5 % (ref 0–1.5)
BILIRUB SERPL-MCNC: 0.2 MG/DL (ref 0–1.2)
BUN SERPL-MCNC: 10 MG/DL (ref 8–23)
BUN/CREAT SERPL: 17.2 (ref 7–25)
CALCIUM SPEC-SCNC: 9.3 MG/DL (ref 8.6–10.5)
CHLORIDE SERPL-SCNC: 99 MMOL/L (ref 98–107)
CO2 SERPL-SCNC: 27 MMOL/L (ref 22–29)
CREAT SERPL-MCNC: 0.58 MG/DL (ref 0.76–1.27)
DEPRECATED RDW RBC AUTO: 62.2 FL (ref 37–54)
EGFRCR SERPLBLD CKD-EPI 2021: 110.3 ML/MIN/1.73
EOSINOPHIL # BLD AUTO: 0.23 10*3/MM3 (ref 0–0.4)
EOSINOPHIL NFR BLD AUTO: 2.7 % (ref 0.3–6.2)
ERYTHROCYTE [DISTWIDTH] IN BLOOD BY AUTOMATED COUNT: 16.3 % (ref 12.3–15.4)
FERRITIN SERPL-MCNC: 356.2 NG/ML (ref 30–400)
GLOBULIN UR ELPH-MCNC: 2.5 GM/DL
GLUCOSE SERPL-MCNC: 103 MG/DL (ref 65–99)
HCT VFR BLD AUTO: 32.4 % (ref 37.5–51)
HGB BLD-MCNC: 11 G/DL (ref 13–17.7)
IMM GRANULOCYTES # BLD AUTO: 0.04 10*3/MM3 (ref 0–0.05)
IMM GRANULOCYTES NFR BLD AUTO: 0.5 % (ref 0–0.5)
IRON 24H UR-MRATE: 63 MCG/DL (ref 59–158)
IRON SATN MFR SERPL: 20 % (ref 20–50)
LYMPHOCYTES # BLD AUTO: 2.06 10*3/MM3 (ref 0.7–3.1)
LYMPHOCYTES NFR BLD AUTO: 24.5 % (ref 19.6–45.3)
MAGNESIUM SERPL-MCNC: 1.7 MG/DL (ref 1.6–2.4)
MCH RBC QN AUTO: 35.1 PG (ref 26.6–33)
MCHC RBC AUTO-ENTMCNC: 34 G/DL (ref 31.5–35.7)
MCV RBC AUTO: 103.5 FL (ref 79–97)
MONOCYTES # BLD AUTO: 0.62 10*3/MM3 (ref 0.1–0.9)
MONOCYTES NFR BLD AUTO: 7.4 % (ref 5–12)
NEUTROPHILS NFR BLD AUTO: 5.41 10*3/MM3 (ref 1.7–7)
NEUTROPHILS NFR BLD AUTO: 64.4 % (ref 42.7–76)
NRBC BLD AUTO-RTO: 0 /100 WBC (ref 0–0.2)
PLATELET # BLD AUTO: 181 10*3/MM3 (ref 140–450)
PMV BLD AUTO: 9.1 FL (ref 6–12)
POTASSIUM SERPL-SCNC: 3.7 MMOL/L (ref 3.5–5.2)
PROT SERPL-MCNC: 6.4 G/DL (ref 6–8.5)
RBC # BLD AUTO: 3.13 10*6/MM3 (ref 4.14–5.8)
SODIUM SERPL-SCNC: 137 MMOL/L (ref 136–145)
TIBC SERPL-MCNC: 320 MCG/DL (ref 298–536)
TRANSFERRIN SERPL-MCNC: 215 MG/DL (ref 200–360)
WBC NRBC COR # BLD AUTO: 8.4 10*3/MM3 (ref 3.4–10.8)

## 2024-08-20 PROCEDURE — 1126F AMNT PAIN NOTED NONE PRSNT: CPT | Performed by: INTERNAL MEDICINE

## 2024-08-20 PROCEDURE — 92526 ORAL FUNCTION THERAPY: CPT | Performed by: SPEECH-LANGUAGE PATHOLOGIST

## 2024-08-20 PROCEDURE — 83540 ASSAY OF IRON: CPT

## 2024-08-20 PROCEDURE — 82728 ASSAY OF FERRITIN: CPT

## 2024-08-20 PROCEDURE — 85025 COMPLETE CBC W/AUTO DIFF WBC: CPT

## 2024-08-20 PROCEDURE — 99214 OFFICE O/P EST MOD 30 MIN: CPT | Performed by: INTERNAL MEDICINE

## 2024-08-20 PROCEDURE — 1159F MED LIST DOCD IN RCRD: CPT | Performed by: INTERNAL MEDICINE

## 2024-08-20 PROCEDURE — 83735 ASSAY OF MAGNESIUM: CPT

## 2024-08-20 PROCEDURE — 84466 ASSAY OF TRANSFERRIN: CPT

## 2024-08-20 PROCEDURE — 1160F RVW MEDS BY RX/DR IN RCRD: CPT | Performed by: INTERNAL MEDICINE

## 2024-08-20 PROCEDURE — 80053 COMPREHEN METABOLIC PANEL: CPT

## 2024-08-20 PROCEDURE — G0463 HOSPITAL OUTPT CLINIC VISIT: HCPCS

## 2024-08-20 PROCEDURE — 36415 COLL VENOUS BLD VENIPUNCTURE: CPT

## 2024-08-20 NOTE — THERAPY DISCHARGE NOTE
Outpatient Speech Language Pathology   Adult Swallow Treatment Note/Discharge Summary       Patient Name: Keron Gerber  : 1960  MRN: [id number]  Today's Date: 2024         Visit Date: 2024     Swallow treatment completed during 6-week following up post XRT to the head and neck for SCC of the hypopharynx [id number]. Patient is alert and well-appearing. Mother present. Patient remains with trach in place. PMV on and patient voicing functionally. He was sipping on a bottle of water during session. No overt s/s of aspiration observed. Vocal quality remained clear throughout. Patient reports he has advanced diet himself and is eating a regular diet with thin liquids to include pork chops, shrimp, and garlic bread. He has continued to lose weight but has had a recent surgery for colostomy. Plans for reversal surgery soon. He is not using his feeding tube, RN encouraged use. His weight is down to 114lbs per his report, he is discouraged by this.     We discussed effects of XRT on the swallow and need for instrumental evaluation to obtain new baseline swallow function. I have requested APRN place OP order for swallow study.     SLP will sign off and plan to complete swallow study with further recommendations pending results. If concerns with dysfunction will address order for OP speech at that juncture.     Patient OK to continue PO as tolerated. Recommend soft whole foods and thin liquids.     Alma Granger, MS CCC-SLP 2024 10:57 CDT        Patient Active Problem List   Diagnosis    Dyslexia    Prostate cancer    Malignant neoplasm hypopharynx    Malignant melanoma of nose    Current every day smoker    Abnormal PET scan of colon    Moderate malnutrition    Tubulovillous adenoma of rectum        Visit Dx:    ICD-10-CM ICD-9-CM   1. Oropharyngeal dysphagia  R13.12 787.22        OP SLP Assessment/Plan - 24 1049          SLP Assessment    Clinical Impression Comments No overt s/s of oral or  pharyngeal dysphagia at the bedside today. Further instrumental assessment is indicated due to known history of dysphagia.  -MG       SLP Plan    Plan Comments Will d/c from this line of therapy since XRT is completed. Will complete VFSS to determine current swallowing ability. Further recommendations for continued OP services following assessment. See note for furhter details.  -MG              User Key  (r) = Recorded By, (t) = Taken By, (c) = Cosigned By      Initials Name Provider Type    MG Alma Granger MS CCC-SLP Speech and Language Pathologist                                       SLP OP Goals       Row Name 08/20/24 1014          Goal Type Needed    Goal Type Needed Other Adult Goals  -MG        Subjective Comments    Subjective Comments Patient was seen today for 6-week follow up.  -MG        Subjective Pain    Able to rate subjective pain? yes  -MG     Pre-Treatment Pain Level 0  -MG     Post-Treatment Pain Level 0  -MG        Other Goals    Other Adult Goal- 1 Patient will increase swallowing frequency during XRT in order to maintain current swallow function once XRT is completed.  -MG     Status: Other Adult Goal- 1 Achieved  -MG     Comments: Other Adult Goal- 1 See note  -MG     Other Adult Goal- 2 Patient will tolerate water throughout and following treatment without complications such as aspiration pneumonia or overt s/s of aspiration.  -MG     Status: Other Adult Goal- 2 Achieved  -MG     Comments: Other Adult Goal- 2 See note.  -MG     Other Adult Goal- 3 Patient will complete oral care daily in order to promote healthy oral mucosa throughout XRT.  -MG     Status: Other Adult Goal- 3 Achieved  -MG     Comments: Other Adult Goal- 3 See note  -MG     Other Adult Goal- 4 Patient will maintain adequate hydration by drinking needed amount of water by mouth or via PEG tube.  -MG     Status: Other Adult Goal- 4 Achieved  -MG     Comments: Other Adult Goal- 4 See note  -MG     Other Adult Goal- 5  Will work towards PO for pleasure as tolerated.  -MG     Status: Other Adult Goal- 5 Achieved  -MG     Comments: Other Adult Goal- 5 See note  -MG     Other Adult Goal- 6 Patient will complete swallowing exercises three times a day in order to maintain current swallow function and range of motion of oral and pharyngeal structures.  -MG     Status: Other Adult Goal- 6 Achieved  -MG     Comments: Other Adult Goal- 6 See note  -MG        SLP Time Calculation    SLP Goal Re-Cert Due Date 11/20/24  -MG               User Key  (r) = Recorded By, (t) = Taken By, (c) = Cosigned By      Initials Name Provider Type    MG Alma Granger MS CCC-SLP Speech and Language Pathologist                     OP SLP Education       Row Name 08/20/24 1050       Education    Barriers to Learning No barriers identified  -MG    Education Provided Patient demonstrated recommended strategies;Family/caregivers demonstrated recommended strategies  -MG    Assessed Learning needs;Learning motivation;Learning preferences;Learning readiness  -MG    Learning Motivation Strong  -MG    Learning Method Explanation  -MG    Teaching Response Verbalized understanding  -MG    Education Comments See note  -MG              User Key  (r) = Recorded By, (t) = Taken By, (c) = Cosigned By      Initials Name Effective Dates    MG Alma Granger MS CCC-SLP 07/11/23 -                               Time Calculation:   SLP Start Time: 1014  SLP Stop Time: 1057  SLP Time Calculation (min): 43 min  Untimed Charges  53203-LE Treatment Swallow Minutes: 43  Total Minutes  Untimed Charges Total Minutes: 43   Total Minutes: 43             OP SLP Discharge Summary  Date of Discharge: 08/20/24  Reason for Discharge: all goals and outcomes met, no further needs identified  Progress Toward Achieving Short/long Term Goals: all goals met within established timelines  Discharge Destination: home w/ OP services  Discharge Instructions: Complete VFSS and further  recommendations pending results.      Alma Granger MS CCC-SLP  8/20/2024

## 2024-08-20 NOTE — TELEPHONE ENCOUNTER
Left VM for patient to call my direct line to schedule appt with dr Middleton. I can get him in this Thurs 8-22-24. Requested call back

## 2024-08-21 ENCOUNTER — TELEPHONE (OUTPATIENT)
Dept: OTOLARYNGOLOGY | Facility: CLINIC | Age: 64
End: 2024-08-21
Payer: MEDICAID

## 2024-08-21 NOTE — TELEPHONE ENCOUNTER
I spoke to patient's brother, offered pt appt tomorrow, He could not come in tomorrow. Erika patient on 8-28-24 at 10:45 with Dr Middleton . He VU

## 2024-08-23 ENCOUNTER — TELEPHONE (OUTPATIENT)
Dept: OTOLARYNGOLOGY | Facility: CLINIC | Age: 64
End: 2024-08-23
Payer: MEDICAID

## 2024-08-23 NOTE — TELEPHONE ENCOUNTER
Received VM from patient's brother requesting to r/s 8/28 appointment, offered 8/26, they could not do that day. Scheduled for 9/4 at 900. Patient's brother VU

## 2024-08-26 ENCOUNTER — TELEMEDICINE (OUTPATIENT)
Dept: SURGERY | Facility: CLINIC | Age: 64
End: 2024-08-26
Payer: MEDICAID

## 2024-08-26 ENCOUNTER — PREP FOR SURGERY (OUTPATIENT)
Dept: OTHER | Facility: HOSPITAL | Age: 64
End: 2024-08-26
Payer: MEDICAID

## 2024-08-26 DIAGNOSIS — D12.8 TUBULOVILLOUS ADENOMA OF RECTUM: Primary | ICD-10-CM

## 2024-08-26 PROCEDURE — 99024 POSTOP FOLLOW-UP VISIT: CPT | Performed by: SURGERY

## 2024-08-26 NOTE — PROGRESS NOTES
Colorectal & General Surgery  Post - Op    Patient: Keron Gerber  YOB: 1960  MRN: [id number]      Assessment  Keron Gerber is a 64 y.o. male with tubulovillous adenoma of the rectum status post laparoscopic low anterior resection with diverting loop ileostomy creation.  I saw him in telehealth today.  He continues to recover nicely.  We discussed proceeding with water-soluble barium enema study on September 9, 2024.  Pending the results of that study, plan for ileostomy closure tentatively in [place] on October 1, 2024.    Today, no significant changes to make from my standpoint.  He is recovering very nicely.    I will call him after I see the results of his barium enema study.    History of Present Illness   Keron Gerber is a 64 y.o. male who presents in follow-up today.  He is doing great.  No complaints from his standpoint.  Tolerating diet.  Having good ileostomy output.    Vital Signs  There were no vitals filed for this visit.     Physical Exam  Constitutional: Appears well.    Trey Cruz MD  Colorectal & General Surgery  [facility]    [address], [address]  [place], [place], [address]  P: [phone]  F: [phone]

## 2024-09-04 ENCOUNTER — OFFICE VISIT (OUTPATIENT)
Dept: OTOLARYNGOLOGY | Facility: CLINIC | Age: 64
End: 2024-09-04
Payer: MEDICAID

## 2024-09-04 VITALS
BODY MASS INDEX: 16.44 KG/M2 | HEART RATE: 74 BPM | HEIGHT: 69 IN | TEMPERATURE: 98 F | WEIGHT: 111 LBS | DIASTOLIC BLOOD PRESSURE: 80 MMHG | SYSTOLIC BLOOD PRESSURE: 114 MMHG

## 2024-09-04 DIAGNOSIS — C14.0 SQUAMOUS CELL CARCINOMA OF PHARYNX: ICD-10-CM

## 2024-09-04 DIAGNOSIS — Z43.0 TRACHEOSTOMY, ACUTE MANAGEMENT: ICD-10-CM

## 2024-09-04 DIAGNOSIS — K12.33 MUCOSITIS DUE TO RADIATION THERAPY: Primary | ICD-10-CM

## 2024-09-04 NOTE — PROGRESS NOTES
Lenny Middleton Jr, MD  [facility] EAR NOSE & THROAT  [address], [address]  [facility] [address]  Fax [phone]  Phone [phone]      Visit Type: FOLLOW UP   Chief Complaint   Patient presents with    Cancer Surveillance     hypopharynx           HPI  Accompanied by:Wife  Keron Gerber is a 64 y.o. male who presents for routine cancer follow up. He has been treated in [place] For posterior pharyngeal wall cancer. He is still seeing physician in [place].  Has Rectal cancer and Pharyngeal cancer. Has had Low anterior resection.   Still smoking  Has seen ENT in [place] and is still seeing them.      Oncology History:  Oncology/Hematology History   Prostate cancer    Initial Diagnosis    Prostate cancer     1/23/2020 Procedure    PSA 13.96     2/23/2021 Procedure    PSA 4.4     4/23/2022 Procedure    PSA 10.1     5/12/2022 Procedure    PSA 16.70     5/23/2022 Biopsy    Prostate Biopsy:  Left mid medial 1 - prostatic adenocarcinoma, Johanny 3+3=6  Left mid lateral 2 - prostatic adenocarcinoma Gay 3+3=6  Left mid medial 2 - prostatic adenocarcinoma Gay 3+3=6  Left apex lateral - prostatic adenocarcinoma, Johanny 3+3=6  Left apex medial - prostatic adenocarcinoma, Gay 3+3=6  Right base medial - prostatic adenocarcinoma, Johanny 3+3=6    Perineural invasion is identified in this case     9/7/2022 Surgery    Final Diagnosis   Prostate, prostatectomy:               - Histologic Type:  Acinar adenocarcinoma.               - Histologic Grade:  Grade group 4 (Johanny score 4 + 4 = 8).               - Intraductal Carcinoma:  Not identified.               - Cribriform Glands:  Present.               - Treatment Effect:  No known presurgical therapy.               - Tumor Quantitation: 41-50%.               - Extraprostatic Extension:  Not identified.               - Urinary Bladder Neck Invasion: Not identified.               - Seminal  Vesicle Invasion: Not identified.               - Lymphovascular Invasion: Not identified.               - Margins: All margins are Negative for invasive carcinoma.               - Pathologic Stage:  pT2 pNx pMx        12/8/2022 Procedure    PSA <0.1     5/16/2023 Procedure    PSA <0.1     9/19/2023 Procedure    PSA <0.1     1/23/2024 Procedure    PSA <0.1     Malignant neoplasm hypopharynx    Initial Diagnosis    Malignant neoplasm hypopharynx     2/28/2024 Imaging    CT Neck:  35 x 40 mm lobular enhancing mass in the upper larynx arising from the posterior   wall at the level of the base of the epiglottis. This is suspicious for malignancy.   There is abnormal mucosal enhancement also noted involving the uvula and   tongue base.      3/2/2024 Imaging    CT Neck:  Findings suggest laryngeal mass as noted, suggest ENT evaluation, asymmetric appearance of the vocal cords and larynx  Minimal adenopathy  Postop changes left submandibular region     3/13/2024 Imaging    CT Chest:  Well-defined 1.7 cm fluid density lesion in the anterior mediastinum could   represent a benign etiology such as a thymic cyst. Given lack of prior imaging   and concern for malignancy, recommend close attention on follow-up imaging   and further workup as clinically necessary.  Mild diffuse thickening of bilateral adrenal glands could represent   adenomatous hyperplasia. Recommend correlation to prior imaging if   available and attention on follow-up imaging.  No evidence of pulmonary metastases.     3/14/2024 Biopsy    Awake tracheostomy/Direct laryngoscopy with suspension with   telescope/right cervical esophagoscopy/biopsy    Findings:  Successful awake tracheostomy in successful awake tracheostomy with   placement of a 6 cuffed Shiley tracheostomy tube.  Large exophytic 5-6 cm posterior oropharyngeal mass extending to the   junction of the lateral posterior pharyngeal wall junction bilaterally, superiorly   comes up to the level of the  soft palate. Inferiorly extends into bilateral piriform   sinuses, but does not involve the apex, however there are reactive changes   of the left piriform apex. There is no involvement of the supraglottic structures.  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  No other mucosal masses, lesions, or ulcers visualized.   No mucosal masses, lesions, or ulcers visualized in the esophageal inlet or   cervical esophagus.    DIAGNOSIS:     A. VOCAL CORD, LEFT, BIOPSY:   - Squamous mucosa with focal dysplasia, at least moderate.     B.  POSTERIOR PHARYNGEAL WALL, BIOPSY:   - INVASIVE POORLY DIFFERENTIATED SQUAMOUS CELL CARCINOMA.     C.  POSTERIOR PHARYNGEAL WALL, BIOPSY:   - INVASIVE POORLY DIFFERENTIATED SQUAMOUS CELL CARCINOMA WITH FOCAL METAPLASTIC FEATURES     D.  POSTERIOR PHARYNGEAL WALL, BIOPSY:   - INVASIVE POORLY DIFFERENTIATED SQUAMOUS CELL CARCINOMA WITH FOCAL METAPLASTIC FEATURES      3/18/2024 Procedure    Gastrostomy tube placement     3/22/2024 Procedure    Appointment with Dr. Rand - [facility]:  Laryngoscopy:  Findings: nasopharynx and oropharynx patent without masses/lesions; BOT   without masses/lesions; exophytic lesion likely coming from posterior   pharyngeal wall protruding onto supraglottis and glottis; unable to visualize   glottis d/t obstructive view; supraglottis without obvious lesion otherwise    PLAN:   CT chest without mets. Discussed imaging in depth with team. Patient is   currently T3N0, stage III oropharyngeal SCC.   DL had biopsies positive for invasive SCC.   Discussed chemoradiation vs surgical resection with patient. Patient will   benefit from chemoradiation at this time.  Patient would like to have treatment closer to home in [facility]. Will send   out referrals.  Patient may need further evaluation of his carotid stenosis as well.     Appointment with Dr. Kohler - Rad [facility]:  Likely Stage IVB (cT4b N0 M0) p16 negative hypopharynx SCC due to   probable prevertebral fasica involvement  Consensus recommendation of the board was for patient to undergo definitive chemo/RT.       4/2/2024 Cancer Staged    Staging form: Pharynx - Hypopharynx, AJCC 8th Edition  - Clinical stage from 4/2/2024: Stage III (cT3, cN0, cM0) - Signed by Tee Randhawa MD on 4/2/2024 5/9/2024 - 6/20/2024 Chemotherapy    OP HEAD & NECK CISplatin (Weekly) + XRT     Malignant melanoma of nose   8/7/2014 Surgery    FINAL DIAGNOSIS                       A. CYST, RIGHT EYELID:                 EPIDERMAL INCLUSION CYST.            B. EXCISION, SKIN LESION, LEFT NOSE:                 1.   MALIGNANT MELANOMA.                 2.   ANTHONY LEVEL III.                 3.   BRESLOW MAXIMUM THICKNESS EQUALS 0.4 MM.                 4.   NO ULCERATION IDENTIFIED.                 5.   NO MICROSATELLITES IDENTIFIED.                 6.   NO MITOTIC FIGURES IDENTIFIED IN TUMOR.                 7.   NO ANGIOLYMPHATIC INVASION IDENTIFIED.                 8.   AJCC STAGE: pT1a pNX           Dictated by: CHANTE GILL MD        9/11/2014 Surgery    FINAL DIAGNOSIS                       SKIN, NOSE, RE-EXCISION FOR MELANOMA:                 NEGATIVE FOR RESIDUAL MELANOMA OR MELANOMA IN SITU.                 BIOPSY SITE IDENTIFIED.                 SEVERE SOLAR ELASTOSIS.                 SEBORRHEIC KERATOSIS.                 MARGINS OF RESECTION ARE NEGATIVE.              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    Tobacco use disorder        Past Surgical History:   Procedure Laterality Date    COLON RESECTION N/A 7/30/2024    Procedure: Laparoscopic low anterior resection, diverting loop ileostomy;  Surgeon: Abram Cruz MD;  Location: [facility];  Service: General;   Laterality: N/A;    COLONOSCOPY N/A 5/1/2024    Procedure: COLONOSCOPY WITH ANESTHESIA;  Surgeon: Gustabo Ruiz MD;  Location: [facility] ENDOSCOPY;  Service: Gastroenterology;  Laterality: N/A;  preop; abnormal pet scan   postop rectal mass; polyps;   PCP Dr Santos    CYST REMOVAL Right 08/07/2014    RIGHT LOWER EYELID- EPIDERMAL INCLUSION CYST    EXCISION LESION N/A 08/07/2014    MALIGNANT MELANOMA OF LEFT NASAL TIP    FOREHEAD FLAP  09/11/2014    PEDICLE FLAP  10/02/2014    PEDICLE DIVISION AND FLAP INSET OF NOSE    PROSTATECTOMY N/A 09/07/2022    Procedure: PROSTATECTOMY LAPAROSCOPIC WITH DAVINCI ROBOT;  Surgeon: Matthew Weaver MD;  Location: [facility] OR;  Service: Robotics - DaVinci;  Laterality: N/A;    SQUAMOUS CELL CARCINOMA EXCISION  10/29/2013    LEFT FLOOR OF MOUTH    TONSILLECTOMY         Family History: His family history includes Colon polyps in his father; Heart disease in his father; No Known Problems in his mother.     Social History: He  reports that he has been smoking cigarettes. He has never used smokeless tobacco. He reports that he does not currently use alcohol. He reports that he does not use drugs.    Home Medications:  Varenicline Tartrate (Starter), acetaminophen, loperamide, ondansetron, prochlorperazine, psyllium, and tadalafil    Allergies:  He is allergic to sulfa antibiotics.       Vital Signs:      ENT Physical Exam  Constitutional  Appearance: patient appears well-developed, thin, patient is cooperative;  Communication/Voice: communication appropriate for developmental age; vocal quality normal;  Constitutional comments: Trach in place  Head and Face  Appearance: head appears normal, face appears normal and face appears atraumatic;  Palpation: facial palpation normal;  Salivary: glands normal;  Ear  Hearing: intact;  Auricles: bilateral auricles normal;  External Mastoids: right external mastoid normal; left external mastoid normal;  Ear Canals: bilateral ear canals  normal;  Tympanic Membranes: bilateral tympanic membranes normal;  Nose  External Nose: nares patent bilaterally; external nose normal;  Internal Nose: nasal mucosa normal; nasal septal deviation present; deviation is to the right, septal deviation is intermediate; Septum comments: general deviation all levels L to R   bilateral inferior turbinates normal;  Oral Cavity/Oropharynx  Lips: normal;  Teeth: dentures (upper and lower) noted;  Gums: gingiva normal;  Tongue: normal; Oral Tongue comments: Tobacco stained  Oral mucosa: mucous membranes dry;  Hard palate: normal;  Soft palate: normal;  Tonsils: normal;  Base of Tongue: normal;  Posterior pharyngeal wall: normal;  OC/OP comments: Palpation negative in base of tongue, oropharynx, tonsillar arch  Neck  Neck: neck palpation normal; no neck mass or crepitus present; tracheostomy noted; healing well; Tracheostomy tube size: 6 mm; Tracheostomy tube type: Shiley flexible shaft; Passy-Mj speaking valve present;  Neck comments: Patient with shallow trach in position, Passy-Sauquoit valve in position.  No evidence of respiratory difficulty, increased secretions or difficulty breathing.  Respiratory  Inspection: breathing unlabored; normal breathing rate;  Cardiovascular  Inspection: extremities are warm and well perfused; no peripheral edema present;  Neurovestibular  Mental Status: alert and oriented;  Psychiatric: mood normal; affect is appropriate;       Flexible laryngoscopy    Date/Time: 9/5/2024 9:11 AM    Performed by: Lenny Middleton Jr., MD  Authorized by: Lenny Middleton Jr., MD    Consent:     Consent obtained:  Verbal    Consent given by:  Patient    Alternatives discussed:  No treatment  Anesthesia (see MAR for exact dosages):     Anesthesia method:  Topical application    Topical anesthetic:  Tetracaine  Procedure details:     Indications: oncologic surveillance      Medication:  Afrin    Instrument: flexible fiberoptic laryngoscope      Scope  location: left nare    Sinus/ Nasopharynx:     Right nasopharynx: patent and inflammation      Left nasopharynx: patent and inflammation      Right eustachian tube: patent      Left eustachian tube: inflammation, patent and inflammation    Oropharynx/ Supraglottis:     Posterior pharyngeal wall: inflamed      Oropharynx: inflammation      Oropharynx: no stenosis and no lesion      Vallecula: inflammation      Vallecula: no lesion      Base of tongue: lingual tonsillar hypertrophy (Mild) and inflammation      Base of tongue: no lesion      Epiglottis: inflammation      Epiglottis: no lesions    Larynx/ Hypopharynx:     Arytenoids: inflammation and interarytenoid edema      Arytenoids: no lesions      Hypopharynx: inflammation      Hypopharynx: no lesions      Pyriform sinus: inflammation      Pyriform sinus: no lesion and no pooling of secretions      False vocal cords: inflammation      False vocal cords: no lesion      True vocal cords: Shawn's edema      True vocal cords: no immobility    Post-procedure details:     Patient tolerance of procedure:  Tolerated well  Comments:      Generalized inflammation from nasopharynx, hypopharynx, including the supraglottis  No evidence of oropharyngeal or posterior pharyngeal wall mass today  Moderate to significant radiation mucositis present with thick secretions and thickened mucous membranes  True vocal folds appear to be edematous but otherwise mobile.     Result Review       RESULTS REVIEW    I have reviewed the patients old records in the chart.   I have reviewed the patients old records in the chart.  The following results/records were reviewed:  [facility] records reviewed    Referral to Otolaryngology for History of head and neck cancer; Status post radiation therapy (08/20/2024)   Progress Notes by Tee Randhawa MD (07/16/2024 08:00)    NM PET/CT Skull Base to Mid Thigh (04/16/2024 13:53)Progress Notes by Tee Randhawa MD (07/16/2024 08:00)    NM  PET/CT Skull Base to Mid Thigh (04/16/2024 13:53)       Assessment & Plan  Squamous cell carcinoma of pharynx    Mucositis due to radiation therapy    Tracheostomy, acute management       Orders Placed This Encounter   Procedures    $ Laryngoscopy       Diagnosis Plan   1. Mucositis due to radiation therapy      Healing      2. Squamous cell carcinoma of pharynx      No evidence of recurrent disease      3. Tracheostomy, acute management      Stable tracheostomy with Passy-Mj valve in position             Conservative management.  Patient appears to have a rather large hypopharyngeal cancer.  He is currently followed at the [facility] for his cancer and his trach.  He has follow-up appointments in the next 30 days for evaluation.  I am not sure why the patient was referred to the [facility] and then referred back for radiation and chemotherapy.  However, because patient does have current appointments at the [facility], I recommend he follow-up with his treating ENT physician and continue with trach management per their recommendations.  I am unable to find operative notes and biopsy notes at this point in time.  I will continue to research this.  The patient also has rectal carcinoma and has a diverting colostomy.  This is being addressed in [place] as well.  He is to maintain his trach until he has undergone colostomy closure.  This is the plan that he and his wife have discussed with me.  I recommend the patient return to his treating physicians in [place].  If he desires, in the future, I will reevaluate and continue cancer surveillance here.  Since he is actively being treated by these physicians, he does not need to see me at this point in time.  Follow-up with treating physicians in [place], including ENT  Trach management discussed with patient  Call for problems    My Chart:  Encouraged to enroll in My Chart  Encouraged to review data and findings in My  Chart    Patient, Spouse understand(s) and agree(s) with the treatment plan as described.    Return if symptoms worsen or fail to improve, for Recheck Pharynx.        Electronically signed by Lenny Middleton Jr, MD 09/04/24 9:31 AM CDT.

## 2024-09-04 NOTE — PATIENT INSTRUCTIONS
ORAL COMPLICATIONS OF CANCER TREATMENT    Cancer treatments can have a toxic effect on the mucous membranes of the mouth and throat tissues. Almost all patients who receive radiation therapy for head and neck malignancies will develop problems with their gums, mouth, tongue, or teeth. Chemotherapy and bone marrow transplants can also affect these areas.    Many of the problems can be prevented or minimized by careful and diligent attention. Patients should seek dental care prior to beginning the cancer treatments. Taking care of existing problems before therapy can prevent major problems later, including tooth and bone infection, tooth and bone loss, and pain.    Dr. Middleton works closely with the medical and radiation oncologists, as well as the dentist and oral care providers to ensure the optimal heal of the head and neck tissues during cancer treatments. However, the patient needs to maintain a routine to care for the teeth and mucus membranes before and especially during and after treatment.    General Oral Complications of Cancer Treatment  Inflammation and ulceration of the mucous membranes, called stomatitis or mucositis  Infection  Salivary gland dysfunction, called xerostomia  Tooth decay and demineralization  Impaired function; difficulty eating, swallowing, speaking  Chronic throat pain/ scratchiness  Altered taste perception  Poor nutrition    Additional complications of Chemotherapy  Nerve pain  Bleeding  Skin damage  Prolonged/delayed healing    Additional complications of Radiation therapy  Rampant dental decay  Skin damage  Jaw or neck stiffness  Increased susceptibility to injury and infection  Prolonged/delayed healing    HOW TO CARE FOR YOUR ORAL HEALTH DURING CANCER THERAPY  See a dentist prior to beginning radiation therapy  Brush your teeth and tongue gently with an extra soft, even bristled toothbrush and a bland toothpaste after every meal and at bedtime  Floss teeth once a day but avoid  areas that are bleeding or sore  Don’t use mouthwash that contains alcohol  Rinse mouth with baking soda/salt combination or an antiplaque solution at room temperature several times daily. You may also use plain water.  Use a waterpik aimed at the teeth, not the gums with a warm baking soda and salt mixture.  If you wear dentures, wear them only when necessary and never at night.  Increase water intake and use oral moisturizing gels.  Use fluoride if recommended by the dentist.  Exercise jaw muscles 3 times daily and more. Do 20 repetitions of opening and closing the mouth, gently stretching and holding open. If the jaw muscles are particularly stiff, you may insert your fingers and gently pull the jaws apart.  Avoid rough-textured or irritating foods. Drink sips of liquids with each bite to allow the food to soften or thin the food.  Use Chapstick, Blistex, or other soothing lip balms, including Vaseline or Polysporin to the lips.  If the lips begin to crust, do not pull this off is there is resistance. Instead, soak the lips with a warm wash cloth to gently loosen these crusts.  Talk with Dr. Middleton about pain or numbing medications, both topical and systemic.  Quit smoking, chewing, snuff. All of these tobacco products accelerate the decay process and increase cancer risks.    For a dry mouth:  Sip water frequently  Use a humidifier  Suck ice chips or sugar free candy  Chew sugar free gum  If desired, use saliva substitute or gel, or prescription saliva stimulant  Avoid lemon glycerin swabs and sugar candies and lozenges    Recipe for oral rinse:  Salt  1 tablespoon  Baking Soda 1 teaspoon  Water  1 quart  Rossy syrup 1 tablespoon    Optional:  Small amount of mouthwash for flavoring  If you have scabs or extremely thick mucous, you may mix the above 50:50 with hydrogen peroxide to help loosen this thick layer.    Dietary Instructions:  Choose soft, moist foods  Moisten foods with gravy, broth or  butter  Increase fluids with meals  Keep food bite-sized  Blenderize foods or use baby foods  Avoid:  Very hot or very cold beverages  Scratchy or rough foods like pretzels, chips, crackers or nuts  Spicy or salty foods like canned soups. vinegar, ketchup, salsa  Alcohol, beer, wine, whiskey, etc.  Strongly minted candies and toothpaste  Fumes like ammonia, household , paints, gasoline, solvents    Skin care with Cancer treatment:  Moisturize the area of treatment at least 4 times daily and more often  Use hypoallergenic moisturizers  Ask Dr. Middleton about certain healing creams and lotions  Avoid strong soaps and excessive makeup  Avoid the sun  If sun exposure is anticipated, use high SPF sunblocks and Zinc Oxide  Do not use razor blades  Avoid any trauma to the area, including squeezing pimples and popping blisters  Report any sores or skin breakdown to Dr. Middleton    Medications:  Continue all your daily medications  If you are experiencing side effects, report these to Dr. Middleton  Continue your multivitamins  Certain medications may interfere with radiation. Please check with Dr. Middleton  Certain natural and homeopathic products can interfere and lessen the efficacy of radiation, especially anti-oxidants. Please ask Dr. Middleton about these products.    Please do not hesitate to call the office for questions or problems.     See [facility] for trach and Cancer followup    STOP smoking     CONTACT INFORMATION:  The main office phone number is [phone]. For emergencies after hours and on weekends, this number will convert over to our answering service and the on call provider will answer. Please try to keep non emergent phone calls/ questions to office hours 9am-5pm Monday through Friday.     [employer]  As an alternative, you can sign up and use the Epic MyChart system for more direct and quicker access for non emergent questions/ problems.  [email or web address] allows you to send messages to  your doctor, view your test results, renew your prescriptions, schedule appointments, and more. To sign up, go to [email or web address] and click on the Sign Up Now link in the New User? box. Enter your [employer] Activation Code exactly as it appears below along with the last four digits of your Social Security Number and your Date of Birth () to complete the sign-up process. If you do not sign up before the expiration date, you must request a new code.    O[employer]ace Activation Code: [email or web address]  Expires: 2024  2:22 PM    If you have questions, you can email [email or web address] or call [phone] to talk to our [employer] staff. Remember, [employer] is NOT to be used for urgent needs. For medical emergencies, dial 911.    IF YOU SMOKE, VAPE OR USE TOBACCO PLEASE READ THE FOLLOWING:  Why is smoking bad for me?  Smoking increases the risk of heart disease, lung disease, vascular disease, stroke, and cancer. If you smoke, STOP!      IF YOU SMOKE, VAPE OR USE TOBACCO PLEASE READ THE FOLLOWING:  Why is smoking bad for me?  Smoking increases the risk of heart disease, lung disease, vascular disease, stroke, and cancer. If you smoke, STOP!    For more information:  Quit Now [place]  -QUIT-NOW  [email or web address]

## 2024-09-05 ENCOUNTER — HOSPITAL ENCOUNTER (OUTPATIENT)
Dept: GENERAL RADIOLOGY | Facility: HOSPITAL | Age: 64
Discharge: HOME | End: 2024-09-05
Payer: MEDICAID

## 2024-09-05 ENCOUNTER — TELEPHONE (OUTPATIENT)
Dept: WOUND CARE | Age: 64
End: 2024-09-05

## 2024-09-05 DIAGNOSIS — Z92.3 STATUS POST RADIATION THERAPY: ICD-10-CM

## 2024-09-05 DIAGNOSIS — C13.9 MALIGNANT NEOPLASM HYPOPHARYNX: ICD-10-CM

## 2024-09-05 DIAGNOSIS — R13.12 OROPHARYNGEAL DYSPHAGIA: Primary | ICD-10-CM

## 2024-09-05 PROCEDURE — 74230 X-RAY XM SWLNG FUNCJ C+: CPT

## 2024-09-05 PROCEDURE — 92611 MOTION FLUOROSCOPY/SWALLOW: CPT | Performed by: SPEECH-LANGUAGE PATHOLOGIST

## 2024-09-05 RX ADMIN — BARIUM SULFATE 50 ML: 400 PASTE ORAL at 09:46

## 2024-09-05 RX ADMIN — BARIUM SULFATE 55 ML: 0.81 POWDER, FOR SUSPENSION ORAL at 09:46

## 2024-09-05 RX ADMIN — BARIUM SULFATE 50 ML: 400 SUSPENSION ORAL at 09:46

## 2024-09-05 NOTE — TELEPHONE ENCOUNTER
Received call from Lorna Villanueva, family member of patient who helps to take care of the ostomy. Stated patient had come into the ER where she works and was having skin issues around the stoma and leakage was noted. Stated patient is having a rash and some burning in the peristomal area.  Discussed having patient lie on the bed, cleanse areas with warm water and pat dry well then antifungal powder and crusting with no sting skin protectant around the peristomal area rash and denuded areas, then not using the barrier ring, and cutting the wafer 1/8th of inch larger than the stoma, then place the wafer in place around the stoma.   She stated she has been cutting the wafer opening larger than this and this may be the problem.  Stated patient to go back to [facility] in [place] sometime in October.  Stated they did receive the supplies that were ordered.  Discussed if still having problems with ostomy appliance they will call next week for a follow appointment.

## 2024-09-05 NOTE — MBS/VFSS/FEES
Speech Language Pathology   MBS FEES / Discharge Summary  [facility]       Patient Name: Keron Gerber  : 1960  MRN: [id number]    Today's Date: 2024      Visit Date: 2024   SPEECH-LANGUAGE PATHOLOGY EVALUTION - MBS/VFSS  Subjective: The patient was seen on this date for a VFSS(Videofluoroscopic Swallowing Study).  Patient was alert and cooperative.    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 He completed 7000 cGy in 35 fractions to the posterior oropharynx and regional lymph nodes on 2024. Trach and PEG remain. Trach is with PMV in place during study.   Objective: Risks/benefits were reviewed with the patient, and consent was obtained. Oral motor examination results: WFL. The study was completed with SLP and Radiologist present. The patient was seen in lateral view(s). Textures given included thin liquid, nectar thick liquid, honey thick liquid, puree consistency, mechanical soft consistency, and regular consistency.  Assessment: Difficulties were noted with thin liquid, characterized by penetration.   Observations:   Trial 1: Nectar Thick - Straw  Functional oral acceptance and control. Swallow initiated timely with no penetration or aspiration. Minimal vallecular and posterior pharyngeal wall residue post swallow.       Trial 2: Thin Liquid - Straw  Functional oral acceptance and control. Mild oral holding which appears to be in prep of swallowing and protective in nature. Swallow initiated with no penetration or aspiration. Minimal lingual, vallecular, posterior pharyngeal wall, and pyriform residue post swallow.       Trial 3: Pudding Thick Liquid - Spoon  Functional oral acceptance and control. Swallow initiated timely with no penetration or aspiration. Minimal lingual and vallecular residue post swallow.       Trial 4: Soft Solids  Functional oral acceptance and control. Functional  mastication. Swallow initiated timely with no penetration or aspiration. Minimal lingual and vallecular residue post swallow.       Trial 5: Regular Solids  Functional oral acceptance and control. Functional mastication.  Vallecular aggregation. Swallow initiated timely with no penetration or aspiration. No significant oral or pharyngeal residue post swallow.      Trial 6: Thin Liquid - Straw  Functional oral acceptance and control. Mild oral holding which appears to be in prep of swallowing and protective in nature. Premature loss over the tip of the epiglottis. Penetration during the swallow with no aspiration. Minimal lingual, vallecular, posterior pharyngeal wall, and pyriform residue post swallow.      Esophageal screen completed with no noted concerns.      Trial 7: Thin Liquid - Straw with consecutive sips  Cued for sequential sip to assess safety and function with larger bolus size. Functional oral acceptance and control. Mild oral holding which appears to be in prep of swallowing and protective in nature. Swallow initiated with penetration during the swallow but no aspiration. Second swallow again with penetration during the swallow and no aspiration.  Minimal lingual, vallecular, posterior pharyngeal wall, and pyriform residue post swallow.      Across trials patient has reduced epiglottic movment (epiglottis appears small likely related to XRT), reduced laryngeal elevation, and reduced posterior stripping wave.     SLP Findings: Patient presents with mild oropharyngeal dysphagia.   Recommendations: Diet Textures: regular and thin liquids. Medications should be taken whole with puree.   Recommended Strategies: upright for PO, small bites and sips, and avoid consecutive sips of thin liquids . Oral care 2x a day.  Other Recommended Evaluations: Repeat VFSS with any increasing concerns. Consider referral to outpatient speech for continued swallowing therapy if the patient wishes to pursue.     Dysphagia  therapy is recommended.      Alma Granger, MS CCC-SLP 9/5/2024 12:26 CDT    Visit Dx:     ICD-10-CM ICD-9-CM   1. Oropharyngeal dysphagia  R13.12 787.22   2. Malignant neoplasm hypopharynx  C13.9 148.9   3. 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 ENDOSCOPY;  Service: Gastroenterology;  Laterality: N/A;  preop; abnormal pet scan   postop rectal mass; polyps;   PCP Dr Santos    CYST REMOVAL Right 08/07/2014    RIGHT LOWER EYELID- EPIDERMAL INCLUSION CYST    EXCISION LESION N/A 08/07/2014    MALIGNANT MELANOMA OF LEFT NASAL TIP    FOREHEAD FLAP  09/11/2014    PEDICLE FLAP  10/02/2014    PEDICLE DIVISION AND FLAP INSET OF NOSE    PROSTATECTOMY N/A 09/07/2022    Procedure: PROSTATECTOMY LAPAROSCOPIC WITH DAVINCI ROBOT;  Surgeon: Matthew Weaver MD;  Location: [facility] OR;  Service: Robotics - DaVinci;  Laterality: N/A;    SQUAMOUS CELL CARCINOMA EXCISION  10/29/2013    LEFT FLOOR OF MOUTH    TONSILLECTOMY                         SLP Adult Swallow Evaluation       Row Name 09/05/24 0934       Rehab Evaluation    Document Type discharge evaluation/summary  -MG    Subjective Information no complaints  -MG    Patient Observations alert;cooperative;agree to therapy  -MG    Patient/Family/Caregiver Comments/Observations Mother present.  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 He completed 7000 cGy in 35 fractions to the posterior oropharynx and regional lymph nodes on 06/25/2024. Trach and PEG. Trach is with PMV in place.  -MG    Current Method of Nutrition soft to chew textures;whole;thin liquids;gastrostomy feedings  -MG    Precautions/Limitations, Vision WFL with corrective lenses;for purposes of eval  -MG    Precautions/Limitations, Hearing WFL;for purposes of eval  -MG    Prior Level of Function-Communication WFL  -MG    Prior Level of Function-Swallowing no diet consistency restrictions  Prior VFSS at start of diagnosis with recommendations for NPO status. Has advanced to soft and thin.  -MG    Plans/Goals Discussed with patient;agreed upon  -MG    Barriers to Rehab none identified  -MG    Patient's Goals for Discharge return to all previous roles/activities;eat/drink without coughing/choking  -MG       Pain    Additional Documentation Pain Scale: FACES Pre/Post-Treatment (Group)  -MG       Pain Scale: FACES Pre/Post-Treatment    Pain: FACES Scale, Pretreatment 0-->no hurt  -MG    Posttreatment Pain Rating 0-->no hurt  -MG       Oral Motor Structure and Function    Dentition Assessment edentulous  -MG    Secretion Management WNL/WFL  -MG    Mucosal Quality moist, healthy  -MG    Volitional Swallow WFL  -MG    Volitional Cough WFL  -MG       Oral Musculature and  Cranial Nerve Assessment    Oral Motor General Assessment WFL  -MG       General Eating/Swallowing Observations    Respiratory Support Currently in Use tracheostomy  -MG       Respiratory    Respiratory Status WFL;room air  -MG       MBS/VFSS    Utensils Used spoon;straw  -MG    Consistencies Trialed nectar/syrup-thick liquids;thin liquids;pureed;soft to chew textures;regular textures  -MG       MBS/VFSS Interpretation    Oral Prep Phase WFL  -MG    Oral Transit Phase WFL  -MG    Oral Residue WFL  -MG       Initiation of Pharyngeal Swallow    Initiation of Pharyngeal Swallow WFL  -MG    Pharyngeal Phase impaired pharyngeal phase of swallowing  -MG    Penetration During the Swallow thin liquids;secondary to reduced vestibular closure  -MG    Response to Penetration No  -MG    Pharyngeal Phase, Comment See note.  -MG       Esophageal Phase    Esophageal Phase no impairments;see radiology report for further details  -MG       SLP Evaluation Clinical Impression    SLP Swallowing Diagnosis mild;oral dysphagia;pharyngeal dysphagia  -MG    Functional Impact risk of aspiration/pneumonia  -MG    Rehab Potential/Prognosis, Swallowing good, to achieve stated therapy goals  -MG    Swallow Criteria for Skilled Therapeutic Interventions Met demonstrates skilled criteria  -MG       Recommendations    Therapy Frequency (Swallow) evaluation only  -MG    Predicted Duration Therapy Intervention (Days) until discharge  -MG    SLP Diet Recommendation soft to chew textures;whole;thin liquids  -MG    Recommended Diagnostics VFSS (MBS)  repeat with any increasing concerns.  -MG    Recommended Precautions and Strategies upright posture during/after eating;small bites of food and sips of liquid;general aspiration precautions;3 second prep;other (see comments)  avoid consective sips  -MG    Oral Care Recommendations Oral Care BID/PRN;Toothbrush  -MG    SLP Rec. for Method of Medication Administration meds whole;with puree  -MG    Monitor for  Signs of Aspiration yes;notify SLP if any concerns  -MG    Anticipated Discharge Disposition (SLP) home with OP services  -MG    Demonstrates Need for Referral to Another Service speech therapy  -MG              User Key  (r) = Recorded By, (t) = Taken By, (c) = Cosigned By      Initials Name Provider Type    Alma Rebolledo MS CCC-SLP Speech and Language Pathologist                                    OP SLP Education       Row Name 09/05/24 1059       Education    Barriers to Learning No barriers identified  -MG    Education Provided Described results of evaluation;Patient expressed understanding of evaluation  -MG    Assessed Learning needs;Learning motivation;Learning preferences;Learning readiness  -MG    Learning Motivation Strong  -MG    Learning Method Explanation  -MG    Teaching Response Verbalized understanding  -MG    Education Comments Results reviewed directly following the study.  -MG              User Key  (r) = Recorded By, (t) = Taken By, (c) = Cosigned By      Initials Name Effective Dates    Alma Rebolledo MS CCC-SLP 07/11/23 -                                          Time Calculation:   Untimed Charges  SLP Eval/Re-eval : ST Motion Fluoro Eval Swallow - 35863  28772-RB Motion Fluoro Eval Swallow Minutes: 100  Total Minutes  Untimed Charges Total Minutes: 100   Total Minutes: 100                Alma Granger MS CCC-SLP  9/5/2024

## 2024-09-06 ENCOUNTER — DOCUMENTATION (OUTPATIENT)
Age: 64
End: 2024-09-06
Payer: MEDICAID

## 2024-09-06 NOTE — PROGRESS NOTES
I spoke with Dr. Lenny Middleton regarding this patient.  He there are no plans for his tracheostomy removal at this time and he is having takedown of his ileostomy in [place].    Dr. Middleton reports he has no significant input at this time and we will see the patient as needed.

## 2024-09-09 ENCOUNTER — HOSPITAL ENCOUNTER (OUTPATIENT)
Dept: GENERAL RADIOLOGY | Facility: HOSPITAL | Age: 64
Discharge: HOME | End: 2024-09-09
Admitting: SURGERY
Payer: MEDICAID

## 2024-09-09 ENCOUNTER — TRANSCRIBE ORDERS (OUTPATIENT)
Dept: ADMINISTRATIVE | Facility: HOSPITAL | Age: 64
End: 2024-09-09
Payer: MEDICAID

## 2024-09-09 DIAGNOSIS — D12.8 TUBULOVILLOUS ADENOMA OF RECTUM: ICD-10-CM

## 2024-09-09 DIAGNOSIS — D12.8 TUBULOVILLOUS ADENOMA OF RECTUM: Primary | ICD-10-CM

## 2024-09-09 PROCEDURE — 0 DIATRIZOATE MEGLUMINE & SODIUM PER 1 ML: Performed by: SURGERY

## 2024-09-09 PROCEDURE — 74270 X-RAY XM COLON 1CNTRST STD: CPT

## 2024-09-09 RX ADMIN — DIATRIZOATE MEGLUMINE AND DIATRIZOATE SODIUM 600 ML: 660; 100 LIQUID ORAL; RECTAL at 10:24

## 2024-09-25 ENCOUNTER — HOSPITAL ENCOUNTER (OUTPATIENT)
Dept: CT IMAGING | Facility: HOSPITAL | Age: 64
Discharge: HOME | End: 2024-09-25
Admitting: INTERNAL MEDICINE
Payer: MEDICAID

## 2024-09-25 DIAGNOSIS — C13.9 MALIGNANT NEOPLASM HYPOPHARYNX: ICD-10-CM

## 2024-09-25 PROCEDURE — 25510000001 IOPAMIDOL 61 % SOLUTION: Performed by: INTERNAL MEDICINE

## 2024-09-25 PROCEDURE — 70491 CT SOFT TISSUE NECK W/DYE: CPT

## 2024-09-25 RX ORDER — IOPAMIDOL 612 MG/ML
100 INJECTION, SOLUTION INTRAVASCULAR
Status: COMPLETED | OUTPATIENT
Start: 2024-09-25 | End: 2024-09-25

## 2024-09-25 RX ADMIN — IOPAMIDOL 100 ML: 612 INJECTION, SOLUTION INTRAVENOUS at 11:34

## 2024-10-14 ENCOUNTER — HOSPITAL ENCOUNTER (INPATIENT)
Facility: HOSPITAL | Age: 64
LOS: 10 days | Discharge: HOME | End: 2024-10-24
Attending: SURGERY | Admitting: SURGERY
Payer: MEDICAID

## 2024-10-14 DIAGNOSIS — D12.8 TUBULOVILLOUS ADENOMA OF RECTUM: ICD-10-CM

## 2024-10-14 LAB
ABO GROUP BLD: NORMAL
ALBUMIN SERPL-MCNC: 4.3 G/DL (ref 3.5–5.2)
ALBUMIN/GLOB SERPL: 1.6 G/DL
ALP SERPL-CCNC: 88 U/L (ref 39–117)
ALT SERPL W P-5'-P-CCNC: 8 U/L (ref 1–41)
ANION GAP SERPL CALCULATED.3IONS-SCNC: 12.7 MMOL/L (ref 5–15)
AST SERPL-CCNC: 12 U/L (ref 1–40)
BILIRUB SERPL-MCNC: <0.2 MG/DL (ref 0–1.2)
BLD GP AB SCN SERPL QL: NEGATIVE
BUN SERPL-MCNC: 10 MG/DL (ref 8–23)
BUN/CREAT SERPL: 12 (ref 7–25)
CALCIUM SPEC-SCNC: 9.4 MG/DL (ref 8.6–10.5)
CHLORIDE SERPL-SCNC: 100 MMOL/L (ref 98–107)
CO2 SERPL-SCNC: 25.3 MMOL/L (ref 22–29)
CREAT SERPL-MCNC: 0.83 MG/DL (ref 0.76–1.27)
DEPRECATED RDW RBC AUTO: 44.7 FL (ref 37–54)
EGFRCR SERPLBLD CKD-EPI 2021: 99 ML/MIN/1.73
ERYTHROCYTE [DISTWIDTH] IN BLOOD BY AUTOMATED COUNT: 11.6 % (ref 12.3–15.4)
GLOBULIN UR ELPH-MCNC: 2.7 GM/DL
GLUCOSE SERPL-MCNC: 79 MG/DL (ref 65–99)
HCT VFR BLD AUTO: 38.6 % (ref 37.5–51)
HGB BLD-MCNC: 13.2 G/DL (ref 13–17.7)
MCH RBC QN AUTO: 36.3 PG (ref 26.6–33)
MCHC RBC AUTO-ENTMCNC: 34.2 G/DL (ref 31.5–35.7)
MCV RBC AUTO: 106 FL (ref 79–97)
PLATELET # BLD AUTO: 201 10*3/MM3 (ref 140–450)
PMV BLD AUTO: 9.2 FL (ref 6–12)
POTASSIUM SERPL-SCNC: 3.6 MMOL/L (ref 3.5–5.2)
PROT SERPL-MCNC: 7 G/DL (ref 6–8.5)
RBC # BLD AUTO: 3.64 10*6/MM3 (ref 4.14–5.8)
RH BLD: POSITIVE
SODIUM SERPL-SCNC: 138 MMOL/L (ref 136–145)
T&S EXPIRATION DATE: NORMAL
WBC NRBC COR # BLD AUTO: 9.31 10*3/MM3 (ref 3.4–10.8)

## 2024-10-14 PROCEDURE — 86850 RBC ANTIBODY SCREEN: CPT | Performed by: SURGERY

## 2024-10-14 PROCEDURE — 85027 COMPLETE CBC AUTOMATED: CPT | Performed by: SURGERY

## 2024-10-14 PROCEDURE — 86901 BLOOD TYPING SEROLOGIC RH(D): CPT | Performed by: SURGERY

## 2024-10-14 PROCEDURE — 80053 COMPREHEN METABOLIC PANEL: CPT | Performed by: SURGERY

## 2024-10-14 PROCEDURE — 86900 BLOOD TYPING SEROLOGIC ABO: CPT | Performed by: SURGERY

## 2024-10-14 RX ORDER — NICOTINE 21 MG/24HR
1 PATCH, TRANSDERMAL 24 HOURS TRANSDERMAL
Status: DISCONTINUED | OUTPATIENT
Start: 2024-10-14 | End: 2024-10-15

## 2024-10-14 RX ORDER — ONDANSETRON 2 MG/ML
4 INJECTION INTRAMUSCULAR; INTRAVENOUS EVERY 6 HOURS PRN
Status: DISCONTINUED | OUTPATIENT
Start: 2024-10-14 | End: 2024-10-15

## 2024-10-14 RX ADMIN — NICOTINE 1 PATCH: 21 PATCH, EXTENDED RELEASE TRANSDERMAL at 21:12

## 2024-10-14 NOTE — PLAN OF CARE
Goal Outcome Evaluation:  Plan of Care Reviewed With: patient, child        Progress: no change  Outcome Evaluation: Pt was a direct admit  Pt was a direct admit to 4 P today. Plan for ostomy removal tomorrow. Clear liquid diet today. NPO at midnight. Pt A & O x 4. Trach in place- capped and not in use. Feeding tube in place that the pt flushes daily, but is no longer using.

## 2024-10-15 ENCOUNTER — ANESTHESIA EVENT (OUTPATIENT)
Dept: PERIOP | Facility: HOSPITAL | Age: 64
End: 2024-10-15
Payer: MEDICAID

## 2024-10-15 ENCOUNTER — ANESTHESIA (OUTPATIENT)
Dept: PERIOP | Facility: HOSPITAL | Age: 64
End: 2024-10-15
Payer: MEDICAID

## 2024-10-15 PROCEDURE — 25010000002 SUGAMMADEX 200 MG/2ML SOLUTION: Performed by: STUDENT IN AN ORGANIZED HEALTH CARE EDUCATION/TRAINING PROGRAM

## 2024-10-15 PROCEDURE — 25010000002 PROPOFOL 200 MG/20ML EMULSION: Performed by: STUDENT IN AN ORGANIZED HEALTH CARE EDUCATION/TRAINING PROGRAM

## 2024-10-15 PROCEDURE — C9290 INJ, BUPIVACAINE LIPOSOME: HCPCS | Performed by: SURGERY

## 2024-10-15 PROCEDURE — 44625 REPAIR BOWEL OPENING: CPT | Performed by: SURGERY

## 2024-10-15 PROCEDURE — 25010000002 ONDANSETRON PER 1 MG: Performed by: STUDENT IN AN ORGANIZED HEALTH CARE EDUCATION/TRAINING PROGRAM

## 2024-10-15 PROCEDURE — 25810000003 LACTATED RINGERS PER 1000 ML: Performed by: ANESTHESIOLOGY

## 2024-10-15 PROCEDURE — 25010000002 LIDOCAINE 2% SOLUTION: Performed by: STUDENT IN AN ORGANIZED HEALTH CARE EDUCATION/TRAINING PROGRAM

## 2024-10-15 PROCEDURE — 99222 1ST HOSP IP/OBS MODERATE 55: CPT | Performed by: SURGERY

## 2024-10-15 PROCEDURE — 25010000002 CEFOXITIN PER 1 G: Performed by: SURGERY

## 2024-10-15 PROCEDURE — 25810000003 LACTATED RINGERS PER 1000 ML: Performed by: SURGERY

## 2024-10-15 PROCEDURE — 25010000002 HYDROMORPHONE PER 4 MG: Performed by: SURGERY

## 2024-10-15 PROCEDURE — 25010000002 MAGNESIUM SULFATE PER 500 MG OF MAGNESIUM: Performed by: STUDENT IN AN ORGANIZED HEALTH CARE EDUCATION/TRAINING PROGRAM

## 2024-10-15 PROCEDURE — 0 BUPIVACAINE LIPOSOME 1.3 % SUSPENSION 20 ML VIAL: Performed by: SURGERY

## 2024-10-15 PROCEDURE — 25010000002 FENTANYL CITRATE (PF) 50 MCG/ML SOLUTION: Performed by: STUDENT IN AN ORGANIZED HEALTH CARE EDUCATION/TRAINING PROGRAM

## 2024-10-15 PROCEDURE — 94799 UNLISTED PULMONARY SVC/PX: CPT

## 2024-10-15 PROCEDURE — 88304 TISSUE EXAM BY PATHOLOGIST: CPT | Performed by: SURGERY

## 2024-10-15 DEVICE — ENDOPATH ECHELON ENDOSCOPIC LINEAR CUTTER RELOADS, WHITE, 60MM
Type: IMPLANTABLE DEVICE | Site: ABDOMEN | Status: FUNCTIONAL
Brand: ECHELON ENDOPATH

## 2024-10-15 RX ORDER — ONDANSETRON 2 MG/ML
INJECTION INTRAMUSCULAR; INTRAVENOUS AS NEEDED
Status: DISCONTINUED | OUTPATIENT
Start: 2024-10-15 | End: 2024-10-15 | Stop reason: SURG

## 2024-10-15 RX ORDER — ONDANSETRON 2 MG/ML
4 INJECTION INTRAMUSCULAR; INTRAVENOUS EVERY 6 HOURS PRN
Status: DISCONTINUED | OUTPATIENT
Start: 2024-10-15 | End: 2024-10-24 | Stop reason: HOSPADM

## 2024-10-15 RX ORDER — ONDANSETRON 2 MG/ML
4 INJECTION INTRAMUSCULAR; INTRAVENOUS ONCE AS NEEDED
Status: DISCONTINUED | OUTPATIENT
Start: 2024-10-15 | End: 2024-10-15 | Stop reason: HOSPADM

## 2024-10-15 RX ORDER — HYDROMORPHONE HYDROCHLORIDE 1 MG/ML
0.5 INJECTION, SOLUTION INTRAMUSCULAR; INTRAVENOUS; SUBCUTANEOUS
Status: DISCONTINUED | OUTPATIENT
Start: 2024-10-15 | End: 2024-10-15 | Stop reason: HOSPADM

## 2024-10-15 RX ORDER — SODIUM CHLORIDE, SODIUM LACTATE, POTASSIUM CHLORIDE, CALCIUM CHLORIDE 600; 310; 30; 20 MG/100ML; MG/100ML; MG/100ML; MG/100ML
9 INJECTION, SOLUTION INTRAVENOUS CONTINUOUS
Status: DISCONTINUED | OUTPATIENT
Start: 2024-10-15 | End: 2024-10-15

## 2024-10-15 RX ORDER — FAMOTIDINE 10 MG/ML
20 INJECTION, SOLUTION INTRAVENOUS ONCE
Status: DISCONTINUED | OUTPATIENT
Start: 2024-10-15 | End: 2024-10-15 | Stop reason: HOSPADM

## 2024-10-15 RX ORDER — OXYCODONE HYDROCHLORIDE 5 MG/1
5 TABLET ORAL EVERY 4 HOURS PRN
Status: DISCONTINUED | OUTPATIENT
Start: 2024-10-15 | End: 2024-10-24 | Stop reason: HOSPADM

## 2024-10-15 RX ORDER — PROMETHAZINE HYDROCHLORIDE 25 MG/1
25 TABLET ORAL ONCE AS NEEDED
Status: DISCONTINUED | OUTPATIENT
Start: 2024-10-15 | End: 2024-10-15 | Stop reason: HOSPADM

## 2024-10-15 RX ORDER — PROMETHAZINE HYDROCHLORIDE 25 MG/1
25 SUPPOSITORY RECTAL ONCE AS NEEDED
Status: DISCONTINUED | OUTPATIENT
Start: 2024-10-15 | End: 2024-10-15 | Stop reason: HOSPADM

## 2024-10-15 RX ORDER — DIPHENHYDRAMINE HYDROCHLORIDE 50 MG/ML
12.5 INJECTION INTRAMUSCULAR; INTRAVENOUS
Status: DISCONTINUED | OUTPATIENT
Start: 2024-10-15 | End: 2024-10-15 | Stop reason: HOSPADM

## 2024-10-15 RX ORDER — EPHEDRINE SULFATE 50 MG/ML
5 INJECTION, SOLUTION INTRAVENOUS ONCE AS NEEDED
Status: DISCONTINUED | OUTPATIENT
Start: 2024-10-15 | End: 2024-10-15 | Stop reason: HOSPADM

## 2024-10-15 RX ORDER — FENTANYL CITRATE 50 UG/ML
INJECTION, SOLUTION INTRAMUSCULAR; INTRAVENOUS AS NEEDED
Status: DISCONTINUED | OUTPATIENT
Start: 2024-10-15 | End: 2024-10-15 | Stop reason: SURG

## 2024-10-15 RX ORDER — NALOXONE HCL 0.4 MG/ML
0.2 VIAL (ML) INJECTION AS NEEDED
Status: DISCONTINUED | OUTPATIENT
Start: 2024-10-15 | End: 2024-10-15 | Stop reason: HOSPADM

## 2024-10-15 RX ORDER — METHOCARBAMOL 750 MG/1
750 TABLET, FILM COATED ORAL 4 TIMES DAILY
Status: DISCONTINUED | OUTPATIENT
Start: 2024-10-15 | End: 2024-10-24 | Stop reason: HOSPADM

## 2024-10-15 RX ORDER — SODIUM CHLORIDE 0.9 % (FLUSH) 0.9 %
3 SYRINGE (ML) INJECTION EVERY 12 HOURS SCHEDULED
Status: DISCONTINUED | OUTPATIENT
Start: 2024-10-15 | End: 2024-10-15 | Stop reason: HOSPADM

## 2024-10-15 RX ORDER — DROPERIDOL 2.5 MG/ML
0.62 INJECTION, SOLUTION INTRAMUSCULAR; INTRAVENOUS
Status: DISCONTINUED | OUTPATIENT
Start: 2024-10-15 | End: 2024-10-15 | Stop reason: HOSPADM

## 2024-10-15 RX ORDER — MAGNESIUM SULFATE HEPTAHYDRATE 500 MG/ML
INJECTION, SOLUTION INTRAMUSCULAR; INTRAVENOUS AS NEEDED
Status: DISCONTINUED | OUTPATIENT
Start: 2024-10-15 | End: 2024-10-15 | Stop reason: SURG

## 2024-10-15 RX ORDER — IPRATROPIUM BROMIDE AND ALBUTEROL SULFATE 2.5; .5 MG/3ML; MG/3ML
3 SOLUTION RESPIRATORY (INHALATION) ONCE AS NEEDED
Status: DISCONTINUED | OUTPATIENT
Start: 2024-10-15 | End: 2024-10-15 | Stop reason: HOSPADM

## 2024-10-15 RX ORDER — MAGNESIUM HYDROXIDE 1200 MG/15ML
LIQUID ORAL AS NEEDED
Status: DISCONTINUED | OUTPATIENT
Start: 2024-10-15 | End: 2024-10-15 | Stop reason: HOSPADM

## 2024-10-15 RX ORDER — PROPOFOL 10 MG/ML
INJECTION, EMULSION INTRAVENOUS AS NEEDED
Status: DISCONTINUED | OUTPATIENT
Start: 2024-10-15 | End: 2024-10-15 | Stop reason: SURG

## 2024-10-15 RX ORDER — ONDANSETRON 4 MG/1
4 TABLET, ORALLY DISINTEGRATING ORAL EVERY 6 HOURS PRN
Status: DISCONTINUED | OUTPATIENT
Start: 2024-10-15 | End: 2024-10-24 | Stop reason: HOSPADM

## 2024-10-15 RX ORDER — PHENYLEPHRINE HCL IN 0.9% NACL 1 MG/10 ML
SYRINGE (ML) INTRAVENOUS AS NEEDED
Status: DISCONTINUED | OUTPATIENT
Start: 2024-10-15 | End: 2024-10-15 | Stop reason: SURG

## 2024-10-15 RX ORDER — OXYCODONE HYDROCHLORIDE 5 MG/1
10 TABLET ORAL EVERY 4 HOURS PRN
Status: DISCONTINUED | OUTPATIENT
Start: 2024-10-15 | End: 2024-10-24 | Stop reason: HOSPADM

## 2024-10-15 RX ORDER — FLUMAZENIL 0.1 MG/ML
0.2 INJECTION INTRAVENOUS AS NEEDED
Status: DISCONTINUED | OUTPATIENT
Start: 2024-10-15 | End: 2024-10-15 | Stop reason: HOSPADM

## 2024-10-15 RX ORDER — SODIUM CHLORIDE 0.9 % (FLUSH) 0.9 %
3-10 SYRINGE (ML) INJECTION AS NEEDED
Status: DISCONTINUED | OUTPATIENT
Start: 2024-10-15 | End: 2024-10-15 | Stop reason: HOSPADM

## 2024-10-15 RX ORDER — ROCURONIUM BROMIDE 10 MG/ML
INJECTION, SOLUTION INTRAVENOUS AS NEEDED
Status: DISCONTINUED | OUTPATIENT
Start: 2024-10-15 | End: 2024-10-15 | Stop reason: SURG

## 2024-10-15 RX ORDER — HYDROMORPHONE HYDROCHLORIDE 1 MG/ML
0.5 INJECTION, SOLUTION INTRAMUSCULAR; INTRAVENOUS; SUBCUTANEOUS
Status: DISCONTINUED | OUTPATIENT
Start: 2024-10-15 | End: 2024-10-19

## 2024-10-15 RX ORDER — ACETAMINOPHEN 500 MG
1000 TABLET ORAL EVERY 6 HOURS
Status: DISCONTINUED | OUTPATIENT
Start: 2024-10-15 | End: 2024-10-24 | Stop reason: HOSPADM

## 2024-10-15 RX ORDER — LABETALOL HYDROCHLORIDE 5 MG/ML
5 INJECTION, SOLUTION INTRAVENOUS
Status: DISCONTINUED | OUTPATIENT
Start: 2024-10-15 | End: 2024-10-15 | Stop reason: HOSPADM

## 2024-10-15 RX ORDER — TRAZODONE HYDROCHLORIDE 50 MG/1
1 TABLET, FILM COATED ORAL DAILY
COMMUNITY
Start: 2024-10-03

## 2024-10-15 RX ORDER — LIDOCAINE HYDROCHLORIDE 10 MG/ML
0.5 INJECTION, SOLUTION INFILTRATION; PERINEURAL ONCE AS NEEDED
Status: DISCONTINUED | OUTPATIENT
Start: 2024-10-15 | End: 2024-10-15 | Stop reason: HOSPADM

## 2024-10-15 RX ORDER — HYDROCODONE BITARTRATE AND ACETAMINOPHEN 5; 325 MG/1; MG/1
1 TABLET ORAL ONCE AS NEEDED
Status: DISCONTINUED | OUTPATIENT
Start: 2024-10-15 | End: 2024-10-15 | Stop reason: HOSPADM

## 2024-10-15 RX ORDER — HYDRALAZINE HYDROCHLORIDE 20 MG/ML
5 INJECTION INTRAMUSCULAR; INTRAVENOUS
Status: DISCONTINUED | OUTPATIENT
Start: 2024-10-15 | End: 2024-10-15 | Stop reason: HOSPADM

## 2024-10-15 RX ORDER — OXYCODONE AND ACETAMINOPHEN 7.5; 325 MG/1; MG/1
1 TABLET ORAL EVERY 4 HOURS PRN
Status: DISCONTINUED | OUTPATIENT
Start: 2024-10-15 | End: 2024-10-15 | Stop reason: HOSPADM

## 2024-10-15 RX ORDER — MIDAZOLAM HYDROCHLORIDE 1 MG/ML
1 INJECTION INTRAMUSCULAR; INTRAVENOUS
Status: DISCONTINUED | OUTPATIENT
Start: 2024-10-15 | End: 2024-10-15 | Stop reason: HOSPADM

## 2024-10-15 RX ORDER — ENOXAPARIN SODIUM 100 MG/ML
30 INJECTION SUBCUTANEOUS DAILY
Status: DISCONTINUED | OUTPATIENT
Start: 2024-10-16 | End: 2024-10-24 | Stop reason: HOSPADM

## 2024-10-15 RX ORDER — LIDOCAINE HYDROCHLORIDE 20 MG/ML
INJECTION, SOLUTION INFILTRATION; PERINEURAL AS NEEDED
Status: DISCONTINUED | OUTPATIENT
Start: 2024-10-15 | End: 2024-10-15 | Stop reason: SURG

## 2024-10-15 RX ORDER — SODIUM CHLORIDE, SODIUM LACTATE, POTASSIUM CHLORIDE, CALCIUM CHLORIDE 600; 310; 30; 20 MG/100ML; MG/100ML; MG/100ML; MG/100ML
50 INJECTION, SOLUTION INTRAVENOUS CONTINUOUS
Status: DISCONTINUED | OUTPATIENT
Start: 2024-10-15 | End: 2024-10-16

## 2024-10-15 RX ORDER — FENTANYL CITRATE 50 UG/ML
50 INJECTION, SOLUTION INTRAMUSCULAR; INTRAVENOUS
Status: DISCONTINUED | OUTPATIENT
Start: 2024-10-15 | End: 2024-10-15 | Stop reason: HOSPADM

## 2024-10-15 RX ADMIN — SODIUM CHLORIDE, POTASSIUM CHLORIDE, SODIUM LACTATE AND CALCIUM CHLORIDE 50 ML/HR: 600; 310; 30; 20 INJECTION, SOLUTION INTRAVENOUS at 19:45

## 2024-10-15 RX ADMIN — CEFOXITIN SODIUM 2 G: 2 POWDER, FOR SOLUTION INTRAVENOUS at 11:56

## 2024-10-15 RX ADMIN — FENTANYL CITRATE 25 MCG: 50 INJECTION, SOLUTION INTRAMUSCULAR; INTRAVENOUS at 12:24

## 2024-10-15 RX ADMIN — ACETAMINOPHEN 1000 MG: 500 TABLET ORAL at 17:43

## 2024-10-15 RX ADMIN — Medication 200 MCG: at 12:13

## 2024-10-15 RX ADMIN — ACETAMINOPHEN 1000 MG: 500 TABLET ORAL at 21:34

## 2024-10-15 RX ADMIN — METHOCARBAMOL TABLETS 750 MG: 750 TABLET, COATED ORAL at 21:34

## 2024-10-15 RX ADMIN — ROCURONIUM BROMIDE 40 MG: 10 INJECTION, SOLUTION INTRAVENOUS at 12:10

## 2024-10-15 RX ADMIN — Medication 100 MCG: at 12:18

## 2024-10-15 RX ADMIN — HYDROMORPHONE HYDROCHLORIDE 0.5 MG: 1 INJECTION, SOLUTION INTRAMUSCULAR; INTRAVENOUS; SUBCUTANEOUS at 15:27

## 2024-10-15 RX ADMIN — ONDANSETRON 4 MG: 2 INJECTION INTRAMUSCULAR; INTRAVENOUS at 13:32

## 2024-10-15 RX ADMIN — OXYCODONE HYDROCHLORIDE 10 MG: 5 TABLET ORAL at 19:52

## 2024-10-15 RX ADMIN — FENTANYL CITRATE 25 MCG: 50 INJECTION, SOLUTION INTRAMUSCULAR; INTRAVENOUS at 12:18

## 2024-10-15 RX ADMIN — LIDOCAINE HYDROCHLORIDE 80 MG: 20 INJECTION, SOLUTION INFILTRATION; PERINEURAL at 12:09

## 2024-10-15 RX ADMIN — PROPOFOL 120 MG: 10 INJECTION, EMULSION INTRAVENOUS at 12:09

## 2024-10-15 RX ADMIN — Medication 100 MCG: at 13:07

## 2024-10-15 RX ADMIN — METHOCARBAMOL TABLETS 750 MG: 750 TABLET, COATED ORAL at 17:43

## 2024-10-15 RX ADMIN — SODIUM CHLORIDE, POTASSIUM CHLORIDE, SODIUM LACTATE AND CALCIUM CHLORIDE 9 ML/HR: 600; 310; 30; 20 INJECTION, SOLUTION INTRAVENOUS at 11:56

## 2024-10-15 RX ADMIN — MAGNESIUM SULFATE HEPTAHYDRATE 1 G: 500 INJECTION, SOLUTION INTRAMUSCULAR; INTRAVENOUS at 12:30

## 2024-10-15 RX ADMIN — LIDOCAINE HYDROCHLORIDE 80 MG: 20 INJECTION, SOLUTION INFILTRATION; PERINEURAL at 13:33

## 2024-10-15 RX ADMIN — SUGAMMADEX 200 MG: 100 INJECTION, SOLUTION INTRAVENOUS at 13:39

## 2024-10-15 RX ADMIN — FENTANYL CITRATE 50 MCG: 50 INJECTION, SOLUTION INTRAMUSCULAR; INTRAVENOUS at 14:21

## 2024-10-15 RX ADMIN — FENTANYL CITRATE 50 MCG: 50 INJECTION, SOLUTION INTRAMUSCULAR; INTRAVENOUS at 13:56

## 2024-10-15 NOTE — OP NOTE
Colorectal & General Surgery  Operative Report    Patient: Keron Gerber  YOB: 1960  MRN: [id number]  DATE OF PROCEDURE: 10/15/24     PREOPERATIVE DIAGNOSIS:  Ileostomy status    POSTOPERATIVE DIAGNOSIS:  Same    PROCEDURE:  Closure of loop ileostomy with intestinal resection    FINDINGS:  Healthy appearing loop ileostomy closed.  Side-to-side antiperistaltic enteroenterostomy created in a handsewn fashion in 2 layers.    SURGEON:  Trey Cruz MD    ASSISTANT:  Assistant: Lior King CSA was responsible for performing the following activities: Retraction, Suction, Irrigation, Closing, and Placing Dressing and their skilled assistance was necessary for the success of this case.     ANESTHESIA:  General-endotracheal    EBL:  25 mL    SPECIMEN:  Ileostomy    OPERATIVE DESCRIPTION:  The patient was brought to the operating room under the care of the nursing staff.  The patient was placed on the operating room table in the supine position where anesthesia was induced.  The patient was then prepped and positioned in the usual sterile fashion.  A standardized timeout was then performed.    An elliptical incision was created around the ileostomy in a transverse fashion.  Subcutaneous tissue was dissected down to the level of the fascia.  Subcutaneous tissue was dissected away from the ileostomy circumferentially.  A window was then created into the peritoneal cavity between the ileostomy and the fascial edge.  The remainder of the ileostomy was dissected free from the surrounding fascia and circumferentially mobilized.  Both loops of ileum were easily eviscerated.  Offerle stapler was used to divide the bowel at the borders of the ileostomy.  Mesentery was divided with the LigaSure device.  There was a hematoma within the mesentery that was excised which did require resecting a tiny bit more about the proximal and distal limbs of the ileum.  Stay sutures were then placed and a side-to-side  antiperistaltic enteroenterostomy was created.  Posterior row was created with 2-0 silk sutures.  Enterotomies were then created.  3-0 chromic suture was used as the inner layer of the anastomosis in a running locking fashion.  Second row of silk Lembert sutures was then placed.  Anastomosis was widely patent.  Mesenteric defect closed with silk sutures.  The bowel was reduced back into the peritoneal cavity.  Fascial flaps were created circumferentially.  Posterior fascia closed with running 0 PDS suture.  Anterior fascia closed with interrupted 0 PDS sutures in figure-of-eight fashion.  Subcutaneous tissue reapproximated with 3-0 Vicryl's.  Deep dermals placed with 0 Vicryl's.  Skin reapproximated with Monocryl and Exofin.    All needle, sponge, and instrument counts were correct at the end of the case.    The patient tolerated the procedure well and was transferred to the postanesthesia care unit in stable condition.    Trey Cruz M.D.  Colorectal & General Surgery  [facility]    [address] [place] [address], [address]  [place], [place], [address]  P: [phone]  F: [phone]

## 2024-10-15 NOTE — PROGRESS NOTES
Tracheostomy rounding done by RT educator.      Informational tracheostomy sign placed at head of bed.  Emergency airway bag hanging in room with spare cuffed tracheostomy of same size and size smaller. Obturator is in box in emergency airway bag.     Verified correct inner cannulas were also at bedside and replenished supplies.

## 2024-10-15 NOTE — ANESTHESIA PREPROCEDURE EVALUATION
Anesthesia Evaluation     Patient summary reviewed and Nursing notes reviewed   no history of anesthetic complications:   NPO Solid Status: > 8 hours  NPO Liquid Status: > 8 hours           Airway   Mallampati: II  TM distance: >3 FB  Neck ROM: full  No difficulty expected  Comment: Cuffed trach in place   Dental    (+) edentulous    Pulmonary    (+) a smoker Current, Abstained day of surgery,  (-) asthma, sleep apnea, rhonchi, decreased breath sounds, wheezes  Cardiovascular   Exercise tolerance: good (4-7 METS)    Rhythm: regular  Rate: normal    (-) hypertension, CAD, dysrhythmias, angina, KING, murmur      Neuro/Psych  (-) seizures, CVA  GI/Hepatic/Renal/Endo    (-) liver disease, no renal disease, diabetes, no thyroid disorder    Musculoskeletal     Abdominal     Abdomen: soft.   Substance History      OB/GYN          Other      history of cancer (melanoma prostate rectal) remission                Anesthesia Plan    ASA 3     general     intravenous induction     Anesthetic plan, risks, benefits, and alternatives have been provided, discussed and informed consent has been obtained with: patient.    CODE STATUS:    Level Of Support Discussed With: Patient  Code Status (Patient has no pulse and is not breathing): CPR (Attempt to Resuscitate)  Medical Interventions (Patient has pulse or is breathing): Full Support

## 2024-10-15 NOTE — PLAN OF CARE
Goal Outcome Evaluation:  Plan of Care Reviewed With: patient, family        Progress: no change  Outcome Evaluation: Pt AOx4.  Up ad michelle.  NPO since midnight for Ileostomy Takedown at 11:45.  No complaints of pain.  Family at bedside.  Will continue with plan of care.                              Family

## 2024-10-15 NOTE — H&P
Colorectal & General Surgery  History and Physical    Patient: Keron Gerber  YOB: 1960  MRN: [id number]      Assessment  Keron Gerber is a 64 y.o. male status post low anterior resection with diverting loop ileostomy creation for advanced adenomatous polyp of his rectum.  He presents for ileostomy closure.  No significant changes to his medical history.  Plan to proceed the operating room today for ileostomy closure.  We discussed the risk, benefits, alternatives of procedure, including anastomotic leak, wound complications, bleeding, infection.  Informed consent obtained.    Chief Complaint: Ileostomy status    History of Present Illness   Keron Gerber is a 64 y.o. male who presents to the hospital for reversal of his loop ileostomy.  No new issues.    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 OR;  Service: General;  Laterality: N/A;    COLONOSCOPY N/A 5/1/2024    Procedure: COLONOSCOPY WITH ANESTHESIA;  Surgeon: Gustabo Ruiz MD;  Location: [facility] ENDOSCOPY;  Service: Gastroenterology;  Laterality: N/A;  preop; abnormal pet scan   postop rectal mass; polyps;   PCP Dr Santos    CYST REMOVAL Right 08/07/2014    RIGHT LOWER EYELID- EPIDERMAL INCLUSION CYST    EXCISION LESION N/A 08/07/2014    MALIGNANT MELANOMA OF LEFT NASAL TIP    FOREHEAD FLAP  09/11/2014    PEDICLE FLAP  10/02/2014    PEDICLE DIVISION AND FLAP INSET OF NOSE    PROSTATECTOMY N/A 09/07/2022    Procedure: PROSTATECTOMY LAPAROSCOPIC WITH DAVINCI ROBOT;  Surgeon: Meg  Matthew CAPONE MD;  Location:  [facility] OR;  Service: Robotics - DaVinci;  Laterality: N/A;    SQUAMOUS CELL CARCINOMA EXCISION  10/29/2013    LEFT FLOOR OF MOUTH    TONSILLECTOMY         Social History  Social History     Socioeconomic History    Marital status: Single   Tobacco Use    Smoking status: Every Day     Current packs/day: 1.00     Types: Cigarettes    Smokeless tobacco: Never   Vaping Use    Vaping status: Never Used   Substance and Sexual Activity    Alcohol use: Not Currently     Comment: 12 PER DAY    Drug use: No    Sexual activity: Defer       Family History  Family History   Problem Relation Age of Onset    No Known Problems Mother     Heart disease Father     Colon polyps Father     Colon cancer Neg Hx        Colorectal cancer family history: None    Review of Systems  Negative except as documented in the HPI.     Allergies  Allergies   Allergen Reactions    Sulfa Antibiotics Other (See Comments)     Headaches       Medications    Current Facility-Administered Medications:     nicotine (NICODERM CQ) 21 MG/24HR patch 1 patch, 1 patch, Transdermal, Q24H, Abram Cruz MD, 1 patch at 10/14/24 2112    ondansetron (ZOFRAN) injection 4 mg, 4 mg, Intravenous, Q6H PRN, Abram Cruz MD    Vital Signs  Vitals:    10/15/24 0543   BP: 99/62   Pulse: 69   Resp: 16   Temp: 97.5 °F (36.4 °C)   SpO2: 99%        Physical Exam  Constitutional: Resting comfortably, no acute distress  Neck: Supple, trachea midline  Respiratory: No increased work of breathing, Symmetric excursion  Cardiovascular: Well pefursed, no jugular venous distention evident   Abdominal: Ileostomy is pink and above the skin.  Soft, non-tender, non-distended  Lymphatics: No cervical or suprascapular adenopathy  Skin: Warm, dry, no rash on visualized skin surfaces  Musculoskeletal: Symmetric strength, no obvious gross abnormalities  Psychiatric: Alert and oriented ×3, normal affect         Laboratory Results  I have personally  reviewed CBC with WC 9, hemoglobin 13, platelets 201.  BMP with creatinine 0.8, bicarb 25.  Albumin 4.3.    Radiology  I have personally reviewed.  Minima demonstrates patent colorectal anastomosis.         Trey Cruz MD  Colorectal & General Surgery  [facility]    [address], [address]  [place], [place], [address]  P: [phone]  F: [phone]

## 2024-10-15 NOTE — ANESTHESIA POSTPROCEDURE EVALUATION
Patient: Keron Gerber    Procedure Summary       Date: 10/15/24 Room / Location: [facility] OR [address] [place] [address] MAIN OR    Anesthesia Start: 1200 Anesthesia Stop: 1350    Procedure: ILEOSTOMY TAKEDOWN (Abdomen) Diagnosis:       Tubulovillous adenoma of rectum      (Tubulovillous adenoma of rectum [D12.8])    Surgeons: Abram Cruz MD Provider: Yousif Patel MD    Anesthesia Type: general ASA Status: 3            Anesthesia Type: general    Vitals  Vitals Value Taken Time   /71 10/15/24 1445   Temp 36.5 °C (97.7 °F) 10/15/24 1348   Pulse 74 10/15/24 1447   Resp 14 10/15/24 1445   SpO2 97 % 10/15/24 1447   Vitals shown include unfiled device data.        Post Anesthesia Care and Evaluation    Patient location during evaluation: PACU  Patient participation: complete - patient participated  Level of consciousness: awake  Pain management: satisfactory to patient    Airway patency: patent  Anesthetic complications: No anesthetic complications  PONV Status: controlled  Cardiovascular status: acceptable  Respiratory status: acceptable  Hydration status: acceptable

## 2024-10-15 NOTE — PLAN OF CARE
Goal Outcome Evaluation:  Plan of Care Reviewed With: patient        Progress: improving  Outcome Evaluation: Pt admitted from PACU s/p ileostomy reversal (patient previously on 4P prior to surgery). RLQ incision dry and intact with glue. Patient stil currently with #6shiley trach capped and PEG tube. PRN dilaudid given x1 upon arrival to unit. Clear liquid diet. Will be on scheduled tylenol and robaxin. Mother at bedside.

## 2024-10-16 LAB
ANION GAP SERPL CALCULATED.3IONS-SCNC: 9.3 MMOL/L (ref 5–15)
BASOPHILS # BLD AUTO: 0.03 10*3/MM3 (ref 0–0.2)
BASOPHILS NFR BLD AUTO: 0.2 % (ref 0–1.5)
BUN SERPL-MCNC: 9 MG/DL (ref 8–23)
BUN/CREAT SERPL: 11.4 (ref 7–25)
CALCIUM SPEC-SCNC: 9.2 MG/DL (ref 8.6–10.5)
CHLORIDE SERPL-SCNC: 101 MMOL/L (ref 98–107)
CO2 SERPL-SCNC: 23.7 MMOL/L (ref 22–29)
CREAT SERPL-MCNC: 0.79 MG/DL (ref 0.76–1.27)
CYTO UR: NORMAL
DEPRECATED RDW RBC AUTO: 44.5 FL (ref 37–54)
EGFRCR SERPLBLD CKD-EPI 2021: 100.4 ML/MIN/1.73
EOSINOPHIL # BLD AUTO: 0.04 10*3/MM3 (ref 0–0.4)
EOSINOPHIL NFR BLD AUTO: 0.3 % (ref 0.3–6.2)
ERYTHROCYTE [DISTWIDTH] IN BLOOD BY AUTOMATED COUNT: 11.5 % (ref 12.3–15.4)
GLUCOSE SERPL-MCNC: 112 MG/DL (ref 65–99)
HCT VFR BLD AUTO: 35.7 % (ref 37.5–51)
HGB BLD-MCNC: 11.5 G/DL (ref 13–17.7)
IMM GRANULOCYTES # BLD AUTO: 0.08 10*3/MM3 (ref 0–0.05)
IMM GRANULOCYTES NFR BLD AUTO: 0.6 % (ref 0–0.5)
LAB AP CASE REPORT: NORMAL
LYMPHOCYTES # BLD AUTO: 1.93 10*3/MM3 (ref 0.7–3.1)
LYMPHOCYTES NFR BLD AUTO: 15.5 % (ref 19.6–45.3)
MCH RBC QN AUTO: 33.8 PG (ref 26.6–33)
MCHC RBC AUTO-ENTMCNC: 32.2 G/DL (ref 31.5–35.7)
MCV RBC AUTO: 105 FL (ref 79–97)
MONOCYTES # BLD AUTO: 0.9 10*3/MM3 (ref 0.1–0.9)
MONOCYTES NFR BLD AUTO: 7.2 % (ref 5–12)
NEUTROPHILS NFR BLD AUTO: 76.2 % (ref 42.7–76)
NEUTROPHILS NFR BLD AUTO: 9.47 10*3/MM3 (ref 1.7–7)
NRBC BLD AUTO-RTO: 0 /100 WBC (ref 0–0.2)
PATH REPORT.FINAL DX SPEC: NORMAL
PATH REPORT.GROSS SPEC: NORMAL
PLATELET # BLD AUTO: 171 10*3/MM3 (ref 140–450)
PMV BLD AUTO: 9.4 FL (ref 6–12)
POTASSIUM SERPL-SCNC: 4.6 MMOL/L (ref 3.5–5.2)
RBC # BLD AUTO: 3.4 10*6/MM3 (ref 4.14–5.8)
SODIUM SERPL-SCNC: 134 MMOL/L (ref 136–145)
WBC NRBC COR # BLD AUTO: 12.45 10*3/MM3 (ref 3.4–10.8)

## 2024-10-16 PROCEDURE — 94799 UNLISTED PULMONARY SVC/PX: CPT

## 2024-10-16 PROCEDURE — 25010000002 HYDROMORPHONE PER 4 MG: Performed by: SURGERY

## 2024-10-16 PROCEDURE — 25010000002 ENOXAPARIN PER 10 MG: Performed by: SURGERY

## 2024-10-16 PROCEDURE — 85025 COMPLETE CBC W/AUTO DIFF WBC: CPT | Performed by: SURGERY

## 2024-10-16 PROCEDURE — 80048 BASIC METABOLIC PNL TOTAL CA: CPT | Performed by: SURGERY

## 2024-10-16 PROCEDURE — 99024 POSTOP FOLLOW-UP VISIT: CPT | Performed by: SURGERY

## 2024-10-16 PROCEDURE — 94761 N-INVAS EAR/PLS OXIMETRY MLT: CPT

## 2024-10-16 RX ORDER — NICOTINE 21 MG/24HR
1 PATCH, TRANSDERMAL 24 HOURS TRANSDERMAL
Status: DISCONTINUED | OUTPATIENT
Start: 2024-10-16 | End: 2024-10-24 | Stop reason: HOSPADM

## 2024-10-16 RX ADMIN — OXYCODONE HYDROCHLORIDE 10 MG: 5 TABLET ORAL at 22:22

## 2024-10-16 RX ADMIN — NICOTINE 1 PATCH: 21 PATCH, EXTENDED RELEASE TRANSDERMAL at 20:48

## 2024-10-16 RX ADMIN — METHOCARBAMOL TABLETS 750 MG: 750 TABLET, COATED ORAL at 20:11

## 2024-10-16 RX ADMIN — ACETAMINOPHEN 1000 MG: 500 TABLET ORAL at 03:52

## 2024-10-16 RX ADMIN — HYDROMORPHONE HYDROCHLORIDE 0.5 MG: 1 INJECTION, SOLUTION INTRAMUSCULAR; INTRAVENOUS; SUBCUTANEOUS at 19:52

## 2024-10-16 RX ADMIN — OXYCODONE HYDROCHLORIDE 10 MG: 5 TABLET ORAL at 14:19

## 2024-10-16 RX ADMIN — OXYCODONE HYDROCHLORIDE 10 MG: 5 TABLET ORAL at 00:39

## 2024-10-16 RX ADMIN — METHOCARBAMOL TABLETS 750 MG: 750 TABLET, COATED ORAL at 09:54

## 2024-10-16 RX ADMIN — OXYCODONE HYDROCHLORIDE 10 MG: 5 TABLET ORAL at 18:22

## 2024-10-16 RX ADMIN — OXYCODONE HYDROCHLORIDE 10 MG: 5 TABLET ORAL at 10:24

## 2024-10-16 RX ADMIN — ACETAMINOPHEN 1000 MG: 500 TABLET ORAL at 22:22

## 2024-10-16 RX ADMIN — ACETAMINOPHEN 1000 MG: 500 TABLET ORAL at 09:54

## 2024-10-16 RX ADMIN — ENOXAPARIN SODIUM 30 MG: 100 INJECTION SUBCUTANEOUS at 08:57

## 2024-10-16 RX ADMIN — OXYCODONE HYDROCHLORIDE 10 MG: 5 TABLET ORAL at 06:31

## 2024-10-16 NOTE — PLAN OF CARE
Goal Outcome Evaluation:  Plan of Care Reviewed With: patient, parent        Progress: improving  Outcome Evaluation: AOx4.   Anxious at times.  Needs reinforcement about staff to do stand by assist any time he gets up and ambulates.  Walked in cardozo with staff assist.  Advance from clear liquid to solids.  Medicated for pain Q 4 hours.  States no pain if he is still but if he moves it starts (abdomen).  Site glued/intact to right abdomen. He takes care of trach and stated he did not need staff help.  Mother at bedside.

## 2024-10-16 NOTE — CASE MANAGEMENT/SOCIAL WORK
Continued Stay Note  [facility]     Patient Name: Keron Gerber  MRN: [id number]  Today's Date: 10/16/2024    Admit Date: 10/14/2024    Plan: Home, lives alone, with family support. States daughter is a nurse and helps him. Family to transport.   Discharge Plan       Row Name 10/16/24 1735       Plan    Plan Home, lives alone, with family support. States daughter is a nurse and helps him. Family to transport.    Patient/Family in Agreement with Plan yes    Plan Comments Met with pt and pt's mother at bedside. Explained roll of . Face sheet and pharmacy verified. Pt lives alone in a single-story home. There are 2 steps to enter home. Home DME includes Gtube, Trach supplies, walker, and cane.  Pt is independent with ADLs using a cane. Pt has never been to Rehab or used HH. Pt's PCP is Yousif Marrero MD. Enrolled in Meds to Bed. Pt normally drives himself to appointments. At discharge, pt's mother will transport. Pt lives in [facility] and drive time is 4 ½ hrs. Denies any D/C needs. Explained that CCP would follow to assess for discharge needs.  Quintin Weir RN-BC                   Discharge Codes    No documentation.                 Expected Discharge Date and Time       Expected Discharge Date Expected Discharge Time    Oct 17, 2024               Quintin Weir RN

## 2024-10-16 NOTE — PROGRESS NOTES
Nutrition Services    Patient Name:  Keron Gerber  YOB: 1960  MRN: [id number]  Admit Date:  10/14/2024    Assessment Date:  10/16/24    Summary: Initial Visit for BMI >19  Pt is a 65 y/o male who is s/p low anterior resection with diverting loop ileostomy creation for advanced adenomatous polyp of his rectum. Pt underwent closure of loop ileostomy with intestinal resection on 10/15. 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 tracheostomy.   Pt laying in bed when I visited. Pt stated he normally eats very well at home as his goal is to gain back the weight he has lost. Pt endorsed eating 3 meals per day. Pt stated he tolerated a liquid diet well. Pt now advanced to regular diet. Pt stated his usual weight is about 145 lbs but he is now down to 120 lbs. Per EMR, pt has had a 11.5% weight loss x 6 months (significant). RD performed NFPE which revealed mild muscle wasting and fat loss. RD offered supplements and pt was agreeable.     Patient meets ASPEN/AND criteria for nutrition diagnosis of moderate malnutrition of chronic illness based on: muscle wasting and fat loss.     NFPE results below    RECS:  Boost TID    CLINICAL NUTRITION ASSESSMENT      Reason for Assessment BMI     Diagnosis/Problem   Pt underwent closure of loop ileostomy with intestinal resection on 10/15. 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    Tobacco use disorder        Past Surgical History:   Procedure Laterality Date    COLON RESECTION N/A 7/30/2024    Procedure: Laparoscopic low anterior resection, diverting loop ileostomy;  Surgeon: Abram Cruz MD;  Location: [facility];  Service: General;  Laterality: N/A;     COLONOSCOPY N/A 5/1/2024    Procedure: COLONOSCOPY WITH ANESTHESIA;  Surgeon: Gustabo Ruiz MD;  Location: [facility] ENDOSCOPY;  Service: Gastroenterology;  Laterality: N/A;  preop; abnormal pet scan   postop rectal mass; polyps;   PCP Dr Santos    CYST REMOVAL Right 08/07/2014    RIGHT LOWER EYELID- EPIDERMAL INCLUSION CYST    EXCISION LESION N/A 08/07/2014    MALIGNANT MELANOMA OF LEFT NASAL TIP    FOREHEAD FLAP  09/11/2014    ILEOSTOMY CLOSURE N/A 10/15/2024    Procedure: ILEOSTOMY TAKEDOWN;  Surgeon: Abram Cruz MD;  Location:  [facility];  Service: General;  Laterality: N/A;    PEDICLE FLAP  10/02/2014    PEDICLE DIVISION AND FLAP INSET OF NOSE    PROSTATECTOMY N/A 09/07/2022    Procedure: PROSTATECTOMY LAPAROSCOPIC WITH DAVINCI ROBOT;  Surgeon: Matthew Weaver MD;  Location: [facility] OR;  Service: Robotics - DaVinci;  Laterality: N/A;    SQUAMOUS CELL CARCINOMA EXCISION  10/29/2013    LEFT FLOOR OF MOUTH    TONSILLECTOMY          Anthropometrics        Current Height  Current Weight  BMI kg/m2    Weight: 49.2 kg (108 lb 7.5 oz) (10/15/24 1125)  Body mass index is 16.02 kg/m².   Adjusted BMI (if applicable)    BMI Category Underweight (18.4 or below)   Ideal Body Weight (IBW) 155 lbs   Usual Body Weight (UBW) 145 lbs   Weight Trend Loss   Weight History Wt Readings from Last 30 Encounters:   10/15/24 1125 49.2 kg (108 lb 7.5 oz)   09/04/24 0855 50.3 kg (111 lb)   08/20/24 1012 51.7 kg (114 lb)   08/20/24 0934 51.3 kg (113 lb)   07/28/24 1559 55.7 kg (122 lb 12.7 oz)   07/28/24 1549 55.7 kg (122 lb 12.7 oz)   07/16/24 0803 54.7 kg (120 lb 9.6 oz)   06/20/24 0812 54.4 kg (120 lb)   06/13/24 0813 54 kg (119 lb)   06/06/24 0851 54 kg (119 lb)   06/06/24 0802 54.3 kg (119 lb 12.8 oz)   05/30/24 0801 54.6 kg (120 lb 6.4 oz)   05/23/24 0749 54.6 kg (120 lb 6.4 oz)   05/16/24 0752 55.1 kg (121 lb 6.4 oz)   05/14/24 1450 54.9 kg (121 lb)   05/14/24 1239 55.4 kg (122 lb 3.2 oz)   05/09/24 0754 55.5  "kg (122 lb 6.4 oz)   05/07/24 1523 55.3 kg (122 lb)   05/01/24 1010 55.3 kg (122 lb)   04/26/24 0928 56.2 kg (124 lb)   04/23/24 1400 57.1 kg (125 lb 12.8 oz)   04/02/24 1427 56.7 kg (125 lb)   04/02/24 1331 56.7 kg (125 lb)   01/30/24 0903 61.1 kg (134 lb 12.8 oz)   09/26/23 0841 59.9 kg (132 lb)   07/05/23 0853 58.5 kg (129 lb)   05/23/23 0855 58.3 kg (128 lb 9.6 oz)   12/15/22 1055 60 kg (132 lb 3.2 oz)   09/15/22 1037 57 kg (125 lb 9.6 oz)   09/07/22 0753 54.5 kg (120 lb 2.4 oz)   08/08/22 1024 56.6 kg (124 lb 12.5 oz)        Estimated/Assessed Needs        Energy Requirements    Weight for Calculation 49 kg   Method for Estimation  35-40 kcal/kg   EST Needs (kcal/day) 4235-7284       Protein Requirements    Weight for Calculation 49 kg   EST Protein Needs (g/kg) 1.5 - 2.0 gm/kg   EST Daily Needs (g/day) 73-98       Fluid Requirements     Method for Estimation 1 mL/kcal    Estimated Needs (mL/day)        Fluid Deficit    Current Na Level (mEq/L)    Desired Na Level (mEq/L)    Estimated Fluid Deficit (L)       Labs       Pertinent Labs    Results from last 7 days   Lab Units 10/16/24  0351 10/14/24  1727   SODIUM mmol/L 134* 138   POTASSIUM mmol/L 4.6 3.6   CHLORIDE mmol/L 101 100   CO2 mmol/L 23.7 25.3   BUN mg/dL 9 10   CREATININE mg/dL 0.79 0.83   CALCIUM mg/dL 9.2 9.4   BILIRUBIN mg/dL  --  <0.2   ALK PHOS U/L  --  88   ALT (SGPT) U/L  --  8   AST (SGOT) U/L  --  12   GLUCOSE mg/dL 112* 79     Results from last 7 days   Lab Units 10/16/24  0350 10/14/24  1727   HEMOGLOBIN g/dL 11.5* 13.2   HEMATOCRIT % 35.7* 38.6   WBC 10*3/mm3 12.45* 9.31   ALBUMIN g/dL  --  4.3     Results from last 7 days   Lab Units 10/16/24  0350 10/14/24  1727   PLATELETS 10*3/mm3 171 201     COVID19   Date Value Ref Range Status   08/08/2022 Detected (C) Not Detected - Ref. Range Final     No results found for: \"HGBA1C\"       Medications           Scheduled Medications acetaminophen, 1,000 mg, Oral, Q6H  enoxaparin, 30 mg, " Subcutaneous, Daily  methocarbamol, 750 mg, Oral, 4x Daily       Infusions     PRN Medications   HYDROmorphone    ondansetron ODT **OR** ondansetron    oxyCODONE **OR** oxyCODONE     Physical Findings          General Findings alert, frail, generalized wasting, loss of muscle mass, loss of subcutaneous fat, oriented   Oral/Mouth Cavity tooth or teeth missing   Edema  no edema   Gastrointestinal last bowel movement: 10/14   Skin  surgical incision: right abdomen   Tubes/Drains/Lines PEG   NFPE See Malnutrition Severity Assessment     Malnutrition Severity Assessment      Patient meets criteria for : Moderate (non-severe) Malnutrition  Malnutrition Type (Last 8 Hours)       Malnutrition Severity Assessment       Row Name 10/16/24 1157       Malnutrition Severity Assessment    Malnutrition Type Chronic Disease - Related Malnutrition      Row Name 10/16/24 1157       Insufficient Energy Intake     Insufficient Energy Intake Findings None      Row Name 10/16/24 1157       Unintentional Weight Loss     Unintentional Weight Loss Findings Severe    Unintentional Weight Loss  Weight loss greater than 10% in six months      Row Name 10/16/24 1157       Muscle Loss    Loss of Muscle Mass Findings Mild    [place] Region Moderate - slight depression    Clavicle Bone Region Moderate - some protrusion in females, visible in males    Acromion Bone Region Moderate - acromion may slightly protrude    Scapular Bone Region Moderate - mild depression, bones may show slightly    Dorsal Hand Region Moderate - slight depression      Row Name 10/16/24 1157       Fat Loss    Subcutaneous Fat Loss Findings Mild    Orbital Region  Moderate -  somewhat hollowness, slightly dark circles    Upper Arm Region Moderate - some fat tissue, not ample      Row Name 10/16/24 1157       Criteria Met (Must meet criteria for severity in at least 2 of these categories: M Wasting, Fat Loss, Fluid, Secondary Signs, Wt. Status, Intake)    Patient meets criteria  for  Moderate (non-severe) Malnutrition                     Current Nutrition Orders & Evaluation of Intake       Oral Nutrition     Food Allergies NKFA   Current PO Diet Diet: Regular/House; Fluid Consistency: Thin (IDDSI 0)   Supplement n/a   PO Evaluation     % PO Intake Pt stated he eats 3 meals per day normally.     Factors Affecting Intake: No factors at this time     PES STATEMENT / NUTRITION DIAGNOSIS      Nutrition Dx Problem  Problem: Malnutrition (moderate)  Etiology: Medical Diagnosis - Pt underwent closure of loop ileostomy with intestinal resection on 10/15. 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 Yes   --      Monitor/Evaluation Per protocol, PO intake, Supplement intake, Weight, Skin status, GI status, Symptoms, POC/GOC, Swallow function   Discharge Plan/Needs No discharge needs identified at this time   --    RD to follow per protocol.      Electronically signed by:  Evaristo Harman RDN, LD  10/16/24 11:46 EDT

## 2024-10-16 NOTE — PLAN OF CARE
Goal Outcome Evaluation:  Plan of Care Reviewed With: patient        Progress: improving  Outcome Evaluation: VSS, pt incison clean dry and intact. PEG  intact and clamped, #6 shiley trach capped, pt states he did not need it cleaned or changed. Up to BR x2 for voids. Medicated x 2 with 10mg Roxicodone along with scheduled tylenol. Abd flat with tinkling/hypo active BS. IVF infusing, mother at bedside. Plan of care ongoing.

## 2024-10-16 NOTE — CASE MANAGEMENT/SOCIAL WORK
Discharge Planning Assessment  [facility]     Patient Name: Keron Gerber  MRN: [id number]  Today's Date: 10/16/2024    Admit Date: 10/14/2024    Plan: Home, lives alone, with family support. States daughter is a nurse and helps him. Family to transport.   Discharge Needs Assessment       Row Name 10/16/24 172       Living Environment    Primary Care Provided by self    Provides Primary Care For no one, unable/limited ability to care for self    Family Caregiver if Needed child(parmjit), adult;parent(s)    Quality of Family Relationships involved;supportive;helpful    Able to Return to Prior Arrangements yes       Resource/Environmental Concerns    Resource/Environmental Concerns none    Transportation Concerns none       Transition Planning    Patient/Family Anticipates Transition to home    Patient/Family Anticipated Services at Transition none    Transportation Anticipated family or friend will provide       Discharge Needs Assessment    Readmission Within the Last 30 Days no previous admission in last 30 days    Equipment Currently Used at Home walker, rolling;cane, straight;trach supplies;other (see comments)  G tube but not receiving TF    Concerns to be Addressed care coordination/care conferences;discharge planning    Anticipated Changes Related to Illness none    Equipment Needed After Discharge none    Provided Post Acute Provider List? Refused    Refused Provider List Comment States his daughter is a registered nurse and help s him at home. Denies any need for services or DME    Provided Post Acute Provider Quality & Resource List? Refused    Current Discharge Risk chronically ill;lives alone                   Discharge Plan       Row Name 10/16/24 5843       Plan    Plan Home, lives alone, with family support. States daughter is a nurse and helps him. Family to transport.    Patient/Family in Agreement with Plan yes    Plan Comments Met with pt and pt's mother at bedside. Explained roll of case  manager. Face sheet and pharmacy verified. Pt lives alone in a single-story home. There are 2 steps to enter home. Home DME includes Gtube, Trach supplies, walker, and cane.  Pt is independent with ADLs using a cane. Pt has never been to Rehab or used HH. Pt's PCP is Yousif Marrero MD. Enrolled in Meds to Bed. Pt normally drives himself to appointments. At discharge, pt's mother will transport. Pt lives in [facility] and drive time is 4 ½ hrs. Denies any D/C needs. Explained that CCP would follow to assess for discharge needs.  Quintin Weir RN-BC                  Continued Care and Services - Admitted Since 10/14/2024    No active coordination exists for this encounter.       Expected Discharge Date and Time       Expected Discharge Date Expected Discharge Time    Oct 17, 2024            Demographic Summary       Row Name 10/16/24 1725       General Information    Admission Type inpatient    Arrived From PACU/recovery room    Reason for Consult care coordination/care conference;discharge planning    Preferred Language English                   Functional Status       Row Name 10/16/24 1727       Functional Status    Usual Activity Tolerance good    Current Activity Tolerance good       Functional Status, IADL    Medications assistive equipment    Meal Preparation assistive equipment    Housekeeping assistive equipment    Laundry assistive equipment    Shopping assistive equipment       Mental Status    General Appearance WDL WDL       Mental Status Summary    Recent Changes in Mental Status/Cognitive Functioning no changes       Employment/    Employment Status disabled                      Quintin Weir, RN

## 2024-10-16 NOTE — PLAN OF CARE
Problem: Malnutrition  Goal: Improved Nutritional Intake  Outcome: Progressing   Goal Outcome Evaluation:      RD to follow

## 2024-10-16 NOTE — PROGRESS NOTES
Postoperative day 1 ileostomy takedown    Doing well.  No nausea or vomiting.  Has tolerated the liquids he has taken so far.  No bowel function yet.    Has remained afebrile since surgery.  Heart rate 66.  Blood pressure 98/64 and stable.    BMP in good order.  WBC 12, hemoglobin 11.5    Abdomen is soft.  Incision is intact with no evidence of infection.    Stable postop day 1  DC IV fluids  Await bowel function advance diet as tolerated

## 2024-10-17 PROCEDURE — 25010000002 HYDROMORPHONE PER 4 MG: Performed by: SURGERY

## 2024-10-17 PROCEDURE — 63710000001 ONDANSETRON ODT 4 MG TABLET DISPERSIBLE: Performed by: SURGERY

## 2024-10-17 PROCEDURE — 99024 POSTOP FOLLOW-UP VISIT: CPT | Performed by: SURGERY

## 2024-10-17 PROCEDURE — 94664 DEMO&/EVAL PT USE INHALER: CPT

## 2024-10-17 PROCEDURE — 25010000002 ENOXAPARIN PER 10 MG: Performed by: SURGERY

## 2024-10-17 RX ADMIN — NICOTINE 1 PATCH: 21 PATCH, EXTENDED RELEASE TRANSDERMAL at 08:24

## 2024-10-17 RX ADMIN — OXYCODONE HYDROCHLORIDE 10 MG: 5 TABLET ORAL at 13:46

## 2024-10-17 RX ADMIN — METHOCARBAMOL TABLETS 750 MG: 750 TABLET, COATED ORAL at 08:23

## 2024-10-17 RX ADMIN — OXYCODONE HYDROCHLORIDE 10 MG: 5 TABLET ORAL at 20:18

## 2024-10-17 RX ADMIN — OXYCODONE HYDROCHLORIDE 10 MG: 5 TABLET ORAL at 03:52

## 2024-10-17 RX ADMIN — METHOCARBAMOL TABLETS 750 MG: 750 TABLET, COATED ORAL at 17:07

## 2024-10-17 RX ADMIN — HYDROMORPHONE HYDROCHLORIDE 0.5 MG: 1 INJECTION, SOLUTION INTRAMUSCULAR; INTRAVENOUS; SUBCUTANEOUS at 18:50

## 2024-10-17 RX ADMIN — OXYCODONE HYDROCHLORIDE 10 MG: 5 TABLET ORAL at 08:51

## 2024-10-17 RX ADMIN — METHOCARBAMOL TABLETS 750 MG: 750 TABLET, COATED ORAL at 11:43

## 2024-10-17 RX ADMIN — ACETAMINOPHEN 1000 MG: 500 TABLET ORAL at 03:52

## 2024-10-17 RX ADMIN — ACETAMINOPHEN 1000 MG: 500 TABLET ORAL at 22:10

## 2024-10-17 RX ADMIN — ACETAMINOPHEN 1000 MG: 500 TABLET ORAL at 17:07

## 2024-10-17 RX ADMIN — METHOCARBAMOL TABLETS 750 MG: 750 TABLET, COATED ORAL at 20:18

## 2024-10-17 RX ADMIN — ENOXAPARIN SODIUM 30 MG: 100 INJECTION SUBCUTANEOUS at 08:23

## 2024-10-17 RX ADMIN — ONDANSETRON 4 MG: 4 TABLET, ORALLY DISINTEGRATING ORAL at 18:47

## 2024-10-17 NOTE — PLAN OF CARE
Problem: Adult Inpatient Plan of Care  Goal: Absence of Hospital-Acquired Illness or Injury  Intervention: Identify and Manage Fall Risk  Recent Flowsheet Documentation  Taken 10/17/2024 0400 by Sean Turpin RN  Safety Promotion/Fall Prevention:   activity supervised   assistive device/personal items within reach   clutter free environment maintained   safety round/check completed   nonskid shoes/slippers when out of bed  Taken 10/17/2024 0215 by Sean Turpin RN  Safety Promotion/Fall Prevention:   activity supervised   assistive device/personal items within reach   clutter free environment maintained   safety round/check completed   nonskid shoes/slippers when out of bed  Taken 10/17/2024 0033 by Sean Turpin RN  Safety Promotion/Fall Prevention:   activity supervised   assistive device/personal items within reach   clutter free environment maintained   safety round/check completed   nonskid shoes/slippers when out of bed  Taken 10/16/2024 2222 by Sean Turpin RN  Safety Promotion/Fall Prevention:   activity supervised   assistive device/personal items within reach   clutter free environment maintained   safety round/check completed   nonskid shoes/slippers when out of bed  Taken 10/16/2024 2022 by Sean Turpin RN  Safety Promotion/Fall Prevention:   activity supervised   assistive device/personal items within reach   clutter free environment maintained   safety round/check completed   nonskid shoes/slippers when out of bed  Intervention: Prevent Skin Injury  Recent Flowsheet Documentation  Taken 10/17/2024 0400 by Sean Turpin RN  Body Position: position changed independently  Taken 10/17/2024 0215 by Sean Turpin RN  Body Position: position changed independently  Taken 10/17/2024 0033 by Sean Turpin RN  Body Position: position changed independently  Taken 10/16/2024 2222 by Sean Turpin RN  Body Position: position changed independently  Taken  10/16/2024 2022 by Sean Turpin, RN  Body Position: position changed independently  Goal: Optimal Comfort and Wellbeing  Intervention: Monitor Pain and Promote Comfort  Recent Flowsheet Documentation  Taken 10/16/2024 1952 by Sean Turpin, RN  Pain Management Interventions: pain medication given  Intervention: Provide Person-Centered Care  Recent Flowsheet Documentation  Taken 10/16/2024 2022 by Sean Turpin, RN  Trust Relationship/Rapport: care explained   Goal Outcome Evaluation:

## 2024-10-17 NOTE — PROGRESS NOTES
Colorectal & General Surgery  Progress Note    Patient: Keron Gerber  YOB: 1960  MRN: [id number]      Assessment  Keron Gerber is a 64 y.o. male with ileostomy status now postoperative day 2 from ileostomy closure.  Overall, recovering well.  Still awaiting bowel function.  Abdominal exam is benign.  No tachycardia.  Repeat labs in the morning.  Continue diet for now.    Subjective  Overall feels well.  Complains of some pain.  No flatus.    Objective    Vitals:    10/17/24 1323   BP: 107/71   Pulse: 68   Resp: 18   Temp: 98.8 °F (37.1 °C)   SpO2: 96%       Physical Exam  Constitutional: Well-developed well-nourished, no acute distress  Neck: Supple, trachea midline  Respiratory: No increased work of breathing, Symmetric excursion  Cardiovascular: Well pefursed, no jugular venous distention evident   Abdominal: Ileostomy site is closed without any erythema.  Soft, non-tender, non-distended  Skin: Warm, dry, no rash on visualized skin surfaces  Psychiatric: Alert and oriented ×3, normal affect          Trey Cruz MD  Colorectal & General Surgery  [facility]    [address], [address]  [place], [place], [address]  P: [phone]  F: [phone]

## 2024-10-18 LAB
ANION GAP SERPL CALCULATED.3IONS-SCNC: 12.4 MMOL/L (ref 5–15)
BUN SERPL-MCNC: 9 MG/DL (ref 8–23)
BUN/CREAT SERPL: 12.2 (ref 7–25)
CALCIUM SPEC-SCNC: 8.7 MG/DL (ref 8.6–10.5)
CHLORIDE SERPL-SCNC: 99 MMOL/L (ref 98–107)
CO2 SERPL-SCNC: 23.6 MMOL/L (ref 22–29)
CREAT SERPL-MCNC: 0.74 MG/DL (ref 0.76–1.27)
DEPRECATED RDW RBC AUTO: 42.1 FL (ref 37–54)
EGFRCR SERPLBLD CKD-EPI 2021: 102.4 ML/MIN/1.73
ERYTHROCYTE [DISTWIDTH] IN BLOOD BY AUTOMATED COUNT: 11.7 % (ref 12.3–15.4)
GLUCOSE SERPL-MCNC: 100 MG/DL (ref 65–99)
HCT VFR BLD AUTO: 35.2 % (ref 37.5–51)
HGB BLD-MCNC: 12.8 G/DL (ref 13–17.7)
MAGNESIUM SERPL-MCNC: 2 MG/DL (ref 1.6–2.4)
MCH RBC QN AUTO: 36.1 PG (ref 26.6–33)
MCHC RBC AUTO-ENTMCNC: 36.4 G/DL (ref 31.5–35.7)
MCV RBC AUTO: 99.2 FL (ref 79–97)
PHOSPHATE SERPL-MCNC: 3.3 MG/DL (ref 2.5–4.5)
PLATELET # BLD AUTO: 176 10*3/MM3 (ref 140–450)
PMV BLD AUTO: 9.5 FL (ref 6–12)
POTASSIUM SERPL-SCNC: 3.7 MMOL/L (ref 3.5–5.2)
PROCALCITONIN SERPL-MCNC: 0.09 NG/ML (ref 0–0.25)
RBC # BLD AUTO: 3.55 10*6/MM3 (ref 4.14–5.8)
SODIUM SERPL-SCNC: 135 MMOL/L (ref 136–145)
WBC NRBC COR # BLD AUTO: 9.09 10*3/MM3 (ref 3.4–10.8)

## 2024-10-18 PROCEDURE — 80048 BASIC METABOLIC PNL TOTAL CA: CPT | Performed by: SURGERY

## 2024-10-18 PROCEDURE — 84100 ASSAY OF PHOSPHORUS: CPT | Performed by: SURGERY

## 2024-10-18 PROCEDURE — 63710000001 ONDANSETRON ODT 4 MG TABLET DISPERSIBLE: Performed by: SURGERY

## 2024-10-18 PROCEDURE — 83735 ASSAY OF MAGNESIUM: CPT | Performed by: SURGERY

## 2024-10-18 PROCEDURE — 25010000002 ONDANSETRON PER 1 MG: Performed by: SURGERY

## 2024-10-18 PROCEDURE — 85027 COMPLETE CBC AUTOMATED: CPT | Performed by: SURGERY

## 2024-10-18 PROCEDURE — 99024 POSTOP FOLLOW-UP VISIT: CPT | Performed by: SURGERY

## 2024-10-18 PROCEDURE — 84145 PROCALCITONIN (PCT): CPT | Performed by: SURGERY

## 2024-10-18 PROCEDURE — 25010000002 ENOXAPARIN PER 10 MG: Performed by: SURGERY

## 2024-10-18 RX ADMIN — ACETAMINOPHEN 1000 MG: 500 TABLET ORAL at 22:28

## 2024-10-18 RX ADMIN — METHOCARBAMOL TABLETS 750 MG: 750 TABLET, COATED ORAL at 21:51

## 2024-10-18 RX ADMIN — OXYCODONE HYDROCHLORIDE 10 MG: 5 TABLET ORAL at 19:30

## 2024-10-18 RX ADMIN — ACETAMINOPHEN 1000 MG: 500 TABLET ORAL at 17:26

## 2024-10-18 RX ADMIN — OXYCODONE HYDROCHLORIDE 10 MG: 5 TABLET ORAL at 06:17

## 2024-10-18 RX ADMIN — METHOCARBAMOL TABLETS 750 MG: 750 TABLET, COATED ORAL at 08:20

## 2024-10-18 RX ADMIN — ENOXAPARIN SODIUM 30 MG: 100 INJECTION SUBCUTANEOUS at 08:20

## 2024-10-18 RX ADMIN — NICOTINE 1 PATCH: 21 PATCH, EXTENDED RELEASE TRANSDERMAL at 08:22

## 2024-10-18 RX ADMIN — OXYCODONE HYDROCHLORIDE 10 MG: 5 TABLET ORAL at 15:02

## 2024-10-18 RX ADMIN — METHOCARBAMOL TABLETS 750 MG: 750 TABLET, COATED ORAL at 12:01

## 2024-10-18 RX ADMIN — ONDANSETRON 4 MG: 4 TABLET, ORALLY DISINTEGRATING ORAL at 15:05

## 2024-10-18 RX ADMIN — ONDANSETRON 4 MG: 2 INJECTION, SOLUTION INTRAMUSCULAR; INTRAVENOUS at 00:19

## 2024-10-18 RX ADMIN — ONDANSETRON 4 MG: 4 TABLET, ORALLY DISINTEGRATING ORAL at 06:22

## 2024-10-18 RX ADMIN — ACETAMINOPHEN 1000 MG: 500 TABLET ORAL at 06:18

## 2024-10-18 RX ADMIN — OXYCODONE HYDROCHLORIDE 10 MG: 5 TABLET ORAL at 10:39

## 2024-10-18 RX ADMIN — OXYCODONE HYDROCHLORIDE 10 MG: 5 TABLET ORAL at 00:19

## 2024-10-18 RX ADMIN — ACETAMINOPHEN 1000 MG: 500 TABLET ORAL at 09:47

## 2024-10-18 RX ADMIN — METHOCARBAMOL TABLETS 750 MG: 750 TABLET, COATED ORAL at 17:25

## 2024-10-18 NOTE — PROGRESS NOTES
"Enter Query Response Below      Query Response: Non-Severe (Moderate) chronic illness related malnutrition              If applicable, please update the problem list.       Patient: Keron Gerber        : 1960  Account: [id number]           Admit Date:         How to Respond to this query:       a. Click New Note     b. Answer query within the yellow box.                c. Update the Problem List, if applicable.      If you have any questions about this query contact me at: [email or web address]     Dr. Cruz,    Patient presented on 10/14 for ileostomy takedown.  Patient was seen by Registered Dietitian on 10/16.  Registered Dietitian documented patient's severe unintentional weight loss, moderate loss of muscle, moderate loss of fat, and plan for Boost three times daily.  Registered Dietitian documented, \"Patient meets ASPEN/AND criteria for nutrition diagnosis of moderate malnutrition of chronic illness.\"    Please clarify diagnosis treated/monitored:    - Non-Severe (Moderate) chronic illness related malnutrition  - Other- specify __________    By submitting this query, we are merely seeking further clarification of documentation to accurately reflect all conditions that you are monitoring, evaluating, treating or that extend the hospitalization or utilize additional resources of care. Please utilize your independent clinical judgment when addressing the question(s) above.     This query and your response, once completed, will be entered into the legal medical record.    Sincerely,  Eboni Lopez RN CCDS [facility]  Clinical Documentation Integrity Program   "

## 2024-10-18 NOTE — PLAN OF CARE
Problem: Adult Inpatient Plan of Care  Goal: Absence of Hospital-Acquired Illness or Injury  Intervention: Identify and Manage Fall Risk  Recent Flowsheet Documentation  Taken 10/18/2024 0400 by Sean Turpin RN  Safety Promotion/Fall Prevention:   activity supervised   assistive device/personal items within reach   clutter free environment maintained   safety round/check completed   nonskid shoes/slippers when out of bed  Taken 10/18/2024 0200 by Sean Turpin RN  Safety Promotion/Fall Prevention:   activity supervised   assistive device/personal items within reach   clutter free environment maintained   safety round/check completed   nonskid shoes/slippers when out of bed  Taken 10/18/2024 0000 by Sean Turpin RN  Safety Promotion/Fall Prevention:   activity supervised   assistive device/personal items within reach   clutter free environment maintained   safety round/check completed   nonskid shoes/slippers when out of bed  Taken 10/17/2024 2200 by Sean Turpin RN  Safety Promotion/Fall Prevention:   activity supervised   assistive device/personal items within reach   clutter free environment maintained   safety round/check completed   nonskid shoes/slippers when out of bed  Taken 10/17/2024 2018 by Sean Turpin RN  Safety Promotion/Fall Prevention:   activity supervised   assistive device/personal items within reach   clutter free environment maintained   safety round/check completed   nonskid shoes/slippers when out of bed  Intervention: Prevent Skin Injury  Recent Flowsheet Documentation  Taken 10/18/2024 0400 by Sean Turpin RN  Body Position: position changed independently  Taken 10/18/2024 0200 by Sean Turpin RN  Body Position: position changed independently  Taken 10/18/2024 0000 by Sean Turpin RN  Body Position: position changed independently  Taken 10/17/2024 2200 by Sean Turpin RN  Body Position: position changed independently  Taken  10/17/2024 2018 by Sean Turpin, RN  Body Position: position changed independently  Goal: Optimal Comfort and Wellbeing  Intervention: Monitor Pain and Promote Comfort  Recent Flowsheet Documentation  Taken 10/17/2024 2018 by Sean Turpin, RN  Pain Management Interventions: pain medication given   Goal Outcome Evaluation:

## 2024-10-18 NOTE — PROGRESS NOTES
Colorectal & General Surgery  Progress Note    Patient: Keron Gerber  YOB: 1960  MRN: [id number]      Assessment  Keron Gerber is a 64 y.o. male now postoperative day 3 from ileostomy closure.  Continues to recover well but we are still awaiting bowel function.  He had a significant ileus after his first surgery, so I suspect he will probably have an ileus after the surgery.  Abdominal exam is benign, no tachycardia, labs all within normal ranges.    Plan  Continue diet as tolerated  Await return of bowel function      Subjective  No significant events.  Feels relatively well.  1 episode of nausea yesterday.    Objective    Vitals:    10/18/24 1554   BP: 105/66   Pulse: 74   Resp: 16   Temp: 98.1 °F (36.7 °C)   SpO2: 98%       Physical Exam  Constitutional: Well-developed well-nourished, no acute distress  Neck: Supple, trachea midline  Respiratory: No increased work of breathing, Symmetric excursion  Cardiovascular: Well pefursed, no jugular venous distention evident   Abdominal: Incision in good order.  Soft, appropriately tender, mildly distended.  Skin: Warm, dry, no rash on visualized skin surfaces  Psychiatric: Alert and oriented ×3, normal affect     Laboratory Results  I have personally reviewed CBC with WC 9, hemoglobin 12.8, platelets 176.  Procalcitonin 0.09.  BMP with creatinine 0.74, bicarb 23.    Radiology  No imaging to review         Trey Cruz MD  Colorectal & General Surgery  [facility]    [address], [address]  [place], [place], [address]  P: [phone]  F: [phone]

## 2024-10-18 NOTE — CASE MANAGEMENT/SOCIAL WORK
Continued Stay Note  [facility]     Patient Name: Keron Gerber  MRN: [id number]  Today's Date: 10/18/2024    Admit Date: 10/14/2024    Plan: Home with family support   Discharge Plan       Row Name 10/18/24 9056       Plan    Plan Home with family support    Patient/Family in Agreement with Plan yes    Plan Comments Home with family. Patient has Gtube, Trach supplies, walker and cane. Patient is independent at home. Daughter is a nurse and can assist. Family to transport. CCP following                   Discharge Codes    No documentation.                 Expected Discharge Date and Time       Expected Discharge Date Expected Discharge Time    Oct 18, 2024

## 2024-10-18 NOTE — PLAN OF CARE
Goal Outcome Evaluation:           Progress: no change  Outcome Evaluation: Patient transferred from [facility] this afternoon. Standby assistance with a cane in the halls and to the restroom. Family to remain at bedside. Incision CDI. G-tube clamped. Tracheostomy cared for by patient. Plan of care ongoing.

## 2024-10-19 PROCEDURE — 25010000002 ENOXAPARIN PER 10 MG: Performed by: SURGERY

## 2024-10-19 PROCEDURE — 99024 POSTOP FOLLOW-UP VISIT: CPT | Performed by: SURGERY

## 2024-10-19 RX ADMIN — METHOCARBAMOL TABLETS 750 MG: 750 TABLET, COATED ORAL at 21:41

## 2024-10-19 RX ADMIN — METHOCARBAMOL TABLETS 750 MG: 750 TABLET, COATED ORAL at 12:54

## 2024-10-19 RX ADMIN — NICOTINE 1 PATCH: 21 PATCH, EXTENDED RELEASE TRANSDERMAL at 09:29

## 2024-10-19 RX ADMIN — OXYCODONE HYDROCHLORIDE 10 MG: 5 TABLET ORAL at 21:41

## 2024-10-19 RX ADMIN — OXYCODONE HYDROCHLORIDE 10 MG: 5 TABLET ORAL at 15:56

## 2024-10-19 RX ADMIN — ACETAMINOPHEN 1000 MG: 500 TABLET ORAL at 06:40

## 2024-10-19 RX ADMIN — OXYCODONE HYDROCHLORIDE 10 MG: 5 TABLET ORAL at 09:29

## 2024-10-19 RX ADMIN — ENOXAPARIN SODIUM 30 MG: 100 INJECTION SUBCUTANEOUS at 09:28

## 2024-10-19 RX ADMIN — METHOCARBAMOL TABLETS 750 MG: 750 TABLET, COATED ORAL at 09:29

## 2024-10-19 RX ADMIN — ACETAMINOPHEN 1000 MG: 500 TABLET ORAL at 21:41

## 2024-10-19 NOTE — PLAN OF CARE
Goal Outcome Evaluation: Patient progressing well today with multiple bowel movements status post ileostomy take down. Diet is being tolerated well. Patient took several laps around nursing station ad michelle. Roxicodone Q4H controlling pain. Possible DC Monday per surgery.       Problem: Adult Inpatient Plan of Care  Goal: Plan of Care Review  Outcome: Progressing

## 2024-10-19 NOTE — PLAN OF CARE
Goal Outcome Evaluation:      Patient resting in bed at this time, see MAR for medications administered. No distress noted. Plan of care is ongoing.

## 2024-10-19 NOTE — PROGRESS NOTES
Colorectal & General Surgery  Progress Note    Patient: Keron Gerber  YOB: 1960  MRN: [id number]      Assessment  Keron Gerber is a 64 y.o. male  now postoperative day 4 from ileostomy closure.  He is passing flatus and having bowel function.  Pain is well-controlled.  No tachycardia or hypotension.  Abdominal exam benign.    Plan  Continue regular diet  Discontinue intravenous pain medications  Anticipate discharge as early as Monday given his significant travel distance back home      Subjective  Multiple bowel movements and passing flatus.  Beginning to feel much better.    Objective    Vitals:    10/19/24 0815   BP: 104/68   Pulse: 84   Resp: 16   Temp: 98.1 °F (36.7 °C)   SpO2: 97%       Physical Exam  Constitutional: Well-developed well-nourished, no acute distress  Neck: Supple, trachea midline  Respiratory: No increased work of breathing, Symmetric excursion  Cardiovascular: Well pefursed, no jugular venous distention evident   Abdominal: Soft, appropriately tender, mildly distended, G-tube in place, ileostomy incision intact.  Skin: Warm, dry, no rash on visualized skin surfaces  Psychiatric: Alert and oriented ×3, normal affect          Tery Cruz MD  Colorectal & General Surgery  [facility]    [address], [address]  [place], [place], [address]  P: [phone]  F: [phone]

## 2024-10-20 PROCEDURE — 99024 POSTOP FOLLOW-UP VISIT: CPT | Performed by: SURGERY

## 2024-10-20 PROCEDURE — 25010000002 ENOXAPARIN PER 10 MG: Performed by: SURGERY

## 2024-10-20 PROCEDURE — 63710000001 ONDANSETRON ODT 4 MG TABLET DISPERSIBLE: Performed by: SURGERY

## 2024-10-20 RX ADMIN — METHOCARBAMOL TABLETS 750 MG: 750 TABLET, COATED ORAL at 21:02

## 2024-10-20 RX ADMIN — OXYCODONE HYDROCHLORIDE 10 MG: 5 TABLET ORAL at 04:33

## 2024-10-20 RX ADMIN — METHOCARBAMOL TABLETS 750 MG: 750 TABLET, COATED ORAL at 17:15

## 2024-10-20 RX ADMIN — ACETAMINOPHEN 1000 MG: 500 TABLET ORAL at 04:33

## 2024-10-20 RX ADMIN — NICOTINE 1 PATCH: 21 PATCH, EXTENDED RELEASE TRANSDERMAL at 08:24

## 2024-10-20 RX ADMIN — ONDANSETRON 4 MG: 4 TABLET, ORALLY DISINTEGRATING ORAL at 21:02

## 2024-10-20 RX ADMIN — ACETAMINOPHEN 1000 MG: 500 TABLET ORAL at 17:16

## 2024-10-20 RX ADMIN — OXYCODONE HYDROCHLORIDE 10 MG: 5 TABLET ORAL at 08:24

## 2024-10-20 RX ADMIN — OXYCODONE HYDROCHLORIDE 5 MG: 5 TABLET ORAL at 17:15

## 2024-10-20 RX ADMIN — METHOCARBAMOL TABLETS 750 MG: 750 TABLET, COATED ORAL at 08:24

## 2024-10-20 RX ADMIN — OXYCODONE HYDROCHLORIDE 5 MG: 5 TABLET ORAL at 12:56

## 2024-10-20 RX ADMIN — ACETAMINOPHEN 1000 MG: 500 TABLET ORAL at 10:51

## 2024-10-20 RX ADMIN — ENOXAPARIN SODIUM 30 MG: 100 INJECTION SUBCUTANEOUS at 08:24

## 2024-10-20 RX ADMIN — METHOCARBAMOL TABLETS 750 MG: 750 TABLET, COATED ORAL at 12:56

## 2024-10-20 NOTE — PLAN OF CARE
Goal Outcome Evaluation:   Patient A&Ox4.  Passing gas & having BM since ileostomy reversal.  Incision with dermabond.  Oxycodone given as requested.  Trach in place with speaking valve.  Tolerating regular diet.  Up ad michelle.  VSS.

## 2024-10-20 NOTE — PROGRESS NOTES
Colorectal & General Surgery  Progress Note    Patient: Keron Gerber  YOB: 1960  MRN: [id number]      Assessment  Keron Gerber is a 64 y.o. male now postoperative day 5 from ileostomy closure.  Continues to pass flatus and had significant bowel function yesterday.  Pain is well-controlled.  Abdomen is slightly distended but no tachycardia or hypotension or any other abnormalities on physical exam.    Plan  Continue diet as tolerated  Given that he lives so far away, I would like him to be a little bit less distended and have a little bit more bowel function prior to discharge.      Subjective  Significant events.  Passing flatus.    Objective    Vitals:    10/20/24 0426   BP: 116/74   Pulse: 71   Resp: 16   Temp: 98.2 °F (36.8 °C)   SpO2: 98%       Physical Exam  Constitutional: Well-developed well-nourished, no acute distress  Neck: Supple, trachea midline  Respiratory: No increased work of breathing, Symmetric excursion  Cardiovascular: Well pefursed, no jugular venous distention evident   Abdominal: Incision in good order.  Soft, appropriately tender, distended.  Soft, non-tender, non-distended  Skin: Warm, dry, no rash on visualized skin surfaces  Psychiatric: Alert and oriented ×3, normal affect          Trey Cruz MD  Colorectal & General Surgery  [facility] [place]    [address], [address]  [place], [place], [address]  P: [phone]  F: [phone]

## 2024-10-20 NOTE — PLAN OF CARE
Problem: Adult Inpatient Plan of Care  Goal: Plan of Care Review  Outcome: Progressing     Problem: Adult Inpatient Plan of Care  Goal: Optimal Comfort and Wellbeing  Outcome: Progressing  Intervention: Monitor Pain and Promote Comfort  Recent Flowsheet Documentation  Taken 10/20/2024 0835 by Beth Joy RN  Pain Management Interventions: pain medication given  Intervention: Provide Person-Centered Care  Recent Flowsheet Documentation  Taken 10/20/2024 0835 by Beth Joy RN  Trust Relationship/Rapport:   questions answered   questions encouraged   reassurance provided     Problem: Adult Inpatient Plan of Care  Goal: Readiness for Transition of Care  Outcome: Progressing

## 2024-10-21 LAB
ANION GAP SERPL CALCULATED.3IONS-SCNC: 13.2 MMOL/L (ref 5–15)
BUN SERPL-MCNC: 13 MG/DL (ref 8–23)
BUN/CREAT SERPL: 17.3 (ref 7–25)
CALCIUM SPEC-SCNC: 8.8 MG/DL (ref 8.6–10.5)
CHLORIDE SERPL-SCNC: 98 MMOL/L (ref 98–107)
CO2 SERPL-SCNC: 23.8 MMOL/L (ref 22–29)
CREAT SERPL-MCNC: 0.75 MG/DL (ref 0.76–1.27)
DEPRECATED RDW RBC AUTO: 42.4 FL (ref 37–54)
EGFRCR SERPLBLD CKD-EPI 2021: 102 ML/MIN/1.73
ERYTHROCYTE [DISTWIDTH] IN BLOOD BY AUTOMATED COUNT: 11.5 % (ref 12.3–15.4)
GLUCOSE SERPL-MCNC: 105 MG/DL (ref 65–99)
HCT VFR BLD AUTO: 35.2 % (ref 37.5–51)
HGB BLD-MCNC: 11.8 G/DL (ref 13–17.7)
MAGNESIUM SERPL-MCNC: 1.8 MG/DL (ref 1.6–2.4)
MCH RBC QN AUTO: 34.3 PG (ref 26.6–33)
MCHC RBC AUTO-ENTMCNC: 33.5 G/DL (ref 31.5–35.7)
MCV RBC AUTO: 102.3 FL (ref 79–97)
PHOSPHATE SERPL-MCNC: 3.8 MG/DL (ref 2.5–4.5)
PLATELET # BLD AUTO: 209 10*3/MM3 (ref 140–450)
PMV BLD AUTO: 9.2 FL (ref 6–12)
POTASSIUM SERPL-SCNC: 3.9 MMOL/L (ref 3.5–5.2)
RBC # BLD AUTO: 3.44 10*6/MM3 (ref 4.14–5.8)
SODIUM SERPL-SCNC: 135 MMOL/L (ref 136–145)
WBC NRBC COR # BLD AUTO: 9.3 10*3/MM3 (ref 3.4–10.8)

## 2024-10-21 PROCEDURE — 83735 ASSAY OF MAGNESIUM: CPT | Performed by: SURGERY

## 2024-10-21 PROCEDURE — 25010000002 ENOXAPARIN PER 10 MG: Performed by: SURGERY

## 2024-10-21 PROCEDURE — 80048 BASIC METABOLIC PNL TOTAL CA: CPT | Performed by: SURGERY

## 2024-10-21 PROCEDURE — 99024 POSTOP FOLLOW-UP VISIT: CPT | Performed by: SURGERY

## 2024-10-21 PROCEDURE — 84100 ASSAY OF PHOSPHORUS: CPT | Performed by: SURGERY

## 2024-10-21 PROCEDURE — 85027 COMPLETE CBC AUTOMATED: CPT | Performed by: SURGERY

## 2024-10-21 RX ADMIN — ACETAMINOPHEN 1000 MG: 500 TABLET ORAL at 09:25

## 2024-10-21 RX ADMIN — ACETAMINOPHEN 1000 MG: 500 TABLET ORAL at 16:27

## 2024-10-21 RX ADMIN — OXYCODONE HYDROCHLORIDE 5 MG: 5 TABLET ORAL at 11:42

## 2024-10-21 RX ADMIN — METHOCARBAMOL TABLETS 750 MG: 750 TABLET, COATED ORAL at 17:43

## 2024-10-21 RX ADMIN — METHOCARBAMOL TABLETS 750 MG: 750 TABLET, COATED ORAL at 09:25

## 2024-10-21 RX ADMIN — NICOTINE 1 PATCH: 21 PATCH, EXTENDED RELEASE TRANSDERMAL at 09:27

## 2024-10-21 RX ADMIN — METHOCARBAMOL TABLETS 750 MG: 750 TABLET, COATED ORAL at 21:15

## 2024-10-21 RX ADMIN — OXYCODONE HYDROCHLORIDE 5 MG: 5 TABLET ORAL at 16:27

## 2024-10-21 RX ADMIN — OXYCODONE HYDROCHLORIDE 10 MG: 5 TABLET ORAL at 21:18

## 2024-10-21 RX ADMIN — ACETAMINOPHEN 1000 MG: 500 TABLET ORAL at 04:12

## 2024-10-21 RX ADMIN — ENOXAPARIN SODIUM 30 MG: 100 INJECTION SUBCUTANEOUS at 09:26

## 2024-10-21 RX ADMIN — METHOCARBAMOL TABLETS 750 MG: 750 TABLET, COATED ORAL at 11:56

## 2024-10-21 RX ADMIN — ACETAMINOPHEN 1000 MG: 500 TABLET ORAL at 21:15

## 2024-10-21 NOTE — PLAN OF CARE
Goal Outcome Evaluation:   Up independently. Active bowel sounds, but still no BM. Trach care done per patient. PEG tube remains clamped.

## 2024-10-21 NOTE — CASE MANAGEMENT/SOCIAL WORK
Continued Stay Note  [facility]     Patient Name: Keron Gerber  MRN: [id number]  Today's Date: 10/21/2024    Admit Date: 10/14/2024    Plan: Home w/ family support   Discharge Plan       Row Name 10/21/24 1148       Plan    Plan Home w/ family support    Plan Comments Clinical chart reviewed; plan remains home w/ family. Patient has chronic trach and Gtube. Daughter is an RN and can assist as needed. Family to transport. CCP will continue to follow for dc planning needs. BIBI, PACOW                   Discharge Codes    No documentation.                 Expected Discharge Date and Time       Expected Discharge Date Expected Discharge Time    Oct 19, 2024               VIVIANA Harkins

## 2024-10-21 NOTE — PAYOR COMM NOTE
"Kathy Gerber (64 y.o. Male)      CONTINUED STAY CLINICALS:  REF#   [id number]     UR : [phone]  [phone]    [facility]:  NPI- [id number] ,  TIN# [id number]    FABIÁN STEEN RN,CCP           Date of Birth   1960    Social Security Number       Address   [address] [address]    Home Phone   [phone]    MRN   [id number]       Cheondoism   Other    Marital Status   Single                            Admission Date   10/14/24    Admission Type   Urgent    Admitting Provider   Abram Curz MD    Attending Provider   Abram Cruz MD    Department, Room/Bed   [address], P386/1       Discharge Date       Discharge Disposition       Discharge Destination                                 Attending Provider: Abram Cruz MD    Allergies: Sulfa Antibiotics    Isolation: None   Infection: None   Code Status: CPR    Ht: 175.3 cm (69\")   Wt: 49.2 kg (108 lb 7.5 oz)    Admission Cmt: None   Principal Problem: Tubulovillous adenoma of rectum [D12.8]                   Active Insurance as of 10/14/2024       Primary Coverage       Payor Plan Insurance Group Employer/Plan Group    [place] [id number]       Payor Plan Address Payor Plan Phone Number Payor Plan Fax Number Effective Dates    [employer] [address] [address] [phone]  7/1/2024 - None Entered    [facility] [id number]         Subscriber Name Subscriber Birth Date Member ID       KATHY GERBER 1960 [id number]                     Emergency Contacts        (Rel.) Home Phone Work Phone Mobile Phone    ALIX GONZALEZ (Sister) [phone] -- [phone]    ZabrinaPortia (Mother) [phone] -- [phone]    RENY GONZALEZ (Brother) [phone] -- [phone]    Elvira paez -- -- --                 History & Physical        Abram Cruz MD at 10/15/24 0732          Colorectal & General " Surgery  History and Physical    Patient: Keron Gerber  YOB: 1960  MRN: [id number]      Assessment  Keron Gerber is a 64 y.o. male status post low anterior resection with diverting loop ileostomy creation for advanced adenomatous polyp of his rectum.  He presents for ileostomy closure.  No significant changes to his medical history.  Plan to proceed the operating room today for ileostomy closure.  We discussed the risk, benefits, alternatives of procedure, including anastomotic leak, wound complications, bleeding, infection.  Informed consent obtained.    Chief Complaint: Ileostomy status    History of Present Illness   Keron Gerber is a 64 y.o. male who presents to the hospital for reversal of his loop ileostomy.  No new issues.    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    Tobacco use disorder         Past Surgical History   Past Surgical History:   Procedure Laterality Date    COLON RESECTION N/A 7/30/2024    Procedure: Laparoscopic low anterior resection, diverting loop ileostomy;  Surgeon: Abram Cruz MD;  Location: [facility] OR;  Service: General;  Laterality: N/A;    COLONOSCOPY N/A 5/1/2024    Procedure: COLONOSCOPY WITH ANESTHESIA;  Surgeon: Gustabo Ruiz MD;  Location: [facility] ENDOSCOPY;  Service: Gastroenterology;  Laterality: N/A;  preop; abnormal pet scan   postop rectal mass; polyps;   PCP Dr Santos    CYST REMOVAL Right 08/07/2014    RIGHT LOWER EYELID- EPIDERMAL INCLUSION CYST    EXCISION LESION N/A 08/07/2014    MALIGNANT MELANOMA OF LEFT NASAL TIP    FOREHEAD FLAP  09/11/2014    PEDICLE FLAP  10/02/2014    PEDICLE DIVISION AND FLAP INSET OF NOSE    PROSTATECTOMY N/A 09/07/2022    Procedure: PROSTATECTOMY LAPAROSCOPIC WITH DAVINCI ROBOT;  Surgeon: Matthew Weaver MD;  Location:   [facility] OR;  Service: Robotics - [facility];  Laterality: N/A;    SQUAMOUS CELL CARCINOMA EXCISION  10/29/2013    LEFT FLOOR OF MOUTH    TONSILLECTOMY         Social History  Social History     Socioeconomic History    Marital status: Single   Tobacco Use    Smoking status: Every Day     Current packs/day: 1.00     Types: Cigarettes    Smokeless tobacco: Never   Vaping Use    Vaping status: Never Used   Substance and Sexual Activity    Alcohol use: Not Currently     Comment: 12 PER DAY    Drug use: No    Sexual activity: Defer       Family History  Family History   Problem Relation Age of Onset    No Known Problems Mother     Heart disease Father     Colon polyps Father     Colon cancer Neg Hx        Colorectal cancer family history: None    Review of Systems  Negative except as documented in the HPI.     Allergies  Allergies   Allergen Reactions    Sulfa Antibiotics Other (See Comments)     Headaches       Medications    Current Facility-Administered Medications:     nicotine (NICODERM CQ) 21 MG/24HR patch 1 patch, 1 patch, Transdermal, Q24H, Abram Cruz MD, 1 patch at 10/14/24 2112    ondansetron (ZOFRAN) injection 4 mg, 4 mg, Intravenous, Q6H PRN, Abram Cruz MD    Vital Signs  Vitals:    10/15/24 0543   BP: 99/62   Pulse: 69   Resp: 16   Temp: 97.5 °F (36.4 °C)   SpO2: 99%        Physical Exam  Constitutional: Resting comfortably, no acute distress  Neck: Supple, trachea midline  Respiratory: No increased work of breathing, Symmetric excursion  Cardiovascular: Well pefursed, no jugular venous distention evident   Abdominal: Ileostomy is pink and above the skin.  Soft, non-tender, non-distended  Lymphatics: No cervical or suprascapular adenopathy  Skin: Warm, dry, no rash on visualized skin surfaces  Musculoskeletal: Symmetric strength, no obvious gross abnormalities  Psychiatric: Alert and oriented ×3, normal affect         Laboratory Results  I have personally reviewed CBC with WC 9,  hemoglobin 13, platelets 201.  BMP with creatinine 0.8, bicarb 25.  Albumin 4.3.    Radiology  I have personally reviewed.  Minima demonstrates patent colorectal anastomosis.         Trey Cruz MD  Colorectal & General Surgery  [facility]    [address], [address]  [place], [place], [address]  P: [phone]  F: [phone]      Electronically signed by Abram Cruz MD at 10/15/24 0799       Emergency Department Notes    No notes of this type exist for this encounter.       Vital Signs (last 7 days)       Date/Time Temp Temp src Pulse Resp BP Patient Position SpO2    10/21/24 0733 98.1 (36.7) Oral 73 18 101/66 Lying 97    10/21/24 0456 -- -- 88 -- 120/77 Lying --    10/21/24 0411 -- -- -- -- 102/58 Sitting --    10/21/24 0403 98.1 (36.7) Oral 101 20 -- -- 97    10/20/24 2001 98.1 (36.7) Oral 75 18 114/79 Lying 99    10/20/24 1640 97.9 (36.6) Oral 79 16 116/74 Sitting 100    10/20/24 1107 97.1 (36.2) Oral 65 16 102/63 Lying 100    10/20/24 0822 97.9 (36.6) Oral 64 16 110/63 Lying 99    10/20/24 0426 98.2 (36.8) Oral 71 16 116/74 Lying 98    10/19/24 2018 98.2 (36.8) Oral 77 16 94/67 Lying 100    10/19/24 1620 98.2 (36.8) Oral 82 16 115/79 Lying 100    10/19/24 1140 97.9 (36.6) Oral 78 16 109/75 Lying 100    10/19/24 0815 98.1 (36.7) Oral 84 16 104/68 Lying 97    10/19/24 0419 98.2 (36.8) Oral 73 16 100/56 Lying 96    10/18/24 1956 97.7 (36.5) Oral 77 16 109/60 Lying 97    10/18/24 1554 98.1 (36.7) Oral 74 16 105/66 Lying 98    10/18/24 1243 97.9 (36.6) Oral 87 16 111/57 Lying 97    10/18/24 0802 -- -- -- -- --  -- --    BP: reported to RN at 10/18/24 0802    10/18/24 0755 97.5 (36.4) Oral 83 16 97/59 Lying 100    10/17/24 2353 98.1 (36.7) Oral 86 18 104/72 Lying 98    10/17/24 2023 98.4 (36.9) Oral 110 18 123/83 Lying 98    10/17/24 1323 98.8 (37.1) Oral 68 18 107/71 Lying 96    10/17/24 1159 -- -- 82 -- 107/74 Lying 100    10/17/24 0908 -- -- 67 -- -- -- 100    10/17/24 0821 97.9  (36.6) Oral 70 18 121/75 Lying 100    10/17/24 0013 97.7 (36.5) Oral 76 18 108/74 Lying 98    10/16/24 2022 98.1 (36.7) Oral 78 18 90/66 Lying 100    10/16/24 1615 -- -- 68 18 -- -- 100    10/16/24 1318 98.1 (36.7) Oral 67 16 99/64 Lying 99    10/16/24 1024 -- -- -- -- -- -- 100    10/16/24 0856 -- -- 64 -- -- -- 100     SpO2: new probe put on at 10/16/24 0856    10/16/24 0700 97.3 (36.3) Oral 66 16 98/64 Lying 100    10/16/24 0031 97.7 (36.5) Oral 63 16 98/58 Lying 99    10/15/24 2012 -- Oral -- 16 -- Lying --    10/15/24 1500 97.3 (36.3) Oral 70 16 122/68 Lying 99    10/15/24 1445 -- -- 74 14 108/71 -- 97    10/15/24 1400 -- -- 67 -- 105/66 -- 100    10/15/24 1355 -- -- 75 -- 83/60 -- 100    10/15/24 1350 -- -- 72 -- 110/66 -- 100    10/15/24 1348 97.7 (36.5) Oral 75 14 112/70 Lying 100    10/15/24 1113 98.1 (36.7) Oral 18 18 110/73 Lying 100    10/15/24 0543 97.5 (36.4) Oral 69 16 99/62 Lying 99    10/15/24 0218 97.5 (36.4) Oral 67 17 91/63 Lying 97    10/14/24 2155 97.5 (36.4) Oral 70 18 104/68 Sitting 98    10/14/24 1629 96.7 (35.9) Oral 88 18 133/75 Sitting 99          Oxygen Therapy (last 7 days)       Date/Time SpO2 Device (Oxygen Therapy) Flow (L/min) (Oxygen Therapy) Oxygen Concentration (%) ETCO2 (mmHg)    10/21/24 0925 -- room air -- -- --    10/21/24 0733 97 room air -- -- --    10/21/24 0403 97 room air -- -- --    10/20/24 2001 99 room air -- -- --    10/20/24 1640 100 room air -- -- --    10/20/24 1107 100 room air -- -- --    10/20/24 0835 -- room air -- -- --    10/20/24 0822 99 room air -- -- --    10/20/24 0426 98 -- -- -- --    10/19/24 2137 -- room air -- -- --    10/19/24 2018 100 -- -- -- --    10/19/24 1620 100 room air -- -- --    10/19/24 1140 100 room air -- -- --    10/19/24 0929 -- room air -- -- --    10/19/24 0815 97 room air -- -- --    10/19/24 0419 96 -- -- -- --    10/18/24 2228 -- room air -- -- --    10/18/24 1956 97 -- -- -- --    10/18/24 1554 98 -- -- -- --    10/18/24 1244  -- room air -- -- --    10/18/24 1243 97 room air -- -- --    10/18/24 0815 -- room air -- -- --    10/18/24 0755 100 room air -- -- --    10/17/24 2353 98 room air -- -- --    10/17/24 2023 98 room air -- -- --    10/17/24 2018 -- room air -- -- --    10/17/24 1323 96 room air -- -- --    10/17/24 1159 100 room air -- -- --    10/17/24 0908 100 room air -- -- --    10/17/24 0825 -- room air -- -- --    10/17/24 0821 100 room air -- -- --    10/17/24 0215 -- room air -- -- --    10/17/24 0013 98 room air -- -- --    10/16/24 2022 100 room air -- -- --    10/16/24 1615 100 room air -- -- --    10/16/24 1419 -- room air -- -- --    10/16/24 1318 99 room air -- -- --    10/16/24 1024 100 room air -- -- --    10/16/24 0856 100 room air -- -- --    10/16/24 0850 -- room air -- -- --    10/16/24 0700 100 room air -- -- --    10/16/24 0039 -- room air -- -- --    10/16/24 0031 99 room air -- -- --    10/15/24 2334 -- room air -- -- --    10/15/24 2012 -- room air -- -- --    10/15/24 1948 -- room air -- -- --    10/15/24 1527 -- room air -- -- --    10/15/24 1500 99 room air -- -- --    10/15/24 1445 97 room air -- -- --    10/15/24 1438 -- room air -- -- --    10/15/24 1400 100 room air -- -- --    10/15/24 1355 100 -- 2 -- --    10/15/24 1350 100 T - piece 2 -- --    10/15/24 1348 100 T - piece 5 -- --    10/15/24 1113 100 room air -- -- --    10/15/24 0543 99 room air -- -- --    10/15/24 0218 97 room air -- -- --    10/14/24 2155 98 room air -- -- --    10/14/24 1630 -- room air -- -- --    10/14/24 1629 99 T - piece -- -- --          Intake & Output (last 7 days)         10/14 0701  10/15 0700 10/15 0701  10/16 0700 10/16 0701  10/17 0700 10/17 0701  10/18 0700 10/18 0701  10/19 0700 10/19 0701  10/20 0700 10/20 0701  10/21 0700 10/21 0701  10/22 0700    P.O.  440 1530 480 540 720 840 240    Total Intake(mL/kg)  440 (8.9) 1530 (31.1) 480 (9.8) 540 (11) 720 (14.6) 840 (17.1) 240 (4.9)    Urine (mL/kg/hr)  0 (0)           Total Output  0          Net  +440 +1530 +480 +540 +720 +840 +240                Urine Unmeasured Occurrence  1 x 6 x 3 x 1 x 3 x 3 x     Stool Unmeasured Occurrence      4 x 2 x     Emesis Unmeasured Occurrence       1 x           Lines, Drains & Airways       Active LDAs       Name Placement date Placement time Site Days    Peripheral IV 10/21/24 1029 Anterior;Proximal;Right Forearm 10/21/24  1029  Forearm  less than 1    Gastrostomy/Enterostomy Gastrostomy LUQ 07/29/24  in place when I came on  1900  LUQ  83    Surgical Airway Justin 6 --  --  6  --    Surgical Airway Other (Comment) --  --  --  --                  Current Facility-Administered Medications   Medication Dose Route Frequency Provider Last Rate Last Admin    acetaminophen (TYLENOL) tablet 1,000 mg  1,000 mg Oral Q6H Abram Cruz MD   1,000 mg at 10/21/24 0925    Enoxaparin Sodium (LOVENOX) syringe 30 mg  30 mg Subcutaneous Daily Abram Cruz MD   30 mg at 10/21/24 0926    methocarbamol (ROBAXIN) tablet 750 mg  750 mg Oral 4x Daily Abram Cruz MD   750 mg at 10/21/24 1156    nicotine (NICODERM CQ) 21 MG/24HR patch 1 patch  1 patch Transdermal Q24H Abram Cruz MD   1 patch at 10/21/24 0927    ondansetron ODT (ZOFRAN-ODT) disintegrating tablet 4 mg  4 mg Oral Q6H PRN Abram Cruz MD   4 mg at 10/20/24 2102    Or    ondansetron (ZOFRAN) injection 4 mg  4 mg Intravenous Q6H PRN Abram Cruz MD   4 mg at 10/18/24 0019    oxyCODONE (ROXICODONE) immediate release tablet 5 mg  5 mg Oral Q4H PRN Abram Cruz MD   5 mg at 10/21/24 1142    Or    oxyCODONE (ROXICODONE) immediate release tablet 10 mg  10 mg Oral Q4H PRN Abram Cruz MD   10 mg at 10/20/24 0824     Lab Results (all)       Procedure Component Value Units Date/Time    Magnesium [[id number]]  (Normal) Collected: 10/21/24 0651    Specimen: Blood Updated: 10/21/24 0802     Magnesium 1.8 mg/dL     Basic  "Metabolic Panel [[id number]]  (Abnormal) Collected: 10/21/24 0651    Specimen: Blood Updated: 10/21/24 0802     Glucose 105 mg/dL      BUN 13 mg/dL      Creatinine 0.75 mg/dL      Sodium 135 mmol/L      Potassium 3.9 mmol/L      Comment: Slight hemolysis detected by analyzer. Result may be falsely elevated.        Chloride 98 mmol/L      CO2 23.8 mmol/L      Calcium 8.8 mg/dL      BUN/Creatinine Ratio 17.3     Anion Gap 13.2 mmol/L      eGFR 102.0 mL/min/1.73     Narrative:      GFR Normal >60  Chronic Kidney Disease <60  Kidney Failure <15      Phosphorus [[id number]]  (Normal) Collected: 10/21/24 0651    Specimen: Blood Updated: 10/21/24 0800     Phosphorus 3.8 mg/dL     CBC (No Diff) [[id number]]  (Abnormal) Collected: 10/21/24 0652    Specimen: Blood Updated: 10/21/24 0724     WBC 9.30 10*3/mm3      RBC 3.44 10*6/mm3      Hemoglobin 11.8 g/dL      Hematocrit 35.2 %      .3 fL      MCH 34.3 pg      MCHC 33.5 g/dL      RDW 11.5 %      RDW-SD 42.4 fl      MPV 9.2 fL      Platelets 209 10*3/mm3     Procalcitonin [[id number]]  (Normal) Collected: 10/18/24 0407    Specimen: Blood Updated: 10/18/24 0525     Procalcitonin 0.09 ng/mL     Narrative:      As a Marker for Sepsis (Non-Neonates):    1. <0.5 ng/mL represents a low risk of severe sepsis and/or septic shock.  2. >2 ng/mL represents a high risk of severe sepsis and/or septic shock.    As a Marker for Lower Respiratory Tract Infections that require antibiotic therapy:    PCT on Admission    Antibiotic Therapy       6-12 Hrs later    >0.5                Strongly Recommended  >0.25 - <0.5        Recommended   0.1 - 0.25          Discouraged              Remeasure/reassess PCT  <0.1                Strongly Discouraged     Remeasure/reassess PCT    As 28 day mortality risk marker: \"Change in Procalcitonin Result\" (>80% or <=80%) if Day 0 (or Day 1) and Day 4 values are available. Refer to http://www.bras-pct-calculator.com    Change in PCT <=80%  A decrease of " PCT levels below or equal to 80% defines a positive change in PCT test result representing a higher risk for 28-day all-cause mortality of patients diagnosed with severe sepsis for septic shock.    Change in PCT >80%  A decrease of PCT levels of more than 80% defines a negative change in PCT result representing a lower risk for 28-day all-cause mortality of patients diagnosed with severe sepsis or septic shock.       Magnesium [[id number]]  (Normal) Collected: 10/18/24 0407    Specimen: Blood Updated: 10/18/24 0521     Magnesium 2.0 mg/dL     Basic Metabolic Panel [[id number]]  (Abnormal) Collected: 10/18/24 0407    Specimen: Blood Updated: 10/18/24 0521     Glucose 100 mg/dL      BUN 9 mg/dL      Creatinine 0.74 mg/dL      Sodium 135 mmol/L      Potassium 3.7 mmol/L      Chloride 99 mmol/L      CO2 23.6 mmol/L      Calcium 8.7 mg/dL      BUN/Creatinine Ratio 12.2     Anion Gap 12.4 mmol/L      eGFR 102.4 mL/min/1.73     Narrative:      GFR Normal >60  Chronic Kidney Disease <60  Kidney Failure <15      Phosphorus [[id number]]  (Normal) Collected: 10/18/24 0407    Specimen: Blood Updated: 10/18/24 0520     Phosphorus 3.3 mg/dL     CBC (No Diff) [[id number]]  (Abnormal) Collected: 10/18/24 0407    Specimen: Blood Updated: 10/18/24 0459     WBC 9.09 10*3/mm3      RBC 3.55 10*6/mm3      Hemoglobin 12.8 g/dL      Hematocrit 35.2 %      MCV 99.2 fL      MCH 36.1 pg      MCHC 36.4 g/dL      RDW 11.7 %      RDW-SD 42.1 fl      MPV 9.5 fL      Platelets 176 10*3/mm3     Tissue Pathology Exam [[id number]] Collected: 10/15/24 1244    Specimen: Tissue from Large Intestine, Rectum Updated: 10/16/24 1327     Case Report --     Surgical Pathology Report                         Case: [id number]                                  Authorizing Provider:  Abram Cruz,   Collected:           10/15/2024 12:44 PM                                 MD                                                                           Ordering  "Location:     [facility]  Received:            10/15/2024 02:48 PM                                 MAIN OR                                                                      Pathologist:           Didi Leo MD                                                          Specimen:    Large Intestine, Rectum, ILEOSTOMY                                                          Final Diagnosis --     1.  Ileostomy, closure:   A.  Intact enterocutaneous anastomotic site with mild vascular congestion, otherwise no significant         histopathologic findings.       Gross Description --     1. Large Intestine, Rectum.  Received in formalin, labeled with the patient's barcode and \"ileostomy\", is a 6.5 cm in length segment of point bowel.  Both margins are stapled averaging 3.0 cm.  Midway through the specimen there is a 4.5 x 3.0 cm ellipse of tan-white unremarkable skin.  The mucosa is pink and glistening.  The walls of pink and unremarkable.  A representative cross-section is submitted in one cassette.      LMP/uso/jab       Microscopic Description --     Unless \"gross only\" is specified, the final diagnosis for each specimen is based on microscopic examination of tissue.      Basic Metabolic Panel [[id number]]  (Abnormal) Collected: 10/16/24 0351    Specimen: Blood Updated: 10/16/24 0454     Glucose 112 mg/dL      BUN 9 mg/dL      Creatinine 0.79 mg/dL      Sodium 134 mmol/L      Potassium 4.6 mmol/L      Chloride 101 mmol/L      CO2 23.7 mmol/L      Calcium 9.2 mg/dL      BUN/Creatinine Ratio 11.4     Anion Gap 9.3 mmol/L      eGFR 100.4 mL/min/1.73     Narrative:      GFR Normal >60  Chronic Kidney Disease <60  Kidney Failure <15      CBC & Differential [[id number]]  (Abnormal) Collected: 10/16/24 0350    Specimen: Blood Updated: 10/16/24 0437    Narrative:      The following orders were created for panel order CBC & Differential.  Procedure                               Abnormality         Status      "                ---------                               -----------         ------                     CBC Auto Differential[[id number]]        Abnormal            Final result                 Please view results for these tests on the individual orders.    CBC Auto Differential [[id number]]  (Abnormal) Collected: 10/16/24 0350    Specimen: Blood Updated: 10/16/24 0437     WBC 12.45 10*3/mm3      RBC 3.40 10*6/mm3      Hemoglobin 11.5 g/dL      Hematocrit 35.7 %      .0 fL      MCH 33.8 pg      MCHC 32.2 g/dL      RDW 11.5 %      RDW-SD 44.5 fl      MPV 9.4 fL      Platelets 171 10*3/mm3      Neutrophil % 76.2 %      Lymphocyte % 15.5 %      Monocyte % 7.2 %      Eosinophil % 0.3 %      Basophil % 0.2 %      Immature Grans % 0.6 %      Neutrophils, Absolute 9.47 10*3/mm3      Lymphocytes, Absolute 1.93 10*3/mm3      Monocytes, Absolute 0.90 10*3/mm3      Eosinophils, Absolute 0.04 10*3/mm3      Basophils, Absolute 0.03 10*3/mm3      Immature Grans, Absolute 0.08 10*3/mm3      nRBC 0.0 /100 WBC     CBC (No Diff) [[id number]]  (Abnormal) Collected: 10/14/24 1727    Specimen: Blood Updated: 10/14/24 1831     WBC 9.31 10*3/mm3      RBC 3.64 10*6/mm3      Hemoglobin 13.2 g/dL      Hematocrit 38.6 %      .0 fL      MCH 36.3 pg      MCHC 34.2 g/dL      RDW 11.6 %      RDW-SD 44.7 fl      MPV 9.2 fL      Platelets 201 10*3/mm3     Comprehensive Metabolic Panel [[id number]] Collected: 10/14/24 1727    Specimen: Blood Updated: 10/14/24 1829     Glucose 79 mg/dL      BUN 10 mg/dL      Creatinine 0.83 mg/dL      Sodium 138 mmol/L      Potassium 3.6 mmol/L      Chloride 100 mmol/L      CO2 25.3 mmol/L      Calcium 9.4 mg/dL      Total Protein 7.0 g/dL      Albumin 4.3 g/dL      ALT (SGPT) 8 U/L      AST (SGOT) 12 U/L      Alkaline Phosphatase 88 U/L      Total Bilirubin <0.2 mg/dL      Globulin 2.7 gm/dL      A/G Ratio 1.6 g/dL      BUN/Creatinine Ratio 12.0     Anion Gap 12.7 mmol/L      eGFR 99.0 mL/min/1.73      Narrative:      GFR Normal >60  Chronic Kidney Disease <60  Kidney Failure <15            ECG/EMG Results (all)       Procedure Component Value Units Date/Time    Telemetry Scan [[id number]] Resulted: 10/14/24     Updated: 10/15/24 1534    Telemetry Scan [[id number]] Resulted: 10/14/24     Updated: 10/16/24 0603    Telemetry Scan [[id number]] Resulted: 10/14/24     Updated: 10/16/24 1306    Telemetry Scan [[id number]] Resulted: 10/14/24     Updated: 10/16/24 1833    Telemetry Scan [[id number]] Resulted: 10/14/24     Updated: 10/17/24 0750             Operative/Procedure Notes (all)        Abram Cruz MD at 10/15/24 1218  Version 1 of 1         Colorectal & General Surgery  Operative Report    Patient: Keron Gerber  YOB: 1960  MRN: [id number]  DATE OF PROCEDURE: 10/15/24     PREOPERATIVE DIAGNOSIS:  Ileostomy status    POSTOPERATIVE DIAGNOSIS:  Same    PROCEDURE:  Closure of loop ileostomy with intestinal resection    FINDINGS:  Healthy appearing loop ileostomy closed.  Side-to-side antiperistaltic enteroenterostomy created in a handsewn fashion in 2 layers.    SURGEON:  Trey Cruz MD    ASSISTANT:  Assistant: Lior King CSA was responsible for performing the following activities: Retraction, Suction, Irrigation, Closing, and Placing Dressing and their skilled assistance was necessary for the success of this case.     ANESTHESIA:  General-endotracheal    EBL:  25 mL    SPECIMEN:  Ileostomy    OPERATIVE DESCRIPTION:  The patient was brought to the operating room under the care of the nursing staff.  The patient was placed on the operating room table in the supine position where anesthesia was induced.  The patient was then prepped and positioned in the usual sterile fashion.  A standardized timeout was then performed.    An elliptical incision was created around the ileostomy in a transverse fashion.  Subcutaneous tissue was dissected down to the level of the fascia.   Subcutaneous tissue was dissected away from the ileostomy circumferentially.  A window was then created into the peritoneal cavity between the ileostomy and the fascial edge.  The remainder of the ileostomy was dissected free from the surrounding fascia and circumferentially mobilized.  Both loops of ileum were easily eviscerated.  [place] stapler was used to divide the bowel at the borders of the ileostomy.  Mesentery was divided with the LigaSure device.  There was a hematoma within the mesentery that was excised which did require resecting a tiny bit more about the proximal and distal limbs of the ileum.  Stay sutures were then placed and a side-to-side antiperistaltic enteroenterostomy was created.  Posterior row was created with 2-0 silk sutures.  Enterotomies were then created.  3-0 chromic suture was used as the inner layer of the anastomosis in a running locking fashion.  Second row of silk Lembert sutures was then placed.  Anastomosis was widely patent.  Mesenteric defect closed with silk sutures.  The bowel was reduced back into the peritoneal cavity.  Fascial flaps were created circumferentially.  Posterior fascia closed with running 0 PDS suture.  Anterior fascia closed with interrupted 0 PDS sutures in figure-of-eight fashion.  Subcutaneous tissue reapproximated with 3-0 Vicryl's.  Deep dermals placed with 0 Vicryl's.  Skin reapproximated with Monocryl and Exofin.    All needle, sponge, and instrument counts were correct at the end of the case.    The patient tolerated the procedure well and was transferred to the postanesthesia care unit in stable condition.    Trey Cruz M.D.  Colorectal & General Surgery  [facility]    [address] [place] [address], [address]  [place], [place], [address]  P: [phone]  F: [phone]      Electronically signed by Abram Cruz MD at 10/15/24 5384          Physician Progress Notes (all)        Abram Cruz MD at 10/20/24 6577           Colorectal & General Surgery  Progress Note    Patient: Keron Gerber  YOB: 1960  MRN: [id number]      Assessment  Keron Gerber is a 64 y.o. male now postoperative day 5 from ileostomy closure.  Continues to pass flatus and had significant bowel function yesterday.  Pain is well-controlled.  Abdomen is slightly distended but no tachycardia or hypotension or any other abnormalities on physical exam.    Plan  Continue diet as tolerated  Given that he lives so far away, I would like him to be a little bit less distended and have a little bit more bowel function prior to discharge.      Subjective  Significant events.  Passing flatus.    Objective    Vitals:    10/20/24 0426   BP: 116/74   Pulse: 71   Resp: 16   Temp: 98.2 °F (36.8 °C)   SpO2: 98%       Physical Exam  Constitutional: Well-developed well-nourished, no acute distress  Neck: Supple, trachea midline  Respiratory: No increased work of breathing, Symmetric excursion  Cardiovascular: Well pefursed, no jugular venous distention evident   Abdominal: Incision in good order.  Soft, appropriately tender, distended.  Soft, non-tender, non-distended  Skin: Warm, dry, no rash on visualized skin surfaces  Psychiatric: Alert and oriented ×3, normal affect          Trey Cruz MD  Colorectal & General Surgery  [facility]    [facility], [address]  [place], [place], [facility]  P: [phone]  F: [phone]       Electronically signed by Abram Cruz MD at 10/20/24 0934       Abram Cruz MD at 10/19/24 1101          Colorectal & General Surgery  Progress Note    Patient: Keron Gerber  YOB: 1960  MRN: [id number]      Assessment  Keron Gerber is a 64 y.o. male  now postoperative day 4 from ileostomy closure.  He is passing flatus and having bowel function.  Pain is well-controlled.  No tachycardia or hypotension.  Abdominal exam benign.    Plan  Continue regular diet  Discontinue  intravenous pain medications  Anticipate discharge as early as Monday given his significant travel distance back home      Subjective  Multiple bowel movements and passing flatus.  Beginning to feel much better.    Objective    Vitals:    10/19/24 0815   BP: 104/68   Pulse: 84   Resp: 16   Temp: 98.1 °F (36.7 °C)   SpO2: 97%       Physical Exam  Constitutional: Well-developed well-nourished, no acute distress  Neck: Supple, trachea midline  Respiratory: No increased work of breathing, Symmetric excursion  Cardiovascular: Well pefursed, no jugular venous distention evident   Abdominal: Soft, appropriately tender, mildly distended, G-tube in place, ileostomy incision intact.  Skin: Warm, dry, no rash on visualized skin surfaces  Psychiatric: Alert and oriented ×3, normal affect          Trey Cruz MD  Colorectal & General Surgery  [facility]    [address], [address]  [place], [place], [address]  P: [phone]  F: [phone]       Electronically signed by Abram Cruz MD at 10/19/24 1102       Abram Cruz MD at 10/18/24 1744          Colorectal & General Surgery  Progress Note    Patient: Keron Gerber  YOB: 1960  MRN: [id number]      Assessment  Keron Gerber is a 64 y.o. male now postoperative day 3 from ileostomy closure.  Continues to recover well but we are still awaiting bowel function.  He had a significant ileus after his first surgery, so I suspect he will probably have an ileus after the surgery.  Abdominal exam is benign, no tachycardia, labs all within normal ranges.    Plan  Continue diet as tolerated  Await return of bowel function      Subjective  No significant events.  Feels relatively well.  1 episode of nausea yesterday.    Objective    Vitals:    10/18/24 1554   BP: 105/66   Pulse: 74   Resp: 16   Temp: 98.1 °F (36.7 °C)   SpO2: 98%       Physical Exam  Constitutional: Well-developed well-nourished, no acute distress  Neck:  "Supple, trachea midline  Respiratory: No increased work of breathing, Symmetric excursion  Cardiovascular: Well pefursed, no jugular venous distention evident   Abdominal: Incision in good order.  Soft, appropriately tender, mildly distended.  Skin: Warm, dry, no rash on visualized skin surfaces  Psychiatric: Alert and oriented ×3, normal affect     Laboratory Results  I have personally reviewed CBC with WC 9, hemoglobin 12.8, platelets 176.  Procalcitonin 0.09.  BMP with creatinine 0.74, bicarb 23.    Radiology  No imaging to review         Trey Cruz MD  Colorectal & General Surgery  [facility]    [address], [address]  [place], [place], [address]  P: [phone]  F: [phone]       Electronically signed by Abram Cruz MD at 10/18/24 9161       Abram Cruz MD at 10/18/24 1306            Enter Query Response Below      Query Response: Non-Severe (Moderate) chronic illness related malnutrition              If applicable, please update the problem list.       Patient: Keron Gerber        : 1960  Account: [id number]           Admit Date:         How to Respond to this query:       a. Click New Note     b. Answer query within the yellow box.                c. Update the Problem List, if applicable.      If you have any questions about this query contact me at: [email or web address]     Dr. Cruz,    Patient presented on 10/14 for ileostomy takedown.  Patient was seen by Registered Dietitian on 10/16.  Registered Dietitian documented patient's severe unintentional weight loss, moderate loss of muscle, moderate loss of fat, and plan for Boost three times daily.  Registered Dietitian documented, \"Patient meets ASPEN/AND criteria for nutrition diagnosis of moderate malnutrition of chronic illness.\"    Please clarify diagnosis treated/monitored:    - Non-Severe (Moderate) chronic illness related malnutrition  - Other- specify __________    By submitting " this query, we are merely seeking further clarification of documentation to accurately reflect all conditions that you are monitoring, evaluating, treating or that extend the hospitalization or utilize additional resources of care. Please utilize your independent clinical judgment when addressing the question(s) above.     This query and your response, once completed, will be entered into the legal medical record.    Sincerely,  Eboni Lopez RN CCDS CCS  Clinical Documentation Integrity Program     Electronically signed by bAram Cruz MD at 10/18/24 1701       Abram Cruz MD at 10/17/24 5183          Colorectal & General Surgery  Progress Note    Patient: Keron Gerber  YOB: 1960  MRN: [id number]      Assessment  Keron Gerber is a 64 y.o. male with ileostomy status now postoperative day 2 from ileostomy closure.  Overall, recovering well.  Still awaiting bowel function.  Abdominal exam is benign.  No tachycardia.  Repeat labs in the morning.  Continue diet for now.    Subjective  Overall feels well.  Complains of some pain.  No flatus.    Objective    Vitals:    10/17/24 1323   BP: 107/71   Pulse: 68   Resp: 18   Temp: 98.8 °F (37.1 °C)   SpO2: 96%       Physical Exam  Constitutional: Well-developed well-nourished, no acute distress  Neck: Supple, trachea midline  Respiratory: No increased work of breathing, Symmetric excursion  Cardiovascular: Well pefursed, no jugular venous distention evident   Abdominal: Ileostomy site is closed without any erythema.  Soft, non-tender, non-distended  Skin: Warm, dry, no rash on visualized skin surfaces  Psychiatric: Alert and oriented ×3, normal affect          Trey Cruz MD  Colorectal & General Surgery  [facility]    [address] [place] [address], [address]  [place], [place], [address]  P: [phone]  F: [phone]       Electronically signed by Abram Cruz MD at 10/17/24 0604       Trev Braun MD  at 10/16/24 0844          Postoperative day 1 ileostomy takedown    Doing well.  No nausea or vomiting.  Has tolerated the liquids he has taken so far.  No bowel function yet.    Has remained afebrile since surgery.  Heart rate 66.  Blood pressure 98/64 and stable.    BMP in good order.  WBC 12, hemoglobin 11.5    Abdomen is soft.  Incision is intact with no evidence of infection.    Stable postop day 1  DC IV fluids  Await bowel function advance diet as tolerated    Electronically signed by Trev Braun MD at 10/16/24 0815       Consult Notes (all)    No notes of this type exist for this encounter.

## 2024-10-21 NOTE — PROGRESS NOTES
Nutrition Services    Patient Name:  Keron Gerber  YOB: 1960  MRN: [id number]  Admit Date:  10/14/2024    Assessment Date:  10/21/24    Summary: Follow-up    Pt currently on CLD. Plan for CT per pt. Appetite is fair. Tolerating po diet. 100% x 3 meals yesterday per EMR. Pt declines Boost Breeze while in clears. Labs reviewed; Na 135, Glu 112/100/105, Cr 0.75. Meds, skin reviewed.    RECS:  ADAT per MD  Continue Boost TID once diet advances    RD to follow.    CLINICAL NUTRITION ASSESSMENT      Reason for Assessment Follow-up Protocol     Diagnosis/Problem   Pt underwent closure of loop ileostomy with intestinal resection on 10/15. 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   Adjusted BMI (if applicable)    BMI Category Underweight (18.4 or below)   Ideal Body Weight (IBW) 155 lbs   Usual Body Weight (UBW) 145 lbs   Weight Trend Loss     Estimated/Assessed Needs        Energy Requirements    Weight for Calculation 49 kg   Method for Estimation  35-40 kcal/kg   EST Needs (kcal/day) 8484-0918       Protein Requirements    Weight for Calculation 49 kg   EST Protein Needs (g/kg) 1.5 - 2.0 gm/kg   EST Daily Needs (g/day) 73-98       Fluid Requirements     Method for Estimation 1 mL/kcal    Estimated Needs (mL/day)        Fluid Deficit    Current Na Level (mEq/L)    Desired Na Level (mEq/L)    Estimated Fluid Deficit (L)       Labs       Pertinent Labs    Results from last 7 days   Lab Units 10/21/24  0651 10/18/24  0407 10/16/24  0351 10/14/24  1727   SODIUM mmol/L 135* 135* 134* 138   POTASSIUM mmol/L 3.9 3.7 4.6 3.6   CHLORIDE mmol/L 98 99 101 100   CO2 mmol/L 23.8 23.6 23.7 25.3   BUN mg/dL 13 9 9 10   CREATININE mg/dL 0.75* 0.74* 0.79 0.83   CALCIUM mg/dL 8.8 8.7 9.2 9.4   BILIRUBIN mg/dL  --   --   --  <0.2  "  ALK PHOS U/L  --   --   --  88   ALT (SGPT) U/L  --   --   --  8   AST (SGOT) U/L  --   --   --  12   GLUCOSE mg/dL 105* 100* 112* 79     Results from last 7 days   Lab Units 10/21/24  0652 10/21/24  0651 10/18/24  0407 10/16/24  0350 10/14/24  1727   MAGNESIUM mg/dL  --  1.8 2.0  --   --    PHOSPHORUS mg/dL  --  3.8 3.3   < >  --    HEMOGLOBIN g/dL 11.8*  --  12.8*   < > 13.2   HEMATOCRIT % 35.2*  --  35.2*   < > 38.6   WBC 10*3/mm3 9.30  --  9.09   < > 9.31   ALBUMIN g/dL  --   --   --   --  4.3    < > = values in this interval not displayed.     Results from last 7 days   Lab Units 10/21/24  0652 10/18/24  0407 10/16/24  0350 10/14/24  1727   PLATELETS 10*3/mm3 209 176 171 201     COVID19   Date Value Ref Range Status   08/08/2022 Detected (C) Not Detected - Ref. Range Final     No results found for: \"HGBA1C\"       Medications           Scheduled Medications acetaminophen, 1,000 mg, Oral, Q6H  enoxaparin, 30 mg, Subcutaneous, Daily  methocarbamol, 750 mg, Oral, 4x Daily  nicotine, 1 patch, Transdermal, Q24H       Infusions     PRN Medications   ondansetron ODT **OR** ondansetron    oxyCODONE **OR** oxyCODONE     Physical Findings          General Findings alert, frail, generalized wasting, loss of muscle mass, loss of subcutaneous fat, oriented   Oral/Mouth Cavity tooth or teeth missing   Edema  no edema   Gastrointestinal last bowel movement: 10/21   Skin  surgical incision: right abdomen   Tubes/Drains/Lines PEG   NFPE See Malnutrition Severity Assessment     Malnutrition Severity Assessment      Patient meets criteria for : Moderate (non-severe) Malnutrition         Current Nutrition Orders & Evaluation of Intake       Oral Nutrition     Food Allergies NKFA   Current PO Diet Diet: Liquid; Clear Liquid; Fluid Consistency: Thin (IDDSI 0)   Supplement n/a   PO Evaluation     % PO Intake %    Factors Affecting Intake: No factors at this time     PES STATEMENT / NUTRITION DIAGNOSIS      Nutrition Dx Problem "  Problem: Malnutrition (moderate)  Etiology: Medical Diagnosis - Pt underwent closure of loop ileostomy with intestinal resection on 10/15. 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 Continue same per protocol, No changes at this time   --      Monitor/Evaluation Per protocol, PO intake, Supplement intake, Weight, Skin status, GI status, Symptoms, POC/GOC, Swallow function   Discharge Plan/Needs No discharge needs identified at this time   --    RD to follow per protocol.      Electronically signed by:  Patricia Phillips  10/21/24 11:04 EDT

## 2024-10-21 NOTE — PLAN OF CARE
Goal Outcome Evaluation:            Pt A/Ox4, Ra, up adlib. Does his own trach care. PRN Manjula given x2 for abd pain below his incision. Pt reports he had a black liquid stool around 0300 and another around 1600. I did not see the stool myself, hat placed in toilet. He is passing a lot of gas, poor appetite. VSS, plan of care ongoing.

## 2024-10-22 ENCOUNTER — APPOINTMENT (OUTPATIENT)
Dept: CT IMAGING | Facility: HOSPITAL | Age: 64
End: 2024-10-22
Payer: MEDICAID

## 2024-10-22 PROCEDURE — 99024 POSTOP FOLLOW-UP VISIT: CPT | Performed by: SURGERY

## 2024-10-22 PROCEDURE — 25010000002 ENOXAPARIN PER 10 MG: Performed by: SURGERY

## 2024-10-22 PROCEDURE — 0 DIATRIZOATE MEGLUMINE & SODIUM PER 1 ML: Performed by: SURGERY

## 2024-10-22 PROCEDURE — 74176 CT ABD & PELVIS W/O CONTRAST: CPT

## 2024-10-22 RX ORDER — DIATRIZOATE MEGLUMINE AND DIATRIZOATE SODIUM 660; 100 MG/ML; MG/ML
30 SOLUTION ORAL; RECTAL ONCE
Status: COMPLETED | OUTPATIENT
Start: 2024-10-22 | End: 2024-10-22

## 2024-10-22 RX ADMIN — ACETAMINOPHEN 1000 MG: 500 TABLET ORAL at 09:00

## 2024-10-22 RX ADMIN — ENOXAPARIN SODIUM 30 MG: 100 INJECTION SUBCUTANEOUS at 08:53

## 2024-10-22 RX ADMIN — ACETAMINOPHEN 1000 MG: 500 TABLET ORAL at 21:08

## 2024-10-22 RX ADMIN — OXYCODONE HYDROCHLORIDE 10 MG: 5 TABLET ORAL at 21:08

## 2024-10-22 RX ADMIN — METHOCARBAMOL TABLETS 750 MG: 750 TABLET, COATED ORAL at 21:08

## 2024-10-22 RX ADMIN — METHOCARBAMOL TABLETS 750 MG: 750 TABLET, COATED ORAL at 08:53

## 2024-10-22 RX ADMIN — METHOCARBAMOL TABLETS 750 MG: 750 TABLET, COATED ORAL at 17:19

## 2024-10-22 RX ADMIN — NICOTINE 1 PATCH: 21 PATCH, EXTENDED RELEASE TRANSDERMAL at 08:56

## 2024-10-22 RX ADMIN — OXYCODONE HYDROCHLORIDE 5 MG: 5 TABLET ORAL at 08:53

## 2024-10-22 RX ADMIN — OXYCODONE HYDROCHLORIDE 5 MG: 5 TABLET ORAL at 17:19

## 2024-10-22 RX ADMIN — METHOCARBAMOL TABLETS 750 MG: 750 TABLET, COATED ORAL at 11:14

## 2024-10-22 RX ADMIN — DIATRIZOATE MEGLUMINE AND DIATRIZOATE SODIUM 30 ML: 600; 100 SOLUTION ORAL; RECTAL at 11:48

## 2024-10-22 RX ADMIN — ACETAMINOPHEN 1000 MG: 500 TABLET ORAL at 15:55

## 2024-10-22 RX ADMIN — OXYCODONE HYDROCHLORIDE 5 MG: 5 TABLET ORAL at 12:56

## 2024-10-22 NOTE — PROGRESS NOTES
Colorectal & General Surgery  Progress Note    Patient: Keron Gerber  YOB: 1960  MRN: [id number]    Assessment  Keron Gerber is a 64 y.o. male now postop day 6 from ileostomy closure.  He had an episode of emesis and is little more distended today but continues to pass gas and have bowel function.  No tachycardia, normal labs.  I have backed him down to clear liquids and we will see how the next 24 hours ago.  May need CT scan of the abdomen pelvis with oral contrast.    Subjective  Vomited once overnight.  Passing flatus and having bowel function.    Physical Exam  Constitutional: Well-developed well-nourished, no acute distress  Neck: Supple, trachea midline  Respiratory: No increased work of breathing, Symmetric excursion  Cardiovascular: Well pefursed, no jugular venous distention evident   Abdominal: Slightly distended.  Ileostomy closure site intact.  Soft, non-tender, non-distended  Skin: Warm, dry, no rash on visualized skin surfaces  Psychiatric: Alert and oriented ×3, normal affect     Laboratory Results  I have personally reviewed CBC with WC 9, hemoglobin, platelets 209.  BMP with creatinine 0.75.         Trey Cruz MD  Colorectal & General Surgery  [facility]    [address], [address]  [place], [place], [address]  P: [phone]  F: [phone]

## 2024-10-22 NOTE — PROGRESS NOTES
Colorectal & General Surgery  Progress Note    Patient: Keron Gerber  YOB: 1960  MRN: [id number]      Assessment  Keron Gerber is a 64 y.o. male now postop day 7 from ileostomy closure.  Feels better today but still has some distention of his abdomen.  There is no tenderness and no peritonitis.  He is afebrile with no tachycardia.  Labs yesterday were reassuring.  Plan to proceed with CT scan of the abdomen pelvis with oral contrast today to ensure that there is no level of obstruction associated with his intermittent nausea and vomiting.  Stay on clear liquids until after CT scan.    Subjective  No significant events.  He is passing flatus and having bowel function with no further nausea or vomiting.  Pain control is an issue at times.  Ambulating.    Objective    Vitals:    10/22/24 0843   BP: 117/60   Pulse: 76   Resp: 16   Temp: 97.9 °F (36.6 °C)   SpO2: 99%       Physical Exam  Constitutional: Well-developed well-nourished, no acute distress  Neck: Supple, trachea midline  Respiratory: No increased work of breathing, Symmetric excursion  Cardiovascular: Well pefursed, no jugular venous distention evident   Abdominal: Ileostomy closure site is intact without any significant erythema.  Abdomen soft, mildly distended, not significantly tender except over the ileostomy closure site.    Skin: Warm, dry, no rash on visualized skin surfaces  Psychiatric: Alert and oriented ×3, normal affect          Trey Cruz MD  Colorectal & General Surgery  [facility]    [address], [address]  [place], [place], [address]  P: [phone]  F: [phone]

## 2024-10-22 NOTE — PLAN OF CARE
Goal Outcome Evaluation:  Plan of Care Reviewed With: patient              Patient voices he slept well. Roxicodone times one for abdominal pain with good relief. Mother at bedside. Abdomen incision c/d/I. Trach in place with passe everett valve. VSS. WCTM

## 2024-10-22 NOTE — PLAN OF CARE
Goal Outcome Evaluation:          Pt A/Ox4, Ra, up adlib. Does his own trach care. PRN Manjula given. CT of abd complete. Clear liquid diet until tomorrow per . Pt reports a diarrhea like brown stool, but did not remember to keep the toilet hat in place so I did not see this for myself. Plan of care ongoing, VSS.

## 2024-10-23 ENCOUNTER — APPOINTMENT (OUTPATIENT)
Dept: GENERAL RADIOLOGY | Facility: HOSPITAL | Age: 64
End: 2024-10-23
Payer: MEDICAID

## 2024-10-23 PROCEDURE — 74018 RADEX ABDOMEN 1 VIEW: CPT

## 2024-10-23 PROCEDURE — 25010000002 ENOXAPARIN PER 10 MG: Performed by: SURGERY

## 2024-10-23 RX ADMIN — OXYCODONE HYDROCHLORIDE 10 MG: 5 TABLET ORAL at 10:53

## 2024-10-23 RX ADMIN — ACETAMINOPHEN 1000 MG: 500 TABLET ORAL at 22:23

## 2024-10-23 RX ADMIN — METHOCARBAMOL TABLETS 750 MG: 750 TABLET, COATED ORAL at 08:11

## 2024-10-23 RX ADMIN — ENOXAPARIN SODIUM 30 MG: 100 INJECTION SUBCUTANEOUS at 08:11

## 2024-10-23 RX ADMIN — ACETAMINOPHEN 1000 MG: 500 TABLET ORAL at 10:53

## 2024-10-23 RX ADMIN — ACETAMINOPHEN 1000 MG: 500 TABLET ORAL at 06:30

## 2024-10-23 RX ADMIN — METHOCARBAMOL TABLETS 750 MG: 750 TABLET, COATED ORAL at 17:40

## 2024-10-23 RX ADMIN — OXYCODONE HYDROCHLORIDE 10 MG: 5 TABLET ORAL at 06:29

## 2024-10-23 RX ADMIN — NICOTINE 1 PATCH: 21 PATCH, EXTENDED RELEASE TRANSDERMAL at 08:13

## 2024-10-23 RX ADMIN — OXYCODONE HYDROCHLORIDE 10 MG: 5 TABLET ORAL at 20:11

## 2024-10-23 RX ADMIN — METHOCARBAMOL TABLETS 750 MG: 750 TABLET, COATED ORAL at 20:11

## 2024-10-23 RX ADMIN — OXYCODONE HYDROCHLORIDE 10 MG: 5 TABLET ORAL at 15:33

## 2024-10-23 RX ADMIN — METHOCARBAMOL TABLETS 750 MG: 750 TABLET, COATED ORAL at 12:22

## 2024-10-23 NOTE — PROGRESS NOTES
General Surgery  Progress Note    Patient: Keron Gerber  YOB: 1960  MRN: [id number]      Assessment  Keron Gerber is a 64 y.o. male now postop day 8 from ileostomy closure. CT from yesterday reviewed; appears most consistent with ileus. Will advance diet today. If does well, home in next 1-2 days.     Subjective  No significant events. Passing flatus and having bowel movements. Pain improved. Up and ambulating some.     Objective    Vitals:    10/23/24 0633   BP: 90/63   Pulse:    Resp:    Temp:    SpO2:      Physical Exam  Constitutional: Well-developed well-nourished, no acute distress  Neck: Supple, trachea midline  Respiratory: No increased work of breathing, Symmetric excursion  Cardiovascular: Well pefursed, no jugular venous distention evident   Abdominal: Ileostomy closure site is intact without any significant erythema.  Abdomen soft, mildly distended, nontender.   Skin: Warm, dry, no rash on visualized skin surfaces  Psychiatric: Alert and oriented ×3, normal affect     Rani Bonilla MD   Colorectal & General Surgery  [facility]    [address], [address]  [place], [place], [address]  P: [phone]  F: [phone]

## 2024-10-23 NOTE — PLAN OF CARE
Goal Outcome Evaluation:   KUB completed this morning. Tolerating regular diet. Ambulated hallways. PRN roxicodone given x2 this shift.

## 2024-10-23 NOTE — PLAN OF CARE
Goal Outcome Evaluation:  Plan of Care Reviewed With: patient        Progress: improving  Outcome Evaluation: Patient is passing gas and voiced that he had one large loose brown BM yesterday afternoon. BP is a little soft at 82/53. Recheck was 90/50's. Patient voices lower abdominal pain. Roxicodone given twice through the night with good relief. Mother at bedside. Patient ambulating the halls without difficulty. WCTM.

## 2024-10-24 ENCOUNTER — READMISSION MANAGEMENT (OUTPATIENT)
Dept: CALL CENTER | Facility: HOSPITAL | Age: 64
End: 2024-10-24
Payer: MEDICAID

## 2024-10-24 VITALS
WEIGHT: 108.47 LBS | OXYGEN SATURATION: 97 % | SYSTOLIC BLOOD PRESSURE: 93 MMHG | BODY MASS INDEX: 16.02 KG/M2 | TEMPERATURE: 98.2 F | DIASTOLIC BLOOD PRESSURE: 57 MMHG | RESPIRATION RATE: 18 BRPM | HEART RATE: 70 BPM

## 2024-10-24 PROCEDURE — 99024 POSTOP FOLLOW-UP VISIT: CPT | Performed by: SURGERY

## 2024-10-24 PROCEDURE — 0DBB0ZZ EXCISION OF ILEUM, OPEN APPROACH: ICD-10-PCS | Performed by: SURGERY

## 2024-10-24 RX ORDER — OXYCODONE HYDROCHLORIDE 5 MG/1
5 TABLET ORAL EVERY 4 HOURS PRN
Qty: 20 TABLET | Refills: 0 | Status: SHIPPED | OUTPATIENT
Start: 2024-10-24 | End: 2024-11-07

## 2024-10-24 RX ADMIN — NICOTINE 1 PATCH: 21 PATCH, EXTENDED RELEASE TRANSDERMAL at 08:35

## 2024-10-24 RX ADMIN — OXYCODONE HYDROCHLORIDE 10 MG: 5 TABLET ORAL at 14:53

## 2024-10-24 RX ADMIN — OXYCODONE HYDROCHLORIDE 10 MG: 5 TABLET ORAL at 04:57

## 2024-10-24 RX ADMIN — METHOCARBAMOL TABLETS 750 MG: 750 TABLET, COATED ORAL at 12:40

## 2024-10-24 RX ADMIN — ACETAMINOPHEN 1000 MG: 500 TABLET ORAL at 10:35

## 2024-10-24 RX ADMIN — METHOCARBAMOL TABLETS 750 MG: 750 TABLET, COATED ORAL at 08:34

## 2024-10-24 RX ADMIN — OXYCODONE HYDROCHLORIDE 10 MG: 5 TABLET ORAL at 10:35

## 2024-10-24 RX ADMIN — ACETAMINOPHEN 1000 MG: 500 TABLET ORAL at 04:57

## 2024-10-24 NOTE — PAYOR COMM NOTE
"Kathy Gerber (64 y.o. Male)          DC SUMMARY FOR [id number]          Date of Birth   1960    Social Security Number       Address   [address] [address]    Home Phone   [phone]    MRN   [id number]       Sikh   Other    Marital Status   Single                            Admission Date   10/14/24    Admission Type   Urgent    Admitting Provider   Abram Cruz MD    Attending Provider       Department, Room/Bed   [address], [facility] [place]       Discharge Date   10/24/2024    Discharge Disposition   Home or Self Care    Discharge Destination                                 Attending Provider: (none)   Allergies: Sulfa Antibiotics    Isolation: None   Infection: None   Code Status: CPR    Ht: 175.3 cm (69\")   Wt: 49.2 kg (108 lb 7.5 oz)    Admission Cmt: None   Principal Problem: Tubulovillous adenoma of rectum [D12.8]                   Active Insurance as of 10/14/2024       Primary Coverage       Payor Plan Insurance Group Employer/Plan Group    [place] [id number]       Payor Plan Address Payor Plan Phone Number Payor Plan Fax Number Effective Dates    [employer] [address] [address] [phone]  7/1/2024 - None Entered    [facility] [id number]         Subscriber Name Subscriber Birth Date Member ID       KATHY GERBER 1960 [id number]                     Emergency Contacts        (Rel.) Home Phone Work Phone Mobile Phone    ALIX GONZALEZ (Sister) [phone] -- [phone]    Portia Gerber (Mother) [phone] -- [phone]    RENY GONZALEZ (Brother) [phone] -- [phone]    Elvira paez -- -- --                 Discharge Summary        Abram Cruz MD at 10/24/24 1216          Colorectal & General Surgery  Discharge Summary    DATE OF ADMIT:    08/30/2024     DATE OF DISCHARGE:    10/24/24     DIAGNOSIS:    Ileostomy status  History of tubulovillous adenoma of the " rectum    PROCEDURES:    Closure of diverting loop ileostomy    FINAL PATHOLOGY:    Benign appearing ileostomy    SUMMARY OF HOSPITAL COURSE:     He is admitted to hospital postoperatively.  He tolerated the procedure well but did develop an intermittent ileus that kept him here for a week.  Otherwise, did very well.    PHYSICAL EXAM  Constitutional: Resting comfortably, no acute distress  Neck: Supple, trachea midline  Respiratory: No increased work of breathing, Symmetric excursion  Cardiovascular: Well pefursed, no jugular venous distention evident   Abdominal: Incision healing nicely.  Soft, non-tender, non-distended  Lymphatics: No cervical or suprascapular adenopathy  Skin: Warm, dry, no rash on visualized skin surfaces  Musculoskeletal: Symmetric strength, no obvious gross abnormalities  Psychiatric: Alert and oriented ×3, normal affect      DIET: Regular, as tolerated    ACTIVITY: No lifting more than 10 pounds for 4 weeks    MEDICATIONS: Refer to MAR    FOLLOW-UP: By phone    Trey Cruz MD  Colorectal & General Surgery  [facility]    [address], [address]  [place], [place], [address]  P: [phone]  F: [phone]       Electronically signed by Abram Cruz MD at 10/24/24 7799

## 2024-10-24 NOTE — CASE MANAGEMENT/SOCIAL WORK
Continued Stay Note  Psychiatric     Patient Name: Keron Gerber  MRN: [id number]  Today's Date: 10/24/2024    Admit Date: 10/14/2024    Plan: Home w/ family support   Discharge Plan       Row Name 10/24/24 1041       Plan    Plan Home w/ family support    Plan Comments Plan remians home w/ family. Daughter is an RN and can assist as needed. Patient has chronic trach and Gtube. Family to transport. CCP will continue to follow for dc planning needs pending clinical course. BIBI, PACOW                   Discharge Codes    No documentation.                 Expected Discharge Date and Time       Expected Discharge Date Expected Discharge Time    Oct 25, 2024               VIVIANA Harkins

## 2024-10-24 NOTE — DISCHARGE SUMMARY
Colorectal & General Surgery  Discharge Summary    DATE OF ADMIT:    08/30/2024     DATE OF DISCHARGE:    10/24/24     DIAGNOSIS:    Ileostomy status  History of tubulovillous adenoma of the rectum    PROCEDURES:    Closure of diverting loop ileostomy    FINAL PATHOLOGY:    Benign appearing ileostomy    SUMMARY OF HOSPITAL COURSE:     He is admitted to hospital postoperatively.  He tolerated the procedure well but did develop an intermittent ileus that kept him here for a week.  Otherwise, did very well.    PHYSICAL EXAM  Constitutional: Resting comfortably, no acute distress  Neck: Supple, trachea midline  Respiratory: No increased work of breathing, Symmetric excursion  Cardiovascular: Well pefursed, no jugular venous distention evident   Abdominal: Incision healing nicely.  Soft, non-tender, non-distended  Lymphatics: No cervical or suprascapular adenopathy  Skin: Warm, dry, no rash on visualized skin surfaces  Musculoskeletal: Symmetric strength, no obvious gross abnormalities  Psychiatric: Alert and oriented ×3, normal affect      DIET: Regular, as tolerated    ACTIVITY: No lifting more than 10 pounds for 4 weeks    MEDICATIONS: Refer to MAR    FOLLOW-UP: By phone    Trey Cruz MD  Colorectal & General Surgery  [facility]    [address], [address]  [place], [place], [address]  P: [phone]  F: [phone]

## 2024-10-24 NOTE — PROGRESS NOTES
Nutrition Services    Patient Name:  Keron Gerber  YOB: 1960  MRN: [id number]  Admit Date:  10/14/2024    Assessment Date:  10/24/24    Summary: Follow-up    Diet upgraded to regular yesterday. Tolerating diet, limited po intake recorded in EMR. Provided pt with High-Calorie, High-Protein Nutrition Therapy and several recipes to use at home. Pt is eager to get home and start cooking. Pt very motivated to gain wt. Encouraged oral nutrition supplements in addition to meals. No new labs. Meds, skin reviewed.    RECS:  Continue Boost TID    RD to follow.    CLINICAL NUTRITION ASSESSMENT      Reason for Assessment Follow-up Protocol     Diagnosis/Problem   Pt underwent closure of loop ileostomy with intestinal resection on 10/15. 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   Adjusted BMI (if applicable)    BMI Category Underweight (18.4 or below)   Ideal Body Weight (IBW) 155 lbs   Usual Body Weight (UBW) 145 lbs   Weight Trend Loss     Estimated/Assessed Needs        Energy Requirements    Weight for Calculation 49 kg   Method for Estimation  35-40 kcal/kg   EST Needs (kcal/day) 5914-9141       Protein Requirements    Weight for Calculation 49 kg   EST Protein Needs (g/kg) 1.5 - 2.0 gm/kg   EST Daily Needs (g/day) 73-98       Fluid Requirements     Method for Estimation 1 mL/kcal    Estimated Needs (mL/day)        Fluid Deficit    Current Na Level (mEq/L)    Desired Na Level (mEq/L)    Estimated Fluid Deficit (L)       Labs       Pertinent Labs    Results from last 7 days   Lab Units 10/21/24  0651 10/18/24  0407   SODIUM mmol/L 135* 135*   POTASSIUM mmol/L 3.9 3.7   CHLORIDE mmol/L 98 99   CO2 mmol/L 23.8 23.6   BUN mg/dL 13 9   CREATININE mg/dL 0.75* 0.74*   CALCIUM mg/dL 8.8 8.7   GLUCOSE mg/dL 105* 100*  "    Results from last 7 days   Lab Units 10/21/24  0652 10/21/24  0651 10/18/24  0407   MAGNESIUM mg/dL  --  1.8 2.0   PHOSPHORUS mg/dL  --  3.8 3.3   HEMOGLOBIN g/dL 11.8*  --  12.8*   HEMATOCRIT % 35.2*  --  35.2*   WBC 10*3/mm3 9.30  --  9.09     Results from last 7 days   Lab Units 10/21/24  0652 10/18/24  0407   PLATELETS 10*3/mm3 209 176     COVID19   Date Value Ref Range Status   08/08/2022 Detected (C) Not Detected - Ref. Range Final     No results found for: \"HGBA1C\"       Medications           Scheduled Medications acetaminophen, 1,000 mg, Oral, Q6H  enoxaparin, 30 mg, Subcutaneous, Daily  methocarbamol, 750 mg, Oral, 4x Daily  nicotine, 1 patch, Transdermal, Q24H       Infusions     PRN Medications   ondansetron ODT **OR** ondansetron    oxyCODONE **OR** oxyCODONE     Physical Findings          General Findings alert, frail, generalized wasting, loss of muscle mass, loss of subcutaneous fat, oriented   Oral/Mouth Cavity tooth or teeth missing   Edema  no edema   Gastrointestinal last bowel movement: 10/23   Skin  surgical incision: right abdomen   Tubes/Drains/Lines PEG   NFPE See Malnutrition Severity Assessment     Malnutrition Severity Assessment      Patient meets criteria for : Moderate (non-severe) Malnutrition         Current Nutrition Orders & Evaluation of Intake       Oral Nutrition     Food Allergies NKFA   Current PO Diet Diet: Regular/House; Fluid Consistency: Thin (IDDSI 0)   Supplement Boost Plus, TID   PO Evaluation     % PO Intake Limited po intake recorded in EMR    Factors Affecting Intake: No factors at this time     PES STATEMENT / NUTRITION DIAGNOSIS      Nutrition Dx Problem  Problem: Malnutrition (moderate)  Etiology: Medical Diagnosis - Pt underwent closure of loop ileostomy with intestinal resection on 10/15. 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    Signs/Symptoms: NFPE Results   --  NUTRITION INTERVENTION / PLAN OF CARE    "   Intervention Goal(s) Maintain nutrition status, Establish goals of care, Meet estimated needs, Maintain intake, Accepts oral nutrition supplement, Maintain weight, No significant weight loss, Appropriate weight gain, and PO intake goal %: 75%         RD Intervention/Action Encourage intake, Continue to monitor, Care plan reviewed, and Recommend/order: Boost TID           Education topic: Weight gain strategies     Resources given:  Printed materials, Sample menus     Advised regarding: Appropriate portions, Beverage choices, Eating pattern, Food choices, Food preparation, Snacks, Supplement use     Learner:  Patient     Readiness to learn: Eager         Prescription/Orders:       PO Diet       Supplements Boost TID      Enteral Nutrition       Parenteral Nutrition    New Prescription Ordered? Continue same per protocol, No changes at this time   --      Monitor/Evaluation Per protocol, PO intake, Supplement intake, Weight, Skin status, GI status, Symptoms, POC/GOC, Swallow function   Discharge Plan/Needs No discharge needs identified at this time   --    RD to follow per protocol.      Electronically signed by:  Patricia Phillips  10/24/24 09:26 EDT

## 2024-10-25 ENCOUNTER — TELEPHONE (OUTPATIENT)
Dept: ONCOLOGY | Facility: CLINIC | Age: 64
End: 2024-10-25
Payer: MEDICAID

## 2024-10-25 NOTE — OUTREACH NOTE
Prep Survey      Flowsheet Row Responses   [facility], [place] facility patient discharged from? [place]   Is LACE score < 7 ? No   Eligibility Readm Mgmt   Discharge diagnosis Ileostomy takedown   Does the patient have one of the following disease processes/diagnoses(primary or secondary)? General Surgery   Does the patient have Home health ordered? No   Is there a DME ordered? No   Prep survey completed? Yes            JENI HANEY - Registered Nurse

## 2024-10-25 NOTE — CASE MANAGEMENT/SOCIAL WORK
Case Management Discharge Note      Final Note: Dc home with family    Provided Post Acute Provider List?: Refused  Refused Provider List Comment: States his daughter is a registered nurse and help s him at home. Denies any need for services or DME  Provided Post Acute Provider Quality & Resource List?: Refused    Selected Continued Care - Discharged on 10/24/2024 Admission date: 10/14/2024 - Discharge disposition: Home or Self Care      Destination    No services have been selected for the patient.                Durable Medical Equipment    No services have been selected for the patient.                Dialysis/Infusion    No services have been selected for the patient.                Home Medical Care    No services have been selected for the patient.                Therapy    No services have been selected for the patient.                Community Resources    No services have been selected for the patient.                Community & DME    No services have been selected for the patient.                         Final Discharge Disposition Code: 01 - home or self-care

## 2024-10-25 NOTE — TELEPHONE ENCOUNTER
Call from Francisco Toscano. He is asking for recommendations for local doctors that could remove a trach and feeding tube. He reports these were initially placed by physicians in [place]. I let him know that he would need to contact the physicians that originally placed the trach and feeding tube to see about having these removed.

## 2024-10-28 ENCOUNTER — TELEPHONE (OUTPATIENT)
Dept: SURGERY | Facility: CLINIC | Age: 64
End: 2024-10-28
Payer: MEDICAID

## 2024-10-28 ENCOUNTER — READMISSION MANAGEMENT (OUTPATIENT)
Dept: CALL CENTER | Facility: HOSPITAL | Age: 64
End: 2024-10-28
Payer: MEDICAID

## 2024-10-28 NOTE — OUTREACH NOTE
General Surgery Week 1 Survey      Flowsheet Row Responses   [facility], [place], operated by [facility] patient discharged from? [place]   Does the patient have one of the following disease processes/diagnoses(primary or secondary)? General Surgery   Week 1 attempt successful? Yes   Call start time 1801   Call end time 1811   Discharge diagnosis Ileostomy takedown   Person spoke with today (if not patient) and relationship pt   Meds reviewed with patient/caregiver? Yes   Is the patient having any side effects they believe may be caused by any medication additions or changes? No   Does the patient have all medications related to this admission filled (includes all antibiotics, pain medications, etc.) Yes   Is the patient taking all medications as directed (includes completed medication regime)? Yes   Does the patient have a follow up appointment scheduled with their surgeon? Yes   Has the patient kept scheduled appointments due by today? N/A   Psychosocial issues? No   Did the patient receive a copy of their discharge instructions? Yes   Nursing interventions Reviewed instructions with patient   What is the patient's perception of their health status since discharge? Improving   Nursing interventions Nurse provided patient education   Is the patient /caregiver able to teach back basic post-op care? Take showers only when approved by MD-sponge bathe until then, No tub bath, swimming, or hot tub until instructed by MD, Keep incision areas clean,dry and protected, Lifting as instructed by MD in discharge instructions   Is the patient/caregiver able to teach back signs and symptoms of incisional infection? Increased redness, swelling or pain at the incisonal site, Increased drainage or bleeding, Incisional warmth, Pus or odor from incision, Fever   Is the patient/caregiver able to teach back steps to recovery at home? Rest and rebuild strength, gradually increase activity, Eat a well-balance diet   If the patient is a current smoker, are they  able to teach back resources for cessation? Not a smoker   Is the patient/caregiver able to teach back the hierarchy of who to call/visit for symptoms/problems? PCP, Specialist, Home health nurse, Urgent Care, ED, 911 Yes   Week 1 call completed? Yes   Is the patient interested in additional calls from an ambulatory ? No   Would this patient benefit from a Referral to Amb Social Work? No   Wrap up additional comments Pt states he is having a lot of gas from surgery,  pt advised to be more mobile to help gas move. Pt also advised to use a heating pad on lower abdomen for gas relief. Pt is using oxycodone/tylenol for pain management. Pt advised to call surgeon's office in AM about oxycodone refill. Pt has upcoming post-op appt.   Call end time 1811            Marisa FLORES - Registered Nurse

## 2024-10-28 NOTE — TELEPHONE ENCOUNTER
----- Message from Abram Cruz sent at 10/24/2024 12:17 PM EDT -----  Please arrange a telehealth visit in 2 weeks as a postoperative appointment.  Thank you.

## 2024-10-29 ENCOUNTER — TELEPHONE (OUTPATIENT)
Dept: SURGERY | Facility: CLINIC | Age: 64
End: 2024-10-29
Payer: MEDICAID

## 2024-10-29 NOTE — TELEPHONE ENCOUNTER
Patient call triage line requesting medication refill. Attempted to call patient, no verbal release signed to leave voice message.

## 2024-10-30 ENCOUNTER — TELEPHONE (OUTPATIENT)
Dept: SURGERY | Facility: CLINIC | Age: 64
End: 2024-10-30
Payer: MEDICAID

## 2024-10-30 DIAGNOSIS — D12.8 TUBULOVILLOUS ADENOMA OF RECTUM: ICD-10-CM

## 2024-10-30 RX ORDER — OXYCODONE HYDROCHLORIDE 5 MG/1
5 TABLET ORAL EVERY 4 HOURS PRN
Qty: 15 TABLET | Refills: 0 | Status: SHIPPED | OUTPATIENT
Start: 2024-10-30 | End: 2024-11-13

## 2024-10-30 NOTE — TELEPHONE ENCOUNTER
Attempted to call patient again, to inform him a prescription has been sent to his pharmacy, phone call went to voice mail.

## 2024-10-30 NOTE — TELEPHONE ENCOUNTER
Attempted to call patient x3, unable to reach him, no verbal release signed. Patient called requesting refill of medication, attempted to call patient to inquire about symptoms, medication for pain management and verified pharmacy.

## 2024-10-30 NOTE — TELEPHONE ENCOUNTER
Caller: Keron Gerber    Relationship: Self    Best call back number: [phone]      Requested Prescriptions:   Requested Prescriptions     Pending Prescriptions Disp Refills    oxyCODONE (ROXICODONE) 5 MG immediate release tablet 20 tablet 0     Sig: Take 1 tablet by mouth Every 4 (Four) Hours As Needed for Moderate Pain.        Pharmacy where request should be sent: [facility] [address] [place] [address] [phone] [facility] [phone]        Last telemedicine visit with prescribing clinician: 8/26/2024   Next office visit with prescribing clinician: 11/14/2024     Additional details provided by patient: PATIENT RAN OUT OF PAIN MEDICATION AND WOULD LIKE IT REFILLED, HE IS STILL HAVING PAIN. PLEASE SEND TO PHARMACY LISTED ABOVE.     Does the patient have less than a 3 day supply:  [x] Yes  [] No    Would you like a call back once the refill request has been completed: [x] Yes [] No    If the office needs to give you a call back, can they leave a voicemail: [x] Yes [] No    Noman Bruce Rep   10/30/24 14:28 EDT

## 2024-10-30 NOTE — TELEPHONE ENCOUNTER
Patient called triage line to request refill on medication, attempted to call patient 4 times to inquire if patient is taking alternative medication like tylenol and ibuprofen and pain level, unable to reach patient.     Closure of loop ileostomy with intestinal resection 10/15/2024

## 2024-10-31 RX ORDER — OXYCODONE HYDROCHLORIDE 5 MG/1
5 TABLET ORAL EVERY 4 HOURS PRN
Qty: 20 TABLET | Refills: 0 | OUTPATIENT
Start: 2024-10-31 | End: 2024-11-14

## 2024-10-31 NOTE — TELEPHONE ENCOUNTER
Spoke with patient and informed him a refill has been sent to his pharmacy, advised he may continue taking Tylenol and add Ibuprofen if he is able to take it, to spread out the Oxycodone. Currently pain level 4/10 while sitting down, he also mentioned being careful and walking up and down of his driveway. Patient verbalized understanding of recommendations. Should  patient have any issues or concerns he was advised to call our office.

## 2024-11-14 ENCOUNTER — TELEMEDICINE (OUTPATIENT)
Dept: SURGERY | Facility: CLINIC | Age: 64
End: 2024-11-14
Payer: MEDICAID

## 2024-11-14 DIAGNOSIS — D12.8 TUBULOVILLOUS ADENOMA OF RECTUM: Primary | ICD-10-CM

## 2024-11-14 PROCEDURE — 99024 POSTOP FOLLOW-UP VISIT: CPT | Performed by: SURGERY

## 2024-11-15 NOTE — PROGRESS NOTES
Colorectal & General Surgery  Post - Op    Patient: Keron Gerber  YOB: 1960  MRN: [id number]      Assessment  Keron Gerber is a 64 y.o. male with tubulovillous adenoma of the rectum status post low anterior resection with diverting loop ileostomy who now presents in postoperative follow-up for his closure of diverting loop ileostomy.  He is tolerating a diet having good bowel function.  Pain is well-controlled.  Back to usual activities.  No issues with postoperative recovery.    He is welcome to follow-up with me on an as-needed basis.    I have recommended that he undergo a repeat colonoscopy in 1 year and we will coordinate with his GI doctor's office closer to home.    History of Present Illness   Keron Gerber is a 64 y.o. male who presents for postoperative follow-up.  He has no significant complaints today.  He reports eating well with good bowel function.  He says he gets a little gassy at times and after passing gas feels much better.  Denies any significant pain.  Continues to increase back to his usual activity.    Vital Signs  There were no vitals filed for this visit.     Physical Exam  Constitutional: Appears to be doing well       Trey Cruz MD  Colorectal & General Surgery  [facility]    [address], [address]  [place], [place], [address]  P: [phone]  F: [phone]

## 2024-11-18 PROBLEM — Z85.46 HISTORY OF PROSTATE CANCER: Status: ACTIVE | Noted: 2024-11-18

## 2024-11-18 PROBLEM — Z85.820 HISTORY OF MELANOMA: Status: ACTIVE | Noted: 2024-11-18

## 2024-12-05 ENCOUNTER — OFFICE VISIT (OUTPATIENT)
Dept: SURGERY | Facility: CLINIC | Age: 64
End: 2024-12-05
Payer: MEDICAID

## 2024-12-05 VITALS
BODY MASS INDEX: 16 KG/M2 | HEIGHT: 69 IN | WEIGHT: 108 LBS | SYSTOLIC BLOOD PRESSURE: 118 MMHG | DIASTOLIC BLOOD PRESSURE: 74 MMHG

## 2024-12-05 DIAGNOSIS — Z93.1 S/P PERCUTANEOUS ENDOSCOPIC GASTROSTOMY (PEG) TUBE PLACEMENT: ICD-10-CM

## 2024-12-05 DIAGNOSIS — Z93.0 TRACHEOSTOMY TUBE PRESENT: Primary | ICD-10-CM

## 2024-12-16 ENCOUNTER — TELEPHONE (OUTPATIENT)
Dept: OTOLARYNGOLOGY | Facility: CLINIC | Age: 64
End: 2024-12-16
Payer: MEDICAID

## 2024-12-19 ENCOUNTER — OFFICE VISIT (OUTPATIENT)
Dept: OTOLARYNGOLOGY | Facility: CLINIC | Age: 64
End: 2024-12-19
Payer: MEDICAID

## 2024-12-19 VITALS
TEMPERATURE: 97.8 F | HEART RATE: 68 BPM | SYSTOLIC BLOOD PRESSURE: 116 MMHG | HEIGHT: 69 IN | WEIGHT: 118.6 LBS | BODY MASS INDEX: 17.57 KG/M2 | DIASTOLIC BLOOD PRESSURE: 70 MMHG | OXYGEN SATURATION: 100 %

## 2024-12-19 DIAGNOSIS — C14.0 SQUAMOUS CELL CARCINOMA OF PHARYNX: Primary | ICD-10-CM

## 2024-12-19 DIAGNOSIS — Z43.0 TRACHEOSTOMY, ACUTE MANAGEMENT: ICD-10-CM

## 2024-12-19 DIAGNOSIS — Q31.8 ANOMALY OF EPIGLOTTIS: ICD-10-CM

## 2024-12-19 DIAGNOSIS — Z87.891 PERSONAL HISTORY OF NICOTINE DEPENDENCE: ICD-10-CM

## 2024-12-19 DIAGNOSIS — K12.33 MUCOSITIS DUE TO RADIATION THERAPY: ICD-10-CM

## 2024-12-19 DIAGNOSIS — J98.8 AIRWAY COMPROMISE: ICD-10-CM

## 2024-12-19 NOTE — PATIENT INSTRUCTIONS
>NASAL SALINE:  Use 2 puffs each nostril 4-6 times daily and more frequently if possible.  You can buy saline spray or you can make your own and use an old spray bottle to administer  Use a humidifier at bedside  Recipe for saline:  Water                                 1 quart  Salt (table)                        1 tablespoon  Gylcerin (or Rossy Syrup)    1 teaspoon  Sodium bicarbonate           1 teaspoon  Sprays or Milo pots are recommended    Do not allow to stand for more than 24 hrs. Make new solution. There is no preservative in this solution.    Sinus irrigation and saline application can be enhanced with various over-the-counter products.  A WaterPik can be quite useful to irrigate, especially following sinus surgery.  [employer] makes a product that irrigates the nose and some of the sinuses.  [employer]Med makes a Sinu-Gator to irrigate the sinuses.  [employer]omed also has canned saline that we will come out under pressure.  A Johanny pot can also be helpful.  All of these products help keep the nose clear of debris.  Please use as directed on the instructions that come with the particular device.     Tracheostomy Care: Shiley trach tubes     Clean around the trach tube with soap and water twice daily (May substitute baby wipes)  Do Not Apply Polysporin antibiotic ointment around the trach site.  Suction the trach as needed. You will be trained how to do this while in-patient.  You may develop small tags of tissue that bleed easily. Please call if this develops.  Use a humidifier as much as possible to keep secretions loose.  If you have Jay style trach, you will need to clean the inside of the trach tube with peroxide dipped Q-tips to clear the crusting and the thick secretions.  Mucus can obstruct the trach and cause blockage to airflow. If you suspect plugging, please call immediately.  Make sure the trach is positioned correctly, for the Jay trach, inserted far enough. You should be able to breathe  through the trach tube when it is uncapped and not talk well. The clear line on the trach tube rings faces down  If the trach comes out, please call the office for instructions if you are unable to re-insert properly. Do not allow the trach to remain out for long or the hole will close.  Return to work in N/A  Resume normal activity in N/A  You will be instructed whether or not to cap or place a speaking valve on the trach, or whether a speaking valve will be used:     Cap the trach:  as desired  Place the speaking valve: as desired  Uncap trach:  for breathing or suction, and at night for sleep apnea  Humidity:  yes  Inner cannulas:   change daily and as needed  Velcro tube sales:  change daily ad as needed  Cuff:  do not inflate  Suction: As needed   Trach type: Trach tube position was confirmed with scope. Trach tube type:  Shiley trach tube Size  6 Cuffed DIC flexible shaft    If trach comes out, call Dr Middleton for instructions.     Please do NOT hesitate to call the office for any questions or problems.    ORAL COMPLICATIONS OF CANCER TREATMENT    Cancer treatments can have a toxic effect on the mucous membranes of the mouth and throat tissues. Almost all patients who receive radiation therapy for head and neck malignancies will develop problems with their gums, mouth, tongue, or teeth. Chemotherapy and bone marrow transplants can also affect these areas.    Many of the problems can be prevented or minimized by careful and diligent attention. Patients should seek dental care prior to beginning the cancer treatments. Taking care of existing problems before therapy can prevent major problems later, including tooth and bone infection, tooth and bone loss, and pain.    Dr. Middleton works closely with the medical and radiation oncologists, as well as the dentist and oral care providers to ensure the optimal heal of the head and neck tissues during cancer treatments. However, the patient needs to maintain a  routine to care for the teeth and mucus membranes before and especially during and after treatment.    General Oral Complications of Cancer Treatment  Inflammation and ulceration of the mucous membranes, called stomatitis or mucositis  Infection  Salivary gland dysfunction, called xerostomia  Tooth decay and demineralization  Impaired function; difficulty eating, swallowing, speaking  Chronic throat pain/ scratchiness  Altered taste perception  Poor nutrition    Additional complications of Chemotherapy  Nerve pain  Bleeding  Skin damage  Prolonged/delayed healing    Additional complications of Radiation therapy  Rampant dental decay  Skin damage  Jaw or neck stiffness  Increased susceptibility to injury and infection  Prolonged/delayed healing    HOW TO CARE FOR YOUR ORAL HEALTH DURING CANCER THERAPY  See a dentist prior to beginning radiation therapy  Brush your teeth and tongue gently with an extra soft, even bristled toothbrush and a bland toothpaste after every meal and at bedtime  Floss teeth once a day but avoid areas that are bleeding or sore  Don’t use mouthwash that contains alcohol  Rinse mouth with baking soda/salt combination or an antiplaque solution at room temperature several times daily. You may also use plain water.  Use a waterpik aimed at the teeth, not the gums with a warm baking soda and salt mixture.  If you wear dentures, wear them only when necessary and never at night.  Increase water intake and use oral moisturizing gels.  Use fluoride if recommended by the dentist.  Exercise jaw muscles 3 times daily and more. Do 20 repetitions of opening and closing the mouth, gently stretching and holding open. If the jaw muscles are particularly stiff, you may insert your fingers and gently pull the jaws apart.  Avoid rough-textured or irritating foods. Drink sips of liquids with each bite to allow the food to soften or thin the food.  Use Chapstick, Blistex, or other soothing lip balms, including  Vaseline or Polysporin to the lips.  If the lips begin to crust, do not pull this off is there is resistance. Instead, soak the lips with a warm wash cloth to gently loosen these crusts.  Talk with Dr. Middleton about pain or numbing medications, both topical and systemic.  Quit smoking, chewing, snuff. All of these tobacco products accelerate the decay process and increase cancer risks.    For a dry mouth:  Sip water frequently  Use a humidifier  Suck ice chips or sugar free candy  Chew sugar free gum  If desired, use saliva substitute or gel, or prescription saliva stimulant  Avoid lemon glycerin swabs and sugar candies and lozenges    Recipe for oral rinse:  Salt  1 tablespoon  Baking Soda 1 teaspoon  Water  1 quart  Rossy syrup 1 tablespoon    Optional:  Small amount of mouthwash for flavoring  If you have scabs or extremely thick mucous, you may mix the above 50:50 with hydrogen peroxide to help loosen this thick layer.    Dietary Instructions:  Choose soft, moist foods  Moisten foods with gravy, broth or butter  Increase fluids with meals  Keep food bite-sized  Blenderize foods or use baby foods  Avoid:  Very hot or very cold beverages  Scratchy or rough foods like pretzels, chips, crackers or nuts  Spicy or salty foods like canned soups. vinegar, ketchup, salsa  Alcohol, beer, wine, whiskey, etc.  Strongly minted candies and toothpaste  Fumes like ammonia, household , paints, gasoline, solvents    Skin care with Cancer treatment:  Moisturize the area of treatment at least 4 times daily and more often  Use hypoallergenic moisturizers  Ask Dr. Middleton about certain healing creams and lotions  Avoid strong soaps and excessive makeup  Avoid the sun  If sun exposure is anticipated, use high SPF sunblocks and Zinc Oxide  Do not use razor blades  Avoid any trauma to the area, including squeezing pimples and popping blisters  Report any sores or skin breakdown to Dr. Middleton    Medications:  Continue all your  daily medications  If you are experiencing side effects, report these to Dr. Middleton  Continue your multivitamins  Certain medications may interfere with radiation. Please check with Dr. Middleton  Certain natural and homeopathic products can interfere and lessen the efficacy of radiation, especially anti-oxidants. Please ask Dr. Middleton about these products.      Call and tell us DME supplier phone number    Please do not hesitate to call the office for questions or problems.      CONTACT INFORMATION:  The main office phone number is [phone]. For emergencies after hours and on weekends, this number will convert over to our answering service and the on call provider will answer. Please try to keep non emergent phone calls/ questions to office hours 9am-5pm Monday through Friday.     [facility]  As an alternative, you can sign up and use the Epic MyChart system for more direct and quicker access for non emergent questions/ problems.  [employer] allows you to send messages to your doctor, view your test results, renew your prescriptions, schedule appointments, and more. To sign up, go to [email or web address] and click on the Sign Up Now link in the New User? box. Enter your [facility] Activation Code exactly as it appears below along with the last four digits of your Social Security Number and your Date of Birth () to complete the sign-up process. If you do not sign up before the expiration date, you must request a new code.    [facility] Activation Code: [email or web address]  Expires: 2025  9:54 AM    If you have questions, you can email [email or web address] or call [phone] to talk to our [facility] staff. Remember, [facility] is NOT to be used for urgent needs. For medical emergencies, dial 911.    IF YOU SMOKE, VAPE OR USE TOBACCO PLEASE READ THE FOLLOWING:  Why is smoking bad for me?  Smoking increases the risk of heart disease, lung disease, vascular disease, stroke, and cancer. If you  smoke, STOP!      IF YOU SMOKE, VAPE OR USE TOBACCO PLEASE READ THE FOLLOWING:  Why is smoking bad for me?  Smoking increases the risk of heart disease, lung disease, vascular disease, stroke, and cancer. If you smoke, STOP!    For more information:  [facility]  1-800-QUIT-NOW  [email or web address]

## 2024-12-19 NOTE — PROGRESS NOTES
Lenny Middleton Jr, MD  [facility] EAR NOSE & THROAT  [address], [address]  [facility] [address]  Fax [phone]  Phone [phone]      Visit Type: FOLLOW UP   Chief Complaint   Patient presents with    trach referral     Discuss trach removal            HISTORY OBTAINED FROM: patient  Accompanied by:Wife and friend    HPI  Keron Gerber is a 64 y.o. male who presents for routine cancer follow up. He wishes trach out. He is eating well. G tube is out.  Colostomy reversed.     Oncology History:  Oncology/Hematology History   Prostate cancer    Initial Diagnosis    Prostate cancer     1/23/2020 Procedure    PSA 13.96     2/23/2021 Procedure    PSA 4.4     4/23/2022 Procedure    PSA 10.1     5/12/2022 Procedure    PSA 16.70     5/23/2022 Biopsy    Prostate Biopsy:  Left mid medial 1 - prostatic adenocarcinoma, Johanny 3+3=6  Left mid lateral 2 - prostatic adenocarcinoma Palestine 3+3=6  Left mid medial 2 - prostatic adenocarcinoma [place] 3+3=6  Left apex lateral - prostatic adenocarcinoma, Johanny 3+3=6  Left apex medial - prostatic adenocarcinoma, Johanny 3+3=6  Right base medial - prostatic adenocarcinoma, Johanny 3+3=6    Perineural invasion is identified in this case     9/7/2022 Surgery    Final Diagnosis   Prostate, prostatectomy:               - Histologic Type:  Acinar adenocarcinoma.               - Histologic Grade:  Grade group 4 (Johanny score 4 + 4 = 8).               - Intraductal Carcinoma:  Not identified.               - Cribriform Glands:  Present.               - Treatment Effect:  No known presurgical therapy.               - Tumor Quantitation: 41-50%.               - Extraprostatic Extension:  Not identified.               - Urinary Bladder Neck Invasion: Not identified.               - Seminal Vesicle Invasion: Not identified.               - Lymphovascular Invasion: Not identified.               - Margins: All margins are  Negative for invasive carcinoma.               - Pathologic Stage:  pT2 pNx pMx        12/8/2022 Procedure    PSA <0.1     5/16/2023 Procedure    PSA <0.1     9/19/2023 Procedure    PSA <0.1     1/23/2024 Procedure    PSA <0.1     Malignant neoplasm hypopharynx    Initial Diagnosis    Malignant neoplasm hypopharynx     2/28/2024 Imaging    CT Neck:  35 x 40 mm lobular enhancing mass in the upper larynx arising from the posterior   wall at the level of the base of the epiglottis. This is suspicious for malignancy.   There is abnormal mucosal enhancement also noted involving the uvula and   tongue base.      3/2/2024 Imaging    CT Neck:  Findings suggest laryngeal mass as noted, suggest ENT evaluation, asymmetric appearance of the vocal cords and larynx  Minimal adenopathy  Postop changes left submandibular region     3/13/2024 Imaging    CT Chest:  Well-defined 1.7 cm fluid density lesion in the anterior mediastinum could   represent a benign etiology such as a thymic cyst. Given lack of prior imaging   and concern for malignancy, recommend close attention on follow-up imaging   and further workup as clinically necessary.  Mild diffuse thickening of bilateral adrenal glands could represent   adenomatous hyperplasia. Recommend correlation to prior imaging if   available and attention on follow-up imaging.  No evidence of pulmonary metastases.     3/14/2024 Biopsy    Awake tracheostomy/Direct laryngoscopy with suspension with   telescope/right cervical esophagoscopy/biopsy    Findings:  Successful awake tracheostomy in successful awake tracheostomy with   placement of a 6 cuffed Shiley tracheostomy tube.  Large exophytic 5-6 cm posterior oropharyngeal mass extending to the   junction of the lateral posterior pharyngeal wall junction bilaterally, superiorly   comes up to the level of the soft palate. Inferiorly extends into bilateral piriform   sinuses, but does not involve the apex, however there are reactive changes    of the left piriform apex. There is no involvement of the supraglottic structures.  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  No other mucosal masses, lesions, or ulcers visualized.   No mucosal masses, lesions, or ulcers visualized in the esophageal inlet or   cervical esophagus.    DIAGNOSIS:     A. VOCAL CORD, LEFT, BIOPSY:   - Squamous mucosa with focal dysplasia, at least moderate.     B.  POSTERIOR PHARYNGEAL WALL, BIOPSY:   - INVASIVE POORLY DIFFERENTIATED SQUAMOUS CELL CARCINOMA.     C.  POSTERIOR PHARYNGEAL WALL, BIOPSY:   - INVASIVE POORLY DIFFERENTIATED SQUAMOUS CELL CARCINOMA WITH FOCAL METAPLASTIC FEATURES     D.  POSTERIOR PHARYNGEAL WALL, BIOPSY:   - INVASIVE POORLY DIFFERENTIATED SQUAMOUS CELL CARCINOMA WITH FOCAL METAPLASTIC FEATURES      3/18/2024 Procedure    Gastrostomy tube placement     3/22/2024 Procedure    Appointment with Dr. Rand - [facility]:  Laryngoscopy:  Findings: nasopharynx and oropharynx patent without masses/lesions; BOT   without masses/lesions; exophytic lesion likely coming from posterior   pharyngeal wall protruding onto supraglottis and glottis; unable to visualize   glottis d/t obstructive view; supraglottis without obvious lesion otherwise    PLAN:   CT chest without mets. Discussed imaging in depth with team. Patient is   currently T3N0, stage III oropharyngeal SCC.   DL had biopsies positive for invasive SCC.   Discussed chemoradiation vs surgical resection with patient. Patient will   benefit from chemoradiation at this time.  Patient would like to have treatment closer to home in [facility]. Will send   out referrals.  Patient may need further evaluation of his carotid stenosis as well.     Appointment with Dr. 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       4/2/2024 Cancer Staged    Staging form: Pharynx - Hypopharynx, AJCC 8th Edition  - Clinical stage from 4/2/2024: Stage III (cT3, cN0, cM0) - Signed by Tee Randhawa MD on 4/2/2024 5/9/2024 - 6/20/2024 Chemotherapy    OP HEAD & NECK CISplatin (Weekly) + XRT     Malignant melanoma of nose   8/7/2014 Surgery    FINAL DIAGNOSIS                       A. CYST, RIGHT EYELID:                 EPIDERMAL INCLUSION CYST.            B. EXCISION, SKIN LESION, LEFT NOSE:                 1.   MALIGNANT MELANOMA.                 2.   ANTHONY LEVEL III.                 3.   BRESLOW MAXIMUM THICKNESS EQUALS 0.4 MM.                 4.   NO ULCERATION IDENTIFIED.                 5.   NO MICROSATELLITES IDENTIFIED.                 6.   NO MITOTIC FIGURES IDENTIFIED IN TUMOR.                 7.   NO ANGIOLYMPHATIC INVASION IDENTIFIED.                 8.   AJCC STAGE: pT1a pNX           Dictated by: CHANTE GILL MD        9/11/2014 Surgery    FINAL DIAGNOSIS                       SKIN, NOSE, RE-EXCISION FOR MELANOMA:                 NEGATIVE FOR RESIDUAL MELANOMA OR MELANOMA IN SITU.                 BIOPSY SITE IDENTIFIED.                 SEVERE SOLAR ELASTOSIS.                 SEBORRHEIC KERATOSIS.                 MARGINS OF RESECTION ARE NEGATIVE.              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    Tobacco use disorder        Past Surgical History:   Procedure Laterality Date    COLON RESECTION N/A 7/30/2024    Procedure: Laparoscopic low anterior resection, diverting loop ileostomy;  Surgeon: Abram Cruz MD;  Location: [facility] OR;   Service: General;  Laterality: N/A;    COLONOSCOPY N/A 5/1/2024    Procedure: COLONOSCOPY WITH ANESTHESIA;  Surgeon: Gustabo Ruiz MD;  Location: [facility] ENDOSCOPY;  Service: Gastroenterology;  Laterality: N/A;  preop; abnormal pet scan   postop rectal mass; polyps;   PCP Dr Santos    CYST REMOVAL Right 08/07/2014    RIGHT LOWER EYELID- EPIDERMAL INCLUSION CYST    EXCISION LESION N/A 08/07/2014    MALIGNANT MELANOMA OF LEFT NASAL TIP    FOREHEAD FLAP  09/11/2014    ILEOSTOMY CLOSURE N/A 10/15/2024    Procedure: ILEOSTOMY TAKEDOWN;  Surgeon: Abram Cruz MD;  Location:  [facility];  Service: General;  Laterality: N/A;    PEDICLE FLAP  10/02/2014    PEDICLE DIVISION AND FLAP INSET OF NOSE    PROSTATECTOMY N/A 09/07/2022    Procedure: PROSTATECTOMY LAPAROSCOPIC WITH DAVINCI ROBOT;  Surgeon: Matthew Weaver MD;  Location: [facility] OR;  Service: Robotics - DaVinci;  Laterality: N/A;    SQUAMOUS CELL CARCINOMA EXCISION  10/29/2013    LEFT FLOOR OF MOUTH    TONSILLECTOMY         Family History: His family history includes Colon polyps in his father; Heart disease in his father; No Known Problems in his mother.     Social History: He  reports that he has been smoking cigarettes. He has never used smokeless tobacco. He reports that he does not currently use alcohol. He reports that he does not use drugs.    Home Medications:  traZODone    Allergies:  He is allergic to sulfa antibiotics.       Vital Signs:   Temp:  [97.8 °F (36.6 °C)] 97.8 °F (36.6 °C)  Heart Rate:  [68] 68  BP: (116)/(70) 116/70  ENT Physical Exam  Constitutional  Appearance: patient appears well-developed, thin, patient is cooperative;  Communication/Voice: communication appropriate for developmental age; vocal quality normal;  Constitutional comments: Trach in place  Smells heavily  cigs  Head and Face  Appearance: head appears normal, face appears normal and face appears atraumatic;  Palpation: facial palpation normal;  Salivary:  glands normal;  Ear  Hearing: intact;  Auricles: bilateral auricles normal;  External Mastoids: right external mastoid normal; left external mastoid normal;  Ear Canals: bilateral ear canals normal;  Tympanic Membranes: bilateral tympanic membranes normal;  Nose  External Nose: nares patent bilaterally; external nose normal;  Internal Nose: nasal mucosa normal; nasal septal deviation present; deviation is to the right, septal deviation is intermediate; Septum comments: general deviation all levels L to R   bilateral inferior turbinates normal;  Oral Cavity/Oropharynx  Lips: normal;  Teeth: dentures (upper and lower) noted;  Gums: gingiva normal;  Tongue: normal; Oral Tongue comments: Tobacco stained  Oral mucosa: mucous membranes dry;  Hard palate: normal;  Soft palate: normal;  Tonsils: normal;  Base of Tongue: normal;  Posterior pharyngeal wall: normal;  OC/OP comments: Palpation negative in base of tongue, oropharynx, tonsillar arch  Neck  Neck: neck palpation normal; no neck mass or crepitus present; tracheostomy noted; healing well; Tracheostomy tube size: 6 mm; Tracheostomy tube type: Shiley flexible shaft; Passy-Tyaskin speaking valve present;  Neck comments: Patient with shallow trach in position, Passy-Mj valve in position.  No evidence of respiratory difficulty, increased secretions or difficulty breathing.  Respiratory  Inspection: breathing unlabored; normal breathing rate;  Cardiovascular  Inspection: extremities are warm and well perfused; no peripheral edema present;  Neurovestibular  Mental Status: alert and oriented;  Psychiatric: mood normal; affect is appropriate;  Cranial Nerves: cranial nerves intact;       Laryngoscopy    Date/Time: 12/19/2024 1:22 PM    Performed by: Lenny Middleton Jr., MD  Authorized by: Lenny Middleton Jr., MD    Consent:     Consent obtained:  Verbal    Consent given by:  Patient    Alternatives discussed:  No treatment  Anesthesia (see MAR for exact dosages):      Anesthesia method:  Topical application    Topical anesthetic:  Tetracaine  Procedure details:     Indications: oncologic surveillance      Medication:  Afrin    Instrument: flexible fiberoptic laryngoscope      Scope location: left nare    Sinus/ Nasopharynx:     Right nasopharynx: patent and inflammation      Left nasopharynx: patent and inflammation      Right eustachian tube: patent      Left eustachian tube: inflammation, patent and inflammation    Oropharynx/ Supraglottis:     Posterior pharyngeal wall: inflamed      Oropharynx: inflammation      Oropharynx: no stenosis and no lesion      Vallecula: inflammation      Vallecula: no web and no lesion      Base of tongue: inflammation      Base of tongue: no lingual tonsillar hypertrophy and no lesion      Epiglottis: inflammation      Epiglottis: not retroflexed       comment:  Severe Tocco shaped over the false vocal cords  Larynx/ Hypopharynx:     Arytenoids: inflammation and interarytenoid edema      Arytenoids: no lesions      Hypopharynx: inflammation      Hypopharynx: no lesions      Pyriform sinus: inflammation      Pyriform sinus: no lesion and no pooling of secretions      False vocal cords: inflammation      True vocal cords: Shawn's edema      True vocal cords: no immobility    Post-procedure details:     Patient tolerance of procedure:  Tolerated well  Comments:      Moderate inflammation from nasopharynx down to the hypopharynx, including supraglottis  Lingual tonsils.  B normal size  Vallecula clear  Epiglottis is thickened with radiation changes.  The glottis is Tocco shaped and almost completely closed over the false cords.  Vocal folds appear to be mildly edematous but otherwise mobile  Tracheostomy Tube Exchange    Date/Time: 12/19/2024 1:23 PM    Performed by: Lenny Middleton Jr., MD  Authorized by: Lenny Middleton Jr., MD  Consent: Verbal consent obtained.  Risks and benefits: risks, benefits and alternatives were  "discussed  Consent given by: patient  Patient understanding: patient states understanding of the procedure being performed  Patient consent: the patient's understanding of the procedure matches consent given  Patient identity confirmed: verbally with patient  Time out: Immediately prior to procedure a \"time out\" was called to verify the correct patient, procedure, equipment, support staff and site/side marked as required.  Indications: routine  Local anesthesia used: no    Anesthesia:  Local anesthesia used: no    Sedation:  Patient sedated: no    Tube type: double cannula  Tube cuff: single cuff  Tube size: 6.0 mm  Cuff inflation: deflated  Confirmation: Nasopharyngoscope used to confirm placement  Patient tolerance: patient tolerated the procedure well with no immediate complications  Comments: Tracheal bronchoscope: Tracheal bronchoscope was used throughout the entire procedure both to evaluate the trachea as well as determine position of tracheostomy tube.  Distal trachea-normal trachea down to the ana maria, ana maria sharp, MSB patent.  Proximal trachea-posterior wall with mild thickening from tracheal rub, no tracheal stenosis superiorly  Subglottis-no evidence of stenosis or inflammation  True vocal folds from below-freely mobile without penetration of secretions  Tracheostomy was placed back in position without difficulty and position was confirmed with the flexible tracheal bronchoscope         Result Review       RESULTS REVIEW    I have reviewed the patients old records in the chart.   I have reviewed the patients old records in the chart.        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  Patient appears to have such a narrowed epiglottis that I am concerned if I removed the trachea.  Should he have any swelling, he would completely obstruct his supraglottis.  I have discussed this with the patient and his wife.  I will continue the trach for now.  He does require a change.  His current tracheostomy tube is cigarette stained as well as a cuffed tube.  He is to provide me with his DME number so that I can prescribe a new trach tube.  He has no spare trach tubes.  Shiley trach tube care  He minified room air  Continue Passy-Mj valve and As desired  Plan tracheostomy tube change when supplies are available  Call for problems    My Chart:  Encouraged to enroll in My Chart  Encouraged to review data and findings in My Chart    Patient, Spouse understand(s) and agree(s) with the treatment plan as described.    Return in about 6 weeks (around 1/30/2025) for Recheck Trach site and Pharyngeal cancer.        Electronically signed by Lenny Middleton Jr, MD 12/19/24 1:30 PM CST.

## 2024-12-23 NOTE — PROGRESS NOTES
GENERAL SURGERY OFFICE FOLLOW UP VISIT    Referring Provider: No ref. provider found  Primary Care Provider: Yousif Marrero MD    Chief Complaint   Patient presents with    Follow-up       Subjective     Mr. Gerber presents to clinic for feeding tube removal.  He had successfully undergone a low anterior resection with diverting loop ileostomy and subsequent ileostomy reversal by Dr. Cruz at [facility].  He has since discontinued using his PEG tube for feeds and is tolerating regular diet.  He has also had his tracheostomy capped for quite some time.    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    Tobacco use disorder    ,   Past Surgical History:   Procedure Laterality Date    COLON RESECTION N/A 7/30/2024    Procedure: Laparoscopic low anterior resection, diverting loop ileostomy;  Surgeon: Abram Cruz MD;  Location: [facility];  Service: General;  Laterality: N/A;    COLONOSCOPY N/A 5/1/2024    Procedure: COLONOSCOPY WITH ANESTHESIA;  Surgeon: Gustabo Ruiz MD;  Location: [facility] ENDOSCOPY;  Service: Gastroenterology;  Laterality: N/A;  preop; abnormal pet scan   postop rectal mass; polyps;   PCP Dr Santos    CYST REMOVAL Right 08/07/2014    RIGHT LOWER EYELID- EPIDERMAL INCLUSION CYST    EXCISION LESION N/A 08/07/2014    MALIGNANT MELANOMA OF LEFT NASAL TIP    FOREHEAD FLAP  09/11/2014    ILEOSTOMY CLOSURE N/A 10/15/2024    Procedure: ILEOSTOMY TAKEDOWN;  Surgeon: Abram Cruz MD;  Location: [facility] OR;  Service: General;  Laterality: N/A;    PEDICLE FLAP  10/02/2014    PEDICLE DIVISION AND FLAP INSET OF NOSE    PROSTATECTOMY N/A 09/07/2022    Procedure: PROSTATECTOMY LAPAROSCOPIC WITH DAVINCI ROBOT;  Surgeon: Matthew Weaver MD;  Location: [facility] OR;  Service: Robotics - DaVinci;   "Laterality: N/A;    SQUAMOUS CELL CARCINOMA EXCISION  10/29/2013    LEFT FLOOR OF MOUTH    TONSILLECTOMY     ,   Family History   Problem Relation Age of Onset    No Known Problems Mother     Heart disease Father     Colon polyps Father     Colon cancer Neg Hx    ,   Social History     Tobacco Use    Smoking status: Every Day     Current packs/day: 1.00     Types: Cigarettes    Smokeless tobacco: Never   Vaping Use    Vaping status: Never Used   Substance Use Topics    Alcohol use: Not Currently     Comment: 12 PER DAY    Drug use: No       Current Outpatient Medications:     traZODone (DESYREL) 50 MG tablet, Take 1 tablet by mouth Daily. (Patient not taking: Reported on 12/19/2024), Disp: , Rfl:     Allergies:  Sulfa antibiotics      The following portions of the patient's history were reviewed and updated as appropriate: allergies, current medications, past family history, past medical history, past social history, past surgical history, and problem list.      Review of Systems  A comprehensive 14 point review of systems was performed and is negative unless otherwise noted      Objective   /74 (BP Location: Left arm, Patient Position: Sitting, Cuff Size: Adult)   Ht 175.3 cm (69.02\")   Wt 49 kg (108 lb)   BMI 15.94 kg/m²     BMI followup discussion/instruction with patient: none (medical contraindication)      Physical Exam  Constitutional:       Appearance: Normal appearance.      Comments: Pleasant middle-age male, in no acute distress. Normal development, thin body habitus.    HENT:      Head: Normocephalic and atraumatic.      Right Ear: External ear normal.      Left Ear: External ear normal.      Ears:      Comments: Hearing intact     Nose: Nose normal.      Comments: Nares patent, no septal deviation, no drainage     Mouth/Throat:      Comments: Airway patent, dentition intact, mucus membranes moist  Eyes:      Extraocular Movements: Extraocular movements intact.      Conjunctiva/sclera: " Conjunctivae normal.      Pupils: Pupils are equal, round, and reactive to light.      Comments: External eyelids grossly normal, vision intact, no scleral icterus   Neck:      Comments: Trachea midline  Cardiovascular:      Rate and Rhythm: Normal rate.      Comments: Normotensive, no JVD bilaterally  Pulmonary:      Effort: Pulmonary effort is normal.      Breath sounds: Normal breath sounds.      Comments: Normal respiratory rate  Abdominal:      General: Abdomen is flat.      Palpations: Abdomen is soft.      Comments: PEG tube in the left upper quadrant with site well-healed.  No drainage around the tube   Musculoskeletal:         General: Normal range of motion.      Cervical back: Normal range of motion.   Skin:     General: Skin is warm and dry.      Comments: Skin color is consistent with ethnicity   Neurological:      General: No focal deficit present.      Mental Status: He is alert and oriented to person, place, and time.   Psychiatric:         Mood and Affect: Mood normal.         Behavior: Behavior normal.         Thought Content: Thought content normal.         Judgment: Judgment normal.      Comments: Patient understands disease process and has decision making capacity.            Labs:  I personally reviewed all pertinent labs.   Imaging: I personally reviewed all pertinent imaging studies.   Pathology:      Assessment & Plan   Diagnoses and all orders for this visit:    1. Tracheostomy tube present (Primary)  -     Ambulatory Referral to ENT (Otolaryngology)    2. S/P percutaneous endoscopic gastrostomy (PEG) tube placement    PEG tube removed in clinic.  I have referred him to ENT for tracheostomy decannulation.  Follow-up I have instructed the patient to cover up the PEG tube site with a dry dressing and this should heal with time.  I have instructed the patient to follow-up with me in 2 to 3 months if the PEG tube site does not fully heal.       Thank you for the referral and trusting me with  the care of your patient.    Yousif Atkins MD, [facility]  General Surgery  12/23/2024  16:41 CST    Please note that portions of this note were completed with a voice recognition program.

## 2024-12-31 ENCOUNTER — TELEPHONE (OUTPATIENT)
Dept: OTOLARYNGOLOGY | Facility: CLINIC | Age: 64
End: 2024-12-31
Payer: MEDICAID

## 2024-12-31 NOTE — TELEPHONE ENCOUNTER
Nurse received call from Ed, brother of patient.  Nurse clarified current trach number left from brother of DME [id number]; per brother he has not opened a new set that patient didn't know was supplied and was recently found in a closet.  Patient's brother is going to come by clinic on 1/6/25 between 12:15 and 12:30 to bring in cannula so that provider can provide a script to order supplies for upcoming visit on 1/27/25 at 1:00 pm. Brother Ed confirmed information with nurse.

## 2025-01-07 ENCOUNTER — TELEPHONE (OUTPATIENT)
Dept: OTOLARYNGOLOGY | Facility: CLINIC | Age: 65
End: 2025-01-07
Payer: MEDICAID

## 2025-01-07 NOTE — TELEPHONE ENCOUNTER
I spoke to representative with [facility] [employer], they only specialize in feeding tube products. This was the number that ED gave me, he thought he might have gotten his trach supplies from there.

## 2025-01-07 NOTE — TELEPHONE ENCOUNTER
I spoke with ED, he stated he does have a replacement trach at home. It is a 6 Shiley CN75H. He thinks that is the one he has in now but is unsure. He stated he did have suction catheters and a suction machine. He is unsure what his DME number is but gave me the number that he thinks delivered his G Tube supplies. [facility] [phone]. He wanted to know if patient needed to keep his scheduled appt on 1-27-25 at 1:00. I advised him that the patient needs to keep this appt to have his trach changed. He should bring the trach box with him at his appt on 1-27-24. He thought Dr middleton was going to put in a different kind, I advised him that he did not mention that in his note. Since the trach has not been changed he needs to come to his appt for the trach change. Going forward if Dr Middleton wants to change the style we will get the new one ordered with his DME company. He VU

## 2025-01-27 ENCOUNTER — OFFICE VISIT (OUTPATIENT)
Dept: OTOLARYNGOLOGY | Facility: CLINIC | Age: 65
End: 2025-01-27
Payer: MEDICAID

## 2025-01-27 VITALS
WEIGHT: 118 LBS | TEMPERATURE: 97.9 F | SYSTOLIC BLOOD PRESSURE: 107 MMHG | DIASTOLIC BLOOD PRESSURE: 79 MMHG | BODY MASS INDEX: 17.48 KG/M2 | HEART RATE: 65 BPM | HEIGHT: 69 IN

## 2025-01-27 DIAGNOSIS — Z87.891 PERSONAL HISTORY OF NICOTINE DEPENDENCE: ICD-10-CM

## 2025-01-27 DIAGNOSIS — Q31.8 ANOMALY OF EPIGLOTTIS: ICD-10-CM

## 2025-01-27 DIAGNOSIS — J98.8 AIRWAY COMPROMISE: ICD-10-CM

## 2025-01-27 DIAGNOSIS — Z43.0 TRACHEOSTOMY, ACUTE MANAGEMENT: ICD-10-CM

## 2025-01-27 DIAGNOSIS — C14.0 SQUAMOUS CELL CARCINOMA OF PHARYNX: Primary | ICD-10-CM

## 2025-01-27 DIAGNOSIS — K12.33 MUCOSITIS DUE TO RADIATION THERAPY: ICD-10-CM

## 2025-01-27 NOTE — PROGRESS NOTES
Lenny Middleton Jr, MD  [facility]AR [facility]SE & THROAT  [address], [address]  [place] [address]  Fax [phone]  Phone [phone]      Visit Type: FOLLOW UP   Chief Complaint   Patient presents with    Cancer Surveillance     H/O SCC pharynx    Tracheostomy Tube Change           HISTORY OBTAINED FROM: patient  Accompanied by:No one  HPI  Keron Gerber is a 64 y.o. male who presents for routine cancer follow up. He is doing well. Here for trach change now.     Oncology History:  Oncology/Hematology History   Prostate cancer    Initial Diagnosis    Prostate cancer     1/23/2020 Procedure    PSA 13.96     2/23/2021 Procedure    PSA 4.4     4/23/2022 Procedure    PSA 10.1     5/12/2022 Procedure    PSA 16.70     5/23/2022 Biopsy    Prostate Biopsy:  Left mid medial 1 - prostatic adenocarcinoma, Johanny 3+3=6  Left mid lateral 2 - prostatic adenocarcinoma [place] 3+3=6  Left mid medial 2 - prostatic adenocarcinoma Johanny 3+3=6  Left apex lateral - prostatic adenocarcinoma, Johanny 3+3=6  Left apex medial - prostatic adenocarcinoma, [place] 3+3=6  Right base medial - prostatic adenocarcinoma, [place] 3+3=6    Perineural invasion is identified in this case     9/7/2022 Surgery    Final Diagnosis   Prostate, prostatectomy:               - Histologic Type:  Acinar adenocarcinoma.               - Histologic Grade:  Grade group 4 (Johanny score 4 + 4 = 8).               - Intraductal Carcinoma:  Not identified.               - Cribriform Glands:  Present.               - Treatment Effect:  No known presurgical therapy.               - Tumor Quantitation: 41-50%.               - Extraprostatic Extension:  Not identified.               - Urinary Bladder Neck Invasion: Not identified.               - Seminal Vesicle Invasion: Not identified.               - Lymphovascular Invasion: Not identified.               - Margins: All margins are Negative for  invasive carcinoma.               - Pathologic Stage:  pT2 pNx pMx        12/8/2022 Procedure    PSA <0.1     5/16/2023 Procedure    PSA <0.1     9/19/2023 Procedure    PSA <0.1     1/23/2024 Procedure    PSA <0.1     Malignant neoplasm hypopharynx    Initial Diagnosis    Malignant neoplasm hypopharynx     2/28/2024 Imaging    CT Neck:  35 x 40 mm lobular enhancing mass in the upper larynx arising from the posterior   wall at the level of the base of the epiglottis. This is suspicious for malignancy.   There is abnormal mucosal enhancement also noted involving the uvula and   tongue base.      3/2/2024 Imaging    CT Neck:  Findings suggest laryngeal mass as noted, suggest ENT evaluation, asymmetric appearance of the vocal cords and larynx  Minimal adenopathy  Postop changes left submandibular region     3/13/2024 Imaging    CT Chest:  Well-defined 1.7 cm fluid density lesion in the anterior mediastinum could   represent a benign etiology such as a thymic cyst. Given lack of prior imaging   and concern for malignancy, recommend close attention on follow-up imaging   and further workup as clinically necessary.  Mild diffuse thickening of bilateral adrenal glands could represent   adenomatous hyperplasia. Recommend correlation to prior imaging if   available and attention on follow-up imaging.  No evidence of pulmonary metastases.     3/14/2024 Biopsy    Awake tracheostomy/Direct laryngoscopy with suspension with   telescope/right cervical esophagoscopy/biopsy    Findings:  Successful awake tracheostomy in successful awake tracheostomy with   placement of a 6 cuffed Shiley tracheostomy tube.  Large exophytic 5-6 cm posterior oropharyngeal mass extending to the   junction of the lateral posterior pharyngeal wall junction bilaterally, superiorly   comes up to the level of the soft palate. Inferiorly extends into bilateral piriform   sinuses, but does not involve the apex, however there are reactive changes   of the left  piriform apex. There is no involvement of the supraglottic structures.  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  No other mucosal masses, lesions, or ulcers visualized.   No mucosal masses, lesions, or ulcers visualized in the esophageal inlet or   cervical esophagus.    DIAGNOSIS:     A. VOCAL CORD, LEFT, BIOPSY:   - Squamous mucosa with focal dysplasia, at least moderate.     B.  POSTERIOR PHARYNGEAL WALL, BIOPSY:   - INVASIVE POORLY DIFFERENTIATED SQUAMOUS CELL CARCINOMA.     C.  POSTERIOR PHARYNGEAL WALL, BIOPSY:   - INVASIVE POORLY DIFFERENTIATED SQUAMOUS CELL CARCINOMA WITH FOCAL METAPLASTIC FEATURES     D.  POSTERIOR PHARYNGEAL WALL, BIOPSY:   - INVASIVE POORLY DIFFERENTIATED SQUAMOUS CELL CARCINOMA WITH FOCAL METAPLASTIC FEATURES      3/18/2024 Procedure    Gastrostomy tube placement     3/22/2024 Procedure    Appointment with Dr. Rand - [facility]:  Laryngoscopy:  Findings: nasopharynx and oropharynx patent without masses/lesions; BOT   without masses/lesions; exophytic lesion likely coming from posterior   pharyngeal wall protruding onto supraglottis and glottis; unable to visualize   glottis d/t obstructive view; supraglottis without obvious lesion otherwise    PLAN:   CT chest without mets. Discussed imaging in depth with team. Patient is   currently T3N0, stage III oropharyngeal SCC.   DL had biopsies positive for invasive SCC.   Discussed chemoradiation vs surgical resection with patient. Patient will   benefit from chemoradiation at this time.  Patient would like to have treatment closer to home in [facility]. Will send   out referrals.  Patient may need further evaluation of his carotid stenosis as well.     Appointment with Dr. 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       4/2/2024 Cancer Staged    Staging form: Pharynx - Hypopharynx, AJCC 8th Edition  - Clinical stage from 4/2/2024: Stage III (cT3, cN0, cM0) - Signed by Tee Randhawa MD on 4/2/2024 5/9/2024 - 6/20/2024 Chemotherapy    OP HEAD & NECK CISplatin (Weekly) + XRT     Malignant melanoma of nose   8/7/2014 Surgery    FINAL DIAGNOSIS                       A. CYST, RIGHT EYELID:                 EPIDERMAL INCLUSION CYST.            B. EXCISION, SKIN LESION, LEFT NOSE:                 1.   MALIGNANT MELANOMA.                 2.   ANTHONY LEVEL III.                 3.   BRESLOW MAXIMUM THICKNESS EQUALS 0.4 MM.                 4.   NO ULCERATION IDENTIFIED.                 5.   NO MICROSATELLITES IDENTIFIED.                 6.   NO MITOTIC FIGURES IDENTIFIED IN TUMOR.                 7.   NO ANGIOLYMPHATIC INVASION IDENTIFIED.                 8.   AJCC STAGE: pT1a pNX           Dictated by: CHANTE GILL MD        9/11/2014 Surgery    FINAL DIAGNOSIS                       SKIN, NOSE, RE-EXCISION FOR MELANOMA:                 NEGATIVE FOR RESIDUAL MELANOMA OR MELANOMA IN SITU.                 BIOPSY SITE IDENTIFIED.                 SEVERE SOLAR ELASTOSIS.                 SEBORRHEIC KERATOSIS.                 MARGINS OF RESECTION ARE NEGATIVE.              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COPY;  Service: Gastroenterology;  Laterality: N/A;  preop; abnormal pet scan   postop rectal mass; polyps;   PCP Dr Santos    CYST REMOVAL Right 08/07/2014    RIGHT LOWER EYELID- EPIDERMAL INCLUSION CYST    EXCISION LESION N/A 08/07/2014    MALIGNANT MELANOMA OF LEFT NASAL TIP    FOREHEAD FLAP  09/11/2014    ILEOSTOMY CLOSURE N/A 10/15/2024    Procedure: ILEOSTOMY TAKEDOWN;  Surgeon: Abram Cruz MD;  Location:  [facility];  Service: General;  Laterality: N/A;    PEDICLE FLAP  10/02/2014    PEDICLE DIVISION AND FLAP INSET OF NOSE    PROSTATECTOMY N/A 09/07/2022    Procedure: PROSTATECTOMY LAPAROSCOPIC WITH DAVINCI ROBOT;  Surgeon: Matthew Weaver MD;  Location: [facility] OR;  Service: Robotics - DaVinci;  Laterality: N/A;    SQUAMOUS CELL CARCINOMA EXCISION  10/29/2013    LEFT FLOOR OF MOUTH    TONSILLECTOMY         Family History: His family history includes Colon polyps in his father; Heart disease in his father; No Known Problems in his mother.     Social History: He  reports that he has been smoking cigarettes. He has never used smokeless tobacco. He reports that he does not currently use alcohol. He reports that he does not use drugs.    Home Medications:  traZODone    Allergies:  He is allergic to sulfa antibiotics.       Vital Signs:   Temp:  [97.9 °F (36.6 °C)] 97.9 °F (36.6 °C)  Heart Rate:  [65] 65  BP: (107)/(79) 107/79  ENT Physical Exam  Constitutional  Appearance: patient appears well-developed, thin, patient is cooperative;  Communication/Voice: communication appropriate for developmental age; vocal quality normal;  Constitutional comments: Trach in place  Smells heavily  cigs  Head and Face  Appearance: head appears normal, face appears normal and face appears atraumatic;  Palpation: facial palpation  normal;  Salivary: glands normal;  Ear  Hearing: intact;  Auricles: bilateral auricles normal;  External Mastoids: right external mastoid normal; left external mastoid normal;  Ear Canals: bilateral ear canals normal;  Tympanic Membranes: bilateral tympanic membranes normal;  Nose  External Nose: nares patent bilaterally; external nose normal;  Internal Nose: nasal mucosa normal; nasal septal deviation present; deviation is to the right, septal deviation is intermediate; Septum comments: general deviation all levels L to R   bilateral inferior turbinates normal;  Oral Cavity/Oropharynx  Lips: normal;  Teeth: dentures (upper and lower) noted;  Gums: gingiva normal;  Tongue: normal; Oral Tongue comments: Tobacco stained  Oral mucosa: mucous membranes dry;  Hard palate: normal;  Soft palate: normal;  Tonsils: normal;  Base of Tongue: normal;  Posterior pharyngeal wall: normal;  OC/OP comments: Palpation negative in base of tongue, oropharynx, tonsillar arch  Neck  Neck: neck palpation normal; no neck mass or crepitus present; tracheostomy noted; healing well; Tracheostomy tube size: 6 mm; Tracheostomy tube type: Shiley flexible shaft; Passy-Mj speaking valve present;  Neck comments: Patient with shallow trach in position, Passy-Nahma valve in position.  No evidence of respiratory difficulty, increased secretions or difficulty breathing.  Respiratory  Inspection: breathing unlabored; normal breathing rate;  Cardiovascular  Inspection: extremities are warm and well perfused; no peripheral edema present;  Neurovestibular  Mental Status: alert and oriented;  Psychiatric: mood normal; affect is appropriate;  Cranial Nerves: cranial nerves intact;  Drawings         Laryngoscopy    Date/Time: 1/27/2025 1:24 PM    Performed by: Lenny Middleton Jr., MD  Authorized by: Lenny Middleton Jr., MD    Consent:     Consent obtained:  Verbal    Consent given by:  Patient    Alternatives discussed:  No treatment  Anesthesia  (see MAR for exact dosages):     Anesthesia method:  Topical application    Topical anesthetic:  Tetracaine  Procedure details:     Indications: assessment of airway and oncologic surveillance      Medication:  Afrin    Instrument: flexible fiberoptic laryngoscope      Scope location: left nare    Sinus/ Nasopharynx:     Right nasopharynx: patent and inflammation      Left nasopharynx: patent and inflammation      Right eustachian tube: patent      Left eustachian tube: inflammation, patent and inflammation    Oropharynx/ Supraglottis:     Posterior pharyngeal wall: inflamed      Oropharynx: inflammation      Oropharynx: no stenosis, no lesion and no retained secretions      Vallecula: inflammation      Vallecula: no lesion      Base of tongue: inflammation      Base of tongue: no lingual tonsillar hypertrophy and no lesion      Epiglottis: inflammation and retroflexed      Epiglottis: no lesions       comment:  Very V-shaped  Larynx/ Hypopharynx:     Arytenoids: inflammation and interarytenoid edema      Arytenoids: no lesions      Hypopharynx: inflammation      Hypopharynx: no lesions      Pyriform sinus: inflammation      Pyriform sinus: no lesion and no pooling of secretions      False vocal cords: inflammation      True vocal cords: Shawn's edema      True vocal cords: no immobility    Post-procedure details:     Patient tolerance of procedure:  Tolerated well  Comments:      Moderate inflammation from nasopharynx down to the hypopharynx, including the glottis and supraglottis  Pooled secretions  No evidence of recurrent cancer  Very V shaped epiglottis that is very stiff and does not move well, partially obstructing the supraglottis  True vocal folds fully mobile but with submucosal edema  Tracheostomy Tube Exchange    Date/Time: 1/27/2025 1:43 PM    Performed by: Lenny Middleton Jr., MD  Authorized by: Lenny Middleton Jr., MD  Consent: Verbal consent obtained.  Risks and benefits: risks, benefits  "and alternatives were discussed  Consent given by: patient  Patient understanding: patient states understanding of the procedure being performed  Patient consent: the patient's understanding of the procedure matches consent given  Patient identity confirmed: verbally with patient  Time out: Immediately prior to procedure a \"time out\" was called to verify the correct patient, procedure, equipment, support staff and site/side marked as required.  Indications: routine  Local anesthesia used: no    Anesthesia:  Local anesthesia used: no    Sedation:  Patient sedated: no    Tube type: double cannula  Tube cuff: single cuff  Cuff inflation: deflated  Confirmation: Nasopharyngoscope used to confirm placement  Comments: Tracheobronchoscopy: Tracheal bronchoscope was used throughout the entire procedure to both assess the trachea and tracheostomy tube position  Distal trachea with mild erythema, no excess secretions, ana maria sharp, MSB patent  Proximal trachea with mild fullness at the posterior wall but otherwise no stenosis or no mucosal lesions  Subglottis-no evidence of narrowing or mucosal lesions  True vocal folds from below-mild edema, no penetration of secretions, no lesions  Caden Kingsleyley #6 cuffed tracheostomy tube was placed without difficulty.  Position was confirmed with tracheal bronchoscope.         Result Review       RESULTS REVIEW    I have reviewed the patients old records in the chart.   I have reviewed the patients old records in the chart.        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  Patient continues stable with a trach in position.  He remains with Passy-Mj in position.  I will try to get him an uncuffed trach as well as begin trach capping.  I am still concerned about the patient's both stiff and V shaped epiglottis causing partial airway obstruction, especially with supraglottic edema.  If the patient tolerates capping well, I will consider removing the tracheostomy.  I have discussed this with both him and his wife.  Justin tracheostomy tube care  New trach supplies written  Oral care for the cancer patient  Stop smoking  Call for problems    My Chart:  Encouraged to enroll in My Chart  Encouraged to review data and findings in My Chart    Patient, Spouse understand(s) and agree(s) with the treatment plan as described.      Return in about 6 weeks (around 3/10/2025) for Recheck Trach and capping, flex scope.        Electronically signed by Lenny Middleton Jr, MD 01/27/25 1:34 PM CST.

## 2025-01-27 NOTE — PATIENT INSTRUCTIONS
>NASAL SALINE:  Use 2 puffs each nostril 4-6 times daily and more frequently if possible.  You can buy saline spray or you can make your own and use an old spray bottle to administer  Use a humidifier at bedside  Recipe for saline:  Water                                 1 quart  Salt (table)                        1 tablespoon  Gylcerin (or Rossy Syrup)    1 teaspoon  Sodium bicarbonate           1 teaspoon  Sprays or Benezett pots are recommended    Do not allow to stand for more than 24 hrs. Make new solution. There is no preservative in this solution.    Sinus irrigation and saline application can be enhanced with various over-the-counter products.  A WaterPik can be quite useful to irrigate, especially following sinus surgery.  Navage makes a product that irrigates the nose and some of the sinuses.  NeilMed makes a Sinu-Gator to irrigate the sinuses.  Neomed also has canned saline that we will come out under pressure.  A Johanny pot can also be helpful.  All of these products help keep the nose clear of debris.  Please use as directed on the instructions that come with the particular device.     Stop Smoking     Tracheostomy Care: Shiley trach tubes     Clean around the trach tube with soap and water twice daily (May substitute baby wipes)  Do Not Apply Polysporin antibiotic ointment around the trach site.  Suction the trach as needed. You will be trained how to do this while in-patient.  You may develop small tags of tissue that bleed easily. Please call if this develops.  Use a humidifier as much as possible to keep secretions loose.  If you have Jay style trach, you will need to clean the inside of the trach tube with peroxide dipped Q-tips to clear the crusting and the thick secretions.  Mucus can obstruct the trach and cause blockage to airflow. If you suspect plugging, please call immediately.  Make sure the trach is positioned correctly, for the Jay trach, inserted far enough. You should be able  to breathe through the trach tube when it is uncapped and not talk well. The clear line on the trach tube rings faces down  If the trach comes out, please call the office for instructions if you are unable to re-insert properly. Do not allow the trach to remain out for long or the hole will close.  Return to work in N/A  Resume normal activity in N/A  You will be instructed whether or not to cap or place a speaking valve on the trach, or whether a speaking valve will be used:     Cap the trach:  as desired  Place the speaking valve: as desired  Uncap trach:  for breathing or suction, and at night for sleep apnea  Humidity:  yes  Inner cannulas:   change daily and as needed  Velcro tube sales:  change daily ad as needed  Cuff:  leave deflated  Suction: As needed   Trach type: Trach tube position was confirmed with scope. Trach tube type:  Shiley trach tube Size  6 Cuffed DIC flexible shaft    If trach comes out, call Dr Middleton for instructions.     Please do NOT hesitate to call the office for any questions or problems.      CONTACT INFORMATION:  The main office phone number is [phone]. For emergencies after hours and on weekends, this number will convert over to our answering service and the on call provider will answer. Please try to keep non emergent phone calls/ questions to office hours 9am-5pm Monday through Friday.     [employer]  As an alternative, you can sign up and use the Epic MyChart system for more direct and quicker access for non emergent questions/ problems.  [facility] [employer] allows you to send messages to your doctor, view your test results, renew your prescriptions, schedule appointments, and more. To sign up, go to [employer] and click on the Sign Up Now link in the New User? box. Enter your [employer] Activation Code exactly as it appears below along with the last four digits of your Social Security Number and your Date of Birth () to complete the sign-up process. If you  do not sign up before the expiration date, you must request a new code.    [employer] Activation Code: [email or web address]  Expires: 2/26/2025 12:47 PM    If you have questions, you can email [email or web address] or call [phone] to talk to our MyChart staff. Remember, MyChart is NOT to be used for urgent needs. For medical emergencies, dial 911.    IF YOU SMOKE, VAPE OR USE TOBACCO PLEASE READ THE FOLLOWING:  Why is smoking bad for me?  Smoking increases the risk of heart disease, lung disease, vascular disease, stroke, and cancer. If you smoke, STOP!      IF YOU SMOKE, VAPE OR USE TOBACCO PLEASE READ THE FOLLOWING:  Why is smoking bad for me?  Smoking increases the risk of heart disease, lung disease, vascular disease, stroke, and cancer. If you smoke, STOP!    For more information:  Quit Now [place]  1-800-QUIT-NOW  https://[email or web address]/en-US/

## 2025-02-21 ENCOUNTER — TELEPHONE (OUTPATIENT)
Dept: OTOLARYNGOLOGY | Facility: CLINIC | Age: 65
End: 2025-02-21
Payer: MEDICAID

## 2025-02-21 NOTE — TELEPHONE ENCOUNTER
Brother in law called in regarding trach supplies.  Nurse updated patient status and will contact patient's brother on Monday once trach supply order has been reviewed and with correct DME company.  Patient's brother had no further questions at this time, yet he just wants to be sure the supplies come in before his next scheduled visit.

## 2025-02-25 ENCOUNTER — TELEPHONE (OUTPATIENT)
Dept: OTOLARYNGOLOGY | Facility: CLINIC | Age: 65
End: 2025-02-25
Payer: MEDICAID

## 2025-02-25 NOTE — TELEPHONE ENCOUNTER
I spoke to Lee at [facility] about obtaining trach supplies with his company. He stated they do not provide trach supplies.    I tried to call Ed back but the number listed says not in in service at this time. I will call him back later and suggest we use [facility] for patient's trach supplies.

## 2025-02-27 ENCOUNTER — TELEPHONE (OUTPATIENT)
Dept: OTOLARYNGOLOGY | Facility: CLINIC | Age: 65
End: 2025-02-27
Payer: MEDICAID

## 2025-02-27 NOTE — TELEPHONE ENCOUNTER
I spoke to Pam at [facility] to see when his trach supplies will be delivered. She stated that Shalini at [facility] had to spoke to someone yesterday about his supplies. She is going to check on an estimated delivery date and let me know.     I spoke to Ed and gave him infomration above. He is unsure if he has a suction machine and will ask him and let me know.

## 2025-03-10 ENCOUNTER — OFFICE VISIT (OUTPATIENT)
Dept: OTOLARYNGOLOGY | Facility: CLINIC | Age: 65
End: 2025-03-10
Payer: MEDICAID

## 2025-03-10 VITALS
HEIGHT: 69 IN | HEART RATE: 72 BPM | TEMPERATURE: 97.8 F | DIASTOLIC BLOOD PRESSURE: 73 MMHG | WEIGHT: 118 LBS | SYSTOLIC BLOOD PRESSURE: 118 MMHG | BODY MASS INDEX: 17.48 KG/M2

## 2025-03-10 DIAGNOSIS — J98.8 AIRWAY COMPROMISE: ICD-10-CM

## 2025-03-10 DIAGNOSIS — C14.0 SQUAMOUS CELL CARCINOMA OF PHARYNX: Primary | ICD-10-CM

## 2025-03-10 DIAGNOSIS — K12.33 MUCOSITIS DUE TO RADIATION THERAPY: ICD-10-CM

## 2025-03-10 DIAGNOSIS — Z87.891 PERSONAL HISTORY OF NICOTINE DEPENDENCE: ICD-10-CM

## 2025-03-10 DIAGNOSIS — Z43.0 TRACHEOSTOMY, ACUTE MANAGEMENT: ICD-10-CM

## 2025-03-10 DIAGNOSIS — Q31.8 ANOMALY OF EPIGLOTTIS: ICD-10-CM

## 2025-03-10 NOTE — PROGRESS NOTES
Lenny Middleton Jr, MD  [facility] EAR NOSE & THROAT  [address], [address]  [place] [address]  Fax [phone]  Phone [phone]      Visit Type: FOLLOW UP   Chief Complaint   Patient presents with    Follow-up     6 weeks f/U with new trach           HISTORY OBTAINED FROM: patient  Accompanied by:Wife  ROYCE Gerber is a 64 y.o. male who presents for routine cancer follow up.  Patient has returned today for evaluation of trach and possible trach change.  He bring supplies with him.    Oncology History:  Oncology/Hematology History   Prostate cancer    Initial Diagnosis    Prostate cancer     1/23/2020 Procedure    PSA 13.96     2/23/2021 Procedure    PSA 4.4     4/23/2022 Procedure    PSA 10.1     5/12/2022 Procedure    PSA 16.70     5/23/2022 Biopsy    Prostate Biopsy:  Left mid medial 1 - prostatic adenocarcinoma, [place] 3+3=6  Left mid lateral 2 - prostatic adenocarcinoma [place] 3+3=6  Left mid medial 2 - prostatic adenocarcinoma Johanny 3+3=6  Left apex lateral - prostatic adenocarcinoma, Johanny 3+3=6  Left apex medial - prostatic adenocarcinoma, [place] 3+3=6  Right base medial - prostatic adenocarcinoma, Johanny 3+3=6    Perineural invasion is identified in this case     9/7/2022 Surgery    Final Diagnosis   Prostate, prostatectomy:               - Histologic Type:  Acinar adenocarcinoma.               - Histologic Grade:  Grade group 4 ([place] score 4 + 4 = 8).               - Intraductal Carcinoma:  Not identified.               - Cribriform Glands:  Present.               - Treatment Effect:  No known presurgical therapy.               - Tumor Quantitation: 41-50%.               - Extraprostatic Extension:  Not identified.               - Urinary Bladder Neck Invasion: Not identified.               - Seminal Vesicle Invasion: Not identified.               - Lymphovascular Invasion: Not identified.               - Margins:  All margins are Negative for invasive carcinoma.               - Pathologic Stage:  pT2 pNx pMx        12/8/2022 Procedure    PSA <0.1     5/16/2023 Procedure    PSA <0.1     9/19/2023 Procedure    PSA <0.1     1/23/2024 Procedure    PSA <0.1     Malignant neoplasm hypopharynx    Initial Diagnosis    Malignant neoplasm hypopharynx     2/28/2024 Imaging    CT Neck:  35 x 40 mm lobular enhancing mass in the upper larynx arising from the posterior   wall at the level of the base of the epiglottis. This is suspicious for malignancy.   There is abnormal mucosal enhancement also noted involving the uvula and   tongue base.      3/2/2024 Imaging    CT Neck:  Findings suggest laryngeal mass as noted, suggest ENT evaluation, asymmetric appearance of the vocal cords and larynx  Minimal adenopathy  Postop changes left submandibular region     3/13/2024 Imaging    CT Chest:  Well-defined 1.7 cm fluid density lesion in the anterior mediastinum could   represent a benign etiology such as a thymic cyst. Given lack of prior imaging   and concern for malignancy, recommend close attention on follow-up imaging   and further workup as clinically necessary.  Mild diffuse thickening of bilateral adrenal glands could represent   adenomatous hyperplasia. Recommend correlation to prior imaging if   available and attention on follow-up imaging.  No evidence of pulmonary metastases.     3/14/2024 Biopsy    Awake tracheostomy/Direct laryngoscopy with suspension with   telescope/right cervical esophagoscopy/biopsy    Findings:  Successful awake tracheostomy in successful awake tracheostomy with   placement of a 6 cuffed Shiley tracheostomy tube.  Large exophytic 5-6 cm posterior oropharyngeal mass extending to the   junction of the lateral posterior pharyngeal wall junction bilaterally, superiorly   comes up to the level of the soft palate. Inferiorly extends into bilateral piriform   sinuses, but does not involve the apex, however there are  reactive changes   of the left piriform apex. There is no involvement of the supraglottic structures.  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  No other mucosal masses, lesions, or ulcers visualized.   No mucosal masses, lesions, or ulcers visualized in the esophageal inlet or   cervical esophagus.    DIAGNOSIS:     A. VOCAL CORD, LEFT, BIOPSY:   - Squamous mucosa with focal dysplasia, at least moderate.     B.  POSTERIOR PHARYNGEAL WALL, BIOPSY:   - INVASIVE POORLY DIFFERENTIATED SQUAMOUS CELL CARCINOMA.     C.  POSTERIOR PHARYNGEAL WALL, BIOPSY:   - INVASIVE POORLY DIFFERENTIATED SQUAMOUS CELL CARCINOMA WITH FOCAL METAPLASTIC FEATURES     D.  POSTERIOR PHARYNGEAL WALL, BIOPSY:   - INVASIVE POORLY DIFFERENTIATED SQUAMOUS CELL CARCINOMA WITH FOCAL METAPLASTIC FEATURES      3/18/2024 Procedure    Gastrostomy tube placement     3/22/2024 Procedure    Appointment with Dr. Rand - [facility]:  Laryngoscopy:  Findings: nasopharynx and oropharynx patent without masses/lesions; BOT   without masses/lesions; exophytic lesion likely coming from posterior   pharyngeal wall protruding onto supraglottis and glottis; unable to visualize   glottis d/t obstructive view; supraglottis without obvious lesion otherwise    PLAN:   CT chest without mets. Discussed imaging in depth with team. Patient is   currently T3N0, stage III oropharyngeal SCC.   DL had biopsies positive for invasive SCC.   Discussed chemoradiation vs surgical resection with patient. Patient will   benefit from chemoradiation at this time.  Patient would like to have treatment closer to home in [facility]. Will send   out referrals.  Patient may need further evaluation of his carotid stenosis as well.     Appointment with Dr. 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       4/2/2024 Cancer Staged    Staging form: Pharynx - Hypopharynx, AJCC 8th Edition  - Clinical stage from 4/2/2024: Stage III (cT3, cN0, cM0) - Signed by Tee Randhawa MD on 4/2/2024 5/9/2024 - 6/20/2024 Chemotherapy    OP HEAD & NECK CISplatin (Weekly) + XRT     Malignant melanoma of nose   8/7/2014 Surgery    FINAL DIAGNOSIS                       A. CYST, RIGHT EYELID:                 EPIDERMAL INCLUSION CYST.            B. EXCISION, SKIN LESION, LEFT NOSE:                 1.   MALIGNANT MELANOMA.                 2.   ANTHONY LEVEL III.                 3.   BRESLOW MAXIMUM THICKNESS EQUALS 0.4 MM.                 4.   NO ULCERATION IDENTIFIED.                 5.   NO MICROSATELLITES IDENTIFIED.                 6.   NO MITOTIC FIGURES IDENTIFIED IN TUMOR.                 7.   NO ANGIOLYMPHATIC INVASION IDENTIFIED.                 8.   AJCC STAGE: pT1a pNX           Dictated by: CHANTE GILL MD        9/11/2014 Surgery    FINAL DIAGNOSIS                       SKIN, NOSE, RE-EXCISION FOR MELANOMA:                 NEGATIVE FOR RESIDUAL MELANOMA OR MELANOMA IN SITU.                 BIOPSY SITE IDENTIFIED.                 SEVERE SOLAR ELASTOSIS.                 SEBORRHEIC KERATOSIS.                 MARGINS OF RESECTION ARE NEGATIVE.              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    Tobacco use disorder        Past Surgical History:   Procedure Laterality Date    COLON RESECTION N/A 7/30/2024    Procedure: Laparoscopic low anterior resection, diverting loop ileostomy;  Surgeon: Abram Cruz MD;  Location: [facility];  Service: General;  Laterality: N/A;    COLONOSCOPY N/A 5/1/2024    Procedure: COLONOSCOPY WITH  ANESTHESIA;  Surgeon: Gustabo Ruiz MD;  Location: [facility] ENDOSCOPY;  Service: Gastroenterology;  Laterality: N/A;  preop; abnormal pet scan   postop rectal mass; polyps;   PCP Dr Santos    CYST REMOVAL Right 08/07/2014    RIGHT LOWER EYELID- EPIDERMAL INCLUSION CYST    EXCISION LESION N/A 08/07/2014    MALIGNANT MELANOMA OF LEFT NASAL TIP    FOREHEAD FLAP  09/11/2014    ILEOSTOMY CLOSURE N/A 10/15/2024    Procedure: ILEOSTOMY TAKEDOWN;  Surgeon: Abram Cruz MD;  Location:  [facility];  Service: General;  Laterality: N/A;    PEDICLE FLAP  10/02/2014    PEDICLE DIVISION AND FLAP INSET OF NOSE    PROSTATECTOMY N/A 09/07/2022    Procedure: PROSTATECTOMY LAPAROSCOPIC WITH DAVINCI ROBOT;  Surgeon: Matthew Weaver MD;  Location: [facility] OR;  Service: Robotics - DaVinci;  Laterality: N/A;    SQUAMOUS CELL CARCINOMA EXCISION  10/29/2013    LEFT FLOOR OF MOUTH    TONSILLECTOMY         Family History: His family history includes Colon polyps in his father; Heart disease in his father; No Known Problems in his mother.     Social History: He  reports that he has been smoking cigarettes. He has never used smokeless tobacco. He reports that he does not currently use alcohol. He reports that he does not use drugs.    Home Medications:  traZODone    Allergies:  He is allergic to sulfa antibiotics.       Vital Signs:   Temp:  [97.8 °F (36.6 °C)] 97.8 °F (36.6 °C)  Heart Rate:  [72] 72  BP: (118)/(73) 118/73  ENT Physical Exam  Constitutional  Appearance: patient appears well-developed, thin, patient is cooperative;  Communication/Voice: communication appropriate for developmental age; vocal quality normal;  Constitutional comments: Trach in place  Smells heavily  cigs  Head and Face  Appearance: head appears normal, face appears normal and face appears atraumatic;  Palpation: facial palpation normal;  Salivary: glands normal;  Ear  Hearing: intact;  Auricles: bilateral auricles normal;  External Mastoids: right  external mastoid normal; left external mastoid normal;  Ear Canals: bilateral ear canals normal;  Tympanic Membranes: bilateral tympanic membranes normal;  Nose  External Nose: nares patent bilaterally; external nose normal;  Internal Nose: nasal mucosa normal; nasal septal deviation present; deviation is to the right, septal deviation is intermediate; Septum comments: general deviation all levels L to R   bilateral inferior turbinates normal;  Oral Cavity/Oropharynx  Lips: normal;  Teeth: dentures (upper and lower) noted;  Gums: gingiva normal;  Tongue: normal; Oral Tongue comments: Tobacco stained  Oral mucosa: mucous membranes dry;  Hard palate: normal;  Soft palate: normal;  Tonsils: normal;  Base of Tongue: normal;  Posterior pharyngeal wall: normal;  OC/OP comments: Palpation negative in base of tongue, oropharynx, tonsillar arch  Neck  Neck: no neck mass or crepitus present; tracheostomy noted; healing well; Tracheostomy tube size: 6 mm; Tracheostomy tube type: Shiley flexible shaft; Passy-Townsend speaking valve present;  Neck comments: Trach in position  Brawny induration of anterior neck  Respiratory  Inspection: breathing unlabored; normal breathing rate;  Cardiovascular  Inspection: extremities are warm and well perfused; no peripheral edema present;  Neurovestibular  Mental Status: alert and oriented;  Psychiatric: mood normal; affect is appropriate;  Cranial Nerves: cranial nerves intact;  Drawings         Tracheostomy Tube Exchange    Date/Time: 3/10/2025 1:02 PM    Performed by: Lenny Middleton Jr., MD  Authorized by: Lenny Middleton Jr., MD  Consent: Verbal consent obtained  Risks and benefits: risks, benefits and alternatives were discussed  Consent given by: patient  Patient understanding: patient states understanding of the procedure being performed  Patient consent: the patient's understanding of the procedure matches consent given  Patient identity confirmed: verbally with patient  Time  "out: Immediately prior to procedure a \"time out\" was called to verify the correct patient, procedure, equipment, support staff and site/side marked as required.  Indications: routine  Tracheostomy status: fistula tract not established  Local anesthesia used: no    Anesthesia:  Local anesthesia used: no    Sedation:  Patient sedated: no    Confirmation: Nasopharyngoscope used to confirm placement  Patient tolerance: patient tolerated the procedure well with no immediate complications  Comments: Tracheobronchoscopy: Tracheal breath scope was used to evaluate the position of the trach as well as distal trachea.  Distal trachea-intact mucosa, normal ana maria, MSB patent  Tracheostomy tube was found to be in good position at this point in time.  Stoma healing appropriately         Result Review       RESULTS REVIEW    I have reviewed the patients old records in the chart.   I have reviewed the patients old records in the chart.        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  Patient does not have the correct trach today.  He has been given oral supplies by DME.  However, the patient is doing quite well with Passy-Brushton valve.  I will advance him to trach capping.  If he can maintain With this current trach in position, I will decannulate him.  In the meantime, he will try to obtain proper supplies.  Trach care-Justin #6 cuffed DIC, flexible shaft with cut off  Cap Trach  Oral care for the cancer patient  Call for trach problems      My Chart:  Encouraged to enroll in My Chart  Encouraged to review data and findings " in My Chart    Patient, Spouse understand(s) and agree(s) with the treatment plan as described.      Return in about 6 weeks (around 4/21/2025) for Recheck OP cancer and trach.        Electronically signed by Lenny Middleton Jr, MD 03/10/25 11:02 AM CDT.

## 2025-03-10 NOTE — PATIENT INSTRUCTIONS
>NASAL SALINE:  Use 2 puffs each nostril 4-6 times daily and more frequently if possible.  You can buy saline spray or you can make your own and use an old spray bottle to administer  Use a humidifier at bedside  Recipe for saline:  Water                                 1 quart  Salt (table)                        1 tablespoon  Gylcerin (or Rossy Syrup)    1 teaspoon  Sodium bicarbonate           1 teaspoon  Sprays or [place]mira pots are recommended    Do not allow to stand for more than 24 hrs. Make new solution. There is no preservative in this solution.    Sinus irrigation and saline application can be enhanced with various over-the-counter products.  A WaterPik can be quite useful to irrigate, especially following sinus surgery.  Navage makes a product that irrigates the nose and some of the sinuses.  NeilMed makes a Sinu-Gator to irrigate the sinuses.  Neomed also has canned saline that we will come out under pressure.  A Johanny pot can also be helpful.  All of these products help keep the nose clear of debris.  Please use as directed on the instructions that come with the particular device.     Tracheostomy Care: Shiley trach tubes     Clean around the trach tube with soap and water twice daily (May substitute baby wipes)  Do Not Apply Polysporin antibiotic ointment around the trach site.  Suction the trach as needed. You will be trained how to do this while in-patient.  You may develop small tags of tissue that bleed easily. Please call if this develops.  Use a humidifier as much as possible to keep secretions loose.  If you have Jay style trach, you will need to clean the inside of the trach tube with peroxide dipped Q-tips to clear the crusting and the thick secretions.  Mucus can obstruct the trach and cause blockage to airflow. If you suspect plugging, please call immediately.  Make sure the trach is positioned correctly, for the Jay trach, inserted far enough. You should be able to breathe  through the trach tube when it is uncapped and not talk well. The clear line on the trach tube rings faces down  If the trach comes out, please call the office for instructions if you are unable to re-insert properly. Do not allow the trach to remain out for long or the hole will close.  Return to work in N/A  Resume normal activity in N/A  You will be instructed whether or not to cap or place a speaking valve on the trach, or whether a speaking valve will be used:     Cap the trach:  as desired  Place the speaking valve: as desired  Uncap trach:  for breathing or suction, and at night for sleep apnea  Humidity:  yes  Inner cannulas:   change daily and as needed  Velcro tube sales:  change daily ad as needed  Cuff:  do not inflate  Suction: As needed   Trach type: Trach tube position was confirmed with scope. Trach tube type:  Shiley trach tube Size  6 DIC, Flexible shaft    If trach comes out, call Dr Middleton for instructions.     Stop smoking    Please do NOT hesitate to call the office for any questions or problems.       CONTACT INFORMATION:  The main office phone number is [phone]. For emergencies after hours and on weekends, this number will convert over to our answering service and the on call provider will answer. Please try to keep non emergent phone calls/ questions to office hours 9am-5pm Monday through Friday.     Vision Critical  As an alternative, you can sign up and use the Epic MyChart system for more direct and quicker access for non emergent questions/ problems.  [facility] Vision Critical allows you to send messages to your doctor, view your test results, renew your prescriptions, schedule appointments, and more. To sign up, go to [employer] and click on the Sign Up Now link in the New User? box. Enter your Vision Critical Activation Code exactly as it appears below along with the last four digits of your Social Security Number and your Date of Birth () to complete the sign-up process. If you  do not sign up before the expiration date, you must request a new code.    PictureMenu Activation Code: [email or web address]  Expires: 4/9/2025  9:57 AM    If you have questions, you can email [email or web address] or call [phone] to talk to our [employer] staff. Remember, [email or web address] is NOT to be used for urgent needs. For medical emergencies, dial 911.    IF YOU SMOKE, VAPE OR USE TOBACCO PLEASE READ THE FOLLOWING:  Why is smoking bad for me?  Smoking increases the risk of heart disease, lung disease, vascular disease, stroke, and cancer. If you smoke, STOP!      IF YOU SMOKE, VAPE OR USE TOBACCO PLEASE READ THE FOLLOWING:  Why is smoking bad for me?  Smoking increases the risk of heart disease, lung disease, vascular disease, stroke, and cancer. If you smoke, STOP!    For more information:  Quit Now [place]  1-800-QUIT-NOW  [email or web address]

## 2025-04-21 ENCOUNTER — OFFICE VISIT (OUTPATIENT)
Dept: OTOLARYNGOLOGY | Facility: CLINIC | Age: 65
End: 2025-04-21
Payer: MEDICAID

## 2025-04-21 VITALS — BODY MASS INDEX: 17.77 KG/M2 | TEMPERATURE: 98.4 F | WEIGHT: 120 LBS | HEIGHT: 69 IN

## 2025-04-21 DIAGNOSIS — Z43.0 TRACHEOSTOMY, ACUTE MANAGEMENT: ICD-10-CM

## 2025-04-21 DIAGNOSIS — J98.8 AIRWAY COMPROMISE: ICD-10-CM

## 2025-04-21 DIAGNOSIS — Z87.891 PERSONAL HISTORY OF NICOTINE DEPENDENCE: ICD-10-CM

## 2025-04-21 DIAGNOSIS — Q31.8 ANOMALY OF EPIGLOTTIS: ICD-10-CM

## 2025-04-21 DIAGNOSIS — K12.33 MUCOSITIS DUE TO RADIATION THERAPY: ICD-10-CM

## 2025-04-21 DIAGNOSIS — H69.91 ETD (EUSTACHIAN TUBE DYSFUNCTION), RIGHT: ICD-10-CM

## 2025-04-21 DIAGNOSIS — C14.0 SQUAMOUS CELL CARCINOMA OF PHARYNX: Primary | ICD-10-CM

## 2025-04-21 PROCEDURE — 1159F MED LIST DOCD IN RCRD: CPT | Performed by: OTOLARYNGOLOGY

## 2025-04-21 PROCEDURE — 31575 DIAGNOSTIC LARYNGOSCOPY: CPT | Performed by: OTOLARYNGOLOGY

## 2025-04-21 PROCEDURE — 1160F RVW MEDS BY RX/DR IN RCRD: CPT | Performed by: OTOLARYNGOLOGY

## 2025-04-21 PROCEDURE — 99214 OFFICE O/P EST MOD 30 MIN: CPT | Performed by: OTOLARYNGOLOGY

## 2025-04-21 PROCEDURE — 31615 TRCHEOBRNCHSC EST TRACHS INC: CPT | Performed by: OTOLARYNGOLOGY

## 2025-04-21 NOTE — PATIENT INSTRUCTIONS
>NASAL SALINE:  Use 2 puffs each nostril 4-6 times daily and more frequently if possible.  You can buy saline spray or you can make your own and use an old spray bottle to administer  Use a humidifier at bedside  Recipe for saline:  Water                                 1 quart  Salt (table)                        1 tablespoon  Gylcerin (or Rossy Syrup)    1 teaspoon  Sodium bicarbonate           1 teaspoon  Sprays or Narberth pots are recommended    Do not allow to stand for more than 24 hrs. Make new solution. There is no preservative in this solution.    Sinus irrigation and saline application can be enhanced with various over-the-counter products.  A WaterPik can be quite useful to irrigate, especially following sinus surgery.  Navage makes a product that irrigates the nose and some of the sinuses.  NeilMed makes a Sinu-Gator to irrigate the sinuses.  Neomed also has canned saline that we will come out under pressure.  A Johanny pot can also be helpful.  All of these products help keep the nose clear of debris.  Please use as directed on the instructions that come with the particular device.     Tracheostomy Care: Shiley trach tubes     Clean around the trach tube with soap and water twice daily (May substitute baby wipes)  Do Not Apply Polysporin antibiotic ointment around the trach site.  Suction the trach as needed. You will be trained how to do this while in-patient.  You may develop small tags of tissue that bleed easily. Please call if this develops.  Use a humidifier as much as possible to keep secretions loose.  If you have Jay style trach, you will need to clean the inside of the trach tube with peroxide dipped Q-tips to clear the crusting and the thick secretions.  Mucus can obstruct the trach and cause blockage to airflow. If you suspect plugging, please call immediately.  Make sure the trach is positioned correctly, for the Jay trach, inserted far enough. You should be able to breathe  through the trach tube when it is uncapped and not talk well. The clear line on the trach tube rings faces down  If the trach comes out, please call the office for instructions if you are unable to re-insert properly. Do not allow the trach to remain out for long or the hole will close.  Return to work in N/A  Resume normal activity in N/A  You will be instructed whether or not to cap or place a speaking valve on the trach, or whether a speaking valve will be used:     Cap the trach:  as desired  Place the speaking valve: as desired  Uncap trach:  for breathing or suction, and at night for sleep apnea  Humidity:  yes  Inner cannulas:   change daily and as needed  Velcro tube sales:  change daily ad as needed  Cuff:  None  Suction: As needed   Trach type: A new tracheostomy tube was re- inserted. Trach tube position was confirmed with scope. Trach tube type:  Shiley trach tube Size  6 Uncuffed DIC, Flex shaft    If trach comes out, call Dr Middleton for instructions.       PREOPERATIVE SURGERY/PROCEDURE INSTRUCTIONS:  Do not eat or drink ANYTHING after midnight, unless instructed   Clean the operative site by showering with an antibacterial soap (like Dial, Dove, Ivory, etc) and shampooing hair  Preoperative scrub for Surgery:   Skin: Antibacterial soap (Dial, Ivory, Dove) shower daily, including hair.  Be careful not to get into eyes  Do this daily for 5 days  Mouth: Betadine solution 3 times daily for 5 days  Do NOT pluck, shave hair on skin the night prior to operation  If you are diabetic, take your blood sugar the night before and in the morning prior to coming to hospital and give results to nurse and the anesthesiologist    Remove any metallic piercings prior to surgery. You may wear plastic spacers if needed.    Do NOT apply eye makeup Morning of surgery    Please remove fingernail polish prior to surgery    STOP:  -   All natural/homeopathic medications 2 weeks prior to surgery, Ask about over the counter  medications  -   Stop weight loss injectable medications, GLP-1, like Wegovy and Mounjaro or any other compounded for weight loss.  -   Smoking 2 weeks prior to surgery  -   Blood thinners- 3-5 days prior to surgery (or as instructed by doctor)  Bring with you the morning of surgery:  -   Preoperative paperwork  -   Insurance card  -   Identification with photo  -   Home medications or up to date list     Lenny Middleton Jr, MD has explained the risks, benefits and alternatives to the patient/patient’s representative, in clear and simple language.  Time was allowed for questions.  Risks of procedure include but are not limited to:    As a result of this procedure being performed, the material risks generally recognized are INFECTION, ALLERGIC REACTION, SEVERE LOSS OF BLOOD, LOSS OR LOSS OF FUNCTION OF ANY LIMB OR ORGAN, PARALYSIS OR PARTIAL PARALYSIS, PARAPLEGIA OR QUADRIPLEGIA, DISFIGURING SCAR, BRAIN DAMAGE, CARDIAC ARREST OR DEATH, BLOOD LOSS NECESSITATING TRANSFUSION WHICH CARRIES THE RISK OF EXPOSURE TO AIDS, HEPATITIS OR OTHER INFECTIOUS DISEASES.      Procedure: Direct laryngoscopy, Esophagoscopy, Bronchoscopy, Microlaryngoscopy, Possible laser use (any or all of the above)    Risks specific for procedure: Perforation of the upper and lower aerodigestive tract including esophagus and trachea, loss of airway, permanent damage to voice and/or swallowing, loss of voice, need for tracheostomy, fire in the airway, scarring and stenosis    No guarantees of outcome given or implied  Patient, Spouse demonstrate understanding    Patient, Spouse do wish to proceed with proposed procedure     Please do NOT hesitate to call the office for any questions or problems.       CONTACT INFORMATION:  The main office phone number is [phone]. For emergencies after hours and on weekends, this number will convert over to our answering service and the on call provider will answer. Please try to keep non emergent phone calls/  questions to office hours 9am-5pm Monday through Friday.     [employer]  As an alternative, you can sign up and use the Epic MyChart system for more direct and quicker access for non emergent questions/ problems.  [facility] [employer] allows you to send messages to your doctor, view your test results, renew your prescriptions, schedule appointments, and more. To sign up, go to [employer] and click on the Sign Up Now link in the New User? box. Enter your [employer] Activation Code exactly as it appears below along with the last four digits of your Social Security Number and your Date of Birth () to complete the sign-up process. If you do not sign up before the expiration date, you must request a new code.    [employer] Activation Code: [email or web address]  Expires: 2025  9:42 AM    If you have questions, you can email [email or web address] or call [phone] to talk to our [employer] staff. Remember, [employer] is NOT to be used for urgent needs. For medical emergencies, dial 911.    IF YOU SMOKE, VAPE OR USE TOBACCO PLEASE READ THE FOLLOWING:  Why is smoking bad for me?  Smoking increases the risk of heart disease, lung disease, vascular disease, stroke, and cancer. If you smoke, STOP!      IF YOU SMOKE, VAPE OR USE TOBACCO PLEASE READ THE FOLLOWING:  Why is smoking bad for me?  Smoking increases the risk of heart disease, lung disease, vascular disease, stroke, and cancer. If you smoke, STOP!    For more information:  [employer]uit Now [place]  -QUIT-NOW  [email or web address]

## 2025-04-21 NOTE — PROGRESS NOTES
Lenny Middleton Jr, MD  [facility]NT [facility] EAR NOSE & THROAT  [address], [address]  [place] [address]  Fax [phone]  Phone [phone]      Visit Type: FOLLOW UP   Chief Complaint   Patient presents with    Cancer Surveillance     SCC Pharynx           HISTORY OBTAINED FROM: patient  Accompanied by: Wife  ROYCE Gerber is a 64 y.o. male who presents for routine cancer follow up.  Patient is doing well with his trach.  He has been given the wrong To His trach by DME.  Patient is having no particular issues.  Patient is here for routine trach change today.  He is finally been given the right trach by DME.  Patient is taking p.o. well.  The patient continues to smoke.    Oncology History:  Oncology/Hematology History   Prostate cancer    Initial Diagnosis    Prostate cancer     1/23/2020 Procedure    PSA 13.96     2/23/2021 Procedure    PSA 4.4     4/23/2022 Procedure    PSA 10.1     5/12/2022 Procedure    PSA 16.70     5/23/2022 Biopsy    Prostate Biopsy:  Left mid medial 1 - prostatic adenocarcinoma, Lafayette 3+3=6  Left mid lateral 2 - prostatic adenocarcinoma [place] 3+3=6  Left mid medial 2 - prostatic adenocarcinoma Johanny 3+3=6  Left apex lateral - prostatic adenocarcinoma, Johanny 3+3=6  Left apex medial - prostatic adenocarcinoma, [place] 3+3=6  Right base medial - prostatic adenocarcinoma, Johanny 3+3=6    Perineural invasion is identified in this case     9/7/2022 Surgery    Final Diagnosis   Prostate, prostatectomy:               - Histologic Type:  Acinar adenocarcinoma.               - Histologic Grade:  Grade group 4 (Lafayette score 4 + 4 = 8).               - Intraductal Carcinoma:  Not identified.               - Cribriform Glands:  Present.               - Treatment Effect:  No known presurgical therapy.               - Tumor Quantitation: 41-50%.               - Extraprostatic Extension:  Not identified.               - Urinary  Bladder Neck Invasion: Not identified.               - Seminal Vesicle Invasion: Not identified.               - Lymphovascular Invasion: Not identified.               - Margins: All margins are Negative for invasive carcinoma.               - Pathologic Stage:  pT2 pNx pMx        12/8/2022 Procedure    PSA <0.1     5/16/2023 Procedure    PSA <0.1     9/19/2023 Procedure    PSA <0.1     1/23/2024 Procedure    PSA <0.1     Malignant neoplasm hypopharynx    Initial Diagnosis    Malignant neoplasm hypopharynx     2/28/2024 Imaging    CT Neck:  35 x 40 mm lobular enhancing mass in the upper larynx arising from the posterior   wall at the level of the base of the epiglottis. This is suspicious for malignancy.   There is abnormal mucosal enhancement also noted involving the uvula and   tongue base.      3/2/2024 Imaging    CT Neck:  Findings suggest laryngeal mass as noted, suggest ENT evaluation, asymmetric appearance of the vocal cords and larynx  Minimal adenopathy  Postop changes left submandibular region     3/13/2024 Imaging    CT Chest:  Well-defined 1.7 cm fluid density lesion in the anterior mediastinum could   represent a benign etiology such as a thymic cyst. Given lack of prior imaging   and concern for malignancy, recommend close attention on follow-up imaging   and further workup as clinically necessary.  Mild diffuse thickening of bilateral adrenal glands could represent   adenomatous hyperplasia. Recommend correlation to prior imaging if   available and attention on follow-up imaging.  No evidence of pulmonary metastases.     3/14/2024 Biopsy    Awake tracheostomy/Direct laryngoscopy with suspension with   telescope/right cervical esophagoscopy/biopsy    Findings:  Successful awake tracheostomy in successful awake tracheostomy with   placement of a 6 cuffed Shiley tracheostomy tube.  Large exophytic 5-6 cm posterior oropharyngeal mass extending to the   junction of the lateral posterior pharyngeal wall  junction bilaterally, superiorly   comes up to the level of the soft palate. Inferiorly extends into bilateral piriform   sinuses, but does not involve the apex, however there are reactive changes   of the left piriform apex. There is no involvement of the supraglottic structures.  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  No other mucosal masses, lesions, or ulcers visualized.   No mucosal masses, lesions, or ulcers visualized in the esophageal inlet or   cervical esophagus.    DIAGNOSIS:     A. VOCAL CORD, LEFT, BIOPSY:   - Squamous mucosa with focal dysplasia, at least moderate.     B.  POSTERIOR PHARYNGEAL WALL, BIOPSY:   - INVASIVE POORLY DIFFERENTIATED SQUAMOUS CELL CARCINOMA.     C.  POSTERIOR PHARYNGEAL WALL, BIOPSY:   - INVASIVE POORLY DIFFERENTIATED SQUAMOUS CELL CARCINOMA WITH FOCAL METAPLASTIC FEATURES     D.  POSTERIOR PHARYNGEAL WALL, BIOPSY:   - INVASIVE POORLY DIFFERENTIATED SQUAMOUS CELL CARCINOMA WITH FOCAL METAPLASTIC FEATURES      3/18/2024 Procedure    Gastrostomy tube placement     3/22/2024 Procedure    Appointment with Dr. Rand - [facility]:  Laryngoscopy:  Findings: nasopharynx and oropharynx patent without masses/lesions; BOT   without masses/lesions; exophytic lesion likely coming from posterior   pharyngeal wall protruding onto supraglottis and glottis; unable to visualize   glottis d/t obstructive view; supraglottis without obvious lesion otherwise    PLAN:   CT chest without mets. Discussed imaging in depth with team. Patient is   currently T3N0, stage III oropharyngeal SCC.   DL had biopsies positive for invasive SCC.   Discussed chemoradiation vs surgical resection with patient. Patient will   benefit from chemoradiation at this time.  Patient would like to have treatment closer to [facility] in [facility]. Will send   out referrals.  Patient may need further evaluation of his carotid stenosis  as well.     Appointment with Dr. 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       4/2/2024 Cancer Staged    Staging form: Pharynx - Hypopharynx, AJCC 8th Edition  - Clinical stage from 4/2/2024: Stage III (cT3, cN0, cM0) - Signed by Tee Randhawa MD on 4/2/2024 5/9/2024 - 6/20/2024 Chemotherapy    OP HEAD & NECK CISplatin (Weekly) + XRT     Malignant melanoma of nose   8/7/2014 Surgery    FINAL DIAGNOSIS                       A. CYST, RIGHT EYELID:                 EPIDERMAL INCLUSION CYST.            B. EXCISION, SKIN LESION, LEFT NOSE:                 1.   MALIGNANT MELANOMA.                 2.   ANTHONY LEVEL III.                 3.   BRESLOW MAXIMUM THICKNESS EQUALS 0.4 MM.                 4.   NO ULCERATION IDENTIFIED.                 5.   NO MICROSATELLITES IDENTIFIED.                 6.   NO MITOTIC FIGURES IDENTIFIED IN TUMOR.                 7.   NO ANGIOLYMPHATIC INVASION IDENTIFIED.                 8.   AJCC STAGE: pT1a pNX           Dictated by: CHANTE GILL MD        9/11/2014 Surgery    FINAL DIAGNOSIS                       SKIN, NOSE, RE-EXCISION FOR MELANOMA:                 NEGATIVE FOR RESIDUAL MELANOMA OR MELANOMA IN SITU.                 BIOPSY SITE IDENTIFIED.                 SEVERE SOLAR ELASTOSIS.                 SEBORRHEIC KERATOSIS.                 MARGINS OF RESECTION ARE NEGATIVE.              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    Tobacco use disorder         Past Surgical History:   Procedure Laterality Date    COLON RESECTION N/A 7/30/2024    Procedure: Laparoscopic low anterior resection, diverting loop ileostomy;  Surgeon: Abram Cruz MD;  Location: [facility] OR;  Service: General;  Laterality: N/A;    COLONOSCOPY N/A 5/1/2024    Procedure: COLONOSCOPY WITH ANESTHESIA;  Surgeon: Gustabo Ruiz MD;  Location: [facility] ENDOSCOPY;  Service: Gastroenterology;  Laterality: N/A;  preop; abnormal pet scan   postop rectal mass; polyps;   PCP Dr Santos    CYST REMOVAL Right 08/07/2014    RIGHT LOWER EYELID- EPIDERMAL INCLUSION CYST    EXCISION LESION N/A 08/07/2014    MALIGNANT MELANOMA OF LEFT NASAL TIP    FOREHEAD FLAP  09/11/2014    ILEOSTOMY CLOSURE N/A 10/15/2024    Procedure: ILEOSTOMY TAKEDOWN;  Surgeon: Abram Cruz MD;  Location: [facility] MAIN OR;  Service: General;  Laterality: N/A;    PEDICLE FLAP  10/02/2014    PEDICLE DIVISION AND FLAP INSET OF NOSE    PROSTATECTOMY N/A 09/07/2022    Procedure: PROSTATECTOMY LAPAROSCOPIC WITH DAVINCI ROBOT;  Surgeon: Matthew Weaver MD;  Location: [facility] OR;  Service: Robotics - DaVinci;  Laterality: N/A;    SQUAMOUS CELL CARCINOMA EXCISION  10/29/2013    LEFT FLOOR OF MOUTH    TONSILLECTOMY         Family History: His family history includes Colon polyps in his father; Heart disease in his father; No Known Problems in his mother.     Social History: He  reports that he has been smoking cigarettes. He has never used smokeless tobacco. He reports that he does not currently use alcohol. He reports that he does not use drugs.    Home Medications:  traZODone    Allergies:  He is allergic to sulfa antibiotics.       Vital Signs:   Temp:  [98.4 °F (36.9 °C)] 98.4 °F (36.9 °C)  ENT Physical Exam  Constitutional  Appearance: patient appears well-developed, thin, patient is cooperative;  Communication/Voice: communication appropriate for developmental age; vocal quality normal;  Constitutional comments:  Trach in place  Smells heavily  cigs  Head and Face  Appearance: head appears normal, face appears normal and face appears atraumatic;  Palpation: facial palpation normal;  Salivary: glands normal;  Ear  Hearing: intact;  Auricles: bilateral auricles normal;  External Mastoids: right external mastoid normal; left external mastoid normal;  Ear Canals: bilateral ear canals normal;  Tympanic Membranes: bilateral tympanic membranes normal;  Nose  External Nose: nares patent bilaterally; external nose normal;  Internal Nose: nasal mucosa normal; nasal septal deviation present; deviation is to the right, septal deviation is intermediate; Septum comments: general deviation all levels L to R   bilateral inferior turbinates normal;  Oral Cavity/Oropharynx  Lips: normal;  Teeth: dentures (upper and lower) noted;  Gums: gingiva normal;  Tongue: normal; Oral Tongue comments: Tobacco stained  Oral mucosa: mucous membranes dry;  Hard palate: normal;  Soft palate: normal;  Tonsils: normal;  Base of Tongue: normal;  Posterior pharyngeal wall: normal;  OC/OP comments: Palpation negative in base of tongue, oropharynx, tonsillar arch  Neck  Neck: no neck mass or crepitus present; tracheostomy noted; healing well; Tracheostomy tube size: 6 mm; Tracheostomy tube type: Shiley flexible shaft; Passy-Telephone speaking valve present;  Neck comments: Trach in position  Brawny induration of anterior neck    TRACHEOSTOMY SITE:    Tracheostomy tube type: Shiley #6 cuffed DIC, flexible shaft    Stoma: Well-healed    Secretions: Minimal clear    Passey-Telephone valve: Positioned, tolerated well    Voice: Good quality  Date placed: 9  Date last changed: 4/21/2025      Respiratory  Inspection: breathing unlabored; normal breathing rate;  Cardiovascular  Inspection: extremities are warm and well perfused; no peripheral edema present;  Neurovestibular  Mental Status: alert and oriented;  Psychiatric: mood normal; affect is appropriate;  Cranial Nerves:  cranial nerves intact;  Drawings         Laryngoscopy    Date/Time: 4/21/2025 10:16 AM    Performed by: Lenny Middleton Jr., MD  Authorized by: Lenny Middleton Jr., MD    Consent:     Consent obtained:  Verbal    Consent given by:  Patient    Alternatives discussed:  No treatment  Anesthesia (see MAR for exact dosages):     Anesthesia method:  Topical application    Topical anesthetic:  Tetracaine  Procedure details:     Indications: assessment of airway and oncologic surveillance      Medication:  Afrin    Instrument: flexible fiberoptic laryngoscope      Scope location: left nare    Sinus/ Nasopharynx:     Right nasopharynx: patent and inflammation      Left nasopharynx: patent and inflammation      Right eustachian tube: patent      Left eustachian tube: inflammation, patent and inflammation    Oropharynx/ Supraglottis:     Posterior pharyngeal wall: inflamed      Oropharynx: inflammation      Oropharynx: no stenosis and no lesion      Vallecula: inflammation      Vallecula: no lesion      Base of tongue: inflammation      Base of tongue: no lingual tonsillar hypertrophy and no lesion      Epiglottis: inflammation and retroflexed (Moderate)       comment:  Tocco shaped  Larynx/ Hypopharynx:     Arytenoids: inflammation and interarytenoid edema      Hypopharynx: inflammation      Hypopharynx: no lesions      Pyriform sinus: inflammation      Pyriform sinus: no lesion and no pooling of secretions      False vocal cords: inflammation      False vocal cords: no lesion      True vocal cords: Shawn's edema      True vocal cords: no immobility    Post-procedure details:     Patient tolerance of procedure:  Tolerated well  Comments:      Moderate on the nasopharynx out of the hypopharynx, including the supraglottis and glottis  Radiation change to the mucous membranes from the oropharynx down to the hypopharynx  V shaped epiglottis that is retroflexed and partially obstructing the supraglottic  "area.  Posterior pharyngeal wall has erythema but no evidence of recurrent lesion  Base of tongue is without lesions  Vallecula is clear  Supraglottis is inflamed  Vocal cords appear without lesions, submucosal edema, normal mobility  Tracheostomy Tube Exchange    Date/Time: 4/21/2025 1:36 PM    Performed by: Lenny Middleton Jr., MD  Authorized by: Lenny Middleton Jr., MD  Consent: Verbal consent obtained  Risks and benefits: risks, benefits and alternatives were discussed  Consent given by: patient  Patient understanding: patient states understanding of the procedure being performed  Patient consent: the patient's understanding of the procedure matches consent given  Patient identity confirmed: verbally with patient  Time out: Immediately prior to procedure a \"time out\" was called to verify the correct patient, procedure, equipment, support staff and site/side marked as required.  Indications: routine  Local anesthesia used: no    Anesthesia:  Local anesthesia used: no    Sedation:  Patient sedated: no    Tube type: double cannula  Tube cuff: cuffless  Tube size: 6.0 mm  Confirmation: Nasopharyngoscope used to confirm placement  Patient tolerance: patient tolerated the procedure well with no immediate complications  Comments: Tracheobronchoscopy: Tracheal bronchoscope was used to get the entire procedure to examine the trachea and subglottic area, and to confirm position of the tracheostomy tube.  Distal trachea-normal mucosa down to ana maria, ana maria sharp, MSB patent  Proximal trachea-normal, no stenosis  Subglottis-normal mucosa, no stenosis  True vocal folds from below-normal mobility, no penetration of secretions  Stoma-well-healed  Tracheostomy tube was replaced and position confirmed with the scope         Result Review       RESULTS REVIEW    I have reviewed the patients old records in the chart.   I have reviewed the patients old records in the chart.        Assessment & Plan  Squamous cell " carcinoma of pharynx  No evidence of recurrence  Mucositis due to radiation therapy  Stable  Tracheostomy, acute management  Trach changed today, good position  Personal history of nicotine dependence  Counseled cessation  Airway compromise  Supraglottic/epiglottic level  Anomaly of epiglottis  V shaped  ETD (Eustachian tube dysfunction), right       Orders Placed This Encounter   Procedures    Provide Patient With Instructions on NPO Status    Flexible laryngoscopy           Assessment & Plan      Medical and surgical options were discussed including observation, continued medical management, medication modification, and surgical management. Risks, benefits and alternatives were discussed and questions were answered. After considering the options, the patient decided to proceed with surgical management.  Medical and surgical options were discussed including medical and surgical options. Risks, benefits and alternatives were discussed and questions were answered. After considering the options, the patient decided to proceed with surgery.     -----SURGERY SCHEDULING:-----  Schedule DIRECT LARYNGOSCOPY, ESOPHAGOSCOPY, BRONCHOSCOPY (Bilateral), MICRODIRECT LARYNGOSCOPY WITH/ Tracheostomy chamge, Tracheal dilitation (Bilateral)    ---INFORMED CONSENT DISCUSSION:---  TRACHEOSTOMY: The risks, benefits and options were explained including but not limited to pain, bleeding (including life threatening bleeding), infection, (including possible mediastinitis), the risks of the general anesthesia, pain, temporary or permanent hoarseness, voice loss, scarring (including subglottic stenosis), recurrent and or persistent lesion, airway loss, dislodgement of the tracheostomy prematurely, or the need for a permanent tracheostomy tube. Questions were answered. No guarantees were made or implied.    PANENDOSCOPY: The risks and benefits were explained including but not limited to pain, bleeding, infection, (including possible  mediastinitis), the risks of the general anesthesia, pain, temporary or permanent hoarseness, airway loss, and/or tooth injury. Questions were answered. No guarantees were made or implied.      ---PREOPERATIVE WORKUP:---  labs/ workup per anesthesia  Preoperative Medicine evaluation for clearance- Dr Marrero  Patient appears to have supraglottic airway obstruction because of the shape of his epiglottis and position of his epiglottis.  I am reluctant to remove the tracheostomy tube completely.  I have discussed with him placing a Jay cannula at this point in time.  I feel this will provide him with a safe airway and much improved tracheostomy management regimen.  I have discussed risk, benefits, alternative treatments, options with him and plan in simple language.  He and his wife wish to proceed.  I will plan panendoscopy with Jay tracheal cannula placement.  I will then place him on constant capping but have the security of an optional airway should he have any swelling of his epiglottis.  Oral care for the cancer patient  Nasal saline  Stop smoking  Plan panendoscopy with tracheal dilatation tracheostomy tube change    My Chart:  Encouraged to enroll in My Chart  Encouraged to review data and findings in My Chart    Patient, Spouse understand(s) and agree(s) with the treatment plan as described.      Return in about 6 weeks (around 6/2/2025) for Recheck.        Electronically signed by Lenny Middleton Jr, MD 04/21/25 1:32 PM CDT.

## 2025-04-28 ENCOUNTER — TELEPHONE (OUTPATIENT)
Dept: OTOLARYNGOLOGY | Facility: CLINIC | Age: 65
End: 2025-04-28
Payer: MEDICAID

## 2025-04-28 NOTE — TELEPHONE ENCOUNTER
I spoke to Elizabeth at PCP office, they have not seen patient since 11/24, patient will need appointment to be evaluated for surgical clearance.    I called Ed, patient's contact and advised him to call and schedule an appt. Shaq MACK

## 2025-06-02 ENCOUNTER — PRE-ADMISSION TESTING (OUTPATIENT)
Dept: PREADMISSION TESTING | Facility: HOSPITAL | Age: 65
End: 2025-06-02
Payer: MEDICAID

## 2025-06-02 VITALS
HEART RATE: 64 BPM | BODY MASS INDEX: 18.18 KG/M2 | WEIGHT: 119.93 LBS | DIASTOLIC BLOOD PRESSURE: 65 MMHG | OXYGEN SATURATION: 99 % | RESPIRATION RATE: 18 BRPM | SYSTOLIC BLOOD PRESSURE: 148 MMHG | HEIGHT: 68 IN

## 2025-06-02 LAB
DEPRECATED RDW RBC AUTO: 48.4 FL (ref 37–54)
ERYTHROCYTE [DISTWIDTH] IN BLOOD BY AUTOMATED COUNT: 13.5 % (ref 12.3–15.4)
HCT VFR BLD AUTO: 41.5 % (ref 37.5–51)
HGB BLD-MCNC: 14.2 G/DL (ref 13–17.7)
MCH RBC QN AUTO: 32.9 PG (ref 26.6–33)
MCHC RBC AUTO-ENTMCNC: 34.2 G/DL (ref 31.5–35.7)
MCV RBC AUTO: 96.3 FL (ref 79–97)
PLATELET # BLD AUTO: 216 10*3/MM3 (ref 140–450)
PMV BLD AUTO: 9.2 FL (ref 6–12)
RBC # BLD AUTO: 4.31 10*6/MM3 (ref 4.14–5.8)
WBC NRBC COR # BLD AUTO: 8.13 10*3/MM3 (ref 3.4–10.8)

## 2025-06-02 PROCEDURE — 36415 COLL VENOUS BLD VENIPUNCTURE: CPT

## 2025-06-02 PROCEDURE — 85027 COMPLETE CBC AUTOMATED: CPT

## 2025-06-02 NOTE — DISCHARGE INSTRUCTIONS

## 2025-06-05 ENCOUNTER — TELEPHONE (OUTPATIENT)
Dept: OTOLARYNGOLOGY | Facility: CLINIC | Age: 65
End: 2025-06-05
Payer: MEDICAID

## 2025-06-05 NOTE — TELEPHONE ENCOUNTER
Parent/guardian called with pts 10:00  surgery arrival time.  NPO after midnight the night before, voiced understanding.

## 2025-06-06 ENCOUNTER — ANESTHESIA EVENT (OUTPATIENT)
Dept: PERIOP | Facility: HOSPITAL | Age: 65
End: 2025-06-06
Payer: MEDICAID

## 2025-06-06 ENCOUNTER — ANESTHESIA (OUTPATIENT)
Dept: PERIOP | Facility: HOSPITAL | Age: 65
End: 2025-06-06
Payer: MEDICAID

## 2025-06-06 ENCOUNTER — HOSPITAL ENCOUNTER (OUTPATIENT)
Facility: HOSPITAL | Age: 65
Setting detail: HOSPITAL OUTPATIENT SURGERY
Discharge: HOME | End: 2025-06-06
Attending: OTOLARYNGOLOGY | Admitting: OTOLARYNGOLOGY
Payer: MEDICAID

## 2025-06-06 VITALS
SYSTOLIC BLOOD PRESSURE: 125 MMHG | OXYGEN SATURATION: 99 % | RESPIRATION RATE: 20 BRPM | DIASTOLIC BLOOD PRESSURE: 68 MMHG | HEART RATE: 66 BPM | TEMPERATURE: 97.5 F

## 2025-06-06 DIAGNOSIS — J98.8 AIRWAY COMPROMISE: Primary | ICD-10-CM

## 2025-06-06 DIAGNOSIS — Z43.0 TRACHEOSTOMY, ACUTE MANAGEMENT: ICD-10-CM

## 2025-06-06 DIAGNOSIS — C14.0 SQUAMOUS CELL CARCINOMA OF PHARYNX: ICD-10-CM

## 2025-06-06 PROCEDURE — 25010000002 LIDOCAINE PF 2% 2 % SOLUTION: Performed by: NURSE ANESTHETIST, CERTIFIED REGISTERED

## 2025-06-06 PROCEDURE — 31502 CHANGE OF WINDPIPE AIRWAY: CPT | Performed by: OTOLARYNGOLOGY

## 2025-06-06 PROCEDURE — 25010000002 FENTANYL CITRATE (PF) 100 MCG/2ML SOLUTION: Performed by: NURSE ANESTHETIST, CERTIFIED REGISTERED

## 2025-06-06 PROCEDURE — 25010000002 MIDAZOLAM PER 1MG: Performed by: ANESTHESIOLOGY

## 2025-06-06 PROCEDURE — 25810000003 LACTATED RINGERS PER 1000 ML: Performed by: OTOLARYNGOLOGY

## 2025-06-06 PROCEDURE — 43191 ESOPHAGOSCOPY RIGID TRNSO DX: CPT | Performed by: OTOLARYNGOLOGY

## 2025-06-06 PROCEDURE — 31630 BRONCHOSCOPY DILATE/FX REPR: CPT | Performed by: OTOLARYNGOLOGY

## 2025-06-06 PROCEDURE — 31526 DX LARYNGOSCOPY W/OPER SCOPE: CPT | Performed by: OTOLARYNGOLOGY

## 2025-06-06 PROCEDURE — 25010000002 PROPOFOL 10 MG/ML EMULSION: Performed by: NURSE ANESTHETIST, CERTIFIED REGISTERED

## 2025-06-06 RX ORDER — IBUPROFEN 600 MG/1
600 TABLET, FILM COATED ORAL EVERY 6 HOURS PRN
Status: DISCONTINUED | OUTPATIENT
Start: 2025-06-06 | End: 2025-06-06 | Stop reason: HOSPADM

## 2025-06-06 RX ORDER — NALOXONE HCL 0.4 MG/ML
0.4 VIAL (ML) INJECTION AS NEEDED
Status: DISCONTINUED | OUTPATIENT
Start: 2025-06-06 | End: 2025-06-06 | Stop reason: HOSPADM

## 2025-06-06 RX ORDER — FENTANYL CITRATE 50 UG/ML
25 INJECTION, SOLUTION INTRAMUSCULAR; INTRAVENOUS
Status: DISCONTINUED | OUTPATIENT
Start: 2025-06-06 | End: 2025-06-06 | Stop reason: HOSPADM

## 2025-06-06 RX ORDER — FENTANYL CITRATE 50 UG/ML
50 INJECTION, SOLUTION INTRAMUSCULAR; INTRAVENOUS
Status: DISCONTINUED | OUTPATIENT
Start: 2025-06-06 | End: 2025-06-06 | Stop reason: HOSPADM

## 2025-06-06 RX ORDER — LIDOCAINE HYDROCHLORIDE 40 MG/ML
SOLUTION TOPICAL AS NEEDED
Status: DISCONTINUED | OUTPATIENT
Start: 2025-06-06 | End: 2025-06-06 | Stop reason: SURG

## 2025-06-06 RX ORDER — HYDROCODONE BITARTRATE AND ACETAMINOPHEN 5; 325 MG/1; MG/1
1 TABLET ORAL EVERY 4 HOURS PRN
Qty: 15 TABLET | Refills: 0 | Status: SHIPPED | OUTPATIENT
Start: 2025-06-06 | End: 2025-06-09

## 2025-06-06 RX ORDER — FLUMAZENIL 0.1 MG/ML
0.2 INJECTION INTRAVENOUS AS NEEDED
Status: DISCONTINUED | OUTPATIENT
Start: 2025-06-06 | End: 2025-06-06 | Stop reason: HOSPADM

## 2025-06-06 RX ORDER — HYDROCODONE BITARTRATE AND ACETAMINOPHEN 5; 325 MG/1; MG/1
1 TABLET ORAL EVERY 4 HOURS PRN
Status: DISCONTINUED | OUTPATIENT
Start: 2025-06-06 | End: 2025-06-06 | Stop reason: HOSPADM

## 2025-06-06 RX ORDER — SODIUM CHLORIDE, SODIUM LACTATE, POTASSIUM CHLORIDE, CALCIUM CHLORIDE 600; 310; 30; 20 MG/100ML; MG/100ML; MG/100ML; MG/100ML
1000 INJECTION, SOLUTION INTRAVENOUS CONTINUOUS
Status: DISCONTINUED | OUTPATIENT
Start: 2025-06-06 | End: 2025-06-06 | Stop reason: HOSPADM

## 2025-06-06 RX ORDER — SODIUM CHLORIDE 0.9 % (FLUSH) 0.9 %
3 SYRINGE (ML) INJECTION AS NEEDED
Status: DISCONTINUED | OUTPATIENT
Start: 2025-06-06 | End: 2025-06-06 | Stop reason: HOSPADM

## 2025-06-06 RX ORDER — DEXTROSE MONOHYDRATE 25 G/50ML
12.5 INJECTION, SOLUTION INTRAVENOUS AS NEEDED
Status: DISCONTINUED | OUTPATIENT
Start: 2025-06-06 | End: 2025-06-06 | Stop reason: HOSPADM

## 2025-06-06 RX ORDER — LABETALOL HYDROCHLORIDE 5 MG/ML
5 INJECTION, SOLUTION INTRAVENOUS
Status: DISCONTINUED | OUTPATIENT
Start: 2025-06-06 | End: 2025-06-06 | Stop reason: HOSPADM

## 2025-06-06 RX ORDER — FENTANYL CITRATE 50 UG/ML
INJECTION, SOLUTION INTRAMUSCULAR; INTRAVENOUS AS NEEDED
Status: DISCONTINUED | OUTPATIENT
Start: 2025-06-06 | End: 2025-06-06 | Stop reason: SURG

## 2025-06-06 RX ORDER — SODIUM CHLORIDE 0.9 % (FLUSH) 0.9 %
10 SYRINGE (ML) INJECTION EVERY 12 HOURS SCHEDULED
Status: DISCONTINUED | OUTPATIENT
Start: 2025-06-06 | End: 2025-06-06 | Stop reason: HOSPADM

## 2025-06-06 RX ORDER — PROPOFOL 10 MG/ML
VIAL (ML) INTRAVENOUS AS NEEDED
Status: DISCONTINUED | OUTPATIENT
Start: 2025-06-06 | End: 2025-06-06 | Stop reason: SURG

## 2025-06-06 RX ORDER — HYDROCODONE BITARTRATE AND ACETAMINOPHEN 10; 325 MG/1; MG/1
1 TABLET ORAL EVERY 4 HOURS PRN
Status: DISCONTINUED | OUTPATIENT
Start: 2025-06-06 | End: 2025-06-06 | Stop reason: HOSPADM

## 2025-06-06 RX ORDER — SODIUM CHLORIDE 0.9 % (FLUSH) 0.9 %
10 SYRINGE (ML) INJECTION AS NEEDED
Status: DISCONTINUED | OUTPATIENT
Start: 2025-06-06 | End: 2025-06-06 | Stop reason: HOSPADM

## 2025-06-06 RX ORDER — MIDAZOLAM HYDROCHLORIDE 2 MG/2ML
2 INJECTION, SOLUTION INTRAMUSCULAR; INTRAVENOUS ONCE
Status: COMPLETED | OUTPATIENT
Start: 2025-06-06 | End: 2025-06-06

## 2025-06-06 RX ORDER — EPHEDRINE SULFATE 50 MG/ML
INJECTION INTRAVENOUS AS NEEDED
Status: DISCONTINUED | OUTPATIENT
Start: 2025-06-06 | End: 2025-06-06 | Stop reason: SURG

## 2025-06-06 RX ORDER — LIDOCAINE HYDROCHLORIDE 10 MG/ML
0.5 INJECTION, SOLUTION EPIDURAL; INFILTRATION; INTRACAUDAL; PERINEURAL ONCE AS NEEDED
Status: DISCONTINUED | OUTPATIENT
Start: 2025-06-06 | End: 2025-06-06 | Stop reason: HOSPADM

## 2025-06-06 RX ORDER — ACETAMINOPHEN 325 MG/1
650 TABLET ORAL ONCE
Status: DISCONTINUED | OUTPATIENT
Start: 2025-06-06 | End: 2025-06-06 | Stop reason: HOSPADM

## 2025-06-06 RX ORDER — ONDANSETRON 2 MG/ML
4 INJECTION INTRAMUSCULAR; INTRAVENOUS ONCE AS NEEDED
Status: DISCONTINUED | OUTPATIENT
Start: 2025-06-06 | End: 2025-06-06 | Stop reason: HOSPADM

## 2025-06-06 RX ORDER — MAGNESIUM HYDROXIDE 1200 MG/15ML
LIQUID ORAL AS NEEDED
Status: DISCONTINUED | OUTPATIENT
Start: 2025-06-06 | End: 2025-06-06 | Stop reason: HOSPADM

## 2025-06-06 RX ORDER — LIDOCAINE HYDROCHLORIDE 20 MG/ML
INJECTION, SOLUTION EPIDURAL; INFILTRATION; INTRACAUDAL; PERINEURAL AS NEEDED
Status: DISCONTINUED | OUTPATIENT
Start: 2025-06-06 | End: 2025-06-06 | Stop reason: SURG

## 2025-06-06 RX ORDER — MIDAZOLAM HYDROCHLORIDE 2 MG/2ML
1 INJECTION, SOLUTION INTRAMUSCULAR; INTRAVENOUS
Status: DISCONTINUED | OUTPATIENT
Start: 2025-06-06 | End: 2025-06-06 | Stop reason: HOSPADM

## 2025-06-06 RX ORDER — BUPIVACAINE HCL/0.9 % NACL/PF 0.125 %
PLASTIC BAG, INJECTION (ML) EPIDURAL AS NEEDED
Status: DISCONTINUED | OUTPATIENT
Start: 2025-06-06 | End: 2025-06-06 | Stop reason: SURG

## 2025-06-06 RX ADMIN — LIDOCAINE HYDROCHLORIDE 100 MG: 20 INJECTION, SOLUTION EPIDURAL; INFILTRATION; INTRACAUDAL; PERINEURAL at 12:37

## 2025-06-06 RX ADMIN — PROPOFOL 80 MG: 10 INJECTION, EMULSION INTRAVENOUS at 12:37

## 2025-06-06 RX ADMIN — FENTANYL CITRATE 50 MCG: 50 INJECTION, SOLUTION INTRAMUSCULAR; INTRAVENOUS at 12:57

## 2025-06-06 RX ADMIN — SODIUM CHLORIDE, POTASSIUM CHLORIDE, SODIUM LACTATE AND CALCIUM CHLORIDE 1000 ML: 600; 310; 30; 20 INJECTION, SOLUTION INTRAVENOUS at 10:24

## 2025-06-06 RX ADMIN — Medication 200 MCG: at 12:49

## 2025-06-06 RX ADMIN — LIDOCAINE HYDROCHLORIDE 1 EACH: 40 SOLUTION TOPICAL at 12:37

## 2025-06-06 RX ADMIN — MIDAZOLAM HYDROCHLORIDE 2 MG: 1 INJECTION, SOLUTION INTRAMUSCULAR; INTRAVENOUS at 12:29

## 2025-06-06 RX ADMIN — EPHEDRINE SULFATE 25 MG: 50 INJECTION INTRAVENOUS at 12:44

## 2025-06-06 NOTE — ANESTHESIA PREPROCEDURE EVALUATION
Anesthesia Evaluation     Patient summary reviewed   no history of anesthetic complications:   NPO Solid Status: > 8 hours  NPO Liquid Status: > 2 hours           Airway   Mallampati: II  TM distance: >3 FB  No difficulty expected  Dental      Pulmonary    (+) a smoker Current,  (-) COPD, asthma, sleep apnea    ROS comment: Trach is 14 months old    Cardiovascular   Exercise tolerance: good (4-7 METS)    (-) pacemaker, hypertension, past MI, CAD, angina, cardiac stents      Neuro/Psych  (-) seizures, TIA, CVA  GI/Hepatic/Renal/Endo    (-) GERD, liver disease, no renal disease, diabetes    ROS Comment: Large mass obstructing vocal cords  Trach in place, planning to start radiation and chemo tomorrow  G tube in place    Musculoskeletal     Abdominal    Substance History      OB/GYN          Other      history of cancer        Phys Exam Other: Uncuffed trach shiley 7.5 in place              Anesthesia Plan    ASA 3     general     intravenous induction     Anesthetic plan, risks, benefits, and alternatives have been provided, discussed and informed consent has been obtained with: patient.    CODE STATUS:

## 2025-06-06 NOTE — DISCHARGE INSTRUCTIONS
WRITTEN INSTRUCTIONS TO PATIENT/FAMILY:  Patient instruction sheet  Direct Laryngoscopy  Trach care sheet

## 2025-06-06 NOTE — ANESTHESIA POSTPROCEDURE EVALUATION
Patient: Keron Gerber    Procedure Summary       Date: 06/06/25 Room / Location:  [facility] OR  /  [facility]    Anesthesia Start: 1235 Anesthesia Stop: 1306    Procedure: DIRECT LARYNGOSCOPY, ESOPHAGOSCOPY, BRONCHOSCOPY, TRACHEOSTOMY CHANGE, TRACHEAL DILATION, PLACEMENT OF TOURE TUBE. (Bilateral: Esophagus) Diagnosis:       Squamous cell carcinoma of pharynx      Mucositis due to radiation therapy      Tracheostomy, acute management      Airway compromise      (Squamous cell carcinoma of pharynx [C14.0])      (Mucositis due to radiation therapy [K12.33])      (Tracheostomy, acute management [Z43.0])      (Airway compromise [J98.8])    Surgeons: Lenny Middleton Jr., MD Provider: Carlos Cabrales CRNA    Anesthesia Type: general ASA Status: 3            Anesthesia Type: general    Vitals  Vitals Value Taken Time   /70 06/06/25 13:40   Temp 97.5 °F (36.4 °C) 06/06/25 13:40   Pulse 69 06/06/25 13:43   Resp 21 06/06/25 13:40   SpO2 98 % 06/06/25 13:43   Vitals shown include unfiled device data.        Post Anesthesia Care and Evaluation    Patient location during evaluation: PACU  Patient participation: complete - patient participated  Level of consciousness: awake and alert  Pain management: adequate    Airway patency: patent  Anesthetic complications: No anesthetic complications    Cardiovascular status: acceptable  Respiratory status: acceptable  Hydration status: acceptable    Comments: Blood pressure 125/68, pulse 66, temperature 97.5 °F (36.4 °C), temperature source Temporal, resp. rate 20, SpO2 99%.    Pt discharged from PACU based on andres score >8

## 2025-06-06 NOTE — H&P
[facility]   PREOPERATIVE HISTORY AND PHYSICAL    Patient Name:Keron Gerber  : 1960  MRN: [id number]  Primary Care Physician: Yousif Marrero MD  Date of admission: 2025    Subjective   Subjective     Chief Complaint: preoperative evaluation    History of Present Illness  Keron Gerber is a 64 y.o. male who presents for preoperative evaluation. 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    Tobacco use disorder        Past Surgical History:   Procedure Laterality Date    COLON RESECTION N/A 2024    Procedure: Laparoscopic low anterior resection, diverting loop ileostomy;  Surgeon: Abram Cruz MD;  Location: [facility];  Service: General;  Laterality: N/A;    COLONOSCOPY N/A 2024    Procedure: COLONOSCOPY WITH ANESTHESIA;  Surgeon: Gustabo Ruiz MD;  Location: [facility] ENDOSCOPY;  Service: Gastroenterology;  Laterality: N/A;  preop; abnormal pet scan   postop rectal mass; polyps;   PCP Dr Santos    CYST REMOVAL Right 2014    RIGHT LOWER EYELID- EPIDERMAL INCLUSION CYST    EXCISION LESION N/A 2014    MALIGNANT MELANOMA OF LEFT NASAL TIP    FOREHEAD FLAP  2014    ILEOSTOMY CLOSURE N/A 10/15/2024    Procedure: ILEOSTOMY TAKEDOWN;  Surgeon: Abram Cruz MD;  Location: [facility] OR;  Service: General;  Laterality: N/A;    PEDICLE FLAP  10/02/2014    PEDICLE DIVISION AND FLAP INSET OF NOSE    PROSTATECTOMY N/A 2022    Procedure: PROSTATECTOMY LAPAROSCOPIC WITH DAVINCI ROBOT;  Surgeon: Matthew Weaver MD;  Location: [facility] OR;  Service: Robotics - DaVinci;  Laterality: N/A;    SQUAMOUS CELL  CARCINOMA EXCISION  10/29/2013    LEFT FLOOR OF MOUTH    TONSILLECTOMY         Family History: His family history includes Colon polyps in his father; Heart disease in his father; No Known Problems in his mother.     Social History: He  reports that he has been smoking cigarettes. He has never used smokeless tobacco. He reports that he does not currently use alcohol. He reports that he does not use drugs.    Home Medications:  traZODone    Allergies:  He is allergic to sulfa antibiotics.    Objective    Objective     Vitals:    Temp:  [98.4 °F (36.9 °C)] 98.4 °F (36.9 °C)  Heart Rate:  [75-76] 76  Resp:  [16-18] 18  BP: (126)/(91) 126/91    EXAM  CONSTITUTIONAL: well nourished, well-developed, alert, oriented, in no acute distress   COMMUNICATION AND VOICE: able to communicate normally, normal voice quality  HEAD: normocephalic, no lesions, atraumatic, no tenderness, no masses   FACE: appearance normal, no lesions, no tenderness, no deformities, facial motion symmetric  EYES: ocular motility normal, eyelids normal, orbits normal, no proptosis, conjunctiva normal , pupils equal, round   EARS:  Hearing: hearing to conversational voice intact bilaterally   External Ears: normal bilaterally, no lesions  NOSE:  External Nose: external nasal structure normal, no tenderness on palpation, no nasal discharge, no lesions, no evidence of trauma, nostrils patent   ORAL:  Lips: upper and lower lips without lesion   NECK:  Inspection and Palpation: neck appearance normal, no masses or tenderness  CHEST/RESPIRATORY: normal respiratory effort   CARDIOVASCULAR: no cyanosis or edema   NEUROLOGICAL/PSYCHIATRIC: oriented appropriately for age, mood normal, affect appropriate, CN II-XII intact grossly       Assessment & Plan   Assessment / Plan     Brief Patient Summary:  Keron Gerber is a 64 y.o. male who presents for preoperative evaluation.    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]     *  Mucositis due to radiation therapy [K12.33]     * Tracheostomy, acute management [Z43.0]     * Airway compromise [J98.8]    Active Hospital Problems:  Active Hospital Problems    Diagnosis     **Airway compromise     Squamous cell carcinoma of pharynx     Mucositis due to radiation therapy     Tracheostomy, acute management      Plan:   Procedure(s):  DIRECT LARYNGOSCOPY, ESOPHAGOSCOPY, BRONCHOSCOPY  MICRODIRECT LARYNGOSCOPY WITH/ Tracheostomy chamge, Tracheal dilitation    TRACHEOSTOMY: The risks, benefits and options were explained including but not limited to pain, bleeding (including life threatening bleeding), infection, (including possible mediastinitis), the risks of the general anesthesia, pain, temporary or permanent hoarseness, voice loss, scarring (including subglottic stenosis), recurrent and or persistent lesion, airway loss, dislodgement of the tracheostomy prematurely, or the need for a permanent tracheostomy tube. Questions were answered. No guarantees were made or implied.    PANENDOSCOPY: The risks and benefits were explained including but not limited to pain, bleeding, infection, (including possible mediastinitis), the risks of the general anesthesia, pain, temporary or permanent hoarseness, airway loss, and/or tooth injury. Questions were answered. No guarantees were made or implied.      Lenny Middleton Jr, MD   Electronically signed by Lenny Middleton Jr, MD, 06/06/25, 12:30 PM CDT.

## 2025-06-06 NOTE — OP NOTE
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    Surgeon(s):  Lenny Middleton Jr., MD    Anesthesia: General    Staff:   Circulator: Awa Devlin RN  Scrub Person: Roxanne Vera    Estimated Blood Loss:   minimal    Specimens:                None    Findings:   Nasopharynx-clear to palpation  Oral cavity-edentulous, normal mucosal surfaces of the buccal, sulcus, floor of mouth, dorsal ventral surfaces of the tongue, hard and soft palate, anterior tonsillar pillar  Oropharynx-lateral and posterior pharyngeal walls with no lesions  Base of Tongue-normal lingual tonsils, normal vallecula  Hypopharynx-lateral and posterior walls intact with no evidence of lesions  Supraglottis-epiglottis is significantly Tocco shaped and retroflexed over the top of the aryepiglottic folds and arytenoids; normal aryepiglottic folds; normal arytenoids  Glottis-normal true and false cords  Subglottis-intact mucosa, normal lumen  Trachea-mild suprastomal collapse, intact mucosa down to ana maria, ana maria sharp, MSB patent  Esophagus-mildly redundant, no lesions down to the level of the aortic impression    Complications:   none    Reason for the Operation:  Keron Gerber is a 64 y.o. male with a history of squamous cell carcinoma requiring tracheostomy.  He requires  further evaluation of his trach and tracheostomy as well as trach change  .  After understanding the risks, benefits and alternatives, a consent for the operation was given.     Procedure Description:  The patient was taken back to the operating room, placed supine on the operating table and placed under anesthesia by the anesthesia staff. Once this was done a time out was performed to confirm the patient and the proper procedure. The table was turned 90° to facilitate access to the head.  The patient was prepped and draped in the standard laryngoscopy fashion.  The patient's Shiley #6 uncuffed trach was removed.  A #6 endotracheal tube was replaced through the tracheostoma.  The patient's airway was maintained during the procedure in this fashion.    A mouthguard was placed to protect the teeth/ gumline.    The head was positioned for the procedure.    Direct Laryngoscopy:  SCOPE :   Direct laryngoscope  The head was positioned and extended.  The oral cavity, oropharynx, hypopharynx and nasopharynx were palpated.  The direct laryngoscope was inserted down to the true vocal cords and into the pyriform recesses, systematically examining all the structures of the oropharynx, supraglottic larynx, true vocal cords, and hypopharynx.   The findings are noted above.    The upper aerodigestive tract was carefully inspected with the findings noted above.  The scope was then removed.    Lewy suspension:  The therapeutic direct laryngoscope was placed in the appropriate position.  The patient was placed in Lewy suspension.  The operating telescope/microscope was brought into the field.    Suspension was released and the scope was removed after below bronchoscopy.  The airway was inspected and found to be patent.  The procedure was terminated.    Esophagoscopy:  SCOPE :   Rigid esophagascope  The scope was inserted and guided through the cricopharyngeal area.  The scope was passed down to the level of the aortic  impression.  The findings are noted above.    The scope was carefully withdrawn, inspecting the esophageal mucosa.  The findings are noted above.  The procedure was terminated.    Bronchoscopy:  SCOPE :   Rigid telescope    The scope was placed at the level of the vocal cords and advanced into the trachea.  The scope was passed down to the level of the main stem bronchi.   The scope was withdrawn while inspecting the mucosa of the trachea and bronchi.  The findings were noted above.    Tracheal dilatation:  Using the tracheostomy dilator, this was inserted through the tracheostoma and directed superiorly.  The dilator was deployed and held for several seconds to dilate the superior aspect of the tracheostomy site and trachea.    Tracheostomy change:  After completion of all of the above procedures, the patient was brought back to spontaneous ventilation.  A Jay #8 tracheal cannula was then placed in the tracheocutaneous fistula.  This fit quite well.  The cannula was shortened appropriately.  The retention plug was placed and the patient was.  He maintained spontaneous ventilation via his nose and oral cavity.    At the end of the procedure, the operative site was found to be hemostatic.  The operative site was inspected for retained foreign bodies and instruments.   Sponge/needle count was Correct  Hemostasis was satisfactory.  The patient was then turned over to the anesthesia team and allowed to wake from anesthesia.     Disposition: The patient was transported to the PACU in Good condition.   Patient will be discharged home following procedure.    Postoperative discussion held with: Sister, Procedure and findings reviewed    DVT ASSESSMENT CARRIED OUT : Patient is in the immediate post-operative period and is not a candidate for anticoagulation therapy  Patient is at increased risk for bleeding if anticoagulant therapy is used    Lenny Middleton Jr, MD  6/6/2025  13:29 CDT

## 2025-06-19 ENCOUNTER — OFFICE VISIT (OUTPATIENT)
Dept: OTOLARYNGOLOGY | Facility: CLINIC | Age: 65
End: 2025-06-19
Payer: MEDICAID

## 2025-06-19 VITALS
DIASTOLIC BLOOD PRESSURE: 68 MMHG | TEMPERATURE: 97.9 F | WEIGHT: 121 LBS | HEART RATE: 77 BPM | HEIGHT: 68 IN | SYSTOLIC BLOOD PRESSURE: 107 MMHG | BODY MASS INDEX: 18.34 KG/M2

## 2025-06-19 DIAGNOSIS — Z87.891 PERSONAL HISTORY OF NICOTINE DEPENDENCE: ICD-10-CM

## 2025-06-19 DIAGNOSIS — K12.33 MUCOSITIS DUE TO RADIATION THERAPY: ICD-10-CM

## 2025-06-19 DIAGNOSIS — J98.8 AIRWAY COMPROMISE: ICD-10-CM

## 2025-06-19 DIAGNOSIS — Z43.0 TRACHEOSTOMY, ACUTE MANAGEMENT: ICD-10-CM

## 2025-06-19 DIAGNOSIS — Z43.0 ACUTE TRACHEOSTOMY MANAGEMENT: ICD-10-CM

## 2025-06-19 DIAGNOSIS — C14.0 SQUAMOUS CELL CARCINOMA OF PHARYNX: Primary | ICD-10-CM

## 2025-06-19 NOTE — PATIENT INSTRUCTIONS
Stop smoking    >NASAL SALINE:  Use 2 puffs each nostril 4-6 times daily and more frequently if possible.  You can buy saline spray or you can make your own and use an old spray bottle to administer  Use a humidifier at bedside  Recipe for saline:  Water                                 1 quart  Salt (table)                        1 tablespoon  Gylcerin (or Rossy Syrup)    1 teaspoon  Sodium bicarbonate           1 teaspoon  Sprays or Johanny pots are recommended    Do not allow to stand for more than 24 hrs. Make new solution. There is no preservative in this solution.    Sinus irrigation and saline application can be enhanced with various over-the-counter products.  A WaterPik can be quite useful to irrigate, especially following sinus surgery.  Navage makes a product that irrigates the nose and some of the sinuses.  NeilMed makes a Sinu-Gator to irrigate the sinuses.  Neomed also has canned saline that we will come out under pressure.  A [address] pot can also be helpful.  All of these products help keep the nose clear of debris.  Please use as directed on the instructions that come with the particular device.     Tracheostomy Care: Jay style     Clean around the trach tube with soap and water twice daily (May substitute baby wipes)  Do Not Apply Polysporin antibiotic ointment around the trach site.  Suction the trach as needed. You will be trained how to do this while in-patient.  You may develop small tags of tissue that bleed easily. Please call if this develops.  Use a humidifier as much as possible to keep secretions loose.  If you have Jay style trach, you will need to clean the inside of the trach tube with peroxide dipped Q-tips to clear the crusting and the thick secretions.  Mucus can obstruct the trach and cause blockage to airflow. If you suspect plugging, please call immediately.  Make sure the trach is positioned correctly, for the Jay trach, inserted far enough. You should be able to  breathe through the trach tube when it is uncapped and not talk well. The clear line on the trach tube rings faces down  If the trach comes out, please call the office for instructions if you are unable to re-insert properly. Do not allow the trach to remain out for long or the hole will close.  Return to work in N/A  Resume normal activity in N/A  You will be instructed whether or not to cap or place a speaking valve on the trach, or whether a speaking valve will be used:     Cap the trach:  as desired  Uncap trach:  for breathing or suction, and at night for sleep apnea  Humidity:  yes  Inner cannulas:   none for this trach  Velcro tube sales:  none needed  Place the speaking valve: If using  Cuff:  none on this trach  Suction: As needed   Trach type: Trach tube position was confirmed with scope. Trach tube type:  Pierre trach cannula Size  8    If trach comes out, call Dr Middleton for instructions.    Please do NOT hesitate to call the office for any questions or problems.       CONTACT INFORMATION:  The main office phone number is [phone]. For emergencies after hours and on weekends, this number will convert over to our answering service and the on call provider will answer. Please try to keep non emergent phone calls/ questions to office hours 9am-5pm Monday through Friday.     [address]  As an alternative, you can sign up and use the Epic MyChart system for more direct and quicker access for non emergent questions/ problems.  [facility] [address] allows you to send messages to your doctor, view your test results, renew your prescriptions, schedule appointments, and more. To sign up, go to [employer] and click on the Sign Up Now link in the New User? box. Enter your [address] Activation Code exactly as it appears below along with the last four digits of your Social Security Number and your Date of Birth () to complete the sign-up process. If you do not sign up before the expiration  date, you must request a new code.    [employer] Activation Code: [email or web address]  Expires: 6/22/2025  8:43 AM    If you have questions, you can email [email or web address] or call [phone] to talk to our [facility] staff. Remember, MyChart is NOT to be used for urgent needs. For medical emergencies, dial 911.    IF YOU SMOKE, VAPE OR USE TOBACCO PLEASE READ THE FOLLOWING:  Why is smoking bad for me?  Smoking increases the risk of heart disease, lung disease, vascular disease, stroke, and cancer. If you smoke, STOP!      IF YOU SMOKE, VAPE OR USE TOBACCO PLEASE READ THE FOLLOWING:  Why is smoking bad for me?  Smoking increases the risk of heart disease, lung disease, vascular disease, stroke, and cancer. If you smoke, STOP!    For more information:  Quit Now [place]  1-800-QUIT-NOW  https://[place].quitlogix.org/en-[place]/

## 2025-06-19 NOTE — PROGRESS NOTES
Lenny Middleton Jr, MD  [facility]AR NOSE & THROAT  [address], [address]  [place] [address]  Fax [phone]  Phone [phone]      Visit Type: POST-OP   Chief Complaint   Patient presents with    Tracheostomy Tube Change           HISTORY OBTAINED FROM: patient  Accompanied by:Wife  ROYCE Gerber is a 65 y.o.  male who presents for follow up s/p Direct Laryngoscopy, Esophagoscopy, Bronchoscopy, Tracheostomy Change, Tracheal Dilation, Placement Of Toure Tube. - Bilateral on 6/6/2025. The patient has had a relatively normal postoperative course and currently has no related complaints.    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    Tobacco use disorder        Past Surgical History:   Procedure Laterality Date    COLON RESECTION N/A 7/30/2024    Procedure: Laparoscopic low anterior resection, diverting loop ileostomy;  Surgeon: Abram Cruz MD;  Location: [facility] OR;  Service: General;  Laterality: N/A;    COLONOSCOPY N/A 5/1/2024    Procedure: COLONOSCOPY WITH ANESTHESIA;  Surgeon: Gustabo Ruiz MD;  Location: [facility] ENDOSCOPY;  Service: Gastroenterology;  Laterality: N/A;  preop; abnormal pet scan   postop rectal mass; polyps;   PCP Dr Santos    CYST REMOVAL Right 08/07/2014    RIGHT LOWER EYELID- EPIDERMAL INCLUSION CYST    DIRECT LARYNGOSCOPY, ESOPHAGOSCOPY, BRONCHOSCOPY Bilateral 6/6/2025    Procedure: DIRECT LARYNGOSCOPY, ESOPHAGOSCOPY, BRONCHOSCOPY, TRACHEOSTOMY CHANGE, TRACHEAL DILATION, PLACEMENT OF TOURE TUBE.;  Surgeon: Lenny Middleton Jr., MD;  Location: [facility] OR;  Service: ENT;  Laterality: Bilateral;    EXCISION LESION N/A 08/07/2014    MALIGNANT MELANOMA OF LEFT NASAL TIP    FOREHEAD FLAP  09/11/2014     "ILEOSTOMY CLOSURE N/A 10/15/2024    Procedure: ILEOSTOMY TAKEDOWN;  Surgeon: Abram Cruz MD;  Location: [facility] MAIN OR;  Service: General;  Laterality: N/A;    PEDICLE FLAP  10/02/2014    PEDICLE DIVISION AND FLAP INSET OF NOSE    PROSTATECTOMY N/A 09/07/2022    Procedure: PROSTATECTOMY LAPAROSCOPIC WITH DAVINCI ROBOT;  Surgeon: Matthew Weaver MD;  Location: [facility] OR;  Service: Robotics - DaVinci;  Laterality: N/A;    SQUAMOUS CELL CARCINOMA EXCISION  10/29/2013    LEFT FLOOR OF MOUTH    TONSILLECTOMY         Family History: His family history includes Colon polyps in his father; Heart disease in his father; No Known Problems in his mother.     Social History: He  reports that he has been smoking cigarettes. He has never used smokeless tobacco. He reports that he does not currently use alcohol. He reports that he does not use drugs.    Home Medications:  traZODone    Allergies:  He is allergic to sulfa antibiotics.       Vital Signs:   Temp:  [97.9 °F (36.6 °C)] 97.9 °F (36.6 °C)  Heart Rate:  [77] 77  BP: (107)/(68) 107/68  ENT Physical Exam     Tracheostomy Tube Exchange    Date/Time: 6/19/2025 2:47 PM    Performed by: Lenny Middleton Jr., MD  Authorized by: Lenny Middleton Jr., MD  Consent: Verbal consent obtained  Risks and benefits: risks, benefits and alternatives were discussed  Consent given by: patient  Patient understanding: patient states understanding of the procedure being performed  Patient consent: the patient's understanding of the procedure matches consent given  Patient identity confirmed: verbally with patient  Time out: Immediately prior to procedure a \"time out\" was called to verify the correct patient, procedure, equipment, support staff and site/side marked as required.  Indications: routine  Local anesthesia used: no    Anesthesia:  Local anesthesia used: no    Sedation:  Patient sedated: no    Confirmation: Nasopharyngoscope used to confirm placement  Patient " tolerance: patient tolerated the procedure well with no immediate complications  Comments: Tracheobronchoscopy: Trigger breath scope was used to establish position of the Jay trach.  Distal trachea is intact with normal ana maria, MSB patent  Proximal trachea-posterior wall thickening, no stenosis  Subglottis-no stenosis  Vocal cords from below-freely mobile with no penetration of secretions  Jay tracheal cannula seated in appropriate position internally         Result Review       RESULTS REVIEW    I have reviewed the patients old records in the chart.   I have reviewed the patients old records in the chart.        Assessment & Plan  Squamous cell carcinoma of pharynx  No evidence of recurrence  Mucositis due to radiation therapy  Stable  Tracheostomy, acute management  Status post change to Jay tracheal cannula size 8  Personal history of nicotine dependence    Airway compromise  At the epiglottic level  Acute tracheostomy management  Evaluated in good position today              Assessment & Plan      Resume preoperative activity and medications.  Patient's tracheostomy tube is in good position.  I will continue trach education and management.  He will continue with cancer surveillance as well.  Oral care for the cancer patient  Nasal saline  Stop smoking  Jay tracheostomy care  Call for problems    My Chart:  Encouraged to enroll in My Chart  Encouraged to review data and findings in My Chart    Patient, Spouse understand(s) and agree(s) with the treatment plan as described.        Return in about 6 weeks (around 7/31/2025) for Recheck Trach, Flex scope.        Electronically signed by Lenny Middleton Jr, MD 06/19/25 2:35 PM CDT.

## 2025-07-30 ENCOUNTER — OFFICE VISIT (OUTPATIENT)
Dept: OTOLARYNGOLOGY | Facility: CLINIC | Age: 65
End: 2025-07-30
Payer: MEDICAID

## 2025-07-30 VITALS
HEART RATE: 54 BPM | BODY MASS INDEX: 18.1 KG/M2 | HEIGHT: 68 IN | WEIGHT: 119.4 LBS | TEMPERATURE: 98.6 F | SYSTOLIC BLOOD PRESSURE: 115 MMHG | DIASTOLIC BLOOD PRESSURE: 91 MMHG

## 2025-07-30 DIAGNOSIS — H69.91 ETD (EUSTACHIAN TUBE DYSFUNCTION), RIGHT: ICD-10-CM

## 2025-07-30 DIAGNOSIS — Q31.8 ANOMALY OF EPIGLOTTIS: ICD-10-CM

## 2025-07-30 DIAGNOSIS — C14.0 SQUAMOUS CELL CARCINOMA OF PHARYNX: Primary | ICD-10-CM

## 2025-07-30 DIAGNOSIS — K12.33 MUCOSITIS DUE TO RADIATION THERAPY: ICD-10-CM

## 2025-07-30 DIAGNOSIS — Z43.0 ACUTE TRACHEOSTOMY MANAGEMENT: ICD-10-CM

## 2025-07-30 DIAGNOSIS — J98.8 AIRWAY COMPROMISE: ICD-10-CM

## 2025-07-30 DIAGNOSIS — Z87.891 PERSONAL HISTORY OF NICOTINE DEPENDENCE: ICD-10-CM

## 2025-07-30 DIAGNOSIS — Z43.0 TRACHEOSTOMY, ACUTE MANAGEMENT: ICD-10-CM

## 2025-07-30 NOTE — PROGRESS NOTES
Lenny Middletno Jr, MD  [facility]AR [facility] THRO[facility]  [address], [address]  [facility] [address]  Fax [phone]  Phone [phone]      Visit Type: FOLLOW UP   Chief Complaint   Patient presents with    Cancer Surveillance           HISTORY OBTAINED FROM: patient  Accompanied by:Wife  ROYCE Gerber is a 65 y.o. male who presents for routine cancer follow up. He has no uissues. He is breathing  well. Eating is good.  Taste good.  Dry throat.   Still smoking    Oncology History:  Oncology/Hematology History   Prostate cancer    Initial Diagnosis    Prostate cancer     1/23/2020 Procedure    PSA 13.96     2/23/2021 Procedure    PSA 4.4     4/23/2022 Procedure    PSA 10.1     5/12/2022 Procedure    PSA 16.70     5/23/2022 Biopsy    Prostate Biopsy:  Left mid medial 1 - prostatic adenocarcinoma, [place] 3+3=6  Left mid lateral 2 - prostatic adenocarcinoma [place] 3+3=6  Left mid medial 2 - prostatic adenocarcinoma Johanny 3+3=6  Left apex lateral - prostatic adenocarcinoma, [place] 3+3=6  Left apex medial - prostatic adenocarcinoma, [place] 3+3=6  Right base medial - prostatic adenocarcinoma, [place] 3+3=6    Perineural invasion is identified in this case     9/7/2022 Surgery    Final Diagnosis   Prostate, prostatectomy:               - Histologic Type:  Acinar adenocarcinoma.               - Histologic Grade:  Grade group 4 (Johnany score 4 + 4 = 8).               - Intraductal Carcinoma:  Not identified.               - Cribriform Glands:  Present.               - Treatment Effect:  No known presurgical therapy.               - Tumor Quantitation: 41-50%.               - Extraprostatic Extension:  Not identified.               - Urinary Bladder Neck Invasion: Not identified.               - Seminal Vesicle Invasion: Not identified.               - Lymphovascular Invasion: Not identified.               - Margins: All margins are Negative for  invasive carcinoma.               - Pathologic Stage:  pT2 pNx pMx        12/8/2022 Procedure    PSA <0.1     5/16/2023 Procedure    PSA <0.1     9/19/2023 Procedure    PSA <0.1     1/23/2024 Procedure    PSA <0.1     Malignant neoplasm hypopharynx    Initial Diagnosis    Malignant neoplasm hypopharynx     2/28/2024 Imaging    CT Neck:  35 x 40 mm lobular enhancing mass in the upper larynx arising from the posterior   wall at the level of the base of the epiglottis. This is suspicious for malignancy.   There is abnormal mucosal enhancement also noted involving the uvula and   tongue base.      3/2/2024 Imaging    CT Neck:  Findings suggest laryngeal mass as noted, suggest ENT evaluation, asymmetric appearance of the vocal cords and larynx  Minimal adenopathy  Postop changes left submandibular region     3/13/2024 Imaging    CT Chest:  Well-defined 1.7 cm fluid density lesion in the anterior mediastinum could   represent a benign etiology such as a thymic cyst. Given lack of prior imaging   and concern for malignancy, recommend close attention on follow-up imaging   and further workup as clinically necessary.  Mild diffuse thickening of bilateral adrenal glands could represent   adenomatous hyperplasia. Recommend correlation to prior imaging if   available and attention on follow-up imaging.  No evidence of pulmonary metastases.     3/14/2024 Biopsy    Awake tracheostomy/Direct laryngoscopy with suspension with   telescope/right cervical esophagoscopy/biopsy    Findings:  Successful awake tracheostomy in successful awake tracheostomy with   placement of a 6 cuffed Shiley tracheostomy tube.  Large exophytic 5-6 cm posterior oropharyngeal mass extending to the   junction of the lateral posterior pharyngeal wall junction bilaterally, superiorly   comes up to the level of the soft palate. Inferiorly extends into bilateral piriform   sinuses, but does not involve the apex, however there are reactive changes   of the left  piriform apex. There is no involvement of the supraglottic structures.  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  No other mucosal masses, lesions, or ulcers visualized.   No mucosal masses, lesions, or ulcers visualized in the esophageal inlet or   cervical esophagus.    DIAGNOSIS:     A. VOCAL CORD, LEFT, BIOPSY:   - Squamous mucosa with focal dysplasia, at least moderate.     B.  POSTERIOR PHARYNGEAL WALL, BIOPSY:   - INVASIVE POORLY DIFFERENTIATED SQUAMOUS CELL CARCINOMA.     C.  POSTERIOR PHARYNGEAL WALL, BIOPSY:   - INVASIVE POORLY DIFFERENTIATED SQUAMOUS CELL CARCINOMA WITH FOCAL METAPLASTIC FEATURES     D.  POSTERIOR PHARYNGEAL WALL, BIOPSY:   - INVASIVE POORLY DIFFERENTIATED SQUAMOUS CELL CARCINOMA WITH FOCAL METAPLASTIC FEATURES      3/18/2024 Procedure    Gastrostomy tube placement     3/22/2024 Procedure    Appointment with Dr. Rand - [facility]:  Laryngoscopy:  Findings: nasopharynx and oropharynx patent without masses/lesions; BOT   without masses/lesions; exophytic lesion likely coming from posterior   pharyngeal wall protruding onto supraglottis and glottis; unable to visualize   glottis d/t obstructive view; supraglottis without obvious lesion otherwise    PLAN:   CT chest without mets. Discussed imaging in depth with team. Patient is   currently T3N0, stage III oropharyngeal SCC.   DL had biopsies positive for invasive SCC.   Discussed chemoradiation vs surgical resection with patient. Patient will   benefit from chemoradiation at this time.  Patient would like to have treatment closer to home in [facility]. Will send   out referrals.  Patient may need further evaluation of his carotid stenosis as well.     Appointment with Dr. 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       4/2/2024 Cancer Staged    Staging form: Pharynx - Hypopharynx, AJCC 8th Edition  - Clinical stage from 4/2/2024: Stage III (cT3, cN0, cM0) - Signed by Tee Randhawa MD on 4/2/2024 5/9/2024 - 6/20/2024 Chemotherapy    OP HEAD & NECK CISplatin (Weekly) + XRT     Malignant melanoma of nose   8/7/2014 Surgery    FINAL DIAGNOSIS                       A. CYST, RIGHT EYELID:                 EPIDERMAL INCLUSION CYST.            B. EXCISION, SKIN LESION, LEFT NOSE:                 1.   MALIGNANT MELANOMA.                 2.   ANTHONY LEVEL III.                 3.   BRESLOW MAXIMUM THICKNESS EQUALS 0.4 MM.                 4.   NO ULCERATION IDENTIFIED.                 5.   NO MICROSATELLITES IDENTIFIED.                 6.   NO MITOTIC FIGURES IDENTIFIED IN TUMOR.                 7.   NO ANGIOLYMPHATIC INVASION IDENTIFIED.                 8.   AJCC STAGE: pT1a pNX           Dictated by: CHANTE GILL MD        9/11/2014 Surgery    FINAL DIAGNOSIS                       SKIN, NOSE, RE-EXCISION FOR MELANOMA:                 NEGATIVE FOR RESIDUAL MELANOMA OR MELANOMA IN SITU.                 BIOPSY SITE IDENTIFIED.                 SEVERE SOLAR ELASTOSIS.                 SEBORRHEIC KERATOSIS.                 MARGINS OF RESECTION ARE NEGATIVE.              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    Tobacco use disorder        Past Surgical History:   Procedure Laterality Date    COLON RESECTION N/A 7/30/2024    Procedure: Laparoscopic low anterior resection, diverting loop ileostomy;  Surgeon: Abram Cruz MD;  Location:   [facility];  Service: General;  Laterality: N/A;    COLONOSCOPY N/A 5/1/2024    Procedure: COLONOSCOPY WITH ANESTHESIA;  Surgeon: Gustabo Ruiz MD;  Location: [facility] ENDOSCOPY;  Service: Gastroenterology;  Laterality: N/A;  preop; abnormal pet scan   postop rectal mass; polyps;   PCP Dr Santos    CYST REMOVAL Right 08/07/2014    RIGHT LOWER EYELID- EPIDERMAL INCLUSION CYST    DIRECT LARYNGOSCOPY, ESOPHAGOSCOPY, BRONCHOSCOPY Bilateral 6/6/2025    Procedure: DIRECT LARYNGOSCOPY, ESOPHAGOSCOPY, BRONCHOSCOPY, TRACHEOSTOMY CHANGE, TRACHEAL DILATION, PLACEMENT OF TOURE TUBE.;  Surgeon: Lenny Middleton Jr., MD;  Location: [facility] OR;  Service: ENT;  Laterality: Bilateral;    EXCISION LESION N/A 08/07/2014    MALIGNANT MELANOMA OF LEFT NASAL TIP    FOREHEAD FLAP  09/11/2014    ILEOSTOMY CLOSURE N/A 10/15/2024    Procedure: ILEOSTOMY TAKEDOWN;  Surgeon: Abram Cruz MD;  Location: [facility] MAIN OR;  Service: General;  Laterality: N/A;    PEDICLE FLAP  10/02/2014    PEDICLE DIVISION AND FLAP INSET OF NOSE    PROSTATECTOMY N/A 09/07/2022    Procedure: PROSTATECTOMY LAPAROSCOPIC WITH DAVINCI ROBOT;  Surgeon: Matthew Weaver MD;  Location: [facility] OR;  Service: Robotics - DaVinci;  Laterality: N/A;    SQUAMOUS CELL CARCINOMA EXCISION  10/29/2013    LEFT FLOOR OF MOUTH    TONSILLECTOMY         Family History: His family history includes Colon polyps in his father; Heart disease in his father; No Known Problems in his mother.     Social History: He  reports that he has been smoking cigarettes. He has never used smokeless tobacco. He reports that he does not currently use alcohol. He reports that he does not use drugs.    Home Medications:  traZODone    Allergies:  He is allergic to sulfa antibiotics.       Vital Signs:      ENT Physical Exam  Constitutional  Appearance: patient appears well-developed, thin, patient is cooperative;  Communication/Voice: communication appropriate for developmental age;  vocal quality normal;  Constitutional comments: Trach in place  Smells heavily  cigs  Head and Face  Appearance: head appears normal, face appears normal and face appears atraumatic;  Palpation: facial palpation normal;  Salivary: glands normal;  Ear  Hearing: intact;  Auricles: bilateral auricles normal;  External Mastoids: right external mastoid normal; left external mastoid normal;  Ear Canals: bilateral ear canals normal;  Tympanic Membranes: bilateral tympanic membranes normal;  Nose  External Nose: nares patent bilaterally; external nose normal;  Internal Nose: nasal mucosa normal; nasal septal deviation present; deviation is to the right, septal deviation is intermediate; Septum comments: general deviation all levels L to R   bilateral inferior turbinates normal;  Oral Cavity/Oropharynx  Lips: normal;  Teeth: dentures (upper and lower) noted;  Gums: gingiva normal;  Tongue: normal; Oral Tongue comments: Tobacco stained  Oral mucosa: mucous membranes dry;  Hard palate: normal;  Soft palate: normal;  Tonsils: normal;  Base of Tongue: normal;  Posterior pharyngeal wall: normal;  OC/OP comments: Palpation negative in base of tongue, oropharynx, tonsillar arch  Neck  Neck: no neck mass or crepitus present; tracheostomy noted; healing well; Tracheostomy tube size: 10 mm; Tracheostomy tube type: Jay short-term tracheal cannula; trach tube capped; normal range of motion;  Neck comments: Trach in position  Brawny induration of anterior neck    TRACHEOSTOMY SITE:    Tracheostomy tube type: Jay #10 tracheal cannula    Stoma: Well-healed    Secretions: Minimal clear  Capping: Patient remains capped    Voice: Good quality  Date placed: 9  Date last changed: Surgery      Respiratory  Inspection: breathing unlabored; normal breathing rate;  Cardiovascular  Inspection: extremities are warm and well perfused; no peripheral edema present;  Neurovestibular  Mental Status: alert and oriented;  Psychiatric: mood  normal; affect is appropriate;  Cranial Nerves: cranial nerves intact;  Drawings         Laryngoscopy    Date/Time: 7/30/2025 10:56 AM    Performed by: Lenny Middleton Jr., MD  Authorized by: Lenny Middleton Jr., MD    Consent:     Consent obtained:  Verbal    Consent given by:  Patient    Alternatives discussed:  No treatment  Anesthesia (see MAR for exact dosages):     Anesthesia method:  Topical application    Topical anesthetic:  Tetracaine  Procedure details:     Indications: oncologic surveillance      Medication:  Afrin    Instrument: flexible fiberoptic laryngoscope      Scope location: left nare    Sinus/ Nasopharynx:     Right nasopharynx: patent and inflammation      Left nasopharynx: patent and inflammation      Right eustachian tube: patent      Left eustachian tube: inflammation, patent and inflammation    Oropharynx/ Supraglottis:     Posterior pharyngeal wall: inflamed      Oropharynx: inflammation      Oropharynx: no lesion and no retained secretions      Vallecula: inflammation      Vallecula: no lesion      Base of tongue: inflammation      Base of tongue: no lingual tonsillar hypertrophy and no lesion      Epiglottis: inflammation and retroflexed       comment:  V-shaped  Larynx/ Hypopharynx:     Arytenoids: inflammation and interarytenoid edema      Arytenoids: no lesions      Hypopharynx: inflammation      Hypopharynx: no lesions      Pyriform sinus: inflammation      Pyriform sinus: no lesion and no pooling of secretions      False vocal cords: inflammation      True vocal cords: Shawn's edema      True vocal cords: no immobility    Post-procedure details:     Patient tolerance of procedure:  Tolerated well  Comments:      Moderate summation, radiation changes from nasopharynx down to the hypopharynx, including glottis supraglottis  V shaped epiglottis that is partially obstructing the supraglottis with aryepiglottic folds narrowed over the glottis itself  No evidence of  "lesions  True vocal folds fully mobile  Tracheostomy Tube Exchange    Date/Time: 7/30/2025 10:56 AM    Performed by: Lenny Middleton Jr., MD  Authorized by: Lenny Middleton Jr., MD  Consent: Verbal consent obtained  Risks and benefits: risks, benefits and alternatives were discussed  Consent given by: patient  Patient understanding: patient states understanding of the procedure being performed  Patient consent: the patient's understanding of the procedure matches consent given  Patient identity confirmed: verbally with patient  Time out: Immediately prior to procedure a \"time out\" was called to verify the correct patient, procedure, equipment, support staff and site/side marked as required.  Indications: routine  Local anesthesia used: no    Anesthesia:  Local anesthesia used: no    Sedation:  Patient sedated: no    Tube type: single cannula  Tube cuff: cuffless  Confirmation: Nasopharyngoscope used to confirm placement  Comments: Tracheobronchoscopy: Tracheal bronchoscope was used to evaluate the tracheostomy, stoma, trachea proper.  Distal trachea-intact mucosa, dry, ana maria sharp, MSB patent  Proximal trachea-intact mucosa with no stenosis  Subglottis-intact mucosa with no stenosis, no penetration of secretions  True vocal folds from below-fully mobile true vocal folds with no evidence of aspiration  Stoma-intact and healing appropriately  Tracheostomy position-good position of Jay tracheal cannula         Result Review       RESULTS REVIEW    I have reviewed the patients old records in the chart.   I have reviewed the patients old records in the chart.        Assessment & Plan  Squamous cell carcinoma of pharynx    Mucositis due to radiation therapy    Tracheostomy, acute management    Personal history of nicotine dependence    Airway compromise    Acute tracheostomy management    Anomaly of epiglottis    ETD (Eustachian tube dysfunction), right       Orders Placed This Encounter   Procedures "    Tracheostomy Tube Exchange    Flexible laryngoscopy           Assessment & Plan      Continue current management plan.  Patient has no evidence of recurrent malignancy in the posterior pharyngeal wall.  His tracheostomy is in good position.  I am still concerned that his V shaped epiglottis might swell and cause glottic airway obstruction.  Patient will continue with the Jay cannula in position for now.  While he has not required acute airway management, his tracheostomy in position helps assure that airway obstruction will not happen.  Jay tracheostomy tube care  Nasal saline  Oral care for the cancer patient  Call for problems    My Chart:  Encouraged to enroll in My Chart  Encouraged to review data and findings in My Chart    Patient, Spouse understand(s) and agree(s) with the treatment plan as described.        Return in about 2 months (around 9/30/2025) for Recheck SCCa and trach.        Electronically signed by Lenny Middleton Jr, MD 07/30/25 10:53 AM CDT.

## 2025-07-30 NOTE — PATIENT INSTRUCTIONS
>NASAL SALINE:  Use 2 puffs each nostril 4-6 times daily and more frequently if possible.  You can buy saline spray or you can make your own and use an old spray bottle to administer  Use a humidifier at bedside  Recipe for saline:  Water                                 1 quart  Salt (table)                        1 tablespoon  Gylcerin (or Rossy Syrup)    1 teaspoon  Sodium bicarbonate           1 teaspoon  Sprays or [place] pots are recommended    Do not allow to stand for more than 24 hrs. Make new solution. There is no preservative in this solution.    Sinus irrigation and saline application can be enhanced with various over-the-counter products.  A WaterPik can be quite useful to irrigate, especially following sinus surgery.  Navage makes a product that irrigates the nose and some of the sinuses.  NeilMed makes a Sinu-Gator to irrigate the sinuses.  Neomed also has canned saline that we will come out under pressure.  A Johanny pot can also be helpful.  All of these products help keep the nose clear of debris.  Please use as directed on the instructions that come with the particular device.     Tracheostomy Care: Jay style     Clean around the trach tube with soap and water twice daily (May substitute baby wipes)  Do Not Apply Polysporin antibiotic ointment around the trach site.  Suction the trach as needed. You will be trained how to do this while in-patient.  You may develop small tags of tissue that bleed easily. Please call if this develops.  Use a humidifier as much as possible to keep secretions loose.  If you have Jay style trach, you will need to clean the inside of the trach tube with peroxide dipped Q-tips to clear the crusting and the thick secretions.  Mucus can obstruct the trach and cause blockage to airflow. If you suspect plugging, please call immediately.  Make sure the trach is positioned correctly, for the Jay trach, inserted far enough. You should be able to breathe through  the trach tube when it is uncapped and not talk well. The clear line on the trach tube rings faces down  If the trach comes out, please call the office for instructions if you are unable to re-insert properly. Do not allow the trach to remain out for long or the hole will close.  Return to work in N/A  Resume normal activity in N/A  You will be instructed whether or not to cap or place a speaking valve on the trach, or whether a speaking valve will be used:     Cap the trach:  as desired  Uncap trach:  for breathing or suction, and at night for sleep apnea  Humidity:  yes  Inner cannulas:   none for this trach  Velcro tube sales:  none needed  Place the speaking valve: If using  Cuff:  none on this trach  Suction: As needed   Trach type: Trach tube position was confirmed with scope. Trach tube type:  Jay trach cannula Size  10 Jay Cannula    If trach comes out, call Dr Middleton for instructions.    Stop Smoking    ORAL COMPLICATIONS OF CANCER TREATMENT    Cancer treatments can have a toxic effect on the mucous membranes of the mouth and throat tissues. Almost all patients who receive radiation therapy for head and neck malignancies will develop problems with their gums, mouth, tongue, or teeth. Chemotherapy and bone marrow transplants can also affect these areas.    Many of the problems can be prevented or minimized by careful and diligent attention. Patients should seek dental care prior to beginning the cancer treatments. Taking care of existing problems before therapy can prevent major problems later, including tooth and bone infection, tooth and bone loss, and pain.    Dr. Middleton works closely with the medical and radiation oncologists, as well as the dentist and oral care providers to ensure the optimal heal of the head and neck tissues during cancer treatments. However, the patient needs to maintain a routine to care for the teeth and mucus membranes before and especially during and after  treatment.    General Oral Complications of Cancer Treatment  Inflammation and ulceration of the mucous membranes, called stomatitis or mucositis  Infection  Salivary gland dysfunction, called xerostomia  Tooth decay and demineralization  Impaired function; difficulty eating, swallowing, speaking  Chronic throat pain/ scratchiness  Altered taste perception  Poor nutrition    Additional complications of Chemotherapy  Nerve pain  Bleeding  Skin damage  Prolonged/delayed healing    Additional complications of Radiation therapy  Rampant dental decay  Skin damage  Jaw or neck stiffness  Increased susceptibility to injury and infection  Prolonged/delayed healing    HOW TO CARE FOR YOUR ORAL HEALTH DURING CANCER THERAPY  See a dentist prior to beginning radiation therapy  Brush your teeth and tongue gently with an extra soft, even bristled toothbrush and a bland toothpaste after every meal and at bedtime  Floss teeth once a day but avoid areas that are bleeding or sore  Don’t use mouthwash that contains alcohol  Rinse mouth with baking soda/salt combination or an antiplaque solution at room temperature several times daily. You may also use plain water.  Use a waterpik aimed at the teeth, not the gums with a warm baking soda and salt mixture.  If you wear dentures, wear them only when necessary and never at night.  Increase water intake and use oral moisturizing gels.  Use fluoride if recommended by the dentist.  Exercise jaw muscles 3 times daily and more. Do 20 repetitions of opening and closing the mouth, gently stretching and holding open. If the jaw muscles are particularly stiff, you may insert your fingers and gently pull the jaws apart.  Avoid rough-textured or irritating foods. Drink sips of liquids with each bite to allow the food to soften or thin the food.  Use Chapstick, Blistex, or other soothing lip balms, including Vaseline or Polysporin to the lips.  If the lips begin to crust, do not pull this off is there  is resistance. Instead, soak the lips with a warm wash cloth to gently loosen these crusts.  Talk with Dr. Middleton about pain or numbing medications, both topical and systemic.  Quit smoking, chewing, snuff. All of these tobacco products accelerate the decay process and increase cancer risks.    For a dry mouth:  Sip water frequently  Use a humidifier  Suck ice chips or sugar free candy  Chew sugar free gum  If desired, use saliva substitute or gel, or prescription saliva stimulant  Avoid lemon glycerin swabs and sugar candies and lozenges    Recipe for oral rinse:  Salt  1 tablespoon  Baking Soda 1 teaspoon  Water  1 quart  Rossy syrup 1 tablespoon    Optional:  Small amount of mouthwash for flavoring  If you have scabs or extremely thick mucous, you may mix the above 50:50 with hydrogen peroxide to help loosen this thick layer.    Dietary Instructions:  Choose soft, moist foods  Moisten foods with gravy, broth or butter  Increase fluids with meals  Keep food bite-sized  Blenderize foods or use baby foods  Avoid:  Very hot or very cold beverages  Scratchy or rough foods like pretzels, chips, crackers or nuts  Spicy or salty foods like canned soups. vinegar, ketchup, salsa  Alcohol, beer, wine, whiskey, etc.  Strongly minted candies and toothpaste  Fumes like ammonia, household , paints, gasoline, solvents    Skin care with Cancer treatment:  Moisturize the area of treatment at least 4 times daily and more often  Use hypoallergenic moisturizers  Ask Dr. Middleton about certain healing creams and lotions  Avoid strong soaps and excessive makeup  Avoid the sun  If sun exposure is anticipated, use high SPF sunblocks and Zinc Oxide  Do not use razor blades  Avoid any trauma to the area, including squeezing pimples and popping blisters  Report any sores or skin breakdown to Dr. Middleton    Medications:  Continue all your daily medications  If you are experiencing side effects, report these to   [place]  Continue your multivitamins  Certain medications may interfere with radiation. Please check with Dr. Middleton  Certain natural and homeopathic products can interfere and lessen the efficacy of radiation, especially anti-oxidants. Please ask Dr. Middleton about these products.    Please do not hesitate to call the office for questions or problems. .       CONTACT INFORMATION:  The main office phone number is [phone]. For emergencies after hours and on weekends, this number will convert over to our answering service and the on call provider will answer. Please try to keep non emergent phone calls/ questions to office hours 9am-5pm Monday through Friday.     Overdog  As an alternative, you can sign up and use the Epic MyChart system for more direct and quicker access for non emergent questions/ problems.  Section 101 allows you to send messages to your doctor, view your test results, renew your prescriptions, schedule appointments, and more. To sign up, go to [employer] and click on the Sign Up Now link in the New User? box. Enter your Overdog Activation Code exactly as it appears below along with the last four digits of your Social Security Number and your Date of Birth () to complete the sign-up process. If you do not sign up before the expiration date, you must request a new code.    Overdog Activation Code: [email or web address]  Expires: 2025 10:28 AM    If you have questions, you can email [email or web address] or call [phone] to talk to our Over[employer] staff. Remember, Overdog is NOT to be used for urgent needs. For medical emergencies, dial 911.    IF YOU SMOKE, VAPE OR USE TOBACCO PLEASE READ THE FOLLOWING:  Why is smoking bad for me?  Smoking increases the risk of heart disease, lung disease, vascular disease, stroke, and cancer. If you smoke, STOP!      IF YOU SMOKE, VAPE OR USE TOBACCO PLEASE READ THE FOLLOWING:  Why is smoking bad for me?  Smoking  increases the risk of heart disease, lung disease, vascular disease, stroke, and cancer. If you smoke, STOP!    For more information:  Quit Now [facility]  1-800-QUIT-NOW  [email or web address]

## 2025-08-26 ENCOUNTER — OFFICE VISIT (OUTPATIENT)
Dept: GASTROENTEROLOGY | Facility: CLINIC | Age: 65
End: 2025-08-26
Payer: MEDICAID

## 2025-08-26 VITALS
BODY MASS INDEX: 17.63 KG/M2 | OXYGEN SATURATION: 100 % | DIASTOLIC BLOOD PRESSURE: 66 MMHG | SYSTOLIC BLOOD PRESSURE: 114 MMHG | HEART RATE: 83 BPM | TEMPERATURE: 96.8 F | WEIGHT: 119 LBS | HEIGHT: 69 IN

## 2025-08-26 DIAGNOSIS — D12.8 TUBULOVILLOUS ADENOMA OF RECTUM: Primary | ICD-10-CM

## (undated) DEVICE — RETRACTABLE L-HOOK LAPAROSCOPIC SEALER/DIVIDER: Brand: LIGASURE

## (undated) DEVICE — COVER,MAYO STAND,STERILE: Brand: MEDLINE

## (undated) DEVICE — LAPAROVUE VISIBILITY SYSTEM LAPAROSCOPIC SOLUTIONS: Brand: LAPAROVUE

## (undated) DEVICE — POSITIONER,HEAD,MULTIRING,36CS: Brand: MEDLINE

## (undated) DEVICE — SUT SILK 2/0 FS BLK 18IN 685G

## (undated) DEVICE — DRN JP RND NO TROC SIL 15F 3/16IN

## (undated) DEVICE — 2, DISPOSABLE SUCTION/IRRIGATOR WITH DISPOSABLE TIP: Brand: STRYKEFLOW

## (undated) DEVICE — SUT SILK 2/0 SH CR8 18IN CR8 C012D

## (undated) DEVICE — FIBR LASR FIBERLASEENDURE CO2 295U STRL

## (undated) DEVICE — ECHELON FLEX 60 ARTICULATING ENDOSCOPIC LINEAR CUTTER (NO CARTRIDGE): Brand: ECHELON FLEX ENDOPATH

## (undated) DEVICE — MONOPOLAR METZENBAUM SCISSOR, MINI BLADE TIP, DISPOSABLE: Brand: MONOPOLAR METZENBAUM SCISSOR, MINI BLADE TIP, DISPOSABLE

## (undated) DEVICE — SUT PROLN 2/0 SH 36IN 8523H

## (undated) DEVICE — GLV SURG PREMIERPRO ORTHO LTX PF SZ7 BRN

## (undated) DEVICE — ANTIBACTERIAL UNDYED BRAIDED (POLYGLACTIN 910), SYNTHETIC ABSORBABLE SUTURE: Brand: COATED VICRYL

## (undated) DEVICE — SUT PDS 3/0 SH 27IN DYED Z316H

## (undated) DEVICE — TRAP FLD MINIVAC MEGADYNE 100ML

## (undated) DEVICE — ADHS SKIN PREMIERPRO EXOFIN TOPICAL HI/VISC .5ML

## (undated) DEVICE — Device: Brand: BLACK EYE

## (undated) DEVICE — 3M™ IOBAN™ 2 ANTIMICROBIAL INCISE DRAPE 6650EZ: Brand: IOBAN™ 2

## (undated) DEVICE — SYR LL TP 10ML STRL

## (undated) DEVICE — LOU LAP SIGMOID COLON: Brand: MEDLINE INDUSTRIES, INC.

## (undated) DEVICE — BLUNT TIP LAPAROSCOPIC SEALER/DIVIDER NANO-COATED: Brand: LIGASURE

## (undated) DEVICE — WOUND RETRACTOR AND PROTECTOR: Brand: ALEXIS WOUND PROTECTOR-RETRACTOR

## (undated) DEVICE — ARGYLE YANKAUER BULB TIP WITH VENT: Brand: ARGYLE

## (undated) DEVICE — ENDOPOUCH RETRIEVER SPECIMEN RETRIEVAL BAGS: Brand: ENDOPOUCH RETRIEVER

## (undated) DEVICE — ST TBG AIRSEAL FLTR TRI LUM

## (undated) DEVICE — MEDI-VAC YANKAUER SUCTION HANDLE W/BULBOUS TIP: Brand: CARDINAL HEALTH

## (undated) DEVICE — ENDOPATH PNEUMONEEDLE INSUFFLATION NEEDLES WITH LUER LOCK CONNECTORS 120MM: Brand: ENDOPATH

## (undated) DEVICE — SUT SILK 2/0 SH 30IN K833H

## (undated) DEVICE — GOWN,NON-REINFORCED,SIRUS,SET IN SLV,XXL: Brand: MEDLINE

## (undated) DEVICE — DRSNG WND SIL OPTIFOAM GENTLE BRDR ADHS W/SA 4X4IN

## (undated) DEVICE — FRCP BX RADJAW4 NDL 2.8 240 STD OG

## (undated) DEVICE — SOL NACL 0.9PCT 1000ML

## (undated) DEVICE — ENDOPATH XCEL WITH OPTIVIEW TECHNOLOGY BLADELESS TROCARS WITH STABILITY SLEEVES: Brand: ENDOPATH XCEL OPTIVIEW

## (undated) DEVICE — DEV COND GAS LAP INSUFLOW W/LUER CONN

## (undated) DEVICE — JACKSON-PRATT 100CC BULB RESERVOIR: Brand: CARDINAL HEALTH

## (undated) DEVICE — MASK,OXYGEN,MED CONC,ADLT,7' TUB, UC: Brand: PENDING

## (undated) DEVICE — YANKAUER,BULB TIP WITH VENT: Brand: ARGYLE

## (undated) DEVICE — ARM DRAPE

## (undated) DEVICE — SUT GUT CHRM 3/0 SH 36IN G172H

## (undated) DEVICE — DAVINCI: Brand: MEDLINE INDUSTRIES, INC.

## (undated) DEVICE — GAUZE,SPONGE,4"X4",16PLY,XRAY,STRL,LF: Brand: MEDLINE

## (undated) DEVICE — SUT MNCRYL 2/0 SH 27IN Y417H

## (undated) DEVICE — TROC ANCHORPORT BLADELES W/GRIP 12X120MM

## (undated) DEVICE — TROCAR: Brand: KII FIOS FIRST ENTRY

## (undated) DEVICE — SUT MNCRYL PLS ANTIB UD 4/0 PS2 18IN

## (undated) DEVICE — KIT,ANTI FOG,W/SPONGE & FLUID,SOFT PACK: Brand: MEDLINE

## (undated) DEVICE — LAPAROSCOPIC SMOKE FILTRATION SYSTEM: Brand: PALL LAPAROSHIELD® PLUS LAPAROSCOPIC SMOKE FILTRATION SYSTEM

## (undated) DEVICE — SUT SILK 2/0 TIES 18IN A185H

## (undated) DEVICE — CODMAN® SURGICAL STRIPS 1" X 6" (25MM X 152MM): Brand: CODMAN®

## (undated) DEVICE — GLV SURG BIOGEL LTX PF 8

## (undated) DEVICE — PENCL SMOKE/EVAC MEGADYNE TELESCP 10FT

## (undated) DEVICE — Device: Brand: DEFENDO AIR/WATER/SUCTION AND BIOPSY VALVE

## (undated) DEVICE — APPL CHLORAPREP HI/LITE 26ML ORNG

## (undated) DEVICE — NDL HYPO PRECISIONGLIDE/REG 18G 11/2 PNK

## (undated) DEVICE — CONTAINER,SPECIMEN,OR STERILE,4OZ: Brand: MEDLINE

## (undated) DEVICE — DISPOSABLE GRASPER CARTRIDGE: Brand: DIRECT DRIVE REPOSABLE GRASPERS

## (undated) DEVICE — CANNULA SEAL

## (undated) DEVICE — TOWEL,OR,DSP,ST,BLUE,STD,4/PK,20PK/CS: Brand: MEDLINE

## (undated) DEVICE — PK POSTN TRENGUARD450 PROC

## (undated) DEVICE — SENSR O2 OXIMAX FNGR A/ 18IN NONSTR

## (undated) DEVICE — SUT GUT CHRM 3/0 SH 27IN G122H

## (undated) DEVICE — CATHETER,FOLEY,SILI-ELAST,LTX,20FR,10ML: Brand: MEDLINE

## (undated) DEVICE — GLV SURG BIOGEL M LTX PF 8

## (undated) DEVICE — PACK,SET UP,NO DRAPES: Brand: MEDLINE

## (undated) DEVICE — SUT VIC 0 UR6 27IN VCP603H

## (undated) DEVICE — BAPTIST TURNOVER KIT: Brand: MEDLINE INDUSTRIES, INC.

## (undated) DEVICE — ELECTRD BLD EZ CLN MOD XLNG 2.75IN

## (undated) DEVICE — ENDOCUT SCISSOR TIP, DISPOSABLE: Brand: RENEW

## (undated) DEVICE — SUT SILK 3/0 TIES 18IN A184H

## (undated) DEVICE — SUT VIC 0 SUTUPAK TIES 18IN J906G

## (undated) DEVICE — TROCAR: Brand: KII SLEEVE

## (undated) DEVICE — CUFF,BP,DISP,1 TUBE,ADULT,HP: Brand: MEDLINE

## (undated) DEVICE — PATIENT RETURN ELECTRODE, SINGLE-USE, CONTACT QUALITY MONITORING, ADULT, WITH 9FT CORD, FOR PATIENTS WEIGING OVER 33LBS. (15KG): Brand: MEGADYNE

## (undated) DEVICE — SUT PDS O CT1 CR/8 18IN Z740D

## (undated) DEVICE — SUT PDS 0 CT 36IN VIO PDP358T

## (undated) DEVICE — KT CLN CLEANOR SCPE

## (undated) DEVICE — ADHS SKIN SURG TISS VISC PREMIERPRO EXOFIN HI/VISC FAST/DRY

## (undated) DEVICE — DEV SUT GRSPR CLOSUR 15CM 14G

## (undated) DEVICE — TIP COVER ACCESSORY

## (undated) DEVICE — NDL HYPO PRECISIONGLIDE REG 25G 1 1/2

## (undated) DEVICE — DBD-DRAPE,LAP,CHOLE,W/TROUGHS,STERILE: Brand: MEDLINE

## (undated) DEVICE — NDL SCLEROTHRPY INTERJECT 25G 4 240 CLR

## (undated) DEVICE — SHEET,DRAPE,53X77,STERILE: Brand: MEDLINE

## (undated) DEVICE — PAD,NON-ADHERENT,3X8,STERILE,LF,1/PK: Brand: MEDLINE

## (undated) DEVICE — PROTECT TEETH A/

## (undated) DEVICE — TOTAL TRAY, 16FR 10ML SIL FOLEY, URN: Brand: MEDLINE

## (undated) DEVICE — TROCAR: Brand: KII OPTICAL ACCESS SYSTEM

## (undated) DEVICE — SUT PDS 0 CT1 36IN Z346H

## (undated) DEVICE — ELECTRD BLD MEGADYNE EZCLEAN STD 2.75IN XLNG

## (undated) DEVICE — SYS CLOSE PORTII CARTR/THOMASN XL

## (undated) DEVICE — THE CHANNEL CLEANING BRUSH IS A NYLON FLEXI BRUSH ATTACHED TO A FLEXIBLE PLASTIC SHEATH DESIGNED TO SAFELY REMOVE DEBRIS FROM FLEXIBLE ENDOSCOPES.

## (undated) DEVICE — PK PROC MAJ 40

## (undated) DEVICE — SPONGE,NEURO,0.5"X1.5",XR,STRL,LF,10/PK: Brand: MEDLINE

## (undated) DEVICE — 4-PORT MANIFOLD: Brand: NEPTUNE 2

## (undated) DEVICE — SUT SILK 0 TIES 18IN SA66G

## (undated) DEVICE — SNAR POLYP SENSATION MICRO OVL 13 240X40

## (undated) DEVICE — BLADELESS OBTURATOR: Brand: WECK VISTA

## (undated) DEVICE — GLV SURG BIOGEL M LTX PF 7 1/2

## (undated) DEVICE — SUT VIC 3/0 CTI 36IN J944H

## (undated) DEVICE — SIZE: 8: Brand: MONTGOMERY® SHORT-TERM CANNULA

## (undated) DEVICE — TUBING, SUCTION, 1/4" X 12', STRAIGHT: Brand: MEDLINE

## (undated) DEVICE — ECHELON FLEX 60 ENDOPATH STAPLER COMPACT ARTICULATING ENDOSCOPIC LINEAR CUTTER: Brand: ECHELON  ENDOPATH

## (undated) DEVICE — RESERVOIR,SUCTION,100CC,SILICONE: Brand: MEDLINE